# Patient Record
Sex: FEMALE | Race: WHITE | NOT HISPANIC OR LATINO | Employment: OTHER | URBAN - METROPOLITAN AREA
[De-identification: names, ages, dates, MRNs, and addresses within clinical notes are randomized per-mention and may not be internally consistent; named-entity substitution may affect disease eponyms.]

---

## 2017-01-01 ENCOUNTER — TELEPHONE (OUTPATIENT)
Dept: ONCOLOGY | Facility: CLINIC | Age: 77
End: 2017-01-01

## 2017-01-01 ENCOUNTER — APPOINTMENT (OUTPATIENT)
Dept: CT IMAGING | Facility: HOSPITAL | Age: 77
End: 2017-01-01

## 2017-01-01 ENCOUNTER — APPOINTMENT (OUTPATIENT)
Dept: GENERAL RADIOLOGY | Facility: HOSPITAL | Age: 77
End: 2017-01-01

## 2017-01-01 ENCOUNTER — ANESTHESIA EVENT (OUTPATIENT)
Dept: PERIOP | Facility: HOSPITAL | Age: 77
End: 2017-01-01

## 2017-01-01 ENCOUNTER — HOSPITAL ENCOUNTER (EMERGENCY)
Facility: HOSPITAL | Age: 77
Discharge: HOME | End: 2017-03-12
Attending: EMERGENCY MEDICINE | Admitting: EMERGENCY MEDICINE

## 2017-01-01 ENCOUNTER — OFFICE VISIT (OUTPATIENT)
Dept: ONCOLOGY | Facility: CLINIC | Age: 77
End: 2017-01-01

## 2017-01-01 ENCOUNTER — HOSPITAL ENCOUNTER (INPATIENT)
Facility: HOSPITAL | Age: 77
LOS: 5 days | Discharge: NURSING, REHAB OR LONG-TERM CARE | End: 2018-01-04
Attending: EMERGENCY MEDICINE | Admitting: INTERNAL MEDICINE

## 2017-01-01 ENCOUNTER — ANESTHESIA (OUTPATIENT)
Dept: PERIOP | Facility: HOSPITAL | Age: 77
End: 2017-01-01

## 2017-01-01 VITALS
DIASTOLIC BLOOD PRESSURE: 74 MMHG | SYSTOLIC BLOOD PRESSURE: 135 MMHG | HEIGHT: 61 IN | HEART RATE: 102 BPM | RESPIRATION RATE: 16 BRPM | BODY MASS INDEX: 16.8 KG/M2 | WEIGHT: 89 LBS | TEMPERATURE: 97.4 F

## 2017-01-01 VITALS
WEIGHT: 90 LBS | SYSTOLIC BLOOD PRESSURE: 117 MMHG | RESPIRATION RATE: 16 BRPM | TEMPERATURE: 98.3 F | HEART RATE: 111 BPM | OXYGEN SATURATION: 98 % | DIASTOLIC BLOOD PRESSURE: 91 MMHG | BODY MASS INDEX: 16.99 KG/M2 | HEIGHT: 61 IN

## 2017-01-01 DIAGNOSIS — S92.331A CLOSED DISPLACED FRACTURE OF THIRD METATARSAL BONE OF RIGHT FOOT, INITIAL ENCOUNTER: ICD-10-CM
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
## 2017-01-01 DIAGNOSIS — C50.111 MALIGNANT NEOPLASM OF CENTRAL PORTION OF RIGHT FEMALE BREAST (HCC): ICD-10-CM

## 2017-01-01 DIAGNOSIS — C50.111 MALIGNANT NEOPLASM OF CENTRAL PORTION OF RIGHT FEMALE BREAST (HCC): Primary | ICD-10-CM
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
## 2017-01-01 DIAGNOSIS — S72.001A CLOSED FRACTURE OF RIGHT HIP, INITIAL ENCOUNTER (HCC): ICD-10-CM

## 2017-01-01 DIAGNOSIS — Z78.9 IMPAIRED MOBILITY AND ADLS: ICD-10-CM

## 2017-01-01 DIAGNOSIS — S92.324A CLOSED NONDISPLACED FRACTURE OF SECOND METATARSAL BONE OF RIGHT FOOT, INITIAL ENCOUNTER: Primary | ICD-10-CM

## 2017-01-01 DIAGNOSIS — Z74.09 IMPAIRED MOBILITY AND ADLS: ICD-10-CM

## 2017-01-01 DIAGNOSIS — Z74.09 IMPAIRED FUNCTIONAL MOBILITY, BALANCE, GAIT, AND ENDURANCE: ICD-10-CM

## 2017-01-01 LAB
ALBUMIN SERPL-MCNC: 4.1 G/DL (ref 3.2–4.8)
ALBUMIN/GLOB SERPL: 1.4 G/DL (ref 1.5–2.5)
ALP SERPL-CCNC: 60 U/L (ref 25–100)
ALT SERPL W P-5'-P-CCNC: 31 U/L (ref 7–40)
ANION GAP SERPL CALCULATED.3IONS-SCNC: 4 MMOL/L (ref 3–11)
ANION GAP SERPL CALCULATED.3IONS-SCNC: 6 MMOL/L (ref 3–11)
AST SERPL-CCNC: 34 U/L (ref 0–33)
BASOPHILS # BLD AUTO: 0.03 10*3/MM3 (ref 0–0.2)
BASOPHILS NFR BLD AUTO: 0.3 % (ref 0–1)
BILIRUB SERPL-MCNC: 1.2 MG/DL (ref 0.3–1.2)
BILIRUB UR QL STRIP: NEGATIVE
BUN BLD-MCNC: 13 MG/DL (ref 9–23)
BUN BLD-MCNC: 15 MG/DL (ref 9–23)
BUN/CREAT SERPL: 26 (ref 7–25)
BUN/CREAT SERPL: 30 (ref 7–25)
CALCIUM SPEC-SCNC: 8.6 MG/DL (ref 8.7–10.4)
CALCIUM SPEC-SCNC: 9.2 MG/DL (ref 8.7–10.4)
CHLORIDE SERPL-SCNC: 98 MMOL/L (ref 99–109)
CHLORIDE SERPL-SCNC: 99 MMOL/L (ref 99–109)
CLARITY UR: CLEAR
CO2 SERPL-SCNC: 26 MMOL/L (ref 20–31)
CO2 SERPL-SCNC: 28 MMOL/L (ref 20–31)
COLOR UR: YELLOW
CREAT BLD-MCNC: 0.5 MG/DL (ref 0.6–1.3)
CREAT BLD-MCNC: 0.5 MG/DL (ref 0.6–1.3)
DEPRECATED RDW RBC AUTO: 48.2 FL (ref 37–54)
DEPRECATED RDW RBC AUTO: 48.6 FL (ref 37–54)
DEPRECATED RDW RBC AUTO: 49.1 FL (ref 37–54)
EOSINOPHIL # BLD AUTO: 0.04 10*3/MM3 (ref 0–0.3)
EOSINOPHIL # BLD AUTO: 0.05 10*3/MM3 (ref 0–0.3)
EOSINOPHIL # BLD AUTO: 0.14 10*3/MM3 (ref 0–0.3)
EOSINOPHIL NFR BLD AUTO: 0.4 % (ref 0–3)
EOSINOPHIL NFR BLD AUTO: 0.5 % (ref 0–3)
EOSINOPHIL NFR BLD AUTO: 1.4 % (ref 0–3)
ERYTHROCYTE [DISTWIDTH] IN BLOOD BY AUTOMATED COUNT: 14.2 % (ref 11.3–14.5)
ERYTHROCYTE [DISTWIDTH] IN BLOOD BY AUTOMATED COUNT: 14.3 % (ref 11.3–14.5)
ERYTHROCYTE [DISTWIDTH] IN BLOOD BY AUTOMATED COUNT: 14.3 % (ref 11.3–14.5)
GFR SERPL CREATININE-BSD FRML MDRD: 119 ML/MIN/1.73
GFR SERPL CREATININE-BSD FRML MDRD: 119 ML/MIN/1.73
GLOBULIN UR ELPH-MCNC: 3 GM/DL
GLUCOSE BLD-MCNC: 109 MG/DL (ref 70–100)
GLUCOSE BLD-MCNC: 113 MG/DL (ref 70–100)
GLUCOSE UR STRIP-MCNC: NEGATIVE MG/DL
HCT VFR BLD AUTO: 32.9 % (ref 34.5–44)
HCT VFR BLD AUTO: 36.6 % (ref 34.5–44)
HCT VFR BLD AUTO: 38.7 % (ref 34.5–44)
HGB BLD-MCNC: 10.7 G/DL (ref 11.5–15.5)
HGB BLD-MCNC: 11.7 G/DL (ref 11.5–15.5)
HGB BLD-MCNC: 13 G/DL (ref 11.5–15.5)
HGB UR QL STRIP.AUTO: NEGATIVE
IMM GRANULOCYTES # BLD: 0.01 10*3/MM3 (ref 0–0.03)
IMM GRANULOCYTES # BLD: 0.02 10*3/MM3 (ref 0–0.03)
IMM GRANULOCYTES # BLD: 0.02 10*3/MM3 (ref 0–0.03)
IMM GRANULOCYTES NFR BLD: 0.1 % (ref 0–0.6)
IMM GRANULOCYTES NFR BLD: 0.2 % (ref 0–0.6)
IMM GRANULOCYTES NFR BLD: 0.2 % (ref 0–0.6)
KETONES UR QL STRIP: ABNORMAL
LEUKOCYTE ESTERASE UR QL STRIP.AUTO: NEGATIVE
LYMPHOCYTES # BLD AUTO: 0.75 10*3/MM3 (ref 0.6–4.8)
LYMPHOCYTES # BLD AUTO: 0.94 10*3/MM3 (ref 0.6–4.8)
LYMPHOCYTES # BLD AUTO: 1.28 10*3/MM3 (ref 0.6–4.8)
LYMPHOCYTES NFR BLD AUTO: 12.8 % (ref 24–44)
LYMPHOCYTES NFR BLD AUTO: 7.2 % (ref 24–44)
LYMPHOCYTES NFR BLD AUTO: 8.4 % (ref 24–44)
MCH RBC QN AUTO: 30.1 PG (ref 27–31)
MCH RBC QN AUTO: 30.5 PG (ref 27–31)
MCH RBC QN AUTO: 31.2 PG (ref 27–31)
MCHC RBC AUTO-ENTMCNC: 32 G/DL (ref 32–36)
MCHC RBC AUTO-ENTMCNC: 32.5 G/DL (ref 32–36)
MCHC RBC AUTO-ENTMCNC: 33.6 G/DL (ref 32–36)
MCV RBC AUTO: 92.8 FL (ref 80–99)
MCV RBC AUTO: 93.7 FL (ref 80–99)
MCV RBC AUTO: 94.1 FL (ref 80–99)
MONOCYTES # BLD AUTO: 0.98 10*3/MM3 (ref 0–1)
MONOCYTES # BLD AUTO: 1.1 10*3/MM3 (ref 0–1)
MONOCYTES # BLD AUTO: 1.15 10*3/MM3 (ref 0–1)
MONOCYTES NFR BLD AUTO: 11.5 % (ref 0–12)
MONOCYTES NFR BLD AUTO: 9.4 % (ref 0–12)
MONOCYTES NFR BLD AUTO: 9.8 % (ref 0–12)
NEUTROPHILS # BLD AUTO: 7.37 10*3/MM3 (ref 1.5–8.3)
NEUTROPHILS # BLD AUTO: 8.63 10*3/MM3 (ref 1.5–8.3)
NEUTROPHILS # BLD AUTO: 9.07 10*3/MM3 (ref 1.5–8.3)
NEUTROPHILS NFR BLD AUTO: 73.9 % (ref 41–71)
NEUTROPHILS NFR BLD AUTO: 80.9 % (ref 41–71)
NEUTROPHILS NFR BLD AUTO: 82.4 % (ref 41–71)
NITRITE UR QL STRIP: NEGATIVE
PH UR STRIP.AUTO: 7.5 [PH] (ref 5–8)
PLATELET # BLD AUTO: 160 10*3/MM3 (ref 150–450)
PLATELET # BLD AUTO: 165 10*3/MM3 (ref 150–450)
PLATELET # BLD AUTO: 172 10*3/MM3 (ref 150–450)
PMV BLD AUTO: 11.4 FL (ref 6–12)
PMV BLD AUTO: 11.5 FL (ref 6–12)
PMV BLD AUTO: 11.7 FL (ref 6–12)
POTASSIUM BLD-SCNC: 3.4 MMOL/L (ref 3.5–5.5)
POTASSIUM BLD-SCNC: 3.7 MMOL/L (ref 3.5–5.5)
PROT SERPL-MCNC: 7.1 G/DL (ref 5.7–8.2)
PROT UR QL STRIP: NEGATIVE
RBC # BLD AUTO: 3.51 10*6/MM3 (ref 3.89–5.14)
RBC # BLD AUTO: 3.89 10*6/MM3 (ref 3.89–5.14)
RBC # BLD AUTO: 4.17 10*6/MM3 (ref 3.89–5.14)
SODIUM BLD-SCNC: 130 MMOL/L (ref 132–146)
SODIUM BLD-SCNC: 131 MMOL/L (ref 132–146)
SP GR UR STRIP: 1.02 (ref 1–1.03)
TROPONIN I SERPL-MCNC: 0 NG/ML (ref 0–0.07)
TROPONIN I SERPL-MCNC: 0 NG/ML (ref 0–0.07)
UROBILINOGEN UR QL STRIP: ABNORMAL
WBC NRBC COR # BLD: 10.46 10*3/MM3 (ref 3.5–10.8)
WBC NRBC COR # BLD: 11.2 10*3/MM3 (ref 3.5–10.8)
WBC NRBC COR # BLD: 9.98 10*3/MM3 (ref 3.5–10.8)

## 2017-01-01 PROCEDURE — 97162 PT EVAL MOD COMPLEX 30 MIN: CPT

## 2017-01-01 PROCEDURE — 99283 EMERGENCY DEPT VISIT LOW MDM: CPT

## 2017-01-01 PROCEDURE — A9270 NON-COVERED ITEM OR SERVICE: HCPCS | Performed by: FAMILY MEDICINE

## 2017-01-01 PROCEDURE — 25010000002 NEOSTIGMINE PER 0.5 MG: Performed by: ANESTHESIOLOGY

## 2017-01-01 PROCEDURE — 70470 CT HEAD/BRAIN W/O & W/DYE: CPT

## 2017-01-01 PROCEDURE — A9270 NON-COVERED ITEM OR SERVICE: HCPCS | Performed by: ORTHOPAEDIC SURGERY

## 2017-01-01 PROCEDURE — A9270 NON-COVERED ITEM OR SERVICE: HCPCS | Performed by: PHYSICIAN ASSISTANT

## 2017-01-01 PROCEDURE — 80048 BASIC METABOLIC PNL TOTAL CA: CPT | Performed by: FAMILY MEDICINE

## 2017-01-01 PROCEDURE — 71010 HC CHEST PA OR AP: CPT

## 2017-01-01 PROCEDURE — C1713 ANCHOR/SCREW BN/BN,TIS/BN: HCPCS | Performed by: ORTHOPAEDIC SURGERY

## 2017-01-01 PROCEDURE — 25010000002 ENOXAPARIN PER 10 MG: Performed by: ORTHOPAEDIC SURGERY

## 2017-01-01 PROCEDURE — 25010000002 DEXAMETHASONE PER 1 MG: Performed by: ANESTHESIOLOGY

## 2017-01-01 PROCEDURE — 63710000001 CALCIUM CARB-CHOLECALCIFEROL 600-800 MG-UNIT TABLET: Performed by: FAMILY MEDICINE

## 2017-01-01 PROCEDURE — 76000 FLUOROSCOPY <1 HR PHYS/QHP: CPT

## 2017-01-01 PROCEDURE — 63710000001 DOCUSATE SODIUM 100 MG CAPSULE: Performed by: FAMILY MEDICINE

## 2017-01-01 PROCEDURE — 99221 1ST HOSP IP/OBS SF/LOW 40: CPT | Performed by: INTERNAL MEDICINE

## 2017-01-01 PROCEDURE — 25010000003 CEFAZOLIN PER 500 MG: Performed by: ANESTHESIOLOGY

## 2017-01-01 PROCEDURE — 63710000001 MULTIVITAMIN WITH MINERALS TABLET: Performed by: FAMILY MEDICINE

## 2017-01-01 PROCEDURE — 63710000001 OCUVITE-LUTEIN TABLET: Performed by: FAMILY MEDICINE

## 2017-01-01 PROCEDURE — 63710000001 POLYETHYLENE GLYCOL PACK: Performed by: PHYSICIAN ASSISTANT

## 2017-01-01 PROCEDURE — 99213 OFFICE O/P EST LOW 20 MIN: CPT | Performed by: NURSE PRACTITIONER

## 2017-01-01 PROCEDURE — G8978 MOBILITY CURRENT STATUS: HCPCS

## 2017-01-01 PROCEDURE — 63710000001 HYDROCODONE-ACETAMINOPHEN 5-325 MG TABLET: Performed by: ORTHOPAEDIC SURGERY

## 2017-01-01 PROCEDURE — 63710000001 DIPHENHYDRAMINE PER 50 MG: Performed by: ORTHOPAEDIC SURGERY

## 2017-01-01 PROCEDURE — 25010000002 ONDANSETRON PER 1 MG: Performed by: EMERGENCY MEDICINE

## 2017-01-01 PROCEDURE — 63710000001 SENNOSIDES-DOCUSATE SODIUM 8.6-50 MG TABLET: Performed by: PHYSICIAN ASSISTANT

## 2017-01-01 PROCEDURE — 85025 COMPLETE CBC W/AUTO DIFF WBC: CPT | Performed by: EMERGENCY MEDICINE

## 2017-01-01 PROCEDURE — 73502 X-RAY EXAM HIP UNI 2-3 VIEWS: CPT

## 2017-01-01 PROCEDURE — 25010000002 FENTANYL CITRATE (PF) 100 MCG/2ML SOLUTION: Performed by: ANESTHESIOLOGY

## 2017-01-01 PROCEDURE — 85025 COMPLETE CBC W/AUTO DIFF WBC: CPT | Performed by: ORTHOPAEDIC SURGERY

## 2017-01-01 PROCEDURE — 72192 CT PELVIS W/O DYE: CPT

## 2017-01-01 PROCEDURE — 25010000002 ONDANSETRON PER 1 MG: Performed by: ANESTHESIOLOGY

## 2017-01-01 PROCEDURE — 99222 1ST HOSP IP/OBS MODERATE 55: CPT | Performed by: FAMILY MEDICINE

## 2017-01-01 PROCEDURE — 25010000003 CEFAZOLIN 1 GM/50ML SOLUTION: Performed by: ORTHOPAEDIC SURGERY

## 2017-01-01 PROCEDURE — 99285 EMERGENCY DEPT VISIT HI MDM: CPT

## 2017-01-01 PROCEDURE — 0 IOPAMIDOL PER 1 ML: Performed by: INTERNAL MEDICINE

## 2017-01-01 PROCEDURE — 25010000002 FENTANYL CITRATE (PF) 100 MCG/2ML SOLUTION: Performed by: EMERGENCY MEDICINE

## 2017-01-01 PROCEDURE — 93005 ELECTROCARDIOGRAM TRACING: CPT | Performed by: EMERGENCY MEDICINE

## 2017-01-01 PROCEDURE — 99232 SBSQ HOSP IP/OBS MODERATE 35: CPT | Performed by: PHYSICIAN ASSISTANT

## 2017-01-01 PROCEDURE — 25010000002 PHENYLEPHRINE PER 1 ML: Performed by: ANESTHESIOLOGY

## 2017-01-01 PROCEDURE — 81003 URINALYSIS AUTO W/O SCOPE: CPT | Performed by: EMERGENCY MEDICINE

## 2017-01-01 PROCEDURE — G8979 MOBILITY GOAL STATUS: HCPCS

## 2017-01-01 PROCEDURE — 73630 X-RAY EXAM OF FOOT: CPT

## 2017-01-01 PROCEDURE — 80053 COMPREHEN METABOLIC PANEL: CPT | Performed by: EMERGENCY MEDICINE

## 2017-01-01 PROCEDURE — 84484 ASSAY OF TROPONIN QUANT: CPT

## 2017-01-01 PROCEDURE — 0QH634Z INSERTION OF INTERNAL FIXATION DEVICE INTO RIGHT UPPER FEMUR, PERCUTANEOUS APPROACH: ICD-10-PCS | Performed by: ORTHOPAEDIC SURGERY

## 2017-01-01 DEVICE — SCRW CANN 16THRD 6.5X85MM: Type: IMPLANTABLE DEVICE | Site: HIP | Status: FUNCTIONAL

## 2017-01-01 DEVICE — SCRW CANN 16THRD 6.5X75MM: Type: IMPLANTABLE DEVICE | Site: HIP | Status: FUNCTIONAL

## 2017-01-01 DEVICE — SCRW CANN 16THRD 6.5X80MM: Type: IMPLANTABLE DEVICE | Site: HIP | Status: FUNCTIONAL

## 2017-01-01 RX ORDER — HYDROMORPHONE HYDROCHLORIDE 1 MG/ML
0.5 INJECTION, SOLUTION INTRAMUSCULAR; INTRAVENOUS; SUBCUTANEOUS
Status: DISCONTINUED | OUTPATIENT
Start: 2017-01-01 | End: 2018-01-01 | Stop reason: HOSPADM

## 2017-01-01 RX ORDER — ANASTROZOLE 1 MG/1
1 TABLET ORAL DAILY
Qty: 30 TABLET | Refills: 0 | Status: SHIPPED | OUTPATIENT
Start: 2017-01-01 | End: 2017-01-01 | Stop reason: SDUPTHER

## 2017-01-01 RX ORDER — SODIUM CHLORIDE, SODIUM LACTATE, POTASSIUM CHLORIDE, CALCIUM CHLORIDE 600; 310; 30; 20 MG/100ML; MG/100ML; MG/100ML; MG/100ML
9 INJECTION, SOLUTION INTRAVENOUS CONTINUOUS
Status: DISCONTINUED | OUTPATIENT
Start: 2017-01-01 | End: 2018-01-01

## 2017-01-01 RX ORDER — HYDROCODONE BITARTRATE AND ACETAMINOPHEN 5; 325 MG/1; MG/1
2 TABLET ORAL EVERY 4 HOURS PRN
Status: DISCONTINUED | OUTPATIENT
Start: 2017-01-01 | End: 2018-01-01 | Stop reason: HOSPADM

## 2017-01-01 RX ORDER — DIPHENHYDRAMINE HYDROCHLORIDE 50 MG/ML
25 INJECTION INTRAMUSCULAR; INTRAVENOUS EVERY 6 HOURS PRN
Status: DISCONTINUED | OUTPATIENT
Start: 2017-01-01 | End: 2018-01-01 | Stop reason: HOSPADM

## 2017-01-01 RX ORDER — FENTANYL CITRATE 50 UG/ML
25 INJECTION, SOLUTION INTRAMUSCULAR; INTRAVENOUS
Status: DISCONTINUED | OUTPATIENT
Start: 2017-01-01 | End: 2017-01-01 | Stop reason: HOSPADM

## 2017-01-01 RX ORDER — ATRACURIUM BESYLATE 10 MG/ML
INJECTION, SOLUTION INTRAVENOUS AS NEEDED
Status: DISCONTINUED | OUTPATIENT
Start: 2017-01-01 | End: 2017-01-01 | Stop reason: SURG

## 2017-01-01 RX ORDER — GLYCOPYRROLATE 0.2 MG/ML
INJECTION INTRAMUSCULAR; INTRAVENOUS AS NEEDED
Status: DISCONTINUED | OUTPATIENT
Start: 2017-01-01 | End: 2017-01-01 | Stop reason: SURG

## 2017-01-01 RX ORDER — ONDANSETRON 2 MG/ML
4 INJECTION INTRAMUSCULAR; INTRAVENOUS ONCE
Status: COMPLETED | OUTPATIENT
Start: 2017-01-01 | End: 2017-01-01

## 2017-01-01 RX ORDER — SODIUM CHLORIDE 9 MG/ML
75 INJECTION, SOLUTION INTRAVENOUS CONTINUOUS
Status: DISCONTINUED | OUTPATIENT
Start: 2017-01-01 | End: 2017-01-01

## 2017-01-01 RX ORDER — ONDANSETRON 2 MG/ML
INJECTION INTRAMUSCULAR; INTRAVENOUS AS NEEDED
Status: DISCONTINUED | OUTPATIENT
Start: 2017-01-01 | End: 2017-01-01 | Stop reason: SURG

## 2017-01-01 RX ORDER — POTASSIUM CHLORIDE 1.5 G/1.77G
40 POWDER, FOR SOLUTION ORAL AS NEEDED
Status: DISCONTINUED | OUTPATIENT
Start: 2017-01-01 | End: 2018-01-01 | Stop reason: HOSPADM

## 2017-01-01 RX ORDER — SODIUM CHLORIDE, SODIUM LACTATE, POTASSIUM CHLORIDE, CALCIUM CHLORIDE 600; 310; 30; 20 MG/100ML; MG/100ML; MG/100ML; MG/100ML
INJECTION, SOLUTION INTRAVENOUS CONTINUOUS PRN
Status: DISCONTINUED | OUTPATIENT
Start: 2017-01-01 | End: 2017-01-01 | Stop reason: SURG

## 2017-01-01 RX ORDER — DOCUSATE SODIUM 100 MG/1
100 CAPSULE, LIQUID FILLED ORAL 2 TIMES DAILY PRN
Status: DISCONTINUED | OUTPATIENT
Start: 2017-01-01 | End: 2018-01-01 | Stop reason: HOSPADM

## 2017-01-01 RX ORDER — ANASTROZOLE 1 MG/1
TABLET ORAL
Qty: 90 TABLET | Refills: 2 | Status: CANCELLED | OUTPATIENT
Start: 2017-01-01

## 2017-01-01 RX ORDER — ACETAMINOPHEN 325 MG/1
650 TABLET ORAL EVERY 6 HOURS PRN
Status: DISCONTINUED | OUTPATIENT
Start: 2017-01-01 | End: 2018-01-01

## 2017-01-01 RX ORDER — ETOMIDATE 2 MG/ML
INJECTION INTRAVENOUS AS NEEDED
Status: DISCONTINUED | OUTPATIENT
Start: 2017-01-01 | End: 2017-01-01 | Stop reason: SURG

## 2017-01-01 RX ORDER — VITS A,C,E/LUTEIN/MINERALS 300MCG-200
1 TABLET ORAL DAILY
Status: DISCONTINUED | OUTPATIENT
Start: 2017-01-01 | End: 2018-01-01 | Stop reason: HOSPADM

## 2017-01-01 RX ORDER — SODIUM CHLORIDE 0.9 % (FLUSH) 0.9 %
1-10 SYRINGE (ML) INJECTION AS NEEDED
Status: DISCONTINUED | OUTPATIENT
Start: 2017-01-01 | End: 2018-01-01 | Stop reason: HOSPADM

## 2017-01-01 RX ORDER — ANASTROZOLE 1 MG/1
1 TABLET ORAL DAILY
Qty: 90 TABLET | Refills: 1 | Status: SHIPPED | OUTPATIENT
Start: 2017-01-01 | End: 2018-01-01 | Stop reason: HOSPADM

## 2017-01-01 RX ORDER — SENNA AND DOCUSATE SODIUM 50; 8.6 MG/1; MG/1
2 TABLET, FILM COATED ORAL NIGHTLY
Status: DISCONTINUED | OUTPATIENT
Start: 2017-01-01 | End: 2018-01-01 | Stop reason: HOSPADM

## 2017-01-01 RX ORDER — HYDROCODONE BITARTRATE AND ACETAMINOPHEN 5; 325 MG/1; MG/1
1 TABLET ORAL EVERY 4 HOURS PRN
Status: DISCONTINUED | OUTPATIENT
Start: 2017-01-01 | End: 2018-01-01 | Stop reason: HOSPADM

## 2017-01-01 RX ORDER — HYDROCODONE BITARTRATE AND ACETAMINOPHEN 5; 325 MG/1; MG/1
1 TABLET ORAL EVERY 6 HOURS PRN
Qty: 15 TABLET | Refills: 0 | Status: ON HOLD | OUTPATIENT
Start: 2017-01-01 | End: 2018-01-01

## 2017-01-01 RX ORDER — ONDANSETRON 2 MG/ML
4 INJECTION INTRAMUSCULAR; INTRAVENOUS EVERY 6 HOURS PRN
Status: DISCONTINUED | OUTPATIENT
Start: 2017-01-01 | End: 2018-01-01 | Stop reason: HOSPADM

## 2017-01-01 RX ORDER — ONDANSETRON 4 MG/1
4 TABLET, FILM COATED ORAL EVERY 6 HOURS PRN
Status: DISCONTINUED | OUTPATIENT
Start: 2017-01-01 | End: 2018-01-01 | Stop reason: HOSPADM

## 2017-01-01 RX ORDER — CEFAZOLIN SODIUM 1 G/3ML
INJECTION, POWDER, FOR SOLUTION INTRAMUSCULAR; INTRAVENOUS AS NEEDED
Status: DISCONTINUED | OUTPATIENT
Start: 2017-01-01 | End: 2017-01-01 | Stop reason: SURG

## 2017-01-01 RX ORDER — FENTANYL CITRATE 50 UG/ML
25 INJECTION, SOLUTION INTRAMUSCULAR; INTRAVENOUS ONCE
Status: COMPLETED | OUTPATIENT
Start: 2017-01-01 | End: 2017-01-01

## 2017-01-01 RX ORDER — FENTANYL CITRATE 50 UG/ML
25 INJECTION, SOLUTION INTRAMUSCULAR; INTRAVENOUS ONCE
Status: DISCONTINUED | OUTPATIENT
Start: 2017-01-01 | End: 2018-01-01

## 2017-01-01 RX ORDER — MAGNESIUM HYDROXIDE 1200 MG/15ML
LIQUID ORAL AS NEEDED
Status: DISCONTINUED | OUTPATIENT
Start: 2017-01-01 | End: 2017-01-01 | Stop reason: HOSPADM

## 2017-01-01 RX ORDER — ACETAMINOPHEN 325 MG/1
650 TABLET ORAL EVERY 4 HOURS PRN
Status: DISCONTINUED | OUTPATIENT
Start: 2017-01-01 | End: 2018-01-01 | Stop reason: HOSPADM

## 2017-01-01 RX ORDER — FAMOTIDINE 20 MG/1
20 TABLET, FILM COATED ORAL ONCE
Status: DISCONTINUED | OUTPATIENT
Start: 2017-01-01 | End: 2017-01-01 | Stop reason: HOSPADM

## 2017-01-01 RX ORDER — ACETAMINOPHEN 160 MG/5ML
650 SOLUTION ORAL EVERY 4 HOURS PRN
Status: DISCONTINUED | OUTPATIENT
Start: 2017-01-01 | End: 2018-01-01 | Stop reason: HOSPADM

## 2017-01-01 RX ORDER — SODIUM CHLORIDE 9 MG/ML
75 INJECTION, SOLUTION INTRAVENOUS CONTINUOUS
Status: ACTIVE | OUTPATIENT
Start: 2017-01-01 | End: 2018-01-01

## 2017-01-01 RX ORDER — BISACODYL 10 MG
10 SUPPOSITORY, RECTAL RECTAL DAILY PRN
Status: DISCONTINUED | OUTPATIENT
Start: 2017-01-01 | End: 2018-01-01 | Stop reason: HOSPADM

## 2017-01-01 RX ORDER — MULTIPLE VITAMINS W/ MINERALS TAB 9MG-400MCG
1 TAB ORAL DAILY
Status: DISCONTINUED | OUTPATIENT
Start: 2017-01-01 | End: 2018-01-01 | Stop reason: HOSPADM

## 2017-01-01 RX ORDER — LIDOCAINE HYDROCHLORIDE 10 MG/ML
INJECTION, SOLUTION INFILTRATION; PERINEURAL AS NEEDED
Status: DISCONTINUED | OUTPATIENT
Start: 2017-01-01 | End: 2017-01-01 | Stop reason: SURG

## 2017-01-01 RX ORDER — DEXAMETHASONE SODIUM PHOSPHATE 4 MG/ML
INJECTION, SOLUTION INTRA-ARTICULAR; INTRALESIONAL; INTRAMUSCULAR; INTRAVENOUS; SOFT TISSUE AS NEEDED
Status: DISCONTINUED | OUTPATIENT
Start: 2017-01-01 | End: 2017-01-01 | Stop reason: SURG

## 2017-01-01 RX ORDER — FENTANYL CITRATE 50 UG/ML
INJECTION, SOLUTION INTRAMUSCULAR; INTRAVENOUS AS NEEDED
Status: DISCONTINUED | OUTPATIENT
Start: 2017-01-01 | End: 2017-01-01 | Stop reason: SURG

## 2017-01-01 RX ORDER — CALCIUM CARBONATE 200(500)MG
2 TABLET,CHEWABLE ORAL 2 TIMES DAILY PRN
Status: DISCONTINUED | OUTPATIENT
Start: 2017-01-01 | End: 2018-01-01 | Stop reason: HOSPADM

## 2017-01-01 RX ORDER — POTASSIUM CHLORIDE 7.45 MG/ML
10 INJECTION INTRAVENOUS
Status: DISCONTINUED | OUTPATIENT
Start: 2017-01-01 | End: 2018-01-01 | Stop reason: HOSPADM

## 2017-01-01 RX ORDER — BISACODYL 5 MG/1
5 TABLET, DELAYED RELEASE ORAL DAILY PRN
Status: DISCONTINUED | OUTPATIENT
Start: 2017-01-01 | End: 2018-01-01 | Stop reason: HOSPADM

## 2017-01-01 RX ORDER — ONDANSETRON 2 MG/ML
4 INJECTION INTRAMUSCULAR; INTRAVENOUS EVERY 6 HOURS PRN
Status: DISCONTINUED | OUTPATIENT
Start: 2017-01-01 | End: 2018-01-01

## 2017-01-01 RX ORDER — SODIUM CHLORIDE 0.9 % (FLUSH) 0.9 %
1-10 SYRINGE (ML) INJECTION AS NEEDED
Status: DISCONTINUED | OUTPATIENT
Start: 2017-01-01 | End: 2017-01-01 | Stop reason: HOSPADM

## 2017-01-01 RX ORDER — POTASSIUM CHLORIDE 750 MG/1
40 CAPSULE, EXTENDED RELEASE ORAL AS NEEDED
Status: DISCONTINUED | OUTPATIENT
Start: 2017-01-01 | End: 2018-01-01 | Stop reason: HOSPADM

## 2017-01-01 RX ORDER — LIDOCAINE HYDROCHLORIDE 10 MG/ML
0.5 INJECTION, SOLUTION EPIDURAL; INFILTRATION; INTRACAUDAL; PERINEURAL ONCE AS NEEDED
Status: DISCONTINUED | OUTPATIENT
Start: 2017-01-01 | End: 2017-01-01 | Stop reason: HOSPADM

## 2017-01-01 RX ORDER — NALOXONE HCL 0.4 MG/ML
0.1 VIAL (ML) INJECTION
Status: DISCONTINUED | OUTPATIENT
Start: 2017-01-01 | End: 2018-01-01 | Stop reason: HOSPADM

## 2017-01-01 RX ORDER — HYDROMORPHONE HYDROCHLORIDE 1 MG/ML
0.2 INJECTION, SOLUTION INTRAMUSCULAR; INTRAVENOUS; SUBCUTANEOUS
Status: DISCONTINUED | OUTPATIENT
Start: 2017-01-01 | End: 2017-01-01 | Stop reason: HOSPADM

## 2017-01-01 RX ORDER — FAMOTIDINE 10 MG/ML
20 INJECTION, SOLUTION INTRAVENOUS ONCE
Status: COMPLETED | OUTPATIENT
Start: 2017-01-01 | End: 2017-01-01

## 2017-01-01 RX ORDER — DIPHENHYDRAMINE HCL 25 MG
25 CAPSULE ORAL EVERY 6 HOURS PRN
Status: DISCONTINUED | OUTPATIENT
Start: 2017-01-01 | End: 2018-01-01 | Stop reason: HOSPADM

## 2017-01-01 RX ORDER — CEFAZOLIN SODIUM 2 G/100ML
2 INJECTION, SOLUTION INTRAVENOUS ONCE
Status: DISCONTINUED | OUTPATIENT
Start: 2017-01-01 | End: 2017-01-01 | Stop reason: HOSPADM

## 2017-01-01 RX ORDER — ACETAMINOPHEN 650 MG/1
650 SUPPOSITORY RECTAL EVERY 4 HOURS PRN
Status: DISCONTINUED | OUTPATIENT
Start: 2017-01-01 | End: 2018-01-01 | Stop reason: HOSPADM

## 2017-01-01 RX ORDER — DOCUSATE SODIUM 100 MG/1
100 CAPSULE, LIQUID FILLED ORAL 2 TIMES DAILY
Status: DISCONTINUED | OUTPATIENT
Start: 2017-01-01 | End: 2017-01-01

## 2017-01-01 RX ADMIN — MULTIPLE VITAMINS W/ MINERALS TAB 1 TABLET: TAB ORAL at 09:38

## 2017-01-01 RX ADMIN — Medication 2 TABLET: at 19:59

## 2017-01-01 RX ADMIN — ONDANSETRON 4 MG: 2 INJECTION INTRAMUSCULAR; INTRAVENOUS at 19:35

## 2017-01-01 RX ADMIN — GLYCOPYRROLATE 0.4 MG: 0.2 INJECTION, SOLUTION INTRAMUSCULAR; INTRAVENOUS at 19:35

## 2017-01-01 RX ADMIN — SODIUM CHLORIDE 75 ML/HR: 9 INJECTION, SOLUTION INTRAVENOUS at 14:03

## 2017-01-01 RX ADMIN — LIDOCAINE HYDROCHLORIDE 25 MG: 10 INJECTION, SOLUTION INFILTRATION; PERINEURAL at 18:22

## 2017-01-01 RX ADMIN — FAMOTIDINE 20 MG: 10 INJECTION, SOLUTION INTRAVENOUS at 18:08

## 2017-01-01 RX ADMIN — DIPHENHYDRAMINE HYDROCHLORIDE 25 MG: 25 CAPSULE ORAL at 19:59

## 2017-01-01 RX ADMIN — Medication 3 MG: at 19:35

## 2017-01-01 RX ADMIN — FENTANYL CITRATE 50 MCG: 50 INJECTION, SOLUTION INTRAMUSCULAR; INTRAVENOUS at 18:20

## 2017-01-01 RX ADMIN — CEFAZOLIN 1 G: 330 INJECTION, POWDER, FOR SOLUTION INTRAMUSCULAR; INTRAVENOUS at 18:36

## 2017-01-01 RX ADMIN — Medication 1 TABLET: at 09:38

## 2017-01-01 RX ADMIN — SODIUM CHLORIDE, POTASSIUM CHLORIDE, SODIUM LACTATE AND CALCIUM CHLORIDE 9 ML/HR: 600; 310; 30; 20 INJECTION, SOLUTION INTRAVENOUS at 18:08

## 2017-01-01 RX ADMIN — SODIUM CHLORIDE, POTASSIUM CHLORIDE, SODIUM LACTATE AND CALCIUM CHLORIDE: 600; 310; 30; 20 INJECTION, SOLUTION INTRAVENOUS at 18:15

## 2017-01-01 RX ADMIN — Medication 1 TABLET: at 09:37

## 2017-01-01 RX ADMIN — SODIUM CHLORIDE 75 ML/HR: 9 INJECTION, SOLUTION INTRAVENOUS at 09:39

## 2017-01-01 RX ADMIN — CEFAZOLIN SODIUM 1 G: 1 INJECTION, SOLUTION INTRAVENOUS at 09:39

## 2017-01-01 RX ADMIN — CEFAZOLIN SODIUM 1 G: 1 INJECTION, SOLUTION INTRAVENOUS at 02:08

## 2017-01-01 RX ADMIN — HYDROCODONE BITARTRATE AND ACETAMINOPHEN 2 TABLET: 5; 325 TABLET ORAL at 19:59

## 2017-01-01 RX ADMIN — IOPAMIDOL 50 ML: 755 INJECTION, SOLUTION INTRAVENOUS at 18:22

## 2017-01-01 RX ADMIN — ONDANSETRON 4 MG: 2 INJECTION INTRAMUSCULAR; INTRAVENOUS at 13:36

## 2017-01-01 RX ADMIN — FENTANYL CITRATE 50 MCG: 50 INJECTION, SOLUTION INTRAMUSCULAR; INTRAVENOUS at 18:25

## 2017-01-01 RX ADMIN — POLYETHYLENE GLYCOL 3350 17 G: 17 POWDER, FOR SOLUTION ORAL at 09:37

## 2017-01-01 RX ADMIN — PHENYLEPHRINE HYDROCHLORIDE 100 MCG: 10 INJECTION INTRAVENOUS at 19:25

## 2017-01-01 RX ADMIN — FENTANYL CITRATE 25 MCG: 50 INJECTION INTRAMUSCULAR; INTRAVENOUS at 13:36

## 2017-01-01 RX ADMIN — PHENYLEPHRINE HYDROCHLORIDE 100 MCG: 10 INJECTION INTRAVENOUS at 19:23

## 2017-01-01 RX ADMIN — ENOXAPARIN SODIUM 40 MG: 40 INJECTION SUBCUTANEOUS at 09:38

## 2017-01-01 RX ADMIN — ETOMIDATE 10 MG: 2 INJECTION INTRAVENOUS at 18:22

## 2017-01-01 RX ADMIN — SODIUM CHLORIDE 75 ML/HR: 9 INJECTION, SOLUTION INTRAVENOUS at 21:30

## 2017-01-01 RX ADMIN — DEXAMETHASONE SODIUM PHOSPHATE 8 MG: 4 INJECTION, SOLUTION INTRAMUSCULAR; INTRAVENOUS at 18:28

## 2017-01-01 RX ADMIN — DOCUSATE SODIUM 100 MG: 100 CAPSULE, LIQUID FILLED ORAL at 22:14

## 2017-01-01 RX ADMIN — ATRACURIUM BESYLATE 30 MG: 10 INJECTION, SOLUTION INTRAVENOUS at 18:22

## 2017-03-12 NOTE — DISCHARGE INSTRUCTIONS
Information regarding Risks and Benefits When using Opioids and Other Controlled Substances to include Storage and Disposal of Medications    When considering the use of opioids and other controlled substances for the control of pain, anxiety, or for other medical purposes, you need to know of not only the benefits of these drugs but also of potential risks in using these drugs. These drugs, as well as more drugs, have beneficial uses; which is why their use is being considered in your   care, but they have risks involved in their use, too.    Opioids:  Opioids such as hydrocodone, oxycodone, hydromorphone, and codeine are pain relieving drugs, some more potent than others. They are most useful for moderate to more severe painful conditions. Risks include sedation, loss of coordination, decreased concentration, and decreased breathing with possibility of loss of consciousness or even death, especially if used in doses higher than prescribed. Improper usage can lead to addiction, tolerance, or overdose. In addition, many of these drugs are combined with acetaminophen (Tylenol) which can damage or destroy our liver when used excessively.  Alternatives to opioids are useful for mild to moderate pain and include ibuprofen (Motrin), naproxen (Aleve), aspirin, and acetaminophen (Tylenol). As with other drugs, these medications should be used according to directions on the label or from your doctor, as overuse can cause harm.    Benzodiazepines:  This group of drugs include: alprazolam (Xanax), diazepam (Valium), lorazepam (Ativan), and clonazepam (Klonopin). These drugs are used to control anxiety symptoms including anxiety and panic attacks. Risks using these drugs include: sedation, loss of coordination, decreased ability to concentrate, effects on memory, and decreased breathing with possibility of loss of consciousness or even death. Improper and prolonged usage can lead to addiction. An alternative without addiction  potential is hydroxyzine (Vistrail).    Other Controlled Substance:  This group includes Soma, Tramadol, stimulant drugs such as Ritalin, and others. Stimulant drugs are not medications that are prescribed by ER doctors. Soma and Tramadol have sedative and addictive affects similar to opioids with the same dangers mentioned with them.    Overdose:  If you or someone else are concerned that overdose has occurred, call 911 for transportation to the nearest hospital.    Storage and Disposal:  All medications need to be kept out of the reach of children or adults who cannot manage their own medicines. In addition, controlled substances can be targeted by criminals so extra precautions need to be taken to keep them in a safe, secure place. Any unused medications should be disposed of by flushing them down the toilet in the home setting or contact your local pharmacy.

## 2017-03-12 NOTE — ED PROVIDER NOTES
Subjective   Patient is a 76 y.o. female presenting with lower extremity pain.   History provided by:  Patient   used: No    Lower Extremity Issue   Location:  Foot  Injury: yes    Foot location:  R foot  Pain details:     Quality:  Sharp    Radiates to:  Does not radiate    Severity:  Moderate    Onset quality:  Sudden    Timing:  Constant  Chronicity:  New  Dislocation: no    Foreign body present:  No foreign bodies  Tetanus status:  Unknown  Associated symptoms: swelling    Associated symptoms: no back pain, no decreased ROM, no fever, no numbness and no stiffness        Review of Systems   Constitutional: Negative for chills and fever.   Respiratory: Negative for chest tightness, shortness of breath and wheezing.    Cardiovascular: Negative for chest pain, palpitations and leg swelling.   Musculoskeletal: Negative for back pain and stiffness.   Psychiatric/Behavioral: Negative.    All other systems reviewed and are negative.      Past Medical History   Diagnosis Date   • Allergic rhinitis    • Anxiety    • Cataract    • Malignant neoplasm of breast, right        No Known Allergies    Past Surgical History   Procedure Laterality Date   • Eye surgery       cataract   • Breast surgery       Left   • Portacath placement     • Reconstruction breast immediate / delayed w/ tissue expander Bilateral 05/2016     [facility]       History reviewed. No pertinent family history.    Social History     Social History   • Marital status:      Spouse name: N/A   • Number of children: N/A   • Years of education: N/A     Social History Main Topics   • Smoking status: Former Smoker   • Smokeless tobacco: None      Comment: Quit 30 years ago   • Alcohol use 0.6 oz/week     1 Glasses of wine per week   • Drug use: Yes   • Sexual activity: Not Asked     Other Topics Concern   • None     Social History Narrative           Objective   Physical Exam   Constitutional: She is oriented to person,  place, and time. She appears well-developed and well-nourished.   HENT:   Head: Normocephalic and atraumatic.   Right Ear: External ear normal.   Left Ear: External ear normal.   Nose: Nose normal.   Mouth/Throat: Oropharynx is clear and moist.   Eyes: Conjunctivae and EOM are normal. Pupils are equal, round, and reactive to light. No scleral icterus.   Neck: Normal range of motion. No thyromegaly present.   Pulmonary/Chest: No respiratory distress. She has no wheezes. She has no rales. She exhibits no tenderness.   Abdominal: She exhibits no distension. There is no tenderness.   Musculoskeletal: Normal range of motion.   patient has tenderness across the second and third metatarsal heads there is no ecchymosis no open wounds.   Lymphadenopathy:     She has no cervical adenopathy.   Neurological: She is alert and oriented to person, place, and time. She has normal reflexes. She displays normal reflexes. No cranial nerve deficit. Coordination normal.   Skin: Skin is warm and dry.   Psychiatric: She has a normal mood and affect. Her behavior is normal. Judgment and thought content normal.   Nursing note and vitals reviewed.      Procedures         ED Course  ED Course                  MDM  Number of Diagnoses or Management Options  Closed displaced fracture of third metatarsal bone of right foot, initial encounter: new and requires workup  Closed nondisplaced fracture of second metatarsal bone of right foot, initial encounter: new and requires workup     Amount and/or Complexity of Data Reviewed  Tests in the radiology section of CPT®: reviewed and ordered  Discuss the patient with other providers: yes    Patient Progress  Patient progress: stable      Final diagnoses:   Closed nondisplaced fracture of second metatarsal bone of right foot, initial encounter   Closed displaced fracture of third metatarsal bone of right foot, initial encounter            TY Temple  03/19/17 1214

## 2017-08-29 NOTE — TELEPHONE ENCOUNTER
----- Message from Keenan Alvarez sent at 8/29/2017  1:59 PM EDT -----  Regarding: CARMICHAEL/REFILL REQUEST  VENICE FROM [facility] CALLED ASKING FOR SOMEONE TO CALL HIM BACK ABOUT PATIENTS ARIMIDEX REFILL HE CAN BE REACHED AT [phone]

## 2017-08-29 NOTE — TELEPHONE ENCOUNTER
Patient canceled office visit in June and has not rescheduled.  Ok per Dr. Lund to give 30 day supply, but will need office visit for any additional fills.  Spoke with Jay and let him know.  He will notify patient.  Rx sent for 30 days.

## 2017-08-30 NOTE — TELEPHONE ENCOUNTER
----- Message from Luna Umaña sent at 8/30/2017  3:26 PM EDT -----  Regarding: vargas unhappy with script  Contact: [phone]  Anastrozole. 30 day supply     called with patient in the background  Complaining about the cost of the 30 day script that was called in.  He wants a 90 day supply.    He was told that we need to see her prior to getting more than 30.    They made an appt for 09 05 17 and ask if you can call and change the script to a 90 day supply due to cost.    He ask for a rtn call to let him know by the end of the day.

## 2017-08-30 NOTE — TELEPHONE ENCOUNTER
I attempted to call both the home and mobile phone lines. No answer or VM at either.     Wanted to let them know that for this month, only a 30 day Rx will be sent. Dr. Lund does not feel comfortable giving any more until he has examined the patient. Once she is seen in clinic next week, we can send 90 day supplies in, as long as she continues keeping her scheduled follow up appts.

## 2017-09-05 PROBLEM — T45.1X5A CHEMOTHERAPY-INDUCED NEUROPATHY (HCC): Status: ACTIVE | Noted: 2017-01-01

## 2017-09-05 PROBLEM — G62.0 CHEMOTHERAPY-INDUCED NEUROPATHY (HCC): Status: ACTIVE | Noted: 2017-01-01

## 2017-09-05 NOTE — PROGRESS NOTES
CHIEF COMPLAINT:   1. Breast cancer follow up                                         2. Peripheral neuropathy                                           Problem List:  Oncology/Hematology History    1. 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 A 2.2 cm  primary, Dhillon-Alexander 4 out of 9, ER and WI positive, HER-2/javid 3+, status  post right mastectomy 09/29/2015:  a) Due to multiple somatic complaints, staging CT of the head chest, abdomen,  and pelvis was done on 10/28/2015. These were negative save for a stable 7 mm  left lower lobe nodule unchanged from 2004. Total body bone scan showed  degenerative joint disease and postsurgical rib changes but no areas worrisome  for metastasis.   b) Baseline echocardiogram 10/28/2015: Estimated ejection fraction 60% to 65%.  Repeat echocardiogram prior to beginning Herceptin on 01/08/2016: Estimated  ejection fraction 55% to 60%.  c) Began dose dense Adriamycin and Cytoxan cycle #1 of a planned 4 on  11/04/2015. Cycle #2 was delayed 1 week due to afebrile neutropenia, received  cycle #2 on 11/24/2015.  d) Started Herceptin and Taxol 01/13/2016.  e) Started Arimidex 04/04/2016.   f) Started Herceptin alone on 04/06/2016.   2. History of previous left breast cancer stage I, status post mastectomy and  radiation (data deficient).        Malignant neoplasm of central portion of right female breast    9/29/2015 Initial Diagnosis     Malignant neoplasm of central portion of right female breast         7/6/2016 Adverse Reaction     Decreased EF 45%, (baseline 60-65% 10/2015).  Herceptin held          8/3/2016 -  Other Event     Repeat ECHO EF 45-50% with mild global hypokinesis.  Will d/c Herceptin.            HISTORY OF PRESENT ILLNESS:  The patient is a 77 y.o. female, here for follow up on management of adjuvant therapy breast cancer.  She is feeling well on her Arimidex.   Takes her calcium supplements.  Has not had her bone density testing yet, states that she actually has an appointment with Dr. Baum tomorrow and will discuss.  Presently her only residual complaint is of persistent though not worsening dysesthesias of both her fingertips and toes, states that it may actually be slightly improved in her fingers, but stable in her toes.  Was asking about the medication that a family member stated she had heard of for this symptom.  Did have a foot fracture in the spring after an accident, healed without complication.      Past Medical History:   Diagnosis Date   • Allergic rhinitis    • Anxiety    • Cataract    • Malignant neoplasm of breast, right      Past Surgical History:   Procedure Laterality Date   • BREAST SURGERY      Left   • EYE SURGERY      cataract   • PORTACATH PLACEMENT     • RECONSTRUCTION BREAST IMMEDIATE / DELAYED W/ TISSUE EXPANDER Bilateral 05/2016    [facility]       No Known Allergies    Family History and Social History reviewed and changed as necessary      REVIEW OF SYSTEM:   Review of Systems   Constitutional: Negative for appetite change, chills, diaphoresis, fatigue, fever and unexpected weight change.   HENT:   Negative for mouth sores, sore throat and trouble swallowing.    Eyes: Negative for icterus.   Respiratory: Negative for cough, hemoptysis and shortness of breath.    Cardiovascular: Negative for chest pain, leg swelling and palpitations.   Gastrointestinal: Negative for abdominal distention, abdominal pain, blood in stool, constipation, diarrhea, nausea and vomiting.   Endocrine: Negative for hot flashes.   Genitourinary: Negative for bladder incontinence, difficulty urinating, dysuria, frequency and hematuria.    Musculoskeletal: Negative for gait problem, neck pain and neck stiffness.   Skin: Negative for rash.   Neurological: Negative for dizziness, gait problem, headaches, light-headedness and numbness.   Hematological:  "Negative for adenopathy. Does not bruise/bleed easily.   Psychiatric/Behavioral: Negative for depression. The patient is not nervous/anxious.    All other systems reviewed and are negative except as stated above in the history of present illness.       PHYSICAL EXAM    Vitals:    09/05/17 1135   BP: 135/74   Pulse: 102   Resp: 16   Temp: 97.4 °F (36.3 °C)   Weight: 89 lb (40.4 kg)   Height: 61\" (154.9 cm)     Constitutional: Appears well-developed and well-nourished. No distress.   ECOG: (0) Fully active, able to carry on all predisease performance without restriction  HENT:   Head: Normocephalic.   Mouth/Throat: Oropharynx is clear and moist.   Eyes: Conjunctivae are normal. Pupils are equal, round, and reactive to light. No scleral icterus.   Neck: Neck supple. No JVD present. No thyromegaly present.   Cardiovascular: Normal rate, regular rhythm and normal heart sounds.    Pulmonary/Chest: Breath sounds normal. No respiratory distress.   Abdominal: Soft. Exhibits no distension and no mass. There is no hepatosplenomegaly. There is no tenderness. There is no rebound and no guarding.   Musculoskeletal:Exhibits no edema, tenderness or deformity.   Neurological: Alert and oriented to person, place, and time. Exhibits normal muscle tone.   Skin: No ecchymosis, no petechiae and no rash noted. Not diaphoretic. No cyanosis. Nails show no clubbing.   Psychiatric: Normal mood and affect.   Vitals reviewed.  Chest wall: Bilateral chest wall beneath bilateral reconstructions benign  Nodes: No cervical, supraclavicular or axillary nodes palpable on exam.      Assessment/Plan     1. Breast cancer:   2.   History of drug-induced decreased ejection fraction  3.   Neuropathy    Discussion:  The patient has no evidence of disease on clinical exam and no new worrisome symptoms.  We will continue Arimidex until April 2026, she is overall tolerating therapy without any significant side effects.  We did discuss breast cancer index " testing and may consider doing that but she wants to wait until she has done a few more years of therapy before we send that off.  She will get bone densities with Dr. Baum.  Her ejection fraction has recovered her echocardiogram November 2016 and no further plans on repeating echo in the absence of symptoms.  Her neuropathy is stable.  We did discuss referral to neurology that she wants to hold off on that for now.  We also discussed medications for neuropathy but I explained that these medications were not curative they would just treat the symptoms and currently she is not having any pain or significant dysesthesias they just feel numb.  No indication for any medications at this time.  We did refill her Arimidex 1 mg daily with quantity of 90 and will refill this for 1 year.  We will see her back in 1 year for follow-up and certainly sooner if she has any concerns.      Leighann Mojica, KRISS    09/05/2017

## 2017-10-04 NOTE — TELEPHONE ENCOUNTER
I reviewed the results of Mrs. Teran's DEXA scan, she does have osteoporosis.  I called and spoke with her and she is waiting to hear from Dr. Baum.  I discussed with her that she has osteoporosis and would benefit from bisphosphonate therapy.  She will let me know after she speaks with Dr. Baum.  She does take calcium and Vit D.
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
## 2017-12-30 PROBLEM — E87.1 HYPONATREMIA: Status: ACTIVE | Noted: 2017-01-01
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
## 2017-12-30 PROBLEM — Z85.3 HISTORY OF BREAST CANCER: Chronic | Status: ACTIVE | Noted: 2017-01-01

## 2017-12-30 PROBLEM — S72.001A CLOSED RIGHT HIP FRACTURE (HCC): Status: ACTIVE | Noted: 2017-01-01

## 2017-12-30 NOTE — ANESTHESIA PROCEDURE NOTES
Airway  Urgency: elective    End Time:12/30/2017 6:15 PM  Airway not difficult    General Information and Staff    Patient location during procedure: OR  Anesthesiologist: GEOFFREY SPENCER    Indications and Patient Condition  Indications for airway management: airway protection    Preoxygenated: yes  MILS not maintained throughout  Mask difficulty assessment: 1 - vent by mask    Final Airway Details  Final airway type: endotracheal airway      Successful airway: ETT  Cuffed: yes   Successful intubation technique: direct laryngoscopy  Facilitating devices/methods: Bougie  Endotracheal tube insertion site: oral  Blade: Caio  Blade size: #3  ETT size: 7.0 mm  Cormack-Lehane Classification: grade IIb - view of arytenoids or posterior of glottis only  Placement verified by: chest auscultation and capnometry   Measured from: lips  ETT to lips (cm): 20  Number of attempts at approach: 1    Additional Comments  Negative epigastric sounds, Breath sound equal bilaterally with symmetric chest rise and fall  Intubated easily with eshmann stylet

## 2017-12-30 NOTE — ANESTHESIA PROCEDURE NOTES
Peripheral Block    Patient location during procedure: pre-op  Reason for block: procedure for pain and at surgeon's request  Performed by  Anesthesiologist: GEOFFREY SPENCER  Preanesthetic Checklist  Completed: patient identified, site marked, surgical consent, pre-op evaluation, timeout performed, IV checked, risks and benefits discussed and monitors and equipment checked  Prep:  Pt Position: supine  Sterile barriers:cap, gloves and mask  Prep: ChloraPrep  Patient monitoring: blood pressure monitoring, continuous pulse oximetry and EKG  Procedure  Performed under: general  Guidance:ultrasound guided  Images:still images obtained    Block Type:fascia iliaca catheter  Injection Technique:catheter  Needle Type:echogenic  Needle Gauge:18 G    Catheter Size:20 G (20g)  Medications  Preservative Free Saline:10ml  Local Injected:ropivacaine 0.2% Local Amount Injected:30mL  Post Assessment  Injection Assessment: negative aspiration for heme, no paresthesia on injection and incremental injection  Patient Tolerance:comfortable throughout block  Complications:no  Additional Notes  Procedure:                 Pt placed in supine position.   The insertion site was prepped in sterile fashion with Chlorapreop and clear plastic drapes.  Analgesia was provided by skin infiltration at insertion site with Lidocaine 1% 3mls.  A B-Sharp 18 g , 4 inch echogenic Touhy needle was advance In-plane under ultrasound guidance. The   Anterior superior Iliac crest was initially visualized and the probe was directed slightly medially and slightly towards the umbilicus.  The course of the needle was tracked over the sartorius muscle through the fascia Iliacus and into the anterior portion of the Iliacus muscle.  Major vessels where identified and avoided as where structures of the peritoneal cavity.  LA injection was made incrementally in 1-5ml amounts spread was visualized superiorly below fascia iliacus.  Injection was completed with negative  aspiration of blood and negative intravascular injection.  Injection pressures where normal or minimal resistance.  A 20 g B-Sharp wire styleted catheter was then advance thru the needle and very easily placed in a superior or cephalad direction.  The catheter was secured at insertion site with skin afix , mastisol, steristreps.  A CHG tegaderm dressing was placed over the insertion site and the nerve catheter labeled and capped.  Thank You.  Because  Of continued oozing from site catheter removed before surgery and pressure held

## 2017-12-30 NOTE — ANESTHESIA PREPROCEDURE EVALUATION
Anesthesia Evaluation       NPO Liquid Status: Waived due to emergency     Airway   Mallampati: II  TM distance: >3 FB  Neck ROM: full  possible difficult intubation  Dental          Pulmonary    Cardiovascular   Exercise tolerance: good (4-7 METS)    Rhythm: regular  Rate: normal    (+) valvular problems/murmurs AS, murmur,       Neuro/Psych  GI/Hepatic/Renal/Endo      Musculoskeletal     Abdominal    Substance History      OB/GYN          Other            Phys Exam Other: Loud systolic heart murmur                                        Anesthesia Plan    ASA 2     general     intravenous induction   Anesthetic plan and risks discussed with patient.    FIAc for post op pain

## 2017-12-31 PROBLEM — E87.6 HYPOKALEMIA: Status: ACTIVE | Noted: 2017-01-01

## 2017-12-31 PROBLEM — D64.9 POSTOPERATIVE ANEMIA: Status: ACTIVE | Noted: 2017-01-01

## 2017-12-31 NOTE — ANESTHESIA POSTPROCEDURE EVALUATION
Patient: Cassidy JACINTO Teran    Procedure Summary     Date Anesthesia Start Anesthesia Stop Room / Location    12/30/17 1817 1949 [facility] / [facility]       Procedure Diagnosis Surgeon Provider    HIP CANNULATED SCREW PLACEMENT (Right Hip) No diagnosis on file. MD Ze Lynch MD          Anesthesia Type: general  Last vitals  BP   113/77 (12/30/17 1745)   Temp   98.3 °F (36.8 °C) (12/30/17 1255)   Pulse   96 (12/30/17 1747)   Resp   16 (12/30/17 1255)     SpO2   96 % (12/30/17 1747)     Post Anesthesia Care and Evaluation    Patient location during evaluation: PACU  Patient participation: complete - patient participated  Level of consciousness: sleepy but conscious  Pain score: 0  Pain management: adequate  Airway patency: patent  Anesthetic complications: No anesthetic complications  PONV Status: none  Cardiovascular status: hemodynamically stable and acceptable  Respiratory status: nonlabored ventilation, acceptable and nasal cannula  Hydration status: acceptable

## 2018-01-01 ENCOUNTER — APPOINTMENT (OUTPATIENT)
Dept: CARDIOLOGY | Facility: HOSPITAL | Age: 78
End: 2018-01-01
Attending: INTERNAL MEDICINE

## 2018-01-01 ENCOUNTER — APPOINTMENT (OUTPATIENT)
Dept: NEUROLOGY | Facility: HOSPITAL | Age: 78
End: 2018-01-01
Attending: PSYCHIATRY & NEUROLOGY

## 2018-01-01 ENCOUNTER — HOSPITAL ENCOUNTER (EMERGENCY)
Facility: HOSPITAL | Age: 78
Discharge: HOME | End: 2018-01-08
Attending: EMERGENCY MEDICINE | Admitting: EMERGENCY MEDICINE

## 2018-01-01 ENCOUNTER — APPOINTMENT (OUTPATIENT)
Dept: GENERAL RADIOLOGY | Facility: HOSPITAL | Age: 78
End: 2018-01-01

## 2018-01-01 ENCOUNTER — APPOINTMENT (OUTPATIENT)
Dept: CT IMAGING | Facility: HOSPITAL | Age: 78
End: 2018-01-01

## 2018-01-01 ENCOUNTER — HOSPITAL ENCOUNTER (EMERGENCY)
Facility: HOSPITAL | Age: 78
Discharge: NURSING, REHAB OR LONG-TERM CARE | End: 2018-01-13
Attending: EMERGENCY MEDICINE | Admitting: EMERGENCY MEDICINE

## 2018-01-01 ENCOUNTER — APPOINTMENT (OUTPATIENT)
Dept: MRI IMAGING | Facility: HOSPITAL | Age: 78
End: 2018-01-01

## 2018-01-01 ENCOUNTER — LAB REQUISITION (OUTPATIENT)
Dept: LAB | Facility: HOSPITAL | Age: 78
End: 2018-01-01

## 2018-01-01 ENCOUNTER — APPOINTMENT (OUTPATIENT)
Dept: GENERAL RADIOLOGY | Facility: HOSPITAL | Age: 78
End: 2018-01-01
Attending: PSYCHIATRY & NEUROLOGY

## 2018-01-01 ENCOUNTER — APPOINTMENT (OUTPATIENT)
Dept: MRI IMAGING | Facility: HOSPITAL | Age: 78
End: 2018-01-01
Attending: PSYCHIATRY & NEUROLOGY

## 2018-01-01 ENCOUNTER — HOSPITAL ENCOUNTER (INPATIENT)
Facility: HOSPITAL | Age: 78
LOS: 29 days | End: 2018-02-14
Attending: EMERGENCY MEDICINE | Admitting: INTERNAL MEDICINE

## 2018-01-01 VITALS
HEART RATE: 88 BPM | OXYGEN SATURATION: 98 % | RESPIRATION RATE: 21 BRPM | HEIGHT: 61 IN | TEMPERATURE: 99 F | WEIGHT: 98.7 LBS | BODY MASS INDEX: 18.63 KG/M2 | DIASTOLIC BLOOD PRESSURE: 94 MMHG | SYSTOLIC BLOOD PRESSURE: 165 MMHG

## 2018-01-01 VITALS
WEIGHT: 85 LBS | OXYGEN SATURATION: 99 % | RESPIRATION RATE: 14 BRPM | SYSTOLIC BLOOD PRESSURE: 122 MMHG | HEART RATE: 109 BPM | DIASTOLIC BLOOD PRESSURE: 74 MMHG | TEMPERATURE: 98.2 F | HEIGHT: 61 IN | BODY MASS INDEX: 16.05 KG/M2

## 2018-01-01 VITALS
DIASTOLIC BLOOD PRESSURE: 79 MMHG | HEART RATE: 98 BPM | HEIGHT: 64 IN | TEMPERATURE: 98.6 F | RESPIRATION RATE: 14 BRPM | SYSTOLIC BLOOD PRESSURE: 137 MMHG | OXYGEN SATURATION: 97 % | BODY MASS INDEX: 14.53 KG/M2 | WEIGHT: 85.1 LBS

## 2018-01-01 VITALS
WEIGHT: 90 LBS | DIASTOLIC BLOOD PRESSURE: 77 MMHG | SYSTOLIC BLOOD PRESSURE: 122 MMHG | HEART RATE: 99 BPM | BODY MASS INDEX: 16.99 KG/M2 | HEIGHT: 61 IN | OXYGEN SATURATION: 98 % | RESPIRATION RATE: 16 BRPM | TEMPERATURE: 98.1 F

## 2018-01-01 DIAGNOSIS — I48.19 PERSISTENT ATRIAL FIBRILLATION (HCC): ICD-10-CM

## 2018-01-01 DIAGNOSIS — R41.0 CONFUSION: ICD-10-CM

## 2018-01-01 DIAGNOSIS — A41.9 SEPSIS, DUE TO UNSPECIFIED ORGANISM: Primary | ICD-10-CM

## 2018-01-01 DIAGNOSIS — I48.91 ATRIAL FIBRILLATION WITH RVR (HCC): ICD-10-CM

## 2018-01-01 DIAGNOSIS — S00.93XA TRAUMATIC HEMATOMA OF HEAD, INITIAL ENCOUNTER: ICD-10-CM

## 2018-01-01 DIAGNOSIS — S09.90XA INJURY OF HEAD, INITIAL ENCOUNTER: Primary | ICD-10-CM

## 2018-01-01 DIAGNOSIS — S80.11XA LEG HEMATOMA, RIGHT, INITIAL ENCOUNTER: ICD-10-CM

## 2018-01-01 DIAGNOSIS — T14.8XXA HEMATOMA: ICD-10-CM

## 2018-01-01 DIAGNOSIS — Z74.09 IMPAIRED MOBILITY AND ADLS: ICD-10-CM

## 2018-01-01 DIAGNOSIS — R13.10 DYSPHAGIA, UNSPECIFIED TYPE: ICD-10-CM

## 2018-01-01 DIAGNOSIS — J10.1 INFLUENZA A: ICD-10-CM

## 2018-01-01 DIAGNOSIS — E86.0 DEHYDRATION, MODERATE: ICD-10-CM

## 2018-01-01 DIAGNOSIS — Z78.9 IMPAIRED MOBILITY AND ADLS: ICD-10-CM

## 2018-01-01 DIAGNOSIS — S70.01XA HEMATOMA OF RIGHT HIP, INITIAL ENCOUNTER: Primary | ICD-10-CM

## 2018-01-01 DIAGNOSIS — Z74.09 IMPAIRED FUNCTIONAL MOBILITY, BALANCE, GAIT, AND ENDURANCE: ICD-10-CM

## 2018-01-01 DIAGNOSIS — Z00.00 ROUTINE GENERAL MEDICAL EXAMINATION AT A HEALTH CARE FACILITY: ICD-10-CM

## 2018-01-01 LAB
ACTH PLAS-MCNC: 4.3 PG/ML (ref 7.2–63.3)
ALB CSF/SERPL: 13 {RATIO} (ref 0–8)
ALBUMIN CSF-MCNC: 42 MG/DL (ref 11–48)
ALBUMIN SERPL-MCNC: 2.2 G/DL (ref 3.2–4.8)
ALBUMIN SERPL-MCNC: 2.3 G/DL (ref 3.2–4.8)
ALBUMIN SERPL-MCNC: 2.3 G/DL (ref 3.2–4.8)
ALBUMIN SERPL-MCNC: 2.7 G/DL (ref 3.2–4.8)
ALBUMIN SERPL-MCNC: 2.7 G/DL (ref 3.2–4.8)
ALBUMIN SERPL-MCNC: 2.8 G/DL (ref 3.2–4.8)
ALBUMIN SERPL-MCNC: 3.1 G/DL (ref 3.2–4.8)
ALBUMIN SERPL-MCNC: 3.2 G/DL (ref 3.5–4.8)
ALBUMIN SERPL-MCNC: 3.3 G/DL (ref 3.2–4.8)
ALBUMIN SERPL-MCNC: 3.4 G/DL (ref 3.2–4.8)
ALBUMIN SERPL-MCNC: 3.6 G/DL (ref 3.2–4.8)
ALBUMIN SERPL-MCNC: 3.7 G/DL (ref 3.2–4.8)
ALBUMIN SERPL-MCNC: 3.8 G/DL (ref 3.2–4.8)
ALBUMIN SERPL-MCNC: 3.8 G/DL (ref 3.2–4.8)
ALBUMIN SERPL-MCNC: 3.9 G/DL (ref 3.2–4.8)
ALBUMIN/GLOB SERPL: 0.5 G/DL (ref 1.5–2.5)
ALBUMIN/GLOB SERPL: 0.5 G/DL (ref 1.5–2.5)
ALBUMIN/GLOB SERPL: 0.6 G/DL (ref 1.5–2.5)
ALBUMIN/GLOB SERPL: 0.7 G/DL (ref 1.5–2.5)
ALBUMIN/GLOB SERPL: 0.7 G/DL (ref 1.5–2.5)
ALBUMIN/GLOB SERPL: 0.8 G/DL (ref 1.5–2.5)
ALBUMIN/GLOB SERPL: 0.9 G/DL (ref 1.5–2.5)
ALBUMIN/GLOB SERPL: 1.2 G/DL (ref 1.5–2.5)
ALBUMIN/GLOB SERPL: 1.3 G/DL (ref 1.5–2.5)
ALBUMIN/GLOB SERPL: 1.3 G/DL (ref 1.5–2.5)
ALBUMIN/GLOB SERPL: 1.5 G/DL (ref 1.5–2.5)
ALP SERPL-CCNC: 55 U/L (ref 25–100)
ALP SERPL-CCNC: 62 U/L (ref 25–100)
ALP SERPL-CCNC: 65 U/L (ref 25–100)
ALP SERPL-CCNC: 67 U/L (ref 25–100)
ALP SERPL-CCNC: 68 U/L (ref 25–100)
ALP SERPL-CCNC: 69 U/L (ref 25–100)
ALP SERPL-CCNC: 69 U/L (ref 25–100)
ALP SERPL-CCNC: 70 U/L (ref 25–100)
ALP SERPL-CCNC: 70 U/L (ref 25–100)
ALP SERPL-CCNC: 72 U/L (ref 25–100)
ALP SERPL-CCNC: 74 U/L (ref 25–100)
ALP SERPL-CCNC: 80 U/L (ref 25–100)
ALP SERPL-CCNC: 80 U/L (ref 25–100)
ALP SERPL-CCNC: 94 U/L (ref 25–100)
ALT SERPL W P-5'-P-CCNC: 105 U/L (ref 7–40)
ALT SERPL W P-5'-P-CCNC: 110 U/L (ref 7–40)
ALT SERPL W P-5'-P-CCNC: 121 U/L (ref 7–40)
ALT SERPL W P-5'-P-CCNC: 21 U/L (ref 7–40)
ALT SERPL W P-5'-P-CCNC: 24 U/L (ref 7–40)
ALT SERPL W P-5'-P-CCNC: 30 U/L (ref 7–40)
ALT SERPL W P-5'-P-CCNC: 31 U/L (ref 7–40)
ALT SERPL W P-5'-P-CCNC: 32 U/L (ref 7–40)
ALT SERPL W P-5'-P-CCNC: 38 U/L (ref 7–40)
ALT SERPL W P-5'-P-CCNC: 38 U/L (ref 7–40)
ALT SERPL W P-5'-P-CCNC: 86 U/L (ref 7–40)
ALT SERPL W P-5'-P-CCNC: 92 U/L (ref 7–40)
ALT SERPL W P-5'-P-CCNC: 93 U/L (ref 7–40)
ALT SERPL W P-5'-P-CCNC: 99 U/L (ref 7–40)
AMMONIA BLD-SCNC: 35 UMOL/L (ref 19–60)
AMPHET+METHAMPHET UR QL: NEGATIVE
AMPHETAMINES UR QL: NEGATIVE
ANION GAP SERPL CALCULATED.3IONS-SCNC: 10 MMOL/L (ref 3–11)
ANION GAP SERPL CALCULATED.3IONS-SCNC: 11 MMOL/L (ref 3–11)
ANION GAP SERPL CALCULATED.3IONS-SCNC: 12 MMOL/L (ref 3–11)
ANION GAP SERPL CALCULATED.3IONS-SCNC: 13 MMOL/L (ref 3–11)
ANION GAP SERPL CALCULATED.3IONS-SCNC: 14 MMOL/L (ref 3–11)
ANION GAP SERPL CALCULATED.3IONS-SCNC: 17 MMOL/L (ref 3–11)
ANION GAP SERPL CALCULATED.3IONS-SCNC: 2 MMOL/L (ref 3–11)
ANION GAP SERPL CALCULATED.3IONS-SCNC: 3 MMOL/L (ref 3–11)
ANION GAP SERPL CALCULATED.3IONS-SCNC: 4 MMOL/L (ref 3–11)
ANION GAP SERPL CALCULATED.3IONS-SCNC: 4 MMOL/L (ref 3–11)
ANION GAP SERPL CALCULATED.3IONS-SCNC: 5 MMOL/L (ref 3–11)
ANION GAP SERPL CALCULATED.3IONS-SCNC: 5 MMOL/L (ref 3–11)
ANION GAP SERPL CALCULATED.3IONS-SCNC: 7 MMOL/L (ref 3–11)
ANION GAP SERPL CALCULATED.3IONS-SCNC: 8 MMOL/L (ref 3–11)
ANION GAP SERPL CALCULATED.3IONS-SCNC: 9 MMOL/L (ref 3–11)
ANTI-RI ANTIBODIES*: NORMAL TITER
APPEARANCE CSF: CLEAR
APPEARANCE CSF: CLEAR
APTT PPP: 30.8 SECONDS (ref 24–31)
ARTERIAL PATENCY WRIST A: ABNORMAL
ARTERIAL PATENCY WRIST A: ABNORMAL
ARTERIAL PATENCY WRIST A: POSITIVE
AST SERPL-CCNC: 22 U/L (ref 0–33)
AST SERPL-CCNC: 23 U/L (ref 0–33)
AST SERPL-CCNC: 23 U/L (ref 0–33)
AST SERPL-CCNC: 24 U/L (ref 0–33)
AST SERPL-CCNC: 25 U/L (ref 0–33)
AST SERPL-CCNC: 36 U/L (ref 0–33)
AST SERPL-CCNC: 41 U/L (ref 0–33)
AST SERPL-CCNC: 46 U/L (ref 0–33)
AST SERPL-CCNC: 49 U/L (ref 0–33)
AST SERPL-CCNC: 51 U/L (ref 0–33)
AST SERPL-CCNC: 51 U/L (ref 0–33)
AST SERPL-CCNC: 61 U/L (ref 0–33)
ATMOSPHERIC PRESS: ABNORMAL MMHG
BACTERIA SPEC AEROBE CULT: NORMAL
BACTERIA UR QL AUTO: ABNORMAL /HPF
BARBITURATES UR QL SCN: NEGATIVE
BASE EXCESS BLDA CALC-SCNC: -9.1 MMOL/L (ref 0–2)
BASE EXCESS BLDA CALC-SCNC: 1.9 MMOL/L (ref 0–2)
BASE EXCESS BLDA CALC-SCNC: 9.7 MMOL/L (ref 0–2)
BASOPHILS # BLD AUTO: 0 10*3/MM3 (ref 0–0.2)
BASOPHILS # BLD AUTO: 0 10*3/MM3 (ref 0–0.2)
BASOPHILS # BLD AUTO: 0.01 10*3/MM3 (ref 0–0.2)
BASOPHILS # BLD AUTO: 0.02 10*3/MM3 (ref 0–0.2)
BASOPHILS # BLD AUTO: 0.03 10*3/MM3 (ref 0–0.2)
BASOPHILS # BLD AUTO: 0.03 10*3/MM3 (ref 0–0.2)
BASOPHILS NFR BLD AUTO: 0 % (ref 0–1)
BASOPHILS NFR BLD AUTO: 0 % (ref 0–1)
BASOPHILS NFR BLD AUTO: 0.1 % (ref 0–1)
BASOPHILS NFR BLD AUTO: 0.2 % (ref 0–1)
BASOPHILS NFR BLD AUTO: 0.2 % (ref 0–1)
BASOPHILS NFR BLD AUTO: 0.3 % (ref 0–1)
BASOPHILS NFR BLD AUTO: 0.6 % (ref 0–1)
BDY SITE: ABNORMAL
BENZODIAZ UR QL SCN: NEGATIVE
BH CV LOWER VASCULAR LEFT COMMON FEMORAL AUGMENT: NORMAL
BH CV LOWER VASCULAR LEFT COMMON FEMORAL COMPRESS: NORMAL
BH CV LOWER VASCULAR LEFT COMMON FEMORAL PHASIC: NORMAL
BH CV LOWER VASCULAR LEFT COMMON FEMORAL SPONT: NORMAL
BH CV LOWER VASCULAR LEFT DISTAL FEMORAL COMPRESS: NORMAL
BH CV LOWER VASCULAR LEFT GASTRONEMIUS COMPRESS: NORMAL
BH CV LOWER VASCULAR LEFT GREATER SAPH AK COMPRESS: NORMAL
BH CV LOWER VASCULAR LEFT GREATER SAPH BK COMPRESS: NORMAL
BH CV LOWER VASCULAR LEFT LESSER SAPH COMPRESS: NORMAL
BH CV LOWER VASCULAR LEFT MID FEMORAL AUGMENT: NORMAL
BH CV LOWER VASCULAR LEFT MID FEMORAL COMPRESS: NORMAL
BH CV LOWER VASCULAR LEFT MID FEMORAL PHASIC: NORMAL
BH CV LOWER VASCULAR LEFT MID FEMORAL SPONT: NORMAL
BH CV LOWER VASCULAR LEFT PERONEAL COMPRESS: NORMAL
BH CV LOWER VASCULAR LEFT POPLITEAL AUGMENT: NORMAL
BH CV LOWER VASCULAR LEFT POPLITEAL COMPRESS: NORMAL
BH CV LOWER VASCULAR LEFT POPLITEAL PHASIC: NORMAL
BH CV LOWER VASCULAR LEFT POPLITEAL SPONT: NORMAL
BH CV LOWER VASCULAR LEFT POSTERIOR TIBIAL COMPRESS: NORMAL
BH CV LOWER VASCULAR LEFT PROFUNDA FEMORAL AUGMENT: NORMAL
BH CV LOWER VASCULAR LEFT PROFUNDA FEMORAL COMPRESS: NORMAL
BH CV LOWER VASCULAR LEFT PROFUNDA FEMORAL PHASIC: NORMAL
BH CV LOWER VASCULAR LEFT PROFUNDA FEMORAL SPONT: NORMAL
BH CV LOWER VASCULAR LEFT PROXIMAL FEMORAL COMPRESS: NORMAL
BH CV LOWER VASCULAR LEFT SAPHENOFEMORAL JUNCTION AUGMENT: NORMAL
BH CV LOWER VASCULAR LEFT SAPHENOFEMORAL JUNCTION COMPRESS: NORMAL
BH CV LOWER VASCULAR LEFT SAPHENOFEMORAL JUNCTION PHASIC: NORMAL
BH CV LOWER VASCULAR LEFT SAPHENOFEMORAL JUNCTION SPONT: NORMAL
BH CV LOWER VASCULAR RIGHT COMMON FEMORAL AUGMENT: NORMAL
BH CV LOWER VASCULAR RIGHT COMMON FEMORAL COMPRESS: NORMAL
BH CV LOWER VASCULAR RIGHT COMMON FEMORAL PHASIC: NORMAL
BH CV LOWER VASCULAR RIGHT COMMON FEMORAL SPONT: NORMAL
BH CV LOWER VASCULAR RIGHT DISTAL FEMORAL COMPRESS: NORMAL
BH CV LOWER VASCULAR RIGHT GASTRONEMIUS COMPRESS: NORMAL
BH CV LOWER VASCULAR RIGHT GREATER SAPH AK COMPRESS: NORMAL
BH CV LOWER VASCULAR RIGHT GREATER SAPH BK COMPRESS: NORMAL
BH CV LOWER VASCULAR RIGHT LESSER SAPH COMPRESS: NORMAL
BH CV LOWER VASCULAR RIGHT MID FEMORAL AUGMENT: NORMAL
BH CV LOWER VASCULAR RIGHT MID FEMORAL COMPRESS: NORMAL
BH CV LOWER VASCULAR RIGHT MID FEMORAL PHASIC: NORMAL
BH CV LOWER VASCULAR RIGHT MID FEMORAL SPONT: NORMAL
BH CV LOWER VASCULAR RIGHT PERONEAL COMPRESS: NORMAL
BH CV LOWER VASCULAR RIGHT POPLITEAL AUGMENT: NORMAL
BH CV LOWER VASCULAR RIGHT POPLITEAL COMPRESS: NORMAL
BH CV LOWER VASCULAR RIGHT POPLITEAL PHASIC: NORMAL
BH CV LOWER VASCULAR RIGHT POPLITEAL SPONT: NORMAL
BH CV LOWER VASCULAR RIGHT POSTERIOR TIBIAL COMPRESS: NORMAL
BH CV LOWER VASCULAR RIGHT PROFUNDA FEMORAL COMPRESS: NORMAL
BH CV LOWER VASCULAR RIGHT PROXIMAL FEMORAL COMPRESS: NORMAL
BH CV LOWER VASCULAR RIGHT SAPHENOFEMORAL JUNCTION AUGMENT: NORMAL
BH CV LOWER VASCULAR RIGHT SAPHENOFEMORAL JUNCTION COMPRESS: NORMAL
BH CV LOWER VASCULAR RIGHT SAPHENOFEMORAL JUNCTION PHASIC: NORMAL
BH CV LOWER VASCULAR RIGHT SAPHENOFEMORAL JUNCTION SPONT: NORMAL
BILIRUB SERPL-MCNC: 0.6 MG/DL (ref 0.3–1.2)
BILIRUB SERPL-MCNC: 0.7 MG/DL (ref 0.3–1.2)
BILIRUB SERPL-MCNC: 0.8 MG/DL (ref 0.3–1.2)
BILIRUB SERPL-MCNC: 0.8 MG/DL (ref 0.3–1.2)
BILIRUB SERPL-MCNC: 0.9 MG/DL (ref 0.3–1.2)
BILIRUB SERPL-MCNC: 1 MG/DL (ref 0.3–1.2)
BILIRUB SERPL-MCNC: 1 MG/DL (ref 0.3–1.2)
BILIRUB SERPL-MCNC: 1.1 MG/DL (ref 0.3–1.2)
BILIRUB SERPL-MCNC: 1.2 MG/DL (ref 0.3–1.2)
BILIRUB SERPL-MCNC: 1.2 MG/DL (ref 0.3–1.2)
BILIRUB UR QL STRIP: NEGATIVE
BNP SERPL-MCNC: 48 PG/ML (ref 0–100)
BUN BLD-MCNC: 12 MG/DL (ref 9–23)
BUN BLD-MCNC: 13 MG/DL (ref 9–23)
BUN BLD-MCNC: 14 MG/DL (ref 9–23)
BUN BLD-MCNC: 15 MG/DL (ref 9–23)
BUN BLD-MCNC: 16 MG/DL (ref 9–23)
BUN BLD-MCNC: 17 MG/DL (ref 9–23)
BUN BLD-MCNC: 18 MG/DL (ref 9–23)
BUN BLD-MCNC: 21 MG/DL (ref 9–23)
BUN BLD-MCNC: 21 MG/DL (ref 9–23)
BUN BLD-MCNC: 22 MG/DL (ref 9–23)
BUN BLD-MCNC: 6 MG/DL (ref 9–23)
BUN BLD-MCNC: 9 MG/DL (ref 9–23)
BUN/CREAT SERPL: 20 (ref 7–25)
BUN/CREAT SERPL: 22.5 (ref 7–25)
BUN/CREAT SERPL: 28.3 (ref 7–25)
BUN/CREAT SERPL: 30 (ref 7–25)
BUN/CREAT SERPL: 34 (ref 7–25)
BUN/CREAT SERPL: 34 (ref 7–25)
BUN/CREAT SERPL: 36 (ref 7–25)
BUN/CREAT SERPL: 40 (ref 7–25)
BUN/CREAT SERPL: 42.5 (ref 7–25)
BUN/CREAT SERPL: 42.5 (ref 7–25)
BUN/CREAT SERPL: 43.3 (ref 7–25)
BUN/CREAT SERPL: 43.3 (ref 7–25)
BUN/CREAT SERPL: 46.7 (ref 7–25)
BUN/CREAT SERPL: 46.7 (ref 7–25)
BUN/CREAT SERPL: 50 (ref 7–25)
BUN/CREAT SERPL: 50 (ref 7–25)
BUN/CREAT SERPL: 52.5 (ref 7–25)
BUN/CREAT SERPL: 55 (ref 7–25)
BUN/CREAT SERPL: 60 (ref 7–25)
BUN/CREAT SERPL: 65 (ref 7–25)
BUN/CREAT SERPL: 70 (ref 7–25)
BUN/CREAT SERPL: 70 (ref 7–25)
BUN/CREAT SERPL: 75 (ref 7–25)
BUPRENORPHINE SERPL-MCNC: NEGATIVE NG/ML
BURR CELLS BLD QL SMEAR: NORMAL
CA-I SERPL ISE-MCNC: 1.09 MMOL/L (ref 1.12–1.32)
CA-I SERPL ISE-MCNC: 1.14 MMOL/L (ref 1.12–1.32)
CA-I SERPL ISE-MCNC: 1.19 MMOL/L (ref 1.12–1.32)
CA-I SERPL ISE-MCNC: 1.34 MMOL/L (ref 1.12–1.32)
CALCIUM SPEC-SCNC: 6.8 MG/DL (ref 8.7–10.4)
CALCIUM SPEC-SCNC: 6.9 MG/DL (ref 8.7–10.4)
CALCIUM SPEC-SCNC: 7.4 MG/DL (ref 8.7–10.4)
CALCIUM SPEC-SCNC: 7.5 MG/DL (ref 8.7–10.4)
CALCIUM SPEC-SCNC: 7.5 MG/DL (ref 8.7–10.4)
CALCIUM SPEC-SCNC: 7.6 MG/DL (ref 8.7–10.4)
CALCIUM SPEC-SCNC: 7.8 MG/DL (ref 8.7–10.4)
CALCIUM SPEC-SCNC: 7.9 MG/DL (ref 8.7–10.4)
CALCIUM SPEC-SCNC: 8 MG/DL (ref 8.7–10.4)
CALCIUM SPEC-SCNC: 8.1 MG/DL (ref 8.7–10.4)
CALCIUM SPEC-SCNC: 8.3 MG/DL (ref 8.7–10.4)
CALCIUM SPEC-SCNC: 8.3 MG/DL (ref 8.7–10.4)
CALCIUM SPEC-SCNC: 8.4 MG/DL (ref 8.7–10.4)
CALCIUM SPEC-SCNC: 8.5 MG/DL (ref 8.7–10.4)
CALCIUM SPEC-SCNC: 8.5 MG/DL (ref 8.7–10.4)
CALCIUM SPEC-SCNC: 8.6 MG/DL (ref 8.7–10.4)
CALCIUM SPEC-SCNC: 8.7 MG/DL (ref 8.7–10.4)
CALCIUM SPEC-SCNC: 8.7 MG/DL (ref 8.7–10.4)
CALCIUM SPEC-SCNC: 8.8 MG/DL (ref 8.7–10.4)
CALCIUM SPEC-SCNC: 8.8 MG/DL (ref 8.7–10.4)
CALCIUM SPEC-SCNC: 8.9 MG/DL (ref 8.7–10.4)
CALCIUM SPEC-SCNC: 9 MG/DL (ref 8.7–10.4)
CALCIUM SPEC-SCNC: 9.2 MG/DL (ref 8.7–10.4)
CALCIUM SPEC-SCNC: 9.2 MG/DL (ref 8.7–10.4)
CANNABINOIDS SERPL QL: NEGATIVE
CHLORIDE SERPL-SCNC: 101 MMOL/L (ref 99–109)
CHLORIDE SERPL-SCNC: 102 MMOL/L (ref 99–109)
CHLORIDE SERPL-SCNC: 103 MMOL/L (ref 99–109)
CHLORIDE SERPL-SCNC: 104 MMOL/L (ref 99–109)
CHLORIDE SERPL-SCNC: 104 MMOL/L (ref 99–109)
CHLORIDE SERPL-SCNC: 106 MMOL/L (ref 99–109)
CHLORIDE SERPL-SCNC: 107 MMOL/L (ref 99–109)
CHLORIDE SERPL-SCNC: 109 MMOL/L (ref 99–109)
CHLORIDE SERPL-SCNC: 110 MMOL/L (ref 99–109)
CHLORIDE SERPL-SCNC: 111 MMOL/L (ref 99–109)
CHLORIDE SERPL-SCNC: 111 MMOL/L (ref 99–109)
CHLORIDE SERPL-SCNC: 118 MMOL/L (ref 99–109)
CHLORIDE SERPL-SCNC: 89 MMOL/L (ref 99–109)
CHLORIDE SERPL-SCNC: 94 MMOL/L (ref 99–109)
CHLORIDE SERPL-SCNC: 96 MMOL/L (ref 99–109)
CHLORIDE SERPL-SCNC: 97 MMOL/L (ref 99–109)
CHLORIDE SERPL-SCNC: 98 MMOL/L (ref 99–109)
CHLORIDE SERPL-SCNC: 99 MMOL/L (ref 99–109)
CHOLEST SERPL-MCNC: 189 MG/DL (ref 0–200)
CK SERPL-CCNC: 60 U/L (ref 26–174)
CLARITY UR: CLEAR
CO2 BLDA-SCNC: 12 MMOL/L (ref 22–33)
CO2 BLDA-SCNC: 23.6 MMOL/L (ref 22–33)
CO2 BLDA-SCNC: 35.8 MMOL/L (ref 22–33)
CO2 SERPL-SCNC: 11 MMOL/L (ref 20–31)
CO2 SERPL-SCNC: 12 MMOL/L (ref 20–31)
CO2 SERPL-SCNC: 14 MMOL/L (ref 20–31)
CO2 SERPL-SCNC: 15 MMOL/L (ref 20–31)
CO2 SERPL-SCNC: 15 MMOL/L (ref 20–31)
CO2 SERPL-SCNC: 18 MMOL/L (ref 20–31)
CO2 SERPL-SCNC: 20 MMOL/L (ref 20–31)
CO2 SERPL-SCNC: 21 MMOL/L (ref 20–31)
CO2 SERPL-SCNC: 21 MMOL/L (ref 20–31)
CO2 SERPL-SCNC: 22 MMOL/L (ref 20–31)
CO2 SERPL-SCNC: 22 MMOL/L (ref 20–31)
CO2 SERPL-SCNC: 23 MMOL/L (ref 20–31)
CO2 SERPL-SCNC: 24 MMOL/L (ref 20–31)
CO2 SERPL-SCNC: 25 MMOL/L (ref 20–31)
CO2 SERPL-SCNC: 26 MMOL/L (ref 20–31)
CO2 SERPL-SCNC: 27 MMOL/L (ref 20–31)
CO2 SERPL-SCNC: 28 MMOL/L (ref 20–31)
CO2 SERPL-SCNC: 28 MMOL/L (ref 20–31)
CO2 SERPL-SCNC: 29 MMOL/L (ref 20–31)
CO2 SERPL-SCNC: 30 MMOL/L (ref 20–31)
COCAINE UR QL: NEGATIVE
COHGB MFR BLD: 1.4 % (ref 0–2)
COHGB MFR BLD: 1.5 % (ref 0–2)
COHGB MFR BLD: 1.6 % (ref 0–2)
COLOR CSF: COLORLESS
COLOR CSF: COLORLESS
COLOR SPUN CSF: COLORLESS
COLOR SPUN CSF: COLORLESS
COLOR UR: ABNORMAL
COLOR UR: YELLOW
CORTIS SERPL-MCNC: 25.1 MCG/DL
CORTIS SERPL-MCNC: 32.2 MCG/DL
CORTIS SERPL-MCNC: 41 MCG/DL
CORTIS SERPL-MCNC: 50.8 MCG/DL
CORTIS SERPL-MCNC: 59.4 MCG/DL
CREAT BLD-MCNC: 0.2 MG/DL (ref 0.6–1.3)
CREAT BLD-MCNC: 0.3 MG/DL (ref 0.6–1.3)
CREAT BLD-MCNC: 0.4 MG/DL (ref 0.6–1.3)
CREAT BLD-MCNC: 0.5 MG/DL (ref 0.6–1.3)
CREAT BLD-MCNC: 0.6 MG/DL (ref 0.6–1.3)
CRP SERPL-MCNC: 2.55 MG/DL (ref 0–1)
CRYPTOC AG TITR CSF: NEGATIVE {TITER}
D DIMER PPP FEU-MCNC: 3.59 MG/L (FEU) (ref 0–0.5)
D-LACTATE SERPL-SCNC: 0.9 MMOL/L (ref 0.5–2)
D-LACTATE SERPL-SCNC: 1.1 MMOL/L (ref 0.5–2)
D-LACTATE SERPL-SCNC: 1.3 MMOL/L (ref 0.5–2)
D-LACTATE SERPL-SCNC: 1.4 MMOL/L (ref 0.5–2)
DEPRECATED RDW RBC AUTO: 45.8 FL (ref 37–54)
DEPRECATED RDW RBC AUTO: 48.1 FL (ref 37–54)
DEPRECATED RDW RBC AUTO: 48.9 FL (ref 37–54)
DEPRECATED RDW RBC AUTO: 49.1 FL (ref 37–54)
DEPRECATED RDW RBC AUTO: 49.1 FL (ref 37–54)
DEPRECATED RDW RBC AUTO: 49.2 FL (ref 37–54)
DEPRECATED RDW RBC AUTO: 49.5 FL (ref 37–54)
DEPRECATED RDW RBC AUTO: 49.8 FL (ref 37–54)
DEPRECATED RDW RBC AUTO: 49.8 FL (ref 37–54)
DEPRECATED RDW RBC AUTO: 50.5 FL (ref 37–54)
DEPRECATED RDW RBC AUTO: 50.8 FL (ref 37–54)
DEPRECATED RDW RBC AUTO: 51.5 FL (ref 37–54)
DEPRECATED RDW RBC AUTO: 51.6 FL (ref 37–54)
DEPRECATED RDW RBC AUTO: 52.5 FL (ref 37–54)
DEPRECATED RDW RBC AUTO: 54 FL (ref 37–54)
DEPRECATED RDW RBC AUTO: 57.4 FL (ref 37–54)
DEPRECATED RDW RBC AUTO: 57.6 FL (ref 37–54)
DEPRECATED RDW RBC AUTO: 57.9 FL (ref 37–54)
EOSINOPHIL # BLD AUTO: 0 10*3/MM3 (ref 0–0.3)
EOSINOPHIL # BLD AUTO: 0.01 10*3/MM3 (ref 0–0.3)
EOSINOPHIL # BLD AUTO: 0.02 10*3/MM3 (ref 0–0.3)
EOSINOPHIL # BLD AUTO: 0.05 10*3/MM3 (ref 0–0.3)
EOSINOPHIL # BLD AUTO: 0.17 10*3/MM3 (ref 0–0.3)
EOSINOPHIL # BLD AUTO: 0.21 10*3/MM3 (ref 0–0.3)
EOSINOPHIL # BLD AUTO: 0.31 10*3/MM3 (ref 0–0.3)
EOSINOPHIL NFR BLD AUTO: 0 % (ref 0–3)
EOSINOPHIL NFR BLD AUTO: 0.1 % (ref 0–3)
EOSINOPHIL NFR BLD AUTO: 0.1 % (ref 0–3)
EOSINOPHIL NFR BLD AUTO: 0.2 % (ref 0–3)
EOSINOPHIL NFR BLD AUTO: 0.2 % (ref 0–3)
EOSINOPHIL NFR BLD AUTO: 0.4 % (ref 0–3)
EOSINOPHIL NFR BLD AUTO: 2.4 % (ref 0–3)
EOSINOPHIL NFR BLD AUTO: 2.8 % (ref 0–3)
EOSINOPHIL NFR BLD AUTO: 4.7 % (ref 0–3)
ERYTHROCYTE [DISTWIDTH] IN BLOOD BY AUTOMATED COUNT: 13.9 % (ref 11.3–14.5)
ERYTHROCYTE [DISTWIDTH] IN BLOOD BY AUTOMATED COUNT: 14.2 % (ref 11.3–14.5)
ERYTHROCYTE [DISTWIDTH] IN BLOOD BY AUTOMATED COUNT: 14.3 % (ref 11.3–14.5)
ERYTHROCYTE [DISTWIDTH] IN BLOOD BY AUTOMATED COUNT: 14.4 % (ref 11.3–14.5)
ERYTHROCYTE [DISTWIDTH] IN BLOOD BY AUTOMATED COUNT: 14.5 % (ref 11.3–14.5)
ERYTHROCYTE [DISTWIDTH] IN BLOOD BY AUTOMATED COUNT: 14.5 % (ref 11.3–14.5)
ERYTHROCYTE [DISTWIDTH] IN BLOOD BY AUTOMATED COUNT: 14.6 % (ref 11.3–14.5)
ERYTHROCYTE [DISTWIDTH] IN BLOOD BY AUTOMATED COUNT: 14.7 % (ref 11.3–14.5)
ERYTHROCYTE [DISTWIDTH] IN BLOOD BY AUTOMATED COUNT: 14.7 % (ref 11.3–14.5)
ERYTHROCYTE [DISTWIDTH] IN BLOOD BY AUTOMATED COUNT: 14.8 % (ref 11.3–14.5)
ERYTHROCYTE [DISTWIDTH] IN BLOOD BY AUTOMATED COUNT: 15 % (ref 11.3–14.5)
ERYTHROCYTE [DISTWIDTH] IN BLOOD BY AUTOMATED COUNT: 15.4 % (ref 11.3–14.5)
ERYTHROCYTE [DISTWIDTH] IN BLOOD BY AUTOMATED COUNT: 15.5 % (ref 11.3–14.5)
ERYTHROCYTE [DISTWIDTH] IN BLOOD BY AUTOMATED COUNT: 15.7 % (ref 11.3–14.5)
ERYTHROCYTE [DISTWIDTH] IN BLOOD BY AUTOMATED COUNT: 16.4 % (ref 11.3–14.5)
ERYTHROCYTE [DISTWIDTH] IN BLOOD BY AUTOMATED COUNT: 17 % (ref 11.3–14.5)
ERYTHROCYTE [DISTWIDTH] IN BLOOD BY AUTOMATED COUNT: 17.6 % (ref 11.3–14.5)
ERYTHROCYTE [DISTWIDTH] IN BLOOD BY AUTOMATED COUNT: 17.6 % (ref 11.3–14.5)
FLUAV AG NPH QL: POSITIVE
FLUBV AG NPH QL IA: NEGATIVE
GFR SERPL CREATININE-BSD FRML MDRD: 119 ML/MIN/1.73
GFR SERPL CREATININE-BSD FRML MDRD: 96 ML/MIN/1.73
GFR SERPL CREATININE-BSD FRML MDRD: >150 ML/MIN/1.73
GLOBULIN UR ELPH-MCNC: 2.4 GM/DL
GLOBULIN UR ELPH-MCNC: 2.6 GM/DL
GLOBULIN UR ELPH-MCNC: 2.8 GM/DL
GLOBULIN UR ELPH-MCNC: 2.9 GM/DL
GLOBULIN UR ELPH-MCNC: 3 GM/DL
GLOBULIN UR ELPH-MCNC: 3.2 GM/DL
GLOBULIN UR ELPH-MCNC: 3.6 GM/DL
GLOBULIN UR ELPH-MCNC: 3.6 GM/DL
GLOBULIN UR ELPH-MCNC: 3.7 GM/DL
GLOBULIN UR ELPH-MCNC: 3.8 GM/DL
GLOBULIN UR ELPH-MCNC: 4 GM/DL
GLOBULIN UR ELPH-MCNC: 4.2 GM/DL
GLOBULIN UR ELPH-MCNC: 4.7 GM/DL
GLUCOSE BLD-MCNC: 101 MG/DL (ref 70–100)
GLUCOSE BLD-MCNC: 101 MG/DL (ref 70–100)
GLUCOSE BLD-MCNC: 103 MG/DL (ref 70–100)
GLUCOSE BLD-MCNC: 105 MG/DL (ref 70–100)
GLUCOSE BLD-MCNC: 116 MG/DL (ref 70–100)
GLUCOSE BLD-MCNC: 117 MG/DL (ref 70–100)
GLUCOSE BLD-MCNC: 119 MG/DL (ref 70–100)
GLUCOSE BLD-MCNC: 120 MG/DL (ref 70–100)
GLUCOSE BLD-MCNC: 120 MG/DL (ref 70–100)
GLUCOSE BLD-MCNC: 121 MG/DL (ref 70–100)
GLUCOSE BLD-MCNC: 125 MG/DL (ref 70–100)
GLUCOSE BLD-MCNC: 126 MG/DL (ref 70–100)
GLUCOSE BLD-MCNC: 126 MG/DL (ref 70–100)
GLUCOSE BLD-MCNC: 129 MG/DL (ref 70–100)
GLUCOSE BLD-MCNC: 131 MG/DL (ref 70–100)
GLUCOSE BLD-MCNC: 143 MG/DL (ref 70–100)
GLUCOSE BLD-MCNC: 146 MG/DL (ref 70–100)
GLUCOSE BLD-MCNC: 170 MG/DL (ref 70–100)
GLUCOSE BLD-MCNC: 172 MG/DL (ref 70–100)
GLUCOSE BLD-MCNC: 193 MG/DL (ref 70–100)
GLUCOSE BLD-MCNC: 201 MG/DL (ref 70–100)
GLUCOSE BLD-MCNC: 91 MG/DL (ref 70–100)
GLUCOSE BLD-MCNC: 92 MG/DL (ref 70–100)
GLUCOSE BLD-MCNC: 95 MG/DL (ref 70–100)
GLUCOSE BLD-MCNC: 97 MG/DL (ref 70–100)
GLUCOSE BLD-MCNC: 97 MG/DL (ref 70–100)
GLUCOSE BLDC GLUCOMTR-MCNC: 105 MG/DL (ref 70–130)
GLUCOSE BLDC GLUCOMTR-MCNC: 115 MG/DL (ref 70–130)
GLUCOSE BLDC GLUCOMTR-MCNC: 116 MG/DL (ref 70–130)
GLUCOSE BLDC GLUCOMTR-MCNC: 118 MG/DL (ref 70–130)
GLUCOSE BLDC GLUCOMTR-MCNC: 118 MG/DL (ref 70–130)
GLUCOSE BLDC GLUCOMTR-MCNC: 119 MG/DL (ref 70–130)
GLUCOSE BLDC GLUCOMTR-MCNC: 119 MG/DL (ref 70–130)
GLUCOSE BLDC GLUCOMTR-MCNC: 121 MG/DL (ref 70–130)
GLUCOSE BLDC GLUCOMTR-MCNC: 124 MG/DL (ref 70–130)
GLUCOSE BLDC GLUCOMTR-MCNC: 125 MG/DL (ref 70–130)
GLUCOSE BLDC GLUCOMTR-MCNC: 126 MG/DL (ref 70–130)
GLUCOSE BLDC GLUCOMTR-MCNC: 127 MG/DL (ref 70–130)
GLUCOSE BLDC GLUCOMTR-MCNC: 131 MG/DL (ref 70–130)
GLUCOSE BLDC GLUCOMTR-MCNC: 134 MG/DL (ref 70–130)
GLUCOSE BLDC GLUCOMTR-MCNC: 136 MG/DL (ref 70–130)
GLUCOSE BLDC GLUCOMTR-MCNC: 136 MG/DL (ref 70–130)
GLUCOSE BLDC GLUCOMTR-MCNC: 137 MG/DL (ref 70–130)
GLUCOSE BLDC GLUCOMTR-MCNC: 139 MG/DL (ref 70–130)
GLUCOSE BLDC GLUCOMTR-MCNC: 140 MG/DL (ref 70–130)
GLUCOSE BLDC GLUCOMTR-MCNC: 140 MG/DL (ref 70–130)
GLUCOSE BLDC GLUCOMTR-MCNC: 144 MG/DL (ref 70–130)
GLUCOSE BLDC GLUCOMTR-MCNC: 147 MG/DL (ref 70–130)
GLUCOSE BLDC GLUCOMTR-MCNC: 148 MG/DL (ref 70–130)
GLUCOSE BLDC GLUCOMTR-MCNC: 150 MG/DL (ref 70–130)
GLUCOSE BLDC GLUCOMTR-MCNC: 150 MG/DL (ref 70–130)
GLUCOSE BLDC GLUCOMTR-MCNC: 151 MG/DL (ref 70–130)
GLUCOSE BLDC GLUCOMTR-MCNC: 162 MG/DL (ref 70–130)
GLUCOSE BLDC GLUCOMTR-MCNC: 163 MG/DL (ref 70–130)
GLUCOSE BLDC GLUCOMTR-MCNC: 164 MG/DL (ref 70–130)
GLUCOSE BLDC GLUCOMTR-MCNC: 166 MG/DL (ref 70–130)
GLUCOSE BLDC GLUCOMTR-MCNC: 167 MG/DL (ref 70–130)
GLUCOSE BLDC GLUCOMTR-MCNC: 171 MG/DL (ref 70–130)
GLUCOSE BLDC GLUCOMTR-MCNC: 174 MG/DL (ref 70–130)
GLUCOSE BLDC GLUCOMTR-MCNC: 175 MG/DL (ref 70–130)
GLUCOSE BLDC GLUCOMTR-MCNC: 176 MG/DL (ref 70–130)
GLUCOSE BLDC GLUCOMTR-MCNC: 178 MG/DL (ref 70–130)
GLUCOSE BLDC GLUCOMTR-MCNC: 181 MG/DL (ref 70–130)
GLUCOSE BLDC GLUCOMTR-MCNC: 186 MG/DL (ref 70–130)
GLUCOSE BLDC GLUCOMTR-MCNC: 188 MG/DL (ref 70–130)
GLUCOSE BLDC GLUCOMTR-MCNC: 191 MG/DL (ref 70–130)
GLUCOSE BLDC GLUCOMTR-MCNC: 192 MG/DL (ref 70–130)
GLUCOSE BLDC GLUCOMTR-MCNC: 192 MG/DL (ref 70–130)
GLUCOSE BLDC GLUCOMTR-MCNC: 193 MG/DL (ref 70–130)
GLUCOSE BLDC GLUCOMTR-MCNC: 195 MG/DL (ref 70–130)
GLUCOSE BLDC GLUCOMTR-MCNC: 195 MG/DL (ref 70–130)
GLUCOSE BLDC GLUCOMTR-MCNC: 197 MG/DL (ref 70–130)
GLUCOSE BLDC GLUCOMTR-MCNC: 201 MG/DL (ref 70–130)
GLUCOSE BLDC GLUCOMTR-MCNC: 202 MG/DL (ref 70–130)
GLUCOSE BLDC GLUCOMTR-MCNC: 207 MG/DL (ref 70–130)
GLUCOSE BLDC GLUCOMTR-MCNC: 208 MG/DL (ref 70–130)
GLUCOSE BLDC GLUCOMTR-MCNC: 209 MG/DL (ref 70–130)
GLUCOSE BLDC GLUCOMTR-MCNC: 212 MG/DL (ref 70–130)
GLUCOSE BLDC GLUCOMTR-MCNC: 220 MG/DL (ref 70–130)
GLUCOSE BLDC GLUCOMTR-MCNC: 96 MG/DL (ref 70–130)
GLUCOSE BLDC GLUCOMTR-MCNC: 99 MG/DL (ref 70–130)
GLUCOSE CSF-MCNC: 112 MG/DL (ref 40–70)
GLUCOSE UR STRIP-MCNC: ABNORMAL MG/DL
GLUCOSE UR STRIP-MCNC: NEGATIVE MG/DL
HBA1C MFR BLD: 5.5 % (ref 4.8–5.6)
HCO3 BLDA-SCNC: 11.6 MMOL/L (ref 20–26)
HCO3 BLDA-SCNC: 22.9 MMOL/L (ref 20–26)
HCO3 BLDA-SCNC: 34.4 MMOL/L (ref 20–26)
HCT VFR BLD AUTO: 26.7 % (ref 34.5–44)
HCT VFR BLD AUTO: 27.3 % (ref 34.5–44)
HCT VFR BLD AUTO: 27.3 % (ref 34.5–44)
HCT VFR BLD AUTO: 29.1 % (ref 34.5–44)
HCT VFR BLD AUTO: 29.5 % (ref 34.5–44)
HCT VFR BLD AUTO: 29.7 % (ref 34.5–44)
HCT VFR BLD AUTO: 30.2 % (ref 34.5–44)
HCT VFR BLD AUTO: 31 % (ref 34.5–44)
HCT VFR BLD AUTO: 31.4 % (ref 34.5–44)
HCT VFR BLD AUTO: 32.6 % (ref 34.5–44)
HCT VFR BLD AUTO: 32.6 % (ref 34.5–44)
HCT VFR BLD AUTO: 32.9 % (ref 34.5–44)
HCT VFR BLD AUTO: 33.3 % (ref 34.5–44)
HCT VFR BLD AUTO: 33.4 % (ref 34.5–44)
HCT VFR BLD AUTO: 34.4 % (ref 34.5–44)
HCT VFR BLD AUTO: 34.6 % (ref 34.5–44)
HCT VFR BLD AUTO: 34.8 % (ref 34.5–44)
HCT VFR BLD AUTO: 35.5 % (ref 34.5–44)
HCT VFR BLD AUTO: 38.2 % (ref 34.5–44)
HCT VFR BLD CALC: 26 %
HCT VFR BLD CALC: 34.3 %
HCT VFR BLD CALC: 34.5 %
HGB BLD-MCNC: 10.3 G/DL (ref 11.5–15.5)
HGB BLD-MCNC: 10.4 G/DL (ref 11.5–15.5)
HGB BLD-MCNC: 10.6 G/DL (ref 11.5–15.5)
HGB BLD-MCNC: 10.8 G/DL (ref 11.5–15.5)
HGB BLD-MCNC: 11 G/DL (ref 11.5–15.5)
HGB BLD-MCNC: 11.1 G/DL (ref 11.5–15.5)
HGB BLD-MCNC: 11.2 G/DL (ref 11.5–15.5)
HGB BLD-MCNC: 11.7 G/DL (ref 11.5–15.5)
HGB BLD-MCNC: 11.8 G/DL (ref 11.5–15.5)
HGB BLD-MCNC: 8.8 G/DL (ref 11.5–15.5)
HGB BLD-MCNC: 8.8 G/DL (ref 11.5–15.5)
HGB BLD-MCNC: 9 G/DL (ref 11.5–15.5)
HGB BLD-MCNC: 9.4 G/DL (ref 11.5–15.5)
HGB BLD-MCNC: 9.6 G/DL (ref 11.5–15.5)
HGB BLD-MCNC: 9.6 G/DL (ref 11.5–15.5)
HGB BLD-MCNC: 9.8 G/DL (ref 11.5–15.5)
HGB BLD-MCNC: 9.9 G/DL (ref 11.5–15.5)
HGB BLDA-MCNC: 11.2 G/DL (ref 14–18)
HGB BLDA-MCNC: 11.3 G/DL (ref 14–18)
HGB BLDA-MCNC: 8.5 G/DL (ref 14–18)
HGB UR QL STRIP.AUTO: ABNORMAL
HGB UR QL STRIP.AUTO: NEGATIVE
HOLD SPECIMEN: NORMAL
HOLD SPECIMEN: NORMAL
HOROWITZ INDEX BLD+IHG-RTO: 21 %
HOROWITZ INDEX BLD+IHG-RTO: 28 %
HOROWITZ INDEX BLD+IHG-RTO: 55 %
HU1 AB TITR SER: NORMAL TITER
HYALINE CASTS UR QL AUTO: ABNORMAL /LPF
IGG CSF-MCNC: 5.4 MG/DL (ref 0–8.6)
IGG SERPL-MCNC: 845 MG/DL (ref 700–1600)
IGG SYNTH RATE SER+CSF CALC-MRATE: -0.4 MG/DAY
IGG/ALB CLEAR SER+CSF-RTO: 0.5 (ref 0–0.7)
IGG/ALB CSF: 0.13 {RATIO} (ref 0–0.25)
IMM GRANULOCYTES # BLD: 0 10*3/MM3 (ref 0–0.03)
IMM GRANULOCYTES # BLD: 0.01 10*3/MM3 (ref 0–0.03)
IMM GRANULOCYTES # BLD: 0.02 10*3/MM3 (ref 0–0.03)
IMM GRANULOCYTES # BLD: 0.04 10*3/MM3 (ref 0–0.03)
IMM GRANULOCYTES # BLD: 0.05 10*3/MM3 (ref 0–0.03)
IMM GRANULOCYTES # BLD: 0.06 10*3/MM3 (ref 0–0.03)
IMM GRANULOCYTES # BLD: 0.07 10*3/MM3 (ref 0–0.03)
IMM GRANULOCYTES NFR BLD: 0 % (ref 0–0.6)
IMM GRANULOCYTES NFR BLD: 0.1 % (ref 0–0.6)
IMM GRANULOCYTES NFR BLD: 0.3 % (ref 0–0.6)
IMM GRANULOCYTES NFR BLD: 0.4 % (ref 0–0.6)
IMM GRANULOCYTES NFR BLD: 0.9 % (ref 0–0.6)
IMM GRANULOCYTES NFR BLD: 1.1 % (ref 0–0.6)
INR PPP: 1.05
INR PPP: 1.18
KETONES UR QL STRIP: ABNORMAL
KETONES UR QL STRIP: ABNORMAL
KETONES UR QL STRIP: NEGATIVE
KETONES UR QL STRIP: NEGATIVE
LAB AP CASE REPORT: NORMAL
LEUKOCYTE ESTERASE UR QL STRIP.AUTO: NEGATIVE
LIPASE SERPL-CCNC: 73 U/L (ref 6–51)
LYMPHOCYTES # BLD AUTO: 0.45 10*3/MM3 (ref 0.6–4.8)
LYMPHOCYTES # BLD AUTO: 0.47 10*3/MM3 (ref 0.6–4.8)
LYMPHOCYTES # BLD AUTO: 0.52 10*3/MM3 (ref 0.6–4.8)
LYMPHOCYTES # BLD AUTO: 0.7 10*3/MM3 (ref 0.6–4.8)
LYMPHOCYTES # BLD AUTO: 0.82 10*3/MM3 (ref 0.6–4.8)
LYMPHOCYTES # BLD AUTO: 0.93 10*3/MM3 (ref 0.6–4.8)
LYMPHOCYTES # BLD AUTO: 0.95 10*3/MM3 (ref 0.6–4.8)
LYMPHOCYTES # BLD AUTO: 1.02 10*3/MM3 (ref 0.6–4.8)
LYMPHOCYTES # BLD AUTO: 1.1 10*3/MM3 (ref 0.6–4.8)
LYMPHOCYTES # BLD AUTO: 1.22 10*3/MM3 (ref 0.6–4.8)
LYMPHOCYTES # BLD AUTO: 1.24 10*3/MM3 (ref 0.6–4.8)
LYMPHOCYTES # BLD AUTO: 1.44 10*3/MM3 (ref 0.6–4.8)
LYMPHOCYTES NFR BLD AUTO: 10.2 % (ref 24–44)
LYMPHOCYTES NFR BLD AUTO: 10.5 % (ref 24–44)
LYMPHOCYTES NFR BLD AUTO: 14.4 % (ref 24–44)
LYMPHOCYTES NFR BLD AUTO: 14.8 % (ref 24–44)
LYMPHOCYTES NFR BLD AUTO: 15.4 % (ref 24–44)
LYMPHOCYTES NFR BLD AUTO: 17.9 % (ref 24–44)
LYMPHOCYTES NFR BLD AUTO: 21.2 % (ref 24–44)
LYMPHOCYTES NFR BLD AUTO: 6.2 % (ref 24–44)
LYMPHOCYTES NFR BLD AUTO: 7.4 % (ref 24–44)
LYMPHOCYTES NFR BLD AUTO: 8.1 % (ref 24–44)
LYMPHOCYTES NFR BLD AUTO: 9.6 % (ref 24–44)
LYMPHOCYTES NFR BLD AUTO: 9.7 % (ref 24–44)
Lab: NORMAL
MAGNESIUM SERPL-MCNC: 1.5 MG/DL (ref 1.3–2.7)
MAGNESIUM SERPL-MCNC: 1.6 MG/DL (ref 1.3–2.7)
MAGNESIUM SERPL-MCNC: 1.8 MG/DL (ref 1.3–2.7)
MAGNESIUM SERPL-MCNC: 1.9 MG/DL (ref 1.3–2.7)
MAGNESIUM SERPL-MCNC: 2 MG/DL (ref 1.3–2.7)
MAGNESIUM SERPL-MCNC: 2.1 MG/DL (ref 1.3–2.7)
MAGNESIUM SERPL-MCNC: 2.2 MG/DL (ref 1.3–2.7)
MAGNESIUM SERPL-MCNC: 2.3 MG/DL (ref 1.3–2.7)
MAGNESIUM SERPL-MCNC: 2.3 MG/DL (ref 1.3–2.7)
MBP CSF-MCNC: 39.1 NG/ML (ref 0–1.2)
MCH RBC QN AUTO: 29.4 PG (ref 27–31)
MCH RBC QN AUTO: 29.6 PG (ref 27–31)
MCH RBC QN AUTO: 29.7 PG (ref 27–31)
MCH RBC QN AUTO: 29.7 PG (ref 27–31)
MCH RBC QN AUTO: 29.8 PG (ref 27–31)
MCH RBC QN AUTO: 29.9 PG (ref 27–31)
MCH RBC QN AUTO: 30 PG (ref 27–31)
MCH RBC QN AUTO: 30.1 PG (ref 27–31)
MCH RBC QN AUTO: 30.2 PG (ref 27–31)
MCH RBC QN AUTO: 30.2 PG (ref 27–31)
MCH RBC QN AUTO: 30.4 PG (ref 27–31)
MCH RBC QN AUTO: 30.5 PG (ref 27–31)
MCH RBC QN AUTO: 30.6 PG (ref 27–31)
MCH RBC QN AUTO: 30.7 PG (ref 27–31)
MCH RBC QN AUTO: 30.7 PG (ref 27–31)
MCH RBC QN AUTO: 30.8 PG (ref 27–31)
MCHC RBC AUTO-ENTMCNC: 30.9 G/DL (ref 32–36)
MCHC RBC AUTO-ENTMCNC: 31.6 G/DL (ref 32–36)
MCHC RBC AUTO-ENTMCNC: 31.9 G/DL (ref 32–36)
MCHC RBC AUTO-ENTMCNC: 32.1 G/DL (ref 32–36)
MCHC RBC AUTO-ENTMCNC: 32.2 G/DL (ref 32–36)
MCHC RBC AUTO-ENTMCNC: 32.3 G/DL (ref 32–36)
MCHC RBC AUTO-ENTMCNC: 32.8 G/DL (ref 32–36)
MCHC RBC AUTO-ENTMCNC: 32.8 G/DL (ref 32–36)
MCHC RBC AUTO-ENTMCNC: 33 G/DL (ref 32–36)
MCHC RBC AUTO-ENTMCNC: 33.3 G/DL (ref 32–36)
MCHC RBC AUTO-ENTMCNC: 33.7 G/DL (ref 32–36)
MCV RBC AUTO: 87.6 FL (ref 80–99)
MCV RBC AUTO: 89.7 FL (ref 80–99)
MCV RBC AUTO: 92 FL (ref 80–99)
MCV RBC AUTO: 92.1 FL (ref 80–99)
MCV RBC AUTO: 92.7 FL (ref 80–99)
MCV RBC AUTO: 92.8 FL (ref 80–99)
MCV RBC AUTO: 92.9 FL (ref 80–99)
MCV RBC AUTO: 93 FL (ref 80–99)
MCV RBC AUTO: 93.4 FL (ref 80–99)
MCV RBC AUTO: 93.8 FL (ref 80–99)
MCV RBC AUTO: 94.1 FL (ref 80–99)
MCV RBC AUTO: 94.8 FL (ref 80–99)
MCV RBC AUTO: 95.3 FL (ref 80–99)
MCV RBC AUTO: 96.2 FL (ref 80–99)
METHADONE UR QL SCN: NEGATIVE
METHGB BLD QL: 0.8 % (ref 0–1.5)
METHGB BLD QL: 0.9 % (ref 0–1.5)
METHGB BLD QL: 0.9 % (ref 0–1.5)
MODALITY: ABNORMAL
MONOCYTES # BLD AUTO: 0.29 10*3/MM3 (ref 0–1)
MONOCYTES # BLD AUTO: 0.39 10*3/MM3 (ref 0–1)
MONOCYTES # BLD AUTO: 0.39 10*3/MM3 (ref 0–1)
MONOCYTES # BLD AUTO: 0.41 10*3/MM3 (ref 0–1)
MONOCYTES # BLD AUTO: 0.73 10*3/MM3 (ref 0–1)
MONOCYTES # BLD AUTO: 0.75 10*3/MM3 (ref 0–1)
MONOCYTES # BLD AUTO: 0.79 10*3/MM3 (ref 0–1)
MONOCYTES # BLD AUTO: 1.02 10*3/MM3 (ref 0–1)
MONOCYTES # BLD AUTO: 1.09 10*3/MM3 (ref 0–1)
MONOCYTES # BLD AUTO: 1.19 10*3/MM3 (ref 0–1)
MONOCYTES # BLD AUTO: 1.23 10*3/MM3 (ref 0–1)
MONOCYTES # BLD AUTO: 1.37 10*3/MM3 (ref 0–1)
MONOCYTES NFR BLD AUTO: 10 % (ref 0–12)
MONOCYTES NFR BLD AUTO: 10.8 % (ref 0–12)
MONOCYTES NFR BLD AUTO: 11 % (ref 0–12)
MONOCYTES NFR BLD AUTO: 14.6 % (ref 0–12)
MONOCYTES NFR BLD AUTO: 14.7 % (ref 0–12)
MONOCYTES NFR BLD AUTO: 15.7 % (ref 0–12)
MONOCYTES NFR BLD AUTO: 22.9 % (ref 0–12)
MONOCYTES NFR BLD AUTO: 3.7 % (ref 0–12)
MONOCYTES NFR BLD AUTO: 5.9 % (ref 0–12)
MONOCYTES NFR BLD AUTO: 6.3 % (ref 0–12)
MONOCYTES NFR BLD AUTO: 6.6 % (ref 0–12)
MONOCYTES NFR BLD AUTO: 7 % (ref 0–12)
MUCOUS THREADS URNS QL MICRO: ABNORMAL /HPF
NEUTROPHILS # BLD AUTO: 10.75 10*3/MM3 (ref 1.5–8.3)
NEUTROPHILS # BLD AUTO: 3.12 10*3/MM3 (ref 1.5–8.3)
NEUTROPHILS # BLD AUTO: 3.57 10*3/MM3 (ref 1.5–8.3)
NEUTROPHILS # BLD AUTO: 4.48 10*3/MM3 (ref 1.5–8.3)
NEUTROPHILS # BLD AUTO: 4.55 10*3/MM3 (ref 1.5–8.3)
NEUTROPHILS # BLD AUTO: 4.67 10*3/MM3 (ref 1.5–8.3)
NEUTROPHILS # BLD AUTO: 5.01 10*3/MM3 (ref 1.5–8.3)
NEUTROPHILS # BLD AUTO: 5.21 10*3/MM3 (ref 1.5–8.3)
NEUTROPHILS # BLD AUTO: 5.77 10*3/MM3 (ref 1.5–8.3)
NEUTROPHILS # BLD AUTO: 5.9 10*3/MM3 (ref 1.5–8.3)
NEUTROPHILS # BLD AUTO: 9.8 10*3/MM3 (ref 1.5–8.3)
NEUTROPHILS # BLD AUTO: 9.85 10*3/MM3 (ref 1.5–8.3)
NEUTROPHILS NFR BLD AUTO: 64.6 % (ref 41–71)
NEUTROPHILS NFR BLD AUTO: 66.6 % (ref 41–71)
NEUTROPHILS NFR BLD AUTO: 67.2 % (ref 41–71)
NEUTROPHILS NFR BLD AUTO: 68.6 % (ref 41–71)
NEUTROPHILS NFR BLD AUTO: 71.1 % (ref 41–71)
NEUTROPHILS NFR BLD AUTO: 77.9 % (ref 41–71)
NEUTROPHILS NFR BLD AUTO: 78.4 % (ref 41–71)
NEUTROPHILS NFR BLD AUTO: 78.9 % (ref 41–71)
NEUTROPHILS NFR BLD AUTO: 79 % (ref 41–71)
NEUTROPHILS NFR BLD AUTO: 82.4 % (ref 41–71)
NEUTROPHILS NFR BLD AUTO: 84.3 % (ref 41–71)
NEUTROPHILS NFR BLD AUTO: 88.6 % (ref 41–71)
NITRITE UR QL STRIP: NEGATIVE
NT-PROBNP SERPL-MCNC: 2973 PG/ML (ref 0–738)
OLIGOCLONAL BANDS.IT SER+CSF QL: ABNORMAL
OPIATES UR QL: NEGATIVE
OXYCODONE UR QL SCN: NEGATIVE
OXYHGB MFR BLDV: 92.6 % (ref 94–99)
OXYHGB MFR BLDV: 97.1 % (ref 94–99)
OXYHGB MFR BLDV: 98.1 % (ref 94–99)
PATH REPORT.FINAL DX SPEC: NORMAL
PCO2 BLDA: 14.7 MM HG (ref 35–45)
PCO2 BLDA: 24.6 MM HG (ref 35–45)
PCO2 BLDA: 47.2 MM HG (ref 35–45)
PCP UR QL SCN: NEGATIVE
PH BLDA: 7.47 PH UNITS (ref 7.35–7.45)
PH BLDA: 7.5 PH UNITS (ref 7.35–7.45)
PH BLDA: 7.58 PH UNITS (ref 7.35–7.45)
PH UR STRIP.AUTO: 5.5 [PH] (ref 5–8)
PH UR STRIP.AUTO: 6 [PH] (ref 5–8)
PHOSPHATE SERPL-MCNC: 1.3 MG/DL (ref 2.4–5.1)
PHOSPHATE SERPL-MCNC: 1.5 MG/DL (ref 2.4–5.1)
PHOSPHATE SERPL-MCNC: 1.6 MG/DL (ref 2.4–5.1)
PHOSPHATE SERPL-MCNC: 1.7 MG/DL (ref 2.4–5.1)
PHOSPHATE SERPL-MCNC: 1.7 MG/DL (ref 2.4–5.1)
PHOSPHATE SERPL-MCNC: 1.8 MG/DL (ref 2.4–5.1)
PHOSPHATE SERPL-MCNC: 1.8 MG/DL (ref 2.4–5.1)
PHOSPHATE SERPL-MCNC: 1.9 MG/DL (ref 2.4–5.1)
PHOSPHATE SERPL-MCNC: 1.9 MG/DL (ref 2.4–5.1)
PHOSPHATE SERPL-MCNC: 2 MG/DL (ref 2.4–5.1)
PHOSPHATE SERPL-MCNC: 2.2 MG/DL (ref 2.4–5.1)
PHOSPHATE SERPL-MCNC: 2.4 MG/DL (ref 2.4–5.1)
PHOSPHATE SERPL-MCNC: 2.5 MG/DL (ref 2.4–5.1)
PHOSPHATE SERPL-MCNC: 2.5 MG/DL (ref 2.4–5.1)
PHOSPHATE SERPL-MCNC: 2.6 MG/DL (ref 2.4–5.1)
PHOSPHATE SERPL-MCNC: 2.7 MG/DL (ref 2.4–5.1)
PHOSPHATE SERPL-MCNC: 2.8 MG/DL (ref 2.4–5.1)
PHOSPHATE SERPL-MCNC: 2.9 MG/DL (ref 2.4–5.1)
PHOSPHATE SERPL-MCNC: 3 MG/DL (ref 2.4–5.1)
PHOSPHATE SERPL-MCNC: 3.1 MG/DL (ref 2.4–5.1)
PHOSPHATE SERPL-MCNC: 3.2 MG/DL (ref 2.4–5.1)
PHOSPHATE SERPL-MCNC: 3.3 MG/DL (ref 2.4–5.1)
PHOSPHATE SERPL-MCNC: 3.3 MG/DL (ref 2.4–5.1)
PHOSPHATE SERPL-MCNC: 3.4 MG/DL (ref 2.4–5.1)
PHOSPHATE SERPL-MCNC: 4.3 MG/DL (ref 2.4–5.1)
PLAT MORPH BLD: NORMAL
PLATELET # BLD AUTO: 109 10*3/MM3 (ref 150–450)
PLATELET # BLD AUTO: 137 10*3/MM3 (ref 150–450)
PLATELET # BLD AUTO: 140 10*3/MM3 (ref 150–450)
PLATELET # BLD AUTO: 146 10*3/MM3 (ref 150–450)
PLATELET # BLD AUTO: 195 10*3/MM3 (ref 150–450)
PLATELET # BLD AUTO: 214 10*3/MM3 (ref 150–450)
PLATELET # BLD AUTO: 242 10*3/MM3 (ref 150–450)
PLATELET # BLD AUTO: 251 10*3/MM3 (ref 150–450)
PLATELET # BLD AUTO: 286 10*3/MM3 (ref 150–450)
PLATELET # BLD AUTO: 289 10*3/MM3 (ref 150–450)
PLATELET # BLD AUTO: 350 10*3/MM3 (ref 150–450)
PLATELET # BLD AUTO: 353 10*3/MM3 (ref 150–450)
PLATELET # BLD AUTO: 355 10*3/MM3 (ref 150–450)
PLATELET # BLD AUTO: 356 10*3/MM3 (ref 150–450)
PLATELET # BLD AUTO: 431 10*3/MM3 (ref 150–450)
PLATELET # BLD AUTO: 451 10*3/MM3 (ref 150–450)
PLATELET # BLD AUTO: 81 10*3/MM3 (ref 150–450)
PLATELET # BLD AUTO: 92 10*3/MM3 (ref 150–450)
PMV BLD AUTO: 10.2 FL (ref 6–12)
PMV BLD AUTO: 10.4 FL (ref 6–12)
PMV BLD AUTO: 10.6 FL (ref 6–12)
PMV BLD AUTO: 10.6 FL (ref 6–12)
PMV BLD AUTO: 10.7 FL (ref 6–12)
PMV BLD AUTO: 10.7 FL (ref 6–12)
PMV BLD AUTO: 10.9 FL (ref 6–12)
PMV BLD AUTO: 11.2 FL (ref 6–12)
PMV BLD AUTO: 11.3 FL (ref 6–12)
PMV BLD AUTO: 11.6 FL (ref 6–12)
PMV BLD AUTO: 11.7 FL (ref 6–12)
PMV BLD AUTO: 12.4 FL (ref 6–12)
PMV BLD AUTO: 12.8 FL (ref 6–12)
PMV BLD AUTO: 9.5 FL (ref 6–12)
PMV BLD AUTO: 9.7 FL (ref 6–12)
PMV BLD AUTO: 9.9 FL (ref 6–12)
PO2 BLDA: 106 MM HG (ref 83–108)
PO2 BLDA: 203 MM HG (ref 83–108)
PO2 BLDA: 61 MM HG (ref 83–108)
POTASSIUM BLD-SCNC: 2.5 MMOL/L (ref 3.5–5.5)
POTASSIUM BLD-SCNC: 2.8 MMOL/L (ref 3.5–5.5)
POTASSIUM BLD-SCNC: 2.8 MMOL/L (ref 3.5–5.5)
POTASSIUM BLD-SCNC: 2.9 MMOL/L (ref 3.5–5.5)
POTASSIUM BLD-SCNC: 2.9 MMOL/L (ref 3.5–5.5)
POTASSIUM BLD-SCNC: 3 MMOL/L (ref 3.5–5.5)
POTASSIUM BLD-SCNC: 3.1 MMOL/L (ref 3.5–5.5)
POTASSIUM BLD-SCNC: 3.1 MMOL/L (ref 3.5–5.5)
POTASSIUM BLD-SCNC: 3.2 MMOL/L (ref 3.5–5.5)
POTASSIUM BLD-SCNC: 3.3 MMOL/L (ref 3.5–5.5)
POTASSIUM BLD-SCNC: 3.5 MMOL/L (ref 3.5–5.5)
POTASSIUM BLD-SCNC: 3.6 MMOL/L (ref 3.5–5.5)
POTASSIUM BLD-SCNC: 3.6 MMOL/L (ref 3.5–5.5)
POTASSIUM BLD-SCNC: 3.7 MMOL/L (ref 3.5–5.5)
POTASSIUM BLD-SCNC: 3.8 MMOL/L (ref 3.5–5.5)
POTASSIUM BLD-SCNC: 3.9 MMOL/L (ref 3.5–5.5)
POTASSIUM BLD-SCNC: 4.2 MMOL/L (ref 3.5–5.5)
POTASSIUM BLD-SCNC: 4.3 MMOL/L (ref 3.5–5.5)
POTASSIUM BLD-SCNC: 4.5 MMOL/L (ref 3.5–5.5)
POTASSIUM BLD-SCNC: 4.5 MMOL/L (ref 3.5–5.5)
POTASSIUM BLD-SCNC: 4.7 MMOL/L (ref 3.5–5.5)
POTASSIUM BLD-SCNC: 4.7 MMOL/L (ref 3.5–5.5)
PREALB SERPL-MCNC: 10.2 MG/DL (ref 10–40)
PREALB SERPL-MCNC: 6.8 MG/DL (ref 10–40)
PROCALCITONIN SERPL-MCNC: 0.07 NG/ML
PROPOXYPH UR QL: NEGATIVE
PROT CSF-MCNC: 70 MG/DL (ref 15–45)
PROT SERPL-MCNC: 6 G/DL (ref 5.7–8.2)
PROT SERPL-MCNC: 6.1 G/DL (ref 5.7–8.2)
PROT SERPL-MCNC: 6.3 G/DL (ref 5.7–8.2)
PROT SERPL-MCNC: 6.4 G/DL (ref 5.7–8.2)
PROT SERPL-MCNC: 6.6 G/DL (ref 5.7–8.2)
PROT SERPL-MCNC: 6.7 G/DL (ref 5.7–8.2)
PROT SERPL-MCNC: 6.8 G/DL (ref 5.7–8.2)
PROT SERPL-MCNC: 7 G/DL (ref 5.7–8.2)
PROT UR QL STRIP: ABNORMAL
PROT UR QL STRIP: NEGATIVE
PROTHROMBIN TIME: 11.5 SECONDS (ref 9.6–11.5)
PROTHROMBIN TIME: 12.9 SECONDS (ref 9.6–11.5)
RBC # BLD AUTO: 2.87 10*6/MM3 (ref 3.89–5.14)
RBC # BLD AUTO: 2.91 10*6/MM3 (ref 3.89–5.14)
RBC # BLD AUTO: 2.94 10*6/MM3 (ref 3.89–5.14)
RBC # BLD AUTO: 3.16 10*6/MM3 (ref 3.89–5.14)
RBC # BLD AUTO: 3.18 10*6/MM3 (ref 3.89–5.14)
RBC # BLD AUTO: 3.21 10*6/MM3 (ref 3.89–5.14)
RBC # BLD AUTO: 3.27 10*6/MM3 (ref 3.89–5.14)
RBC # BLD AUTO: 3.42 10*6/MM3 (ref 3.89–5.14)
RBC # BLD AUTO: 3.5 10*6/MM3 (ref 3.89–5.14)
RBC # BLD AUTO: 3.55 10*6/MM3 (ref 3.89–5.14)
RBC # BLD AUTO: 3.59 10*6/MM3 (ref 3.89–5.14)
RBC # BLD AUTO: 3.62 10*6/MM3 (ref 3.89–5.14)
RBC # BLD AUTO: 3.71 10*6/MM3 (ref 3.89–5.14)
RBC # BLD AUTO: 3.72 10*6/MM3 (ref 3.89–5.14)
RBC # BLD AUTO: 3.72 10*6/MM3 (ref 3.89–5.14)
RBC # BLD AUTO: 3.75 10*6/MM3 (ref 3.89–5.14)
RBC # BLD AUTO: 3.83 10*6/MM3 (ref 3.89–5.14)
RBC # BLD AUTO: 3.97 10*6/MM3 (ref 3.89–5.14)
RBC # CSF MANUAL: 0 /MM3 (ref 0–0)
RBC # CSF MANUAL: 0 /MM3 (ref 0–0)
RBC # UR: ABNORMAL /HPF
REF LAB TEST METHOD: ABNORMAL
REF LAB TEST METHOD: NORMAL
SODIUM BLD-SCNC: 123 MMOL/L (ref 132–146)
SODIUM BLD-SCNC: 127 MMOL/L (ref 132–146)
SODIUM BLD-SCNC: 128 MMOL/L (ref 132–146)
SODIUM BLD-SCNC: 128 MMOL/L (ref 132–146)
SODIUM BLD-SCNC: 129 MMOL/L (ref 132–146)
SODIUM BLD-SCNC: 130 MMOL/L (ref 132–146)
SODIUM BLD-SCNC: 131 MMOL/L (ref 132–146)
SODIUM BLD-SCNC: 132 MMOL/L (ref 132–146)
SODIUM BLD-SCNC: 132 MMOL/L (ref 132–146)
SODIUM BLD-SCNC: 133 MMOL/L (ref 132–146)
SODIUM BLD-SCNC: 134 MMOL/L (ref 132–146)
SODIUM BLD-SCNC: 135 MMOL/L (ref 132–146)
SODIUM BLD-SCNC: 136 MMOL/L (ref 132–146)
SODIUM BLD-SCNC: 138 MMOL/L (ref 132–146)
SODIUM BLD-SCNC: 139 MMOL/L (ref 132–146)
SODIUM BLD-SCNC: 141 MMOL/L (ref 132–146)
SODIUM BLD-SCNC: 141 MMOL/L (ref 132–146)
SP GR UR STRIP: 1.02 (ref 1–1.03)
SP GR UR STRIP: 1.02 (ref 1–1.03)
SP GR UR STRIP: 1.03 (ref 1–1.03)
SP GR UR STRIP: 1.03 (ref 1–1.03)
SPECIMEN VOL CSF: 5.5 ML
SPECIMEN VOL CSF: 5.5 ML
SQUAMOUS #/AREA URNS HPF: ABNORMAL /HPF
T4 FREE SERPL-MCNC: 1.52 NG/DL (ref 0.89–1.76)
TRICYCLICS UR QL SCN: NEGATIVE
TRIGL SERPL-MCNC: 79 MG/DL (ref 0–150)
TROPONIN I SERPL-MCNC: 0 NG/ML (ref 0–0.07)
TROPONIN I SERPL-MCNC: 0 NG/ML (ref 0–0.07)
TSH SERPL DL<=0.05 MIU/L-ACNC: 1.01 MIU/ML (ref 0.35–5.35)
TSH SERPL DL<=0.05 MIU/L-ACNC: 1.9 MIU/ML (ref 0.35–5.35)
TUBE # CSF: 1
TUBE # CSF: 4
UROBILINOGEN UR QL STRIP: ABNORMAL
UROBILINOGEN UR QL STRIP: NORMAL
VANCOMYCIN TROUGH SERPL-MCNC: 4.3 MCG/ML (ref 10–20)
VIT B1 BLD-SCNC: 79.3 NMOL/L (ref 66.5–200)
VIT B12 BLD-MCNC: 1547 PG/ML (ref 211–911)
VIT B12 BLD-MCNC: 1803 PG/ML (ref 211–911)
WBC # CSF MANUAL: 3 /MM3 (ref 0–5)
WBC # CSF MANUAL: 4 /MM3 (ref 0–5)
WBC MORPH BLD: NORMAL
WBC NRBC COR # BLD: 11.06 10*3/MM3 (ref 3.5–10.8)
WBC NRBC COR # BLD: 11.95 10*3/MM3 (ref 3.5–10.8)
WBC NRBC COR # BLD: 13.66 10*3/MM3 (ref 3.5–10.8)
WBC NRBC COR # BLD: 4.39 10*3/MM3 (ref 3.5–10.8)
WBC NRBC COR # BLD: 5.36 10*3/MM3 (ref 3.5–10.8)
WBC NRBC COR # BLD: 5.54 10*3/MM3 (ref 3.5–10.8)
WBC NRBC COR # BLD: 5.64 10*3/MM3 (ref 3.5–10.8)
WBC NRBC COR # BLD: 5.98 10*3/MM3 (ref 3.5–10.8)
WBC NRBC COR # BLD: 6.08 10*3/MM3 (ref 3.5–10.8)
WBC NRBC COR # BLD: 6.62 10*3/MM3 (ref 3.5–10.8)
WBC NRBC COR # BLD: 6.64 10*3/MM3 (ref 3.5–10.8)
WBC NRBC COR # BLD: 6.94 10*3/MM3 (ref 3.5–10.8)
WBC NRBC COR # BLD: 6.99 10*3/MM3 (ref 3.5–10.8)
WBC NRBC COR # BLD: 7.3 10*3/MM3 (ref 3.5–10.8)
WBC NRBC COR # BLD: 7.43 10*3/MM3 (ref 3.5–10.8)
WBC NRBC COR # BLD: 7.45 10*3/MM3 (ref 3.5–10.8)
WBC NRBC COR # BLD: 7.58 10*3/MM3 (ref 3.5–10.8)
WBC NRBC COR # BLD: 8.95 10*3/MM3 (ref 3.5–10.8)
WBC UR QL AUTO: ABNORMAL /HPF
WHOLE BLOOD HOLD SPECIMEN: NORMAL
WHOLE BLOOD HOLD SPECIMEN: NORMAL

## 2018-01-01 PROCEDURE — 25010000002 HEPARIN (PORCINE) PER 1000 UNITS: Performed by: HOSPITALIST

## 2018-01-01 PROCEDURE — 94640 AIRWAY INHALATION TREATMENT: CPT

## 2018-01-01 PROCEDURE — 83735 ASSAY OF MAGNESIUM: CPT | Performed by: INTERNAL MEDICINE

## 2018-01-01 PROCEDURE — 70553 MRI BRAIN STEM W/O & W/DYE: CPT

## 2018-01-01 PROCEDURE — 63710000001 OSELTAMIVIR 30 MG CAPSULE: Performed by: NURSE PRACTITIONER

## 2018-01-01 PROCEDURE — 80053 COMPREHEN METABOLIC PANEL: CPT | Performed by: NURSE PRACTITIONER

## 2018-01-01 PROCEDURE — A9270 NON-COVERED ITEM OR SERVICE: HCPCS | Performed by: FAMILY MEDICINE

## 2018-01-01 PROCEDURE — 99205 OFFICE O/P NEW HI 60 MIN: CPT | Performed by: INTERNAL MEDICINE

## 2018-01-01 PROCEDURE — 97116 GAIT TRAINING THERAPY: CPT

## 2018-01-01 PROCEDURE — 74018 RADEX ABDOMEN 1 VIEW: CPT

## 2018-01-01 PROCEDURE — 5A09357 ASSISTANCE WITH RESPIRATORY VENTILATION, LESS THAN 24 CONSECUTIVE HOURS, CONTINUOUS POSITIVE AIRWAY PRESSURE: ICD-10-PCS | Performed by: INTERNAL MEDICINE

## 2018-01-01 PROCEDURE — 25010000002 PIPERACILLIN SOD-TAZOBACTAM PER 1 G: Performed by: INTERNAL MEDICINE

## 2018-01-01 PROCEDURE — G0378 HOSPITAL OBSERVATION PER HR: HCPCS

## 2018-01-01 PROCEDURE — 83735 ASSAY OF MAGNESIUM: CPT | Performed by: NURSE PRACTITIONER

## 2018-01-01 PROCEDURE — 92610 EVALUATE SWALLOWING FUNCTION: CPT

## 2018-01-01 PROCEDURE — 80053 COMPREHEN METABOLIC PANEL: CPT | Performed by: INTERNAL MEDICINE

## 2018-01-01 PROCEDURE — 25010000002 HEPARIN (PORCINE) PER 1000 UNITS: Performed by: INTERNAL MEDICINE

## 2018-01-01 PROCEDURE — 80048 BASIC METABOLIC PNL TOTAL CA: CPT | Performed by: INTERNAL MEDICINE

## 2018-01-01 PROCEDURE — 25010000002 LEVETRIRACETAM PER 10 MG: Performed by: PSYCHIATRY & NEUROLOGY

## 2018-01-01 PROCEDURE — 99232 SBSQ HOSP IP/OBS MODERATE 35: CPT | Performed by: PSYCHIATRY & NEUROLOGY

## 2018-01-01 PROCEDURE — G8988 SELF CARE GOAL STATUS: HCPCS

## 2018-01-01 PROCEDURE — 80048 BASIC METABOLIC PNL TOTAL CA: CPT | Performed by: HOSPITALIST

## 2018-01-01 PROCEDURE — 25010000002 AMIODARONE IN DEXTROSE 5% 360-4.14 MG/200ML-% SOLUTION: Performed by: INTERNAL MEDICINE

## 2018-01-01 PROCEDURE — 84132 ASSAY OF SERUM POTASSIUM: CPT | Performed by: INTERNAL MEDICINE

## 2018-01-01 PROCEDURE — 25010000002 IMMUNE GLOBULIN (HUMAN) 20 GM/200ML SOLUTION: Performed by: PSYCHIATRY & NEUROLOGY

## 2018-01-01 PROCEDURE — A9270 NON-COVERED ITEM OR SERVICE: HCPCS | Performed by: NURSE PRACTITIONER

## 2018-01-01 PROCEDURE — 25810000003 DEXTROSE-NACL PER 500 ML: Performed by: INTERNAL MEDICINE

## 2018-01-01 PROCEDURE — 99233 SBSQ HOSP IP/OBS HIGH 50: CPT | Performed by: INTERNAL MEDICINE

## 2018-01-01 PROCEDURE — 95819 EEG AWAKE AND ASLEEP: CPT

## 2018-01-01 PROCEDURE — 63710000001 HYDROCODONE-ACETAMINOPHEN 5-325 MG TABLET: Performed by: ORTHOPAEDIC SURGERY

## 2018-01-01 PROCEDURE — 70470 CT HEAD/BRAIN W/O & W/DYE: CPT

## 2018-01-01 PROCEDURE — 82040 ASSAY OF SERUM ALBUMIN: CPT | Performed by: PSYCHIATRY & NEUROLOGY

## 2018-01-01 PROCEDURE — 25010000003 POTASSIUM CHLORIDE 20 MEQ/250ML SOLUTION

## 2018-01-01 PROCEDURE — 71045 X-RAY EXAM CHEST 1 VIEW: CPT

## 2018-01-01 PROCEDURE — G8997 SWALLOW GOAL STATUS: HCPCS

## 2018-01-01 PROCEDURE — 25010000002 IMMUNE GLOBULIN (HUMAN) 5 GM/50ML SOLUTION 50 ML VIAL: Performed by: PSYCHIATRY & NEUROLOGY

## 2018-01-01 PROCEDURE — 94799 UNLISTED PULMONARY SVC/PX: CPT

## 2018-01-01 PROCEDURE — 25010000002 METHYLPREDNISOLONE: Performed by: PSYCHIATRY & NEUROLOGY

## 2018-01-01 PROCEDURE — C1894 INTRO/SHEATH, NON-LASER: HCPCS

## 2018-01-01 PROCEDURE — A9270 NON-COVERED ITEM OR SERVICE: HCPCS | Performed by: PHYSICIAN ASSISTANT

## 2018-01-01 PROCEDURE — 25010000003 POTASSIUM CHLORIDE PER 2 MEQ: Performed by: INTERNAL MEDICINE

## 2018-01-01 PROCEDURE — 25010000002 METHYLPREDNISOLONE PER 125 MG: Performed by: PSYCHIATRY & NEUROLOGY

## 2018-01-01 PROCEDURE — 97110 THERAPEUTIC EXERCISES: CPT

## 2018-01-01 PROCEDURE — 009U3ZX DRAINAGE OF SPINAL CANAL, PERCUTANEOUS APPROACH, DIAGNOSTIC: ICD-10-PCS | Performed by: RADIOLOGY

## 2018-01-01 PROCEDURE — 63710000001 OCUVITE-LUTEIN TABLET: Performed by: FAMILY MEDICINE

## 2018-01-01 PROCEDURE — 97530 THERAPEUTIC ACTIVITIES: CPT

## 2018-01-01 PROCEDURE — 63710000001 INSULIN LISPRO (HUMAN) PER 5 UNITS: Performed by: NURSE PRACTITIONER

## 2018-01-01 PROCEDURE — 86255 FLUORESCENT ANTIBODY SCREEN: CPT | Performed by: PSYCHIATRY & NEUROLOGY

## 2018-01-01 PROCEDURE — 80048 BASIC METABOLIC PNL TOTAL CA: CPT

## 2018-01-01 PROCEDURE — 25010000002 VANCOMYCIN PER 500 MG

## 2018-01-01 PROCEDURE — 63710000001 MULTIVITAMIN WITH MINERALS TABLET: Performed by: FAMILY MEDICINE

## 2018-01-01 PROCEDURE — 99226 PR SBSQ OBSERVATION CARE/DAY 35 MINUTES: CPT | Performed by: INTERNAL MEDICINE

## 2018-01-01 PROCEDURE — 25010000002 LEVETIRACETAM IN NACL 0.82% 500 MG/100ML SOLUTION

## 2018-01-01 PROCEDURE — 84100 ASSAY OF PHOSPHORUS: CPT | Performed by: INTERNAL MEDICINE

## 2018-01-01 PROCEDURE — 84134 ASSAY OF PREALBUMIN: CPT | Performed by: NURSE PRACTITIONER

## 2018-01-01 PROCEDURE — 70450 CT HEAD/BRAIN W/O DYE: CPT

## 2018-01-01 PROCEDURE — 97110 THERAPEUTIC EXERCISES: CPT | Performed by: PHYSICAL THERAPIST

## 2018-01-01 PROCEDURE — 97162 PT EVAL MOD COMPLEX 30 MIN: CPT | Performed by: PHYSICAL THERAPIST

## 2018-01-01 PROCEDURE — 82962 GLUCOSE BLOOD TEST: CPT

## 2018-01-01 PROCEDURE — 84443 ASSAY THYROID STIM HORMONE: CPT | Performed by: NURSE PRACTITIONER

## 2018-01-01 PROCEDURE — 99226 PR SBSQ OBSERVATION CARE/DAY 35 MINUTES: CPT | Performed by: HOSPITALIST

## 2018-01-01 PROCEDURE — 99220 PR INITIAL OBSERVATION CARE/DAY 70 MINUTES: CPT | Performed by: HOSPITALIST

## 2018-01-01 PROCEDURE — 83605 ASSAY OF LACTIC ACID: CPT | Performed by: INTERNAL MEDICINE

## 2018-01-01 PROCEDURE — 99232 SBSQ HOSP IP/OBS MODERATE 35: CPT | Performed by: INTERNAL MEDICINE

## 2018-01-01 PROCEDURE — 93005 ELECTROCARDIOGRAM TRACING: CPT

## 2018-01-01 PROCEDURE — 84134 ASSAY OF PREALBUMIN: CPT | Performed by: INTERNAL MEDICINE

## 2018-01-01 PROCEDURE — 25010000002 MIDAZOLAM PER 1 MG

## 2018-01-01 PROCEDURE — 93005 ELECTROCARDIOGRAM TRACING: CPT | Performed by: NURSE PRACTITIONER

## 2018-01-01 PROCEDURE — 82330 ASSAY OF CALCIUM: CPT | Performed by: INTERNAL MEDICINE

## 2018-01-01 PROCEDURE — 25010000002 AMIODARONE IN DEXTROSE 5% 150-4.21 MG/100ML-% SOLUTION

## 2018-01-01 PROCEDURE — 92960 CARDIOVERSION ELECTRIC EXT: CPT | Performed by: INTERNAL MEDICINE

## 2018-01-01 PROCEDURE — 25010000002 PIPERACILLIN-TAZOBACTAM: Performed by: INTERNAL MEDICINE

## 2018-01-01 PROCEDURE — 25010000002 ACETAZOLAMIDE PER 500 MG: Performed by: INTERNAL MEDICINE

## 2018-01-01 PROCEDURE — 82945 GLUCOSE OTHER FLUID: CPT | Performed by: PSYCHIATRY & NEUROLOGY

## 2018-01-01 PROCEDURE — 25010000002 VANCOMYCIN PER 500 MG: Performed by: EMERGENCY MEDICINE

## 2018-01-01 PROCEDURE — 70551 MRI BRAIN STEM W/O DYE: CPT

## 2018-01-01 PROCEDURE — 99291 CRITICAL CARE FIRST HOUR: CPT | Performed by: INTERNAL MEDICINE

## 2018-01-01 PROCEDURE — 85025 COMPLETE CBC W/AUTO DIFF WBC: CPT | Performed by: NURSE PRACTITIONER

## 2018-01-01 PROCEDURE — 97166 OT EVAL MOD COMPLEX 45 MIN: CPT

## 2018-01-01 PROCEDURE — 85730 THROMBOPLASTIN TIME PARTIAL: CPT | Performed by: NURSE PRACTITIONER

## 2018-01-01 PROCEDURE — 99225 PR SBSQ OBSERVATION CARE/DAY 25 MINUTES: CPT | Performed by: INTERNAL MEDICINE

## 2018-01-01 PROCEDURE — 84100 ASSAY OF PHOSPHORUS: CPT | Performed by: NURSE PRACTITIONER

## 2018-01-01 PROCEDURE — 99204 OFFICE O/P NEW MOD 45 MIN: CPT | Performed by: PSYCHIATRY & NEUROLOGY

## 2018-01-01 PROCEDURE — 97530 THERAPEUTIC ACTIVITIES: CPT | Performed by: OCCUPATIONAL THERAPIST

## 2018-01-01 PROCEDURE — 81001 URINALYSIS AUTO W/SCOPE: CPT | Performed by: EMERGENCY MEDICINE

## 2018-01-01 PROCEDURE — 85025 COMPLETE CBC W/AUTO DIFF WBC: CPT | Performed by: INTERNAL MEDICINE

## 2018-01-01 PROCEDURE — A9270 NON-COVERED ITEM OR SERVICE: HCPCS | Performed by: ORTHOPAEDIC SURGERY

## 2018-01-01 PROCEDURE — 93010 ELECTROCARDIOGRAM REPORT: CPT | Performed by: INTERNAL MEDICINE

## 2018-01-01 PROCEDURE — 85025 COMPLETE CBC W/AUTO DIFF WBC: CPT | Performed by: EMERGENCY MEDICINE

## 2018-01-01 PROCEDURE — 82024 ASSAY OF ACTH: CPT | Performed by: INTERNAL MEDICINE

## 2018-01-01 PROCEDURE — 63710000001 CALCIUM CARB-CHOLECALCIFEROL 600-800 MG-UNIT TABLET: Performed by: FAMILY MEDICINE

## 2018-01-01 PROCEDURE — 77003 FLUOROGUIDE FOR SPINE INJECT: CPT

## 2018-01-01 PROCEDURE — P9612 CATHETERIZE FOR URINE SPEC: HCPCS

## 2018-01-01 PROCEDURE — 87040 BLOOD CULTURE FOR BACTERIA: CPT | Performed by: EMERGENCY MEDICINE

## 2018-01-01 PROCEDURE — 99239 HOSP IP/OBS DSCHRG MGMT >30: CPT | Performed by: INTERNAL MEDICINE

## 2018-01-01 PROCEDURE — 72192 CT PELVIS W/O DYE: CPT

## 2018-01-01 PROCEDURE — 84484 ASSAY OF TROPONIN QUANT: CPT

## 2018-01-01 PROCEDURE — 25010000002 PIPERACILLIN-TAZOBACTAM: Performed by: EMERGENCY MEDICINE

## 2018-01-01 PROCEDURE — 82805 BLOOD GASES W/O2 SATURATION: CPT | Performed by: INTERNAL MEDICINE

## 2018-01-01 PROCEDURE — 36600 WITHDRAWAL OF ARTERIAL BLOOD: CPT | Performed by: INTERNAL MEDICINE

## 2018-01-01 PROCEDURE — 25010000003 METHYLPREDNISOLONE PER 125 MG: Performed by: PSYCHIATRY & NEUROLOGY

## 2018-01-01 PROCEDURE — 83036 HEMOGLOBIN GLYCOSYLATED A1C: CPT | Performed by: NURSE PRACTITIONER

## 2018-01-01 PROCEDURE — 84157 ASSAY OF PROTEIN OTHER: CPT | Performed by: PSYCHIATRY & NEUROLOGY

## 2018-01-01 PROCEDURE — 83873 ASSAY OF CSF PROTEIN: CPT | Performed by: PSYCHIATRY & NEUROLOGY

## 2018-01-01 PROCEDURE — 25010000002 COSYNTROPIN PER 0.25 MG: Performed by: INTERNAL MEDICINE

## 2018-01-01 PROCEDURE — 82607 VITAMIN B-12: CPT | Performed by: INTERNAL MEDICINE

## 2018-01-01 PROCEDURE — 87529 HSV DNA AMP PROBE: CPT | Performed by: INTERNAL MEDICINE

## 2018-01-01 PROCEDURE — 99284 EMERGENCY DEPT VISIT MOD MDM: CPT

## 2018-01-01 PROCEDURE — 25010000002 AMIODARONE IN DEXTROSE 5% 150-4.21 MG/100ML-% SOLUTION: Performed by: INTERNAL MEDICINE

## 2018-01-01 PROCEDURE — 99285 EMERGENCY DEPT VISIT HI MDM: CPT

## 2018-01-01 PROCEDURE — 86140 C-REACTIVE PROTEIN: CPT | Performed by: INTERNAL MEDICINE

## 2018-01-01 PROCEDURE — 99214 OFFICE O/P EST MOD 30 MIN: CPT | Performed by: INTERNAL MEDICINE

## 2018-01-01 PROCEDURE — 84443 ASSAY THYROID STIM HORMONE: CPT | Performed by: INTERNAL MEDICINE

## 2018-01-01 PROCEDURE — 25010000002 ENOXAPARIN PER 10 MG

## 2018-01-01 PROCEDURE — 82607 VITAMIN B-12: CPT | Performed by: PSYCHIATRY & NEUROLOGY

## 2018-01-01 PROCEDURE — 80306 DRUG TEST PRSMV INSTRMNT: CPT | Performed by: INTERNAL MEDICINE

## 2018-01-01 PROCEDURE — G8996 SWALLOW CURRENT STATUS: HCPCS

## 2018-01-01 PROCEDURE — 80202 ASSAY OF VANCOMYCIN: CPT | Performed by: INTERNAL MEDICINE

## 2018-01-01 PROCEDURE — 0 IOPAMIDOL PER 1 ML: Performed by: INTERNAL MEDICINE

## 2018-01-01 PROCEDURE — 82533 TOTAL CORTISOL: CPT | Performed by: INTERNAL MEDICINE

## 2018-01-01 PROCEDURE — 84145 PROCALCITONIN (PCT): CPT | Performed by: EMERGENCY MEDICINE

## 2018-01-01 PROCEDURE — 83880 ASSAY OF NATRIURETIC PEPTIDE: CPT | Performed by: NURSE PRACTITIONER

## 2018-01-01 PROCEDURE — 83690 ASSAY OF LIPASE: CPT | Performed by: NURSE PRACTITIONER

## 2018-01-01 PROCEDURE — 94760 N-INVAS EAR/PLS OXIMETRY 1: CPT

## 2018-01-01 PROCEDURE — 81003 URINALYSIS AUTO W/O SCOPE: CPT | Performed by: INTERNAL MEDICINE

## 2018-01-01 PROCEDURE — 85018 HEMOGLOBIN: CPT | Performed by: INTERNAL MEDICINE

## 2018-01-01 PROCEDURE — 80048 BASIC METABOLIC PNL TOTAL CA: CPT | Performed by: NURSE PRACTITIONER

## 2018-01-01 PROCEDURE — 63710000001 POTASSIUM CHLORIDE 10 MEQ CAPSULE CONTROLLED-RELEASE: Performed by: NURSE PRACTITIONER

## 2018-01-01 PROCEDURE — 99225 PR SBSQ OBSERVATION CARE/DAY 25 MINUTES: CPT | Performed by: HOSPITALIST

## 2018-01-01 PROCEDURE — 82042 OTHER SOURCE ALBUMIN QUAN EA: CPT | Performed by: PSYCHIATRY & NEUROLOGY

## 2018-01-01 PROCEDURE — 85610 PROTHROMBIN TIME: CPT | Performed by: NURSE PRACTITIONER

## 2018-01-01 PROCEDURE — 93005 ELECTROCARDIOGRAM TRACING: CPT | Performed by: INTERNAL MEDICINE

## 2018-01-01 PROCEDURE — 63710000001 SENNOSIDES-DOCUSATE SODIUM 8.6-50 MG TABLET: Performed by: PHYSICIAN ASSISTANT

## 2018-01-01 PROCEDURE — 25010000002 IMMUNE GLOBULIN (HUMAN) 10 GM/100ML SOLUTION 100 ML VIAL: Performed by: PSYCHIATRY & NEUROLOGY

## 2018-01-01 PROCEDURE — 25010000002 VANCOMYCIN

## 2018-01-01 PROCEDURE — 80053 COMPREHEN METABOLIC PANEL: CPT | Performed by: EMERGENCY MEDICINE

## 2018-01-01 PROCEDURE — 84478 ASSAY OF TRIGLYCERIDES: CPT | Performed by: INTERNAL MEDICINE

## 2018-01-01 PROCEDURE — 82465 ASSAY BLD/SERUM CHOLESTEROL: CPT | Performed by: INTERNAL MEDICINE

## 2018-01-01 PROCEDURE — 85027 COMPLETE CBC AUTOMATED: CPT | Performed by: INTERNAL MEDICINE

## 2018-01-01 PROCEDURE — 85027 COMPLETE CBC AUTOMATED: CPT | Performed by: HOSPITALIST

## 2018-01-01 PROCEDURE — 63710000001 POLYETHYLENE GLYCOL PACK: Performed by: NURSE PRACTITIONER

## 2018-01-01 PROCEDURE — 99239 HOSP IP/OBS DSCHRG MGMT >30: CPT | Performed by: PHYSICIAN ASSISTANT

## 2018-01-01 PROCEDURE — 87327 CRYPTOCOCCUS NEOFORM AG IA: CPT | Performed by: PSYCHIATRY & NEUROLOGY

## 2018-01-01 PROCEDURE — 83605 ASSAY OF LACTIC ACID: CPT | Performed by: EMERGENCY MEDICINE

## 2018-01-01 PROCEDURE — 85379 FIBRIN DEGRADATION QUANT: CPT | Performed by: INTERNAL MEDICINE

## 2018-01-01 PROCEDURE — 83916 OLIGOCLONAL BANDS: CPT | Performed by: PSYCHIATRY & NEUROLOGY

## 2018-01-01 PROCEDURE — 63710000001 LACTOBACILLUS ACIDOPHILUS CAPSULE: Performed by: NURSE PRACTITIONER

## 2018-01-01 PROCEDURE — 82550 ASSAY OF CK (CPK): CPT | Performed by: NURSE PRACTITIONER

## 2018-01-01 PROCEDURE — 85025 COMPLETE CBC W/AUTO DIFF WBC: CPT | Performed by: ORTHOPAEDIC SURGERY

## 2018-01-01 PROCEDURE — 94660 CPAP INITIATION&MGMT: CPT

## 2018-01-01 PROCEDURE — 81001 URINALYSIS AUTO W/SCOPE: CPT | Performed by: NURSE PRACTITIONER

## 2018-01-01 PROCEDURE — 87086 URINE CULTURE/COLONY COUNT: CPT | Performed by: INTERNAL MEDICINE

## 2018-01-01 PROCEDURE — 25010000002 METHYLPREDNISOLONE

## 2018-01-01 PROCEDURE — 93970 EXTREMITY STUDY: CPT | Performed by: INTERNAL MEDICINE

## 2018-01-01 PROCEDURE — 25010000002 ENOXAPARIN PER 10 MG: Performed by: ORTHOPAEDIC SURGERY

## 2018-01-01 PROCEDURE — A9577 INJ MULTIHANCE: HCPCS | Performed by: INTERNAL MEDICINE

## 2018-01-01 PROCEDURE — 82784 ASSAY IGA/IGD/IGG/IGM EACH: CPT | Performed by: PSYCHIATRY & NEUROLOGY

## 2018-01-01 PROCEDURE — 81001 URINALYSIS AUTO W/SCOPE: CPT

## 2018-01-01 PROCEDURE — 63710000001 DIPHENHYDRAMINE PER 50 MG: Performed by: ORTHOPAEDIC SURGERY

## 2018-01-01 PROCEDURE — 87804 INFLUENZA ASSAY W/OPTIC: CPT | Performed by: EMERGENCY MEDICINE

## 2018-01-01 PROCEDURE — 63710000001 GUAIFENESIN 600 MG TABLET SUSTAINED-RELEASE 12 HOUR: Performed by: NURSE PRACTITIONER

## 2018-01-01 PROCEDURE — 25010000003 POTASSIUM CHLORIDE 20 MEQ/250ML SOLUTION: Performed by: INTERNAL MEDICINE

## 2018-01-01 PROCEDURE — 89050 BODY FLUID CELL COUNT: CPT | Performed by: PSYCHIATRY & NEUROLOGY

## 2018-01-01 PROCEDURE — 25010000002 PIPERACILLIN-TAZOBACTAM: Performed by: NURSE PRACTITIONER

## 2018-01-01 PROCEDURE — 5A2204Z RESTORATION OF CARDIAC RHYTHM, SINGLE: ICD-10-PCS | Performed by: INTERNAL MEDICINE

## 2018-01-01 PROCEDURE — 63710000001 MULTIVITAMIN WITH MINERALS TABLET: Performed by: NURSE PRACTITIONER

## 2018-01-01 PROCEDURE — G8987 SELF CARE CURRENT STATUS: HCPCS

## 2018-01-01 PROCEDURE — 85027 COMPLETE CBC AUTOMATED: CPT | Performed by: NURSE PRACTITIONER

## 2018-01-01 PROCEDURE — 84425 ASSAY OF VITAMIN B-1: CPT | Performed by: PSYCHIATRY & NEUROLOGY

## 2018-01-01 PROCEDURE — 87040 BLOOD CULTURE FOR BACTERIA: CPT | Performed by: INTERNAL MEDICINE

## 2018-01-01 PROCEDURE — 83605 ASSAY OF LACTIC ACID: CPT | Performed by: NURSE PRACTITIONER

## 2018-01-01 PROCEDURE — 71275 CT ANGIOGRAPHY CHEST: CPT

## 2018-01-01 PROCEDURE — 0 GADOBENATE DIMEGLUMINE 529 MG/ML SOLUTION: Performed by: INTERNAL MEDICINE

## 2018-01-01 PROCEDURE — 85014 HEMATOCRIT: CPT | Performed by: INTERNAL MEDICINE

## 2018-01-01 PROCEDURE — 92526 ORAL FUNCTION THERAPY: CPT

## 2018-01-01 PROCEDURE — 99222 1ST HOSP IP/OBS MODERATE 55: CPT | Performed by: INTERNAL MEDICINE

## 2018-01-01 PROCEDURE — 95816 EEG AWAKE AND DROWSY: CPT

## 2018-01-01 PROCEDURE — 99231 SBSQ HOSP IP/OBS SF/LOW 25: CPT | Performed by: INTERNAL MEDICINE

## 2018-01-01 PROCEDURE — 99231 SBSQ HOSP IP/OBS SF/LOW 25: CPT | Performed by: PSYCHIATRY & NEUROLOGY

## 2018-01-01 PROCEDURE — 82140 ASSAY OF AMMONIA: CPT | Performed by: INTERNAL MEDICINE

## 2018-01-01 PROCEDURE — 63710000001 CALCIUM CARB-CHOLECALCIFEROL 600-800 MG-UNIT TABLET: Performed by: NURSE PRACTITIONER

## 2018-01-01 PROCEDURE — 3E0G76Z INTRODUCTION OF NUTRITIONAL SUBSTANCE INTO UPPER GI, VIA NATURAL OR ARTIFICIAL OPENING: ICD-10-PCS | Performed by: INTERNAL MEDICINE

## 2018-01-01 PROCEDURE — 83735 ASSAY OF MAGNESIUM: CPT | Performed by: EMERGENCY MEDICINE

## 2018-01-01 PROCEDURE — B01BZZZ FLUOROSCOPY OF SPINAL CORD: ICD-10-PCS | Performed by: RADIOLOGY

## 2018-01-01 PROCEDURE — 25010000002 MORPHINE SULFATE (PF) 2 MG/ML SOLUTION: Performed by: INTERNAL MEDICINE

## 2018-01-01 PROCEDURE — 99213 OFFICE O/P EST LOW 20 MIN: CPT | Performed by: PSYCHIATRY & NEUROLOGY

## 2018-01-01 PROCEDURE — 25010000003 POTASSIUM CHLORIDE 10 MEQ/100ML SOLUTION: Performed by: INTERNAL MEDICINE

## 2018-01-01 PROCEDURE — 84439 ASSAY OF FREE THYROXINE: CPT | Performed by: INTERNAL MEDICINE

## 2018-01-01 PROCEDURE — C1751 CATH, INF, PER/CENT/MIDLINE: HCPCS

## 2018-01-01 PROCEDURE — 97166 OT EVAL MOD COMPLEX 45 MIN: CPT | Performed by: OCCUPATIONAL THERAPIST

## 2018-01-01 PROCEDURE — 63710000001 POLYETHYLENE GLYCOL PACK: Performed by: PHYSICIAN ASSISTANT

## 2018-01-01 PROCEDURE — 82330 ASSAY OF CALCIUM: CPT | Performed by: NURSE PRACTITIONER

## 2018-01-01 PROCEDURE — 85007 BL SMEAR W/DIFF WBC COUNT: CPT | Performed by: INTERNAL MEDICINE

## 2018-01-01 PROCEDURE — 88112 CYTOPATH CELL ENHANCE TECH: CPT | Performed by: PSYCHIATRY & NEUROLOGY

## 2018-01-01 PROCEDURE — 93970 EXTREMITY STUDY: CPT

## 2018-01-01 PROCEDURE — 85025 COMPLETE CBC W/AUTO DIFF WBC: CPT

## 2018-01-01 PROCEDURE — 25010000002 DIGOXIN PER 500 MCG: Performed by: NURSE PRACTITIONER

## 2018-01-01 RX ORDER — HYDROCODONE BITARTRATE AND ACETAMINOPHEN 5; 325 MG/1; MG/1
1 TABLET ORAL EVERY 6 HOURS PRN
Qty: 8 TABLET | Refills: 0 | Status: SHIPPED | OUTPATIENT
Start: 2018-01-01

## 2018-01-01 RX ORDER — DEXTROMETHORPHAN POLISTIREX 30 MG/5ML
30 SUSPENSION ORAL EVERY 12 HOURS PRN
Status: DISCONTINUED | OUTPATIENT
Start: 2018-01-01 | End: 2018-01-01

## 2018-01-01 RX ORDER — CARVEDILOL 12.5 MG/1
12.5 TABLET ORAL EVERY 12 HOURS SCHEDULED
Status: DISCONTINUED | OUTPATIENT
Start: 2018-01-01 | End: 2018-01-01

## 2018-01-01 RX ORDER — FAMOTIDINE 10 MG/ML
20 INJECTION, SOLUTION INTRAVENOUS EVERY 12 HOURS SCHEDULED
Status: DISCONTINUED | OUTPATIENT
Start: 2018-01-01 | End: 2018-01-01

## 2018-01-01 RX ORDER — DILTIAZEM HCL IN NACL,ISO-OSM 125 MG/125
5-15 PLASTIC BAG, INJECTION (ML) INTRAVENOUS
Status: DISCONTINUED | OUTPATIENT
Start: 2018-01-01 | End: 2018-01-01

## 2018-01-01 RX ORDER — SODIUM CHLORIDE 9 MG/ML
100 INJECTION, SOLUTION INTRAVENOUS CONTINUOUS
Status: DISCONTINUED | OUTPATIENT
Start: 2018-01-01 | End: 2018-01-01

## 2018-01-01 RX ORDER — MAGNESIUM SULFATE HEPTAHYDRATE 40 MG/ML
2 INJECTION, SOLUTION INTRAVENOUS AS NEEDED
Status: DISCONTINUED | OUTPATIENT
Start: 2018-01-01 | End: 2018-01-01

## 2018-01-01 RX ORDER — HEPARIN SODIUM 5000 [USP'U]/ML
5000 INJECTION, SOLUTION INTRAVENOUS; SUBCUTANEOUS EVERY 12 HOURS SCHEDULED
Status: DISCONTINUED | OUTPATIENT
Start: 2018-01-01 | End: 2018-01-01 | Stop reason: HOSPADM

## 2018-01-01 RX ORDER — SODIUM CHLORIDE 0.9 % (FLUSH) 0.9 %
1-10 SYRINGE (ML) INJECTION AS NEEDED
Status: DISCONTINUED | OUTPATIENT
Start: 2018-01-01 | End: 2018-01-01

## 2018-01-01 RX ORDER — MAGNESIUM SULFATE HEPTAHYDRATE 40 MG/ML
4 INJECTION, SOLUTION INTRAVENOUS AS NEEDED
Status: DISCONTINUED | OUTPATIENT
Start: 2018-01-01 | End: 2018-01-01

## 2018-01-01 RX ORDER — SODIUM CHLORIDE 9 MG/ML
40 INJECTION, SOLUTION INTRAVENOUS CONTINUOUS
Status: DISCONTINUED | OUTPATIENT
Start: 2018-01-01 | End: 2018-01-01

## 2018-01-01 RX ORDER — SOTALOL HYDROCHLORIDE 80 MG/1
40 TABLET ORAL EVERY 12 HOURS SCHEDULED
Status: DISCONTINUED | OUTPATIENT
Start: 2018-01-01 | End: 2018-01-01

## 2018-01-01 RX ORDER — FAMOTIDINE 20 MG/1
20 TABLET, FILM COATED ORAL 2 TIMES DAILY
Status: DISCONTINUED | OUTPATIENT
Start: 2018-01-01 | End: 2018-01-01

## 2018-01-01 RX ORDER — ONDANSETRON 2 MG/ML
4 INJECTION INTRAMUSCULAR; INTRAVENOUS EVERY 6 HOURS PRN
Status: DISCONTINUED | OUTPATIENT
Start: 2018-01-01 | End: 2018-01-01 | Stop reason: HOSPADM

## 2018-01-01 RX ORDER — METOPROLOL TARTRATE 5 MG/5ML
5 INJECTION INTRAVENOUS EVERY 6 HOURS SCHEDULED
Status: DISCONTINUED | OUTPATIENT
Start: 2018-01-01 | End: 2018-01-01 | Stop reason: HOSPADM

## 2018-01-01 RX ORDER — IPRATROPIUM BROMIDE AND ALBUTEROL SULFATE 2.5; .5 MG/3ML; MG/3ML
3 SOLUTION RESPIRATORY (INHALATION) 4 TIMES DAILY PRN
Status: DISCONTINUED | OUTPATIENT
Start: 2018-01-01 | End: 2018-01-01 | Stop reason: HOSPADM

## 2018-01-01 RX ORDER — POTASSIUM CHLORIDE 7.45 MG/ML
10 INJECTION INTRAVENOUS
Status: DISCONTINUED | OUTPATIENT
Start: 2018-01-01 | End: 2018-01-01

## 2018-01-01 RX ORDER — HYDROCODONE BITARTRATE AND ACETAMINOPHEN 5; 325 MG/1; MG/1
1 TABLET ORAL EVERY 6 HOURS PRN
Status: DISCONTINUED | OUTPATIENT
Start: 2018-01-01 | End: 2018-01-01

## 2018-01-01 RX ORDER — ACETAMINOPHEN 325 MG/1
650 TABLET ORAL EVERY 4 HOURS PRN
Status: DISCONTINUED | OUTPATIENT
Start: 2018-01-01 | End: 2018-01-01 | Stop reason: HOSPADM

## 2018-01-01 RX ORDER — ACETAZOLAMIDE SODIUM 500 MG/5ML
250 INJECTION, POWDER, LYOPHILIZED, FOR SOLUTION INTRAVENOUS EVERY 8 HOURS
Status: DISCONTINUED | OUTPATIENT
Start: 2018-01-01 | End: 2018-01-01

## 2018-01-01 RX ORDER — METHYLPHENIDATE HYDROCHLORIDE 10 MG/1
10 TABLET ORAL
Status: DISCONTINUED | OUTPATIENT
Start: 2018-01-01 | End: 2018-01-01

## 2018-01-01 RX ORDER — FAMOTIDINE 20 MG/1
20 TABLET, FILM COATED ORAL 2 TIMES DAILY PRN
Status: DISCONTINUED | OUTPATIENT
Start: 2018-01-01 | End: 2018-01-01

## 2018-01-01 RX ORDER — ACETAZOLAMIDE SODIUM 500 MG/5ML
500 INJECTION, POWDER, LYOPHILIZED, FOR SOLUTION INTRAVENOUS EVERY 4 HOURS
Status: COMPLETED | OUTPATIENT
Start: 2018-01-01 | End: 2018-01-01

## 2018-01-01 RX ORDER — DEXTROSE AND SODIUM CHLORIDE 5; .45 G/100ML; G/100ML
75 INJECTION, SOLUTION INTRAVENOUS CONTINUOUS
Status: DISCONTINUED | OUTPATIENT
Start: 2018-01-01 | End: 2018-01-01

## 2018-01-01 RX ORDER — POTASSIUM CHLORIDE 7.45 MG/ML
10 INJECTION INTRAVENOUS
Status: COMPLETED | OUTPATIENT
Start: 2018-01-01 | End: 2018-01-01

## 2018-01-01 RX ORDER — ALBUTEROL SULFATE 2.5 MG/3ML
2.5 SOLUTION RESPIRATORY (INHALATION)
Status: DISCONTINUED | OUTPATIENT
Start: 2018-01-01 | End: 2018-01-01

## 2018-01-01 RX ORDER — MULTIPLE VITAMINS W/ MINERALS TAB 9MG-400MCG
1 TAB ORAL DAILY
Status: DISCONTINUED | OUTPATIENT
Start: 2018-01-01 | End: 2018-01-01 | Stop reason: ALTCHOICE

## 2018-01-01 RX ORDER — MAGNESIUM SULFATE 1 G/100ML
1 INJECTION INTRAVENOUS AS NEEDED
Status: DISCONTINUED | OUTPATIENT
Start: 2018-01-01 | End: 2018-01-01

## 2018-01-01 RX ORDER — METOPROLOL TARTRATE 5 MG/5ML
5 INJECTION INTRAVENOUS EVERY 6 HOURS PRN
Status: DISCONTINUED | OUTPATIENT
Start: 2018-01-01 | End: 2018-01-01 | Stop reason: HOSPADM

## 2018-01-01 RX ORDER — NICOTINE POLACRILEX 4 MG
15 LOZENGE BUCCAL
Status: DISCONTINUED | OUTPATIENT
Start: 2018-01-01 | End: 2018-01-01 | Stop reason: HOSPADM

## 2018-01-01 RX ORDER — SENNA AND DOCUSATE SODIUM 50; 8.6 MG/1; MG/1
2 TABLET, FILM COATED ORAL 2 TIMES DAILY PRN
Status: DISCONTINUED | OUTPATIENT
Start: 2018-01-01 | End: 2018-01-01

## 2018-01-01 RX ORDER — ALBUTEROL SULFATE 2.5 MG/3ML
2.5 SOLUTION RESPIRATORY (INHALATION) 4 TIMES DAILY PRN
Status: DISCONTINUED | OUTPATIENT
Start: 2018-01-01 | End: 2018-01-01

## 2018-01-01 RX ORDER — ACETAMINOPHEN 325 MG/1
650 TABLET ORAL EVERY 4 HOURS PRN
Start: 2018-01-01

## 2018-01-01 RX ORDER — METOPROLOL TARTRATE 5 MG/5ML
2.5 INJECTION INTRAVENOUS
Status: DISCONTINUED | OUTPATIENT
Start: 2018-01-01 | End: 2018-01-01 | Stop reason: HOSPADM

## 2018-01-01 RX ORDER — PROMETHAZINE HYDROCHLORIDE 12.5 MG/1
6.25 SUPPOSITORY RECTAL EVERY 8 HOURS PRN
Status: DISCONTINUED | OUTPATIENT
Start: 2018-01-01 | End: 2018-01-01

## 2018-01-01 RX ORDER — FLUCONAZOLE 150 MG/1
150 TABLET ORAL ONCE
Status: COMPLETED | OUTPATIENT
Start: 2018-01-01 | End: 2018-01-01

## 2018-01-01 RX ORDER — DIGOXIN 0.25 MG/ML
500 INJECTION INTRAMUSCULAR; INTRAVENOUS
Status: DISCONTINUED | OUTPATIENT
Start: 2018-01-01 | End: 2018-01-01

## 2018-01-01 RX ORDER — COSYNTROPIN 0.25 MG/ML
0.25 INJECTION, POWDER, FOR SOLUTION INTRAMUSCULAR; INTRAVENOUS ONCE
Status: COMPLETED | OUTPATIENT
Start: 2018-01-01 | End: 2018-01-01

## 2018-01-01 RX ORDER — ACETAMINOPHEN 650 MG/1
650 SUPPOSITORY RECTAL EVERY 4 HOURS PRN
Status: DISCONTINUED | OUTPATIENT
Start: 2018-01-01 | End: 2018-01-01

## 2018-01-01 RX ORDER — BISACODYL 5 MG/1
5 TABLET, DELAYED RELEASE ORAL DAILY PRN
Status: DISCONTINUED | OUTPATIENT
Start: 2018-01-01 | End: 2018-01-01

## 2018-01-01 RX ORDER — DIGOXIN 0.25 MG/ML
500 INJECTION INTRAMUSCULAR; INTRAVENOUS ONCE
Status: DISCONTINUED | OUTPATIENT
Start: 2018-01-01 | End: 2018-01-01

## 2018-01-01 RX ORDER — LIDOCAINE HYDROCHLORIDE 10 MG/ML
5 INJECTION, SOLUTION EPIDURAL; INFILTRATION; INTRACAUDAL; PERINEURAL ONCE
Status: COMPLETED | OUTPATIENT
Start: 2018-01-01 | End: 2018-01-01

## 2018-01-01 RX ORDER — MORPHINE SULFATE 2 MG/ML
1 INJECTION, SOLUTION INTRAMUSCULAR; INTRAVENOUS
Status: DISCONTINUED | OUTPATIENT
Start: 2018-01-01 | End: 2018-01-01 | Stop reason: HOSPADM

## 2018-01-01 RX ORDER — BISACODYL 10 MG
10 SUPPOSITORY, RECTAL RECTAL ONCE
Status: COMPLETED | OUTPATIENT
Start: 2018-01-01 | End: 2018-01-01

## 2018-01-01 RX ORDER — SENNA AND DOCUSATE SODIUM 50; 8.6 MG/1; MG/1
2 TABLET, FILM COATED ORAL 2 TIMES DAILY PRN
Start: 2018-01-01

## 2018-01-01 RX ORDER — PROMETHAZINE HYDROCHLORIDE 25 MG/ML
6.25 INJECTION, SOLUTION INTRAMUSCULAR; INTRAVENOUS EVERY 8 HOURS PRN
Status: DISCONTINUED | OUTPATIENT
Start: 2018-01-01 | End: 2018-01-01

## 2018-01-01 RX ORDER — DEXTROSE, SODIUM CHLORIDE, AND POTASSIUM CHLORIDE 5; .9; .15 G/100ML; G/100ML; G/100ML
75 INJECTION INTRAVENOUS CONTINUOUS
Status: DISCONTINUED | OUTPATIENT
Start: 2018-01-01 | End: 2018-01-01 | Stop reason: HOSPADM

## 2018-01-01 RX ORDER — SODIUM CHLORIDE 0.9 % (FLUSH) 0.9 %
10 SYRINGE (ML) INJECTION AS NEEDED
Status: DISCONTINUED | OUTPATIENT
Start: 2018-01-01 | End: 2018-01-01 | Stop reason: HOSPADM

## 2018-01-01 RX ORDER — METHYLPHENIDATE HYDROCHLORIDE 5 MG/1
5 TABLET ORAL
Status: DISCONTINUED | OUTPATIENT
Start: 2018-01-01 | End: 2018-01-01

## 2018-01-01 RX ORDER — POTASSIUM CHLORIDE 29.8 MG/ML
20 INJECTION INTRAVENOUS
Status: DISCONTINUED | OUTPATIENT
Start: 2018-01-01 | End: 2018-01-01 | Stop reason: HOSPADM

## 2018-01-01 RX ORDER — MAGNESIUM SULFATE HEPTAHYDRATE 40 MG/ML
4 INJECTION, SOLUTION INTRAVENOUS ONCE
Status: COMPLETED | OUTPATIENT
Start: 2018-01-01 | End: 2018-01-01

## 2018-01-01 RX ORDER — LEVETIRACETAM 5 MG/ML
500 INJECTION INTRAVASCULAR EVERY 12 HOURS SCHEDULED
Status: DISCONTINUED | OUTPATIENT
Start: 2018-01-01 | End: 2018-01-01

## 2018-01-01 RX ORDER — MAGNESIUM SULFATE HEPTAHYDRATE 40 MG/ML
6 INJECTION, SOLUTION INTRAVENOUS AS NEEDED
Status: ACTIVE | OUTPATIENT
Start: 2018-01-01 | End: 2018-01-01

## 2018-01-01 RX ORDER — DEXTROSE AND SODIUM CHLORIDE 5; .9 G/100ML; G/100ML
50 INJECTION, SOLUTION INTRAVENOUS CONTINUOUS
Status: DISCONTINUED | OUTPATIENT
Start: 2018-01-01 | End: 2018-01-01

## 2018-01-01 RX ORDER — METOPROLOL TARTRATE 5 MG/5ML
5 INJECTION INTRAVENOUS
Status: DISCONTINUED | OUTPATIENT
Start: 2018-01-01 | End: 2018-01-01

## 2018-01-01 RX ORDER — SODIUM CHLORIDE 9 MG/ML
75 INJECTION, SOLUTION INTRAVENOUS CONTINUOUS
Status: DISCONTINUED | OUTPATIENT
Start: 2018-01-01 | End: 2018-01-01

## 2018-01-01 RX ORDER — HEPARIN SODIUM 5000 [USP'U]/ML
5000 INJECTION, SOLUTION INTRAVENOUS; SUBCUTANEOUS EVERY 12 HOURS SCHEDULED
Status: DISCONTINUED | OUTPATIENT
Start: 2018-01-01 | End: 2018-01-01

## 2018-01-01 RX ORDER — OSELTAMIVIR PHOSPHATE 30 MG/1
30 CAPSULE ORAL ONCE
Status: COMPLETED | OUTPATIENT
Start: 2018-01-01 | End: 2018-01-01

## 2018-01-01 RX ORDER — ACETAZOLAMIDE SODIUM 500 MG/5ML
500 INJECTION, POWDER, LYOPHILIZED, FOR SOLUTION INTRAVENOUS EVERY 4 HOURS
Status: DISCONTINUED | OUTPATIENT
Start: 2018-01-01 | End: 2018-01-01

## 2018-01-01 RX ORDER — QUETIAPINE FUMARATE 25 MG/1
25 TABLET, FILM COATED ORAL NIGHTLY
Status: DISCONTINUED | OUTPATIENT
Start: 2018-01-01 | End: 2018-01-01 | Stop reason: HOSPADM

## 2018-01-01 RX ORDER — SOTALOL HYDROCHLORIDE 80 MG/1
80 TABLET ORAL EVERY 12 HOURS SCHEDULED
Status: DISCONTINUED | OUTPATIENT
Start: 2018-01-01 | End: 2018-01-01

## 2018-01-01 RX ORDER — IPRATROPIUM BROMIDE AND ALBUTEROL SULFATE 2.5; .5 MG/3ML; MG/3ML
3 SOLUTION RESPIRATORY (INHALATION)
Status: DISCONTINUED | OUTPATIENT
Start: 2018-01-01 | End: 2018-01-01

## 2018-01-01 RX ORDER — MIDAZOLAM HYDROCHLORIDE 1 MG/ML
INJECTION INTRAMUSCULAR; INTRAVENOUS
Status: COMPLETED
Start: 2018-01-01 | End: 2018-01-01

## 2018-01-01 RX ORDER — ALBUTEROL SULFATE 2.5 MG/3ML
1.25 SOLUTION RESPIRATORY (INHALATION) EVERY 4 HOURS PRN
Status: DISCONTINUED | OUTPATIENT
Start: 2018-01-01 | End: 2018-01-01 | Stop reason: HOSPADM

## 2018-01-01 RX ORDER — PROMETHAZINE HYDROCHLORIDE 12.5 MG/1
6.25 TABLET ORAL EVERY 8 HOURS PRN
Status: DISCONTINUED | OUTPATIENT
Start: 2018-01-01 | End: 2018-01-01

## 2018-01-01 RX ORDER — ONDANSETRON 4 MG/1
4 TABLET, FILM COATED ORAL EVERY 6 HOURS PRN
Status: DISCONTINUED | OUTPATIENT
Start: 2018-01-01 | End: 2018-01-01

## 2018-01-01 RX ORDER — OSELTAMIVIR PHOSPHATE 30 MG/1
30 CAPSULE ORAL EVERY 12 HOURS SCHEDULED
Status: COMPLETED | OUTPATIENT
Start: 2018-01-01 | End: 2018-01-01

## 2018-01-01 RX ORDER — THIAMINE MONONITRATE (VIT B1) 100 MG
100 TABLET ORAL DAILY
Status: DISCONTINUED | OUTPATIENT
Start: 2018-01-01 | End: 2018-01-01

## 2018-01-01 RX ORDER — CETIRIZINE HYDROCHLORIDE 10 MG/1
10 TABLET ORAL NIGHTLY
Status: DISCONTINUED | OUTPATIENT
Start: 2018-01-01 | End: 2018-01-01

## 2018-01-01 RX ORDER — GUAIFENESIN 600 MG/1
600 TABLET, EXTENDED RELEASE ORAL EVERY 12 HOURS SCHEDULED
Status: DISCONTINUED | OUTPATIENT
Start: 2018-01-01 | End: 2018-01-01 | Stop reason: ALTCHOICE

## 2018-01-01 RX ORDER — DEXTROSE MONOHYDRATE 25 G/50ML
25 INJECTION, SOLUTION INTRAVENOUS
Status: DISCONTINUED | OUTPATIENT
Start: 2018-01-01 | End: 2018-01-01 | Stop reason: HOSPADM

## 2018-01-01 RX ORDER — MULTIVIT AND MINERALS-FERROUS GLUCONATE 9 MG IRON/15 ML ORAL LIQUID 9 MG/15 ML
15 LIQUID (ML) ORAL DAILY
Status: DISCONTINUED | OUTPATIENT
Start: 2018-01-01 | End: 2018-01-01

## 2018-01-01 RX ORDER — ACETAMINOPHEN 500 MG
1000 TABLET ORAL ONCE
Status: COMPLETED | OUTPATIENT
Start: 2018-01-01 | End: 2018-01-01

## 2018-01-01 RX ORDER — KETOROLAC TROMETHAMINE 15 MG/ML
15 INJECTION, SOLUTION INTRAMUSCULAR; INTRAVENOUS ONCE AS NEEDED
Status: ACTIVE | OUTPATIENT
Start: 2018-01-01 | End: 2018-01-01

## 2018-01-01 RX ORDER — SODIUM CHLORIDE 9 MG/ML
50 INJECTION, SOLUTION INTRAVENOUS CONTINUOUS
Status: ACTIVE | OUTPATIENT
Start: 2018-01-01 | End: 2018-01-01

## 2018-01-01 RX ORDER — QUETIAPINE FUMARATE 25 MG/1
25 TABLET, FILM COATED ORAL NIGHTLY
Start: 2018-01-01 | End: 2018-01-01

## 2018-01-01 RX ORDER — ACETYLCYSTEINE 200 MG/ML
3 SOLUTION ORAL; RESPIRATORY (INHALATION) EVERY 4 HOURS PRN
Status: DISCONTINUED | OUTPATIENT
Start: 2018-01-01 | End: 2018-01-01 | Stop reason: HOSPADM

## 2018-01-01 RX ORDER — CARVEDILOL 6.25 MG/1
6.25 TABLET ORAL EVERY 12 HOURS SCHEDULED
Status: DISCONTINUED | OUTPATIENT
Start: 2018-01-01 | End: 2018-01-01

## 2018-01-01 RX ORDER — L.ACID,PARA/B.BIFIDUM/S.THERM 8B CELL
1 CAPSULE ORAL DAILY
Status: DISCONTINUED | OUTPATIENT
Start: 2018-01-01 | End: 2018-01-01

## 2018-01-01 RX ORDER — POTASSIUM CHLORIDE 1.5 G/1.77G
40 POWDER, FOR SOLUTION ORAL AS NEEDED
Status: DISCONTINUED | OUTPATIENT
Start: 2018-01-01 | End: 2018-01-01 | Stop reason: HOSPADM

## 2018-01-01 RX ORDER — MAGNESIUM SULFATE HEPTAHYDRATE 40 MG/ML
4 INJECTION, SOLUTION INTRAVENOUS AS NEEDED
Status: DISPENSED | OUTPATIENT
Start: 2018-01-01 | End: 2018-01-01

## 2018-01-01 RX ORDER — POTASSIUM CHLORIDE 750 MG/1
40 CAPSULE, EXTENDED RELEASE ORAL AS NEEDED
Status: DISCONTINUED | OUTPATIENT
Start: 2018-01-01 | End: 2018-01-01

## 2018-01-01 RX ADMIN — Medication 15 ML: at 18:17

## 2018-01-01 RX ADMIN — SOTALOL HYDROCHLORIDE 40 MG: 80 TABLET ORAL at 08:07

## 2018-01-01 RX ADMIN — POLYETHYLENE GLYCOL 3350 17 G: 17 POWDER, FOR SOLUTION ORAL at 09:21

## 2018-01-01 RX ADMIN — VANCOMYCIN HYDROCHLORIDE 500 MG: 500 INJECTION, POWDER, LYOPHILIZED, FOR SOLUTION INTRAVENOUS at 06:33

## 2018-01-01 RX ADMIN — COSYNTROPIN 0.25 MG: 0.25 INJECTION, POWDER, LYOPHILIZED, FOR SOLUTION INTRAMUSCULAR; INTRAVENOUS at 15:17

## 2018-01-01 RX ADMIN — CARVEDILOL 12.5 MG: 12.5 TABLET, FILM COATED ORAL at 22:00

## 2018-01-01 RX ADMIN — OSELTAMIVIR PHOSPHATE 30 MG: 30 CAPSULE ORAL at 22:39

## 2018-01-01 RX ADMIN — SOTALOL HYDROCHLORIDE 40 MG: 80 TABLET ORAL at 21:51

## 2018-01-01 RX ADMIN — HEPARIN SODIUM 5000 UNITS: 5000 INJECTION, SOLUTION INTRAVENOUS; SUBCUTANEOUS at 21:25

## 2018-01-01 RX ADMIN — CETIRIZINE HYDROCHLORIDE 10 MG: 10 TABLET, FILM COATED ORAL at 20:23

## 2018-01-01 RX ADMIN — HEPARIN SODIUM 5000 UNITS: 5000 INJECTION, SOLUTION INTRAVENOUS; SUBCUTANEOUS at 10:13

## 2018-01-01 RX ADMIN — FAMOTIDINE 20 MG: 20 TABLET, FILM COATED ORAL at 10:24

## 2018-01-01 RX ADMIN — IMMUNE GLOBULIN INFUSION (HUMAN) 20 G: 100 INJECTION, SOLUTION INTRAVENOUS; SUBCUTANEOUS at 18:03

## 2018-01-01 RX ADMIN — SOTALOL HYDROCHLORIDE 40 MG: 80 TABLET ORAL at 20:25

## 2018-01-01 RX ADMIN — METHYLPREDNISOLONE SODIUM SUCCINATE 500 MG: 500 INJECTION, POWDER, FOR SOLUTION INTRAMUSCULAR; INTRAVENOUS at 18:58

## 2018-01-01 RX ADMIN — HEPARIN SODIUM 5000 UNITS: 5000 INJECTION, SOLUTION INTRAVENOUS; SUBCUTANEOUS at 08:30

## 2018-01-01 RX ADMIN — INSULIN LISPRO 2 UNITS: 100 INJECTION, SOLUTION INTRAVENOUS; SUBCUTANEOUS at 18:40

## 2018-01-01 RX ADMIN — SODIUM CHLORIDE 50 ML/HR: 900 INJECTION INTRAVENOUS at 05:07

## 2018-01-01 RX ADMIN — LEVETIRACETAM 250 MG: 100 INJECTION, SOLUTION INTRAVENOUS at 17:08

## 2018-01-01 RX ADMIN — GUAIFENESIN 600 MG: 600 TABLET, EXTENDED RELEASE ORAL at 08:30

## 2018-01-01 RX ADMIN — Medication 1 CAPSULE: at 08:17

## 2018-01-01 RX ADMIN — INSULIN LISPRO 2 UNITS: 100 INJECTION, SOLUTION INTRAVENOUS; SUBCUTANEOUS at 18:13

## 2018-01-01 RX ADMIN — HEPARIN SODIUM 5000 UNITS: 5000 INJECTION, SOLUTION INTRAVENOUS; SUBCUTANEOUS at 20:26

## 2018-01-01 RX ADMIN — Medication 15 ML: at 10:25

## 2018-01-01 RX ADMIN — CARVEDILOL 6.25 MG: 6.25 TABLET, FILM COATED ORAL at 20:09

## 2018-01-01 RX ADMIN — POTASSIUM CHLORIDE 20 MEQ: 149 INJECTION, SOLUTION, CONCENTRATE INTRAVENOUS at 18:58

## 2018-01-01 RX ADMIN — QUETIAPINE FUMARATE 25 MG: 25 TABLET ORAL at 22:18

## 2018-01-01 RX ADMIN — MULTIPLE VITAMINS W/ MINERALS TAB 1 TABLET: TAB ORAL at 10:11

## 2018-01-01 RX ADMIN — GUAIFENESIN 600 MG: 600 TABLET, EXTENDED RELEASE ORAL at 08:42

## 2018-01-01 RX ADMIN — SODIUM CHLORIDE 75 ML/HR: 9 INJECTION, SOLUTION INTRAVENOUS at 14:31

## 2018-01-01 RX ADMIN — SOTALOL HYDROCHLORIDE 40 MG: 80 TABLET ORAL at 21:03

## 2018-01-01 RX ADMIN — Medication 100 MG: at 13:30

## 2018-01-01 RX ADMIN — METHYLPREDNISOLONE SODIUM SUCCINATE 500 MG: 500 INJECTION, POWDER, FOR SOLUTION INTRAMUSCULAR; INTRAVENOUS at 21:38

## 2018-01-01 RX ADMIN — INSULIN LISPRO 3 UNITS: 100 INJECTION, SOLUTION INTRAVENOUS; SUBCUTANEOUS at 05:40

## 2018-01-01 RX ADMIN — HEPARIN SODIUM 5000 UNITS: 5000 INJECTION, SOLUTION INTRAVENOUS; SUBCUTANEOUS at 09:54

## 2018-01-01 RX ADMIN — GUAIFENESIN 600 MG: 600 TABLET, EXTENDED RELEASE ORAL at 21:04

## 2018-01-01 RX ADMIN — Medication 1 TABLET: at 08:42

## 2018-01-01 RX ADMIN — Medication 100 MG: at 08:08

## 2018-01-01 RX ADMIN — POLYETHYLENE GLYCOL 3350 17 G: 17 POWDER, FOR SOLUTION ORAL at 09:38

## 2018-01-01 RX ADMIN — Medication 15 ML: at 09:37

## 2018-01-01 RX ADMIN — INSULIN LISPRO 2 UNITS: 100 INJECTION, SOLUTION INTRAVENOUS; SUBCUTANEOUS at 00:22

## 2018-01-01 RX ADMIN — INSULIN LISPRO 2 UNITS: 100 INJECTION, SOLUTION INTRAVENOUS; SUBCUTANEOUS at 18:04

## 2018-01-01 RX ADMIN — OSELTAMIVIR PHOSPHATE 30 MG: 30 CAPSULE ORAL at 20:13

## 2018-01-01 RX ADMIN — Medication 1 TABLET: at 10:11

## 2018-01-01 RX ADMIN — Medication 1 CAPSULE: at 09:10

## 2018-01-01 RX ADMIN — SODIUM CHLORIDE 50 ML/HR: 900 INJECTION INTRAVENOUS at 11:22

## 2018-01-01 RX ADMIN — FAMOTIDINE 20 MG: 20 TABLET, FILM COATED ORAL at 21:52

## 2018-01-01 RX ADMIN — FAMOTIDINE 20 MG: 10 INJECTION, SOLUTION INTRAVENOUS at 21:22

## 2018-01-01 RX ADMIN — SODIUM CHLORIDE 750 MG: 9 INJECTION, SOLUTION INTRAVENOUS at 05:14

## 2018-01-01 RX ADMIN — DEXTROSE AND SODIUM CHLORIDE 75 ML/HR: 5; 450 INJECTION, SOLUTION INTRAVENOUS at 09:44

## 2018-01-01 RX ADMIN — HYDROCODONE BITARTRATE AND ACETAMINOPHEN 1 TABLET: 5; 325 TABLET ORAL at 08:25

## 2018-01-01 RX ADMIN — HEPARIN SODIUM 5000 UNITS: 5000 INJECTION, SOLUTION INTRAVENOUS; SUBCUTANEOUS at 22:12

## 2018-01-01 RX ADMIN — OSELTAMIVIR PHOSPHATE 30 MG: 30 CAPSULE ORAL at 20:32

## 2018-01-01 RX ADMIN — LEVETIRACETAM 250 MG: 100 INJECTION, SOLUTION INTRAVENOUS at 05:12

## 2018-01-01 RX ADMIN — AMIODARONE HYDROCHLORIDE 0.5 MG/MIN: 1.8 INJECTION, SOLUTION INTRAVENOUS at 20:09

## 2018-01-01 RX ADMIN — POTASSIUM CHLORIDE 20 MEQ: 149 INJECTION, SOLUTION, CONCENTRATE INTRAVENOUS at 08:04

## 2018-01-01 RX ADMIN — HEPARIN SODIUM 5000 UNITS: 5000 INJECTION, SOLUTION INTRAVENOUS; SUBCUTANEOUS at 21:13

## 2018-01-01 RX ADMIN — HEPARIN SODIUM 5000 UNITS: 5000 INJECTION, SOLUTION INTRAVENOUS; SUBCUTANEOUS at 08:55

## 2018-01-01 RX ADMIN — HEPARIN SODIUM 5000 UNITS: 5000 INJECTION, SOLUTION INTRAVENOUS; SUBCUTANEOUS at 09:10

## 2018-01-01 RX ADMIN — TAZOBACTAM SODIUM AND PIPERACILLIN SODIUM 3.38 G: 375; 3 INJECTION, SOLUTION INTRAVENOUS at 09:19

## 2018-01-01 RX ADMIN — FAMOTIDINE 20 MG: 20 TABLET, FILM COATED ORAL at 08:11

## 2018-01-01 RX ADMIN — HEPARIN SODIUM 5000 UNITS: 5000 INJECTION, SOLUTION INTRAVENOUS; SUBCUTANEOUS at 10:24

## 2018-01-01 RX ADMIN — SODIUM CHLORIDE 750 MG: 9 INJECTION, SOLUTION INTRAVENOUS at 18:13

## 2018-01-01 RX ADMIN — AMIODARONE HYDROCHLORIDE 150 MG: 1.5 INJECTION, SOLUTION INTRAVENOUS at 18:00

## 2018-01-01 RX ADMIN — Medication 100 MG: at 09:38

## 2018-01-01 RX ADMIN — POLYETHYLENE GLYCOL 3350 17 G: 17 POWDER, FOR SOLUTION ORAL at 08:07

## 2018-01-01 RX ADMIN — IPRATROPIUM BROMIDE AND ALBUTEROL SULFATE 3 ML: .5; 3 SOLUTION RESPIRATORY (INHALATION) at 07:28

## 2018-01-01 RX ADMIN — INSULIN LISPRO 2 UNITS: 100 INJECTION, SOLUTION INTRAVENOUS; SUBCUTANEOUS at 13:00

## 2018-01-01 RX ADMIN — HEPARIN SODIUM 5000 UNITS: 5000 INJECTION, SOLUTION INTRAVENOUS; SUBCUTANEOUS at 09:49

## 2018-01-01 RX ADMIN — LEVETIRACETAM 250 MG: 100 INJECTION, SOLUTION INTRAVENOUS at 18:31

## 2018-01-01 RX ADMIN — GUAIFENESIN 600 MG: 600 TABLET, EXTENDED RELEASE ORAL at 23:45

## 2018-01-01 RX ADMIN — IPRATROPIUM BROMIDE AND ALBUTEROL SULFATE 3 ML: .5; 3 SOLUTION RESPIRATORY (INHALATION) at 16:37

## 2018-01-01 RX ADMIN — POLYETHYLENE GLYCOL 3350 17 G: 17 POWDER, FOR SOLUTION ORAL at 08:54

## 2018-01-01 RX ADMIN — POTASSIUM CHLORIDE 20 MEQ: 2 INJECTION, SOLUTION, CONCENTRATE INTRAVENOUS at 11:22

## 2018-01-01 RX ADMIN — HEPARIN SODIUM 5000 UNITS: 5000 INJECTION, SOLUTION INTRAVENOUS; SUBCUTANEOUS at 09:38

## 2018-01-01 RX ADMIN — HEPARIN SODIUM 5000 UNITS: 5000 INJECTION, SOLUTION INTRAVENOUS; SUBCUTANEOUS at 08:01

## 2018-01-01 RX ADMIN — CARVEDILOL 6.25 MG: 6.25 TABLET, FILM COATED ORAL at 10:28

## 2018-01-01 RX ADMIN — INSULIN LISPRO 3 UNITS: 100 INJECTION, SOLUTION INTRAVENOUS; SUBCUTANEOUS at 00:00

## 2018-01-01 RX ADMIN — DEXTROSE AND SODIUM CHLORIDE 50 ML/HR: 5; 900 INJECTION, SOLUTION INTRAVENOUS at 17:26

## 2018-01-01 RX ADMIN — FAMOTIDINE 20 MG: 20 TABLET, FILM COATED ORAL at 17:44

## 2018-01-01 RX ADMIN — HEPARIN SODIUM 5000 UNITS: 5000 INJECTION, SOLUTION INTRAVENOUS; SUBCUTANEOUS at 22:09

## 2018-01-01 RX ADMIN — POLYETHYLENE GLYCOL 3350 17 G: 17 POWDER, FOR SOLUTION ORAL at 09:31

## 2018-01-01 RX ADMIN — HEPARIN SODIUM 5000 UNITS: 5000 INJECTION, SOLUTION INTRAVENOUS; SUBCUTANEOUS at 20:29

## 2018-01-01 RX ADMIN — LEVETIRACETAM 500 MG: 5 INJECTION INTRAVENOUS at 17:47

## 2018-01-01 RX ADMIN — SOTALOL HYDROCHLORIDE 40 MG: 80 TABLET ORAL at 21:36

## 2018-01-01 RX ADMIN — AMIODARONE HYDROCHLORIDE 150 MG: 1.5 INJECTION, SOLUTION INTRAVENOUS at 11:15

## 2018-01-01 RX ADMIN — HYDROGEN PEROXIDE: 3 LIQUID TOPICAL at 03:23

## 2018-01-01 RX ADMIN — TAZOBACTAM SODIUM AND PIPERACILLIN SODIUM 4.5 G: .5; 4 INJECTION, POWDER, LYOPHILIZED, FOR SOLUTION INTRAVENOUS at 21:22

## 2018-01-01 RX ADMIN — ALBUTEROL SULFATE 2.5 MG: 2.5 SOLUTION RESPIRATORY (INHALATION) at 21:45

## 2018-01-01 RX ADMIN — FAMOTIDINE 20 MG: 20 TABLET, FILM COATED ORAL at 21:13

## 2018-01-01 RX ADMIN — FAMOTIDINE 20 MG: 20 TABLET, FILM COATED ORAL at 21:37

## 2018-01-01 RX ADMIN — ENOXAPARIN SODIUM 40 MG: 40 INJECTION SUBCUTANEOUS at 08:22

## 2018-01-01 RX ADMIN — HEPARIN SODIUM 5000 UNITS: 5000 INJECTION, SOLUTION INTRAVENOUS; SUBCUTANEOUS at 21:45

## 2018-01-01 RX ADMIN — MORPHINE SULFATE 1 MG: 10 INJECTION INTRAVENOUS at 05:19

## 2018-01-01 RX ADMIN — HEPARIN SODIUM 5000 UNITS: 5000 INJECTION, SOLUTION INTRAVENOUS; SUBCUTANEOUS at 22:39

## 2018-01-01 RX ADMIN — Medication 2 TABLET: at 20:12

## 2018-01-01 RX ADMIN — SODIUM CHLORIDE 75 ML/HR: 9 INJECTION, SOLUTION INTRAVENOUS at 10:26

## 2018-01-01 RX ADMIN — METHYLPREDNISOLONE SODIUM SUCCINATE 500 MG: 500 INJECTION, POWDER, FOR SOLUTION INTRAMUSCULAR; INTRAVENOUS at 00:02

## 2018-01-01 RX ADMIN — LEVETIRACETAM 250 MG: 100 INJECTION, SOLUTION INTRAVENOUS at 16:53

## 2018-01-01 RX ADMIN — OSELTAMIVIR PHOSPHATE 30 MG: 30 CAPSULE ORAL at 08:31

## 2018-01-01 RX ADMIN — Medication 1 TABLET: at 09:30

## 2018-01-01 RX ADMIN — SOTALOL HYDROCHLORIDE 40 MG: 80 TABLET ORAL at 09:04

## 2018-01-01 RX ADMIN — METHYLPREDNISOLONE SODIUM SUCCINATE 500 MG: 500 INJECTION, POWDER, FOR SOLUTION INTRAMUSCULAR; INTRAVENOUS at 05:40

## 2018-01-01 RX ADMIN — ALBUTEROL SULFATE 2.5 MG: 2.5 SOLUTION RESPIRATORY (INHALATION) at 07:06

## 2018-01-01 RX ADMIN — POTASSIUM CHLORIDE 40 MEQ: 1.5 POWDER, FOR SOLUTION ORAL at 21:30

## 2018-01-01 RX ADMIN — IMMUNE GLOBULIN INFUSION (HUMAN) 20 G: 100 INJECTION, SOLUTION INTRAVENOUS; SUBCUTANEOUS at 17:26

## 2018-01-01 RX ADMIN — CETIRIZINE HYDROCHLORIDE 10 MG: 10 TABLET, FILM COATED ORAL at 21:37

## 2018-01-01 RX ADMIN — FAMOTIDINE 20 MG: 20 TABLET, FILM COATED ORAL at 20:45

## 2018-01-01 RX ADMIN — Medication 15 ML: at 09:43

## 2018-01-01 RX ADMIN — LEVETIRACETAM 250 MG: 100 INJECTION, SOLUTION INTRAVENOUS at 05:21

## 2018-01-01 RX ADMIN — Medication 2 TABLET: at 22:18

## 2018-01-01 RX ADMIN — Medication 100 MG: at 08:32

## 2018-01-01 RX ADMIN — INSULIN LISPRO 2 UNITS: 100 INJECTION, SOLUTION INTRAVENOUS; SUBCUTANEOUS at 00:17

## 2018-01-01 RX ADMIN — LEVETIRACETAM 250 MG: 100 INJECTION, SOLUTION INTRAVENOUS at 18:00

## 2018-01-01 RX ADMIN — METHYLPREDNISOLONE SODIUM SUCCINATE 500 MG: 500 INJECTION, POWDER, FOR SOLUTION INTRAMUSCULAR; INTRAVENOUS at 15:23

## 2018-01-01 RX ADMIN — HYDROCODONE BITARTRATE AND ACETAMINOPHEN 1 TABLET: 5; 325 TABLET ORAL at 20:09

## 2018-01-01 RX ADMIN — FAMOTIDINE 20 MG: 20 TABLET, FILM COATED ORAL at 21:30

## 2018-01-01 RX ADMIN — INSULIN LISPRO 2 UNITS: 100 INJECTION, SOLUTION INTRAVENOUS; SUBCUTANEOUS at 06:38

## 2018-01-01 RX ADMIN — METOROPROLOL TARTRATE 5 MG: 5 INJECTION, SOLUTION INTRAVENOUS at 20:16

## 2018-01-01 RX ADMIN — HYDROCODONE BITARTRATE AND ACETAMINOPHEN 2 TABLET: 5; 325 TABLET ORAL at 08:23

## 2018-01-01 RX ADMIN — VANCOMYCIN HYDROCHLORIDE 500 MG: 500 INJECTION, POWDER, LYOPHILIZED, FOR SOLUTION INTRAVENOUS at 05:09

## 2018-01-01 RX ADMIN — METOROPROLOL TARTRATE 5 MG: 5 INJECTION, SOLUTION INTRAVENOUS at 23:17

## 2018-01-01 RX ADMIN — HEPARIN SODIUM 5000 UNITS: 5000 INJECTION, SOLUTION INTRAVENOUS; SUBCUTANEOUS at 20:59

## 2018-01-01 RX ADMIN — TAZOBACTAM SODIUM AND PIPERACILLIN SODIUM 4.5 G: .5; 4 INJECTION, POWDER, LYOPHILIZED, FOR SOLUTION INTRAVENOUS at 23:15

## 2018-01-01 RX ADMIN — TAZOBACTAM SODIUM AND PIPERACILLIN SODIUM 3.38 G: 375; 3 INJECTION, SOLUTION INTRAVENOUS at 00:02

## 2018-01-01 RX ADMIN — Medication 1 TABLET: at 10:27

## 2018-01-01 RX ADMIN — HEPARIN SODIUM 5000 UNITS: 5000 INJECTION, SOLUTION INTRAVENOUS; SUBCUTANEOUS at 09:22

## 2018-01-01 RX ADMIN — TAZOBACTAM SODIUM AND PIPERACILLIN SODIUM 3.38 G: 375; 3 INJECTION, SOLUTION INTRAVENOUS at 01:14

## 2018-01-01 RX ADMIN — TAZOBACTAM SODIUM AND PIPERACILLIN SODIUM 4.5 G: .5; 4 INJECTION, POWDER, LYOPHILIZED, FOR SOLUTION INTRAVENOUS at 06:17

## 2018-01-01 RX ADMIN — HEPARIN SODIUM 5000 UNITS: 5000 INJECTION, SOLUTION INTRAVENOUS; SUBCUTANEOUS at 08:10

## 2018-01-01 RX ADMIN — SODIUM CHLORIDE 75 ML/HR: 9 INJECTION, SOLUTION INTRAVENOUS at 15:38

## 2018-01-01 RX ADMIN — VANCOMYCIN HYDROCHLORIDE 500 MG: 500 INJECTION, POWDER, LYOPHILIZED, FOR SOLUTION INTRAVENOUS at 09:09

## 2018-01-01 RX ADMIN — METHYLPREDNISOLONE SODIUM SUCCINATE 500 MG: 500 INJECTION, POWDER, FOR SOLUTION INTRAMUSCULAR; INTRAVENOUS at 00:31

## 2018-01-01 RX ADMIN — IPRATROPIUM BROMIDE AND ALBUTEROL SULFATE 3 ML: .5; 3 SOLUTION RESPIRATORY (INHALATION) at 08:37

## 2018-01-01 RX ADMIN — ALBUTEROL SULFATE 2.5 MG: 2.5 SOLUTION RESPIRATORY (INHALATION) at 11:44

## 2018-01-01 RX ADMIN — INSULIN LISPRO 3 UNITS: 100 INJECTION, SOLUTION INTRAVENOUS; SUBCUTANEOUS at 12:56

## 2018-01-01 RX ADMIN — INSULIN LISPRO 2 UNITS: 100 INJECTION, SOLUTION INTRAVENOUS; SUBCUTANEOUS at 18:56

## 2018-01-01 RX ADMIN — ALBUTEROL SULFATE 2.5 MG: 2.5 SOLUTION RESPIRATORY (INHALATION) at 03:55

## 2018-01-01 RX ADMIN — METHYLPREDNISOLONE SODIUM SUCCINATE 500 MG: 500 INJECTION, POWDER, FOR SOLUTION INTRAMUSCULAR; INTRAVENOUS at 12:53

## 2018-01-01 RX ADMIN — MULTIPLE VITAMINS W/ MINERALS TAB 1 TABLET: TAB ORAL at 09:49

## 2018-01-01 RX ADMIN — IMMUNE GLOBULIN INFUSION (HUMAN) 16 G: 100 INJECTION, SOLUTION INTRAVENOUS; SUBCUTANEOUS at 16:48

## 2018-01-01 RX ADMIN — CARVEDILOL 6.25 MG: 6.25 TABLET, FILM COATED ORAL at 20:13

## 2018-01-01 RX ADMIN — FAMOTIDINE 20 MG: 20 TABLET, FILM COATED ORAL at 08:24

## 2018-01-01 RX ADMIN — CETIRIZINE HYDROCHLORIDE 10 MG: 10 TABLET, FILM COATED ORAL at 20:09

## 2018-01-01 RX ADMIN — POTASSIUM PHOSPHATE, MONOBASIC AND POTASSIUM PHOSPHATE, DIBASIC 30 MMOL: 224; 236 INJECTION, SOLUTION INTRAVENOUS at 00:02

## 2018-01-01 RX ADMIN — SODIUM CHLORIDE 75 ML/HR: 9 INJECTION, SOLUTION INTRAVENOUS at 18:31

## 2018-01-01 RX ADMIN — SOTALOL HYDROCHLORIDE 80 MG: 80 TABLET ORAL at 21:00

## 2018-01-01 RX ADMIN — OSELTAMIVIR PHOSPHATE 30 MG: 30 CAPSULE ORAL at 00:24

## 2018-01-01 RX ADMIN — LEVETIRACETAM 250 MG: 100 INJECTION, SOLUTION INTRAVENOUS at 17:26

## 2018-01-01 RX ADMIN — IPRATROPIUM BROMIDE AND ALBUTEROL SULFATE 3 ML: .5; 3 SOLUTION RESPIRATORY (INHALATION) at 19:54

## 2018-01-01 RX ADMIN — INSULIN LISPRO 2 UNITS: 100 INJECTION, SOLUTION INTRAVENOUS; SUBCUTANEOUS at 00:19

## 2018-01-01 RX ADMIN — Medication 1 CAPSULE: at 08:41

## 2018-01-01 RX ADMIN — MULTIPLE VITAMINS W/ MINERALS TAB 1 TABLET: TAB ORAL at 08:23

## 2018-01-01 RX ADMIN — POTASSIUM CHLORIDE 20 MEQ: 2 INJECTION, SOLUTION, CONCENTRATE INTRAVENOUS at 13:08

## 2018-01-01 RX ADMIN — ENOXAPARIN SODIUM 30 MG: 30 INJECTION SUBCUTANEOUS at 09:30

## 2018-01-01 RX ADMIN — CETIRIZINE HYDROCHLORIDE 10 MG: 10 TABLET, FILM COATED ORAL at 22:12

## 2018-01-01 RX ADMIN — HEPARIN SODIUM 5000 UNITS: 5000 INJECTION, SOLUTION INTRAVENOUS; SUBCUTANEOUS at 08:14

## 2018-01-01 RX ADMIN — IMMUNE GLOBULIN INFUSION (HUMAN) 16 G: 100 INJECTION, SOLUTION INTRAVENOUS; SUBCUTANEOUS at 14:49

## 2018-01-01 RX ADMIN — MULTIPLE VITAMINS W/ MINERALS TAB 1 TABLET: TAB ORAL at 08:42

## 2018-01-01 RX ADMIN — HEPARIN SODIUM 5000 UNITS: 5000 INJECTION, SOLUTION INTRAVENOUS; SUBCUTANEOUS at 20:52

## 2018-01-01 RX ADMIN — ACETAMINOPHEN 650 MG: 325 TABLET, FILM COATED ORAL at 19:14

## 2018-01-01 RX ADMIN — SODIUM CHLORIDE 75 ML/HR: 9 INJECTION, SOLUTION INTRAVENOUS at 21:29

## 2018-01-01 RX ADMIN — FAMOTIDINE 20 MG: 20 TABLET, FILM COATED ORAL at 09:28

## 2018-01-01 RX ADMIN — HEPARIN SODIUM 5000 UNITS: 5000 INJECTION, SOLUTION INTRAVENOUS; SUBCUTANEOUS at 20:13

## 2018-01-01 RX ADMIN — HEPARIN SODIUM 5000 UNITS: 5000 INJECTION, SOLUTION INTRAVENOUS; SUBCUTANEOUS at 20:09

## 2018-01-01 RX ADMIN — Medication 1 TABLET: at 08:22

## 2018-01-01 RX ADMIN — INSULIN LISPRO 3 UNITS: 100 INJECTION, SOLUTION INTRAVENOUS; SUBCUTANEOUS at 05:09

## 2018-01-01 RX ADMIN — SOTALOL HYDROCHLORIDE 40 MG: 80 TABLET ORAL at 20:43

## 2018-01-01 RX ADMIN — Medication 15 ML: at 09:55

## 2018-01-01 RX ADMIN — MULTIPLE VITAMINS W/ MINERALS TAB 1 TABLET: TAB ORAL at 08:30

## 2018-01-01 RX ADMIN — AMIODARONE HYDROCHLORIDE 0.5 MG/MIN: 1.8 INJECTION, SOLUTION INTRAVENOUS at 07:15

## 2018-01-01 RX ADMIN — LIDOCAINE HYDROCHLORIDE 5 ML: 10 INJECTION, SOLUTION EPIDURAL; INFILTRATION; INTRACAUDAL; PERINEURAL at 15:18

## 2018-01-01 RX ADMIN — POTASSIUM CHLORIDE 40 MEQ: 1.5 POWDER, FOR SOLUTION ORAL at 09:04

## 2018-01-01 RX ADMIN — Medication 1 TABLET: at 08:31

## 2018-01-01 RX ADMIN — LIDOCAINE HYDROCHLORIDE 5 ML: 10 INJECTION, SOLUTION EPIDURAL; INFILTRATION; INTRACAUDAL; PERINEURAL at 16:00

## 2018-01-01 RX ADMIN — ALBUTEROL SULFATE 2.5 MG: 2.5 SOLUTION RESPIRATORY (INHALATION) at 10:47

## 2018-01-01 RX ADMIN — TAZOBACTAM SODIUM AND PIPERACILLIN SODIUM 4.5 G: .5; 4 INJECTION, POWDER, LYOPHILIZED, FOR SOLUTION INTRAVENOUS at 05:43

## 2018-01-01 RX ADMIN — SOTALOL HYDROCHLORIDE 40 MG: 80 TABLET ORAL at 08:32

## 2018-01-01 RX ADMIN — POTASSIUM CHLORIDE 40 MEQ: 1.5 POWDER, FOR SOLUTION ORAL at 10:17

## 2018-01-01 RX ADMIN — POTASSIUM CHLORIDE 20 MEQ: 2 INJECTION, SOLUTION, CONCENTRATE INTRAVENOUS at 03:46

## 2018-01-01 RX ADMIN — ENOXAPARIN SODIUM 40 MG: 40 INJECTION SUBCUTANEOUS at 08:26

## 2018-01-01 RX ADMIN — ALBUTEROL SULFATE 2.5 MG: 2.5 SOLUTION RESPIRATORY (INHALATION) at 07:09

## 2018-01-01 RX ADMIN — HEPARIN SODIUM 5000 UNITS: 5000 INJECTION, SOLUTION INTRAVENOUS; SUBCUTANEOUS at 21:51

## 2018-01-01 RX ADMIN — POTASSIUM PHOSPHATE, MONOBASIC AND POTASSIUM PHOSPHATE, DIBASIC 15 MMOL: 224; 236 INJECTION, SOLUTION INTRAVENOUS at 15:10

## 2018-01-01 RX ADMIN — WATER 500 MG: 1 INJECTION INTRAMUSCULAR; INTRAVENOUS; SUBCUTANEOUS at 16:14

## 2018-01-01 RX ADMIN — METOROPROLOL TARTRATE 5 MG: 5 INJECTION, SOLUTION INTRAVENOUS at 18:07

## 2018-01-01 RX ADMIN — OSELTAMIVIR PHOSPHATE 30 MG: 30 CAPSULE ORAL at 08:41

## 2018-01-01 RX ADMIN — Medication 100 MG: at 08:14

## 2018-01-01 RX ADMIN — POTASSIUM CHLORIDE 40 MEQ: 1.5 POWDER, FOR SOLUTION ORAL at 08:19

## 2018-01-01 RX ADMIN — INSULIN LISPRO 2 UNITS: 100 INJECTION, SOLUTION INTRAVENOUS; SUBCUTANEOUS at 11:35

## 2018-01-01 RX ADMIN — OSELTAMIVIR PHOSPHATE 30 MG: 30 CAPSULE ORAL at 21:04

## 2018-01-01 RX ADMIN — IPRATROPIUM BROMIDE AND ALBUTEROL SULFATE 3 ML: .5; 3 SOLUTION RESPIRATORY (INHALATION) at 00:40

## 2018-01-01 RX ADMIN — POLYETHYLENE GLYCOL 3350 17 G: 17 POWDER, FOR SOLUTION ORAL at 17:46

## 2018-01-01 RX ADMIN — WATER 250 MG: 1 INJECTION INTRAMUSCULAR; INTRAVENOUS; SUBCUTANEOUS at 12:45

## 2018-01-01 RX ADMIN — Medication: at 06:42

## 2018-01-01 RX ADMIN — POTASSIUM CHLORIDE 20 MEQ: 149 INJECTION, SOLUTION, CONCENTRATE INTRAVENOUS at 05:43

## 2018-01-01 RX ADMIN — Medication 100 MG: at 09:55

## 2018-01-01 RX ADMIN — IMMUNE GLOBULIN INFUSION (HUMAN) 16 G: 100 INJECTION, SOLUTION INTRAVENOUS; SUBCUTANEOUS at 15:34

## 2018-01-01 RX ADMIN — HEPARIN SODIUM 5000 UNITS: 5000 INJECTION, SOLUTION INTRAVENOUS; SUBCUTANEOUS at 21:29

## 2018-01-01 RX ADMIN — Medication 100 MG: at 09:23

## 2018-01-01 RX ADMIN — POTASSIUM CHLORIDE 20 MEQ: 149 INJECTION, SOLUTION, CONCENTRATE INTRAVENOUS at 03:45

## 2018-01-01 RX ADMIN — HEPARIN SODIUM 5000 UNITS: 5000 INJECTION, SOLUTION INTRAVENOUS; SUBCUTANEOUS at 21:37

## 2018-01-01 RX ADMIN — POTASSIUM CHLORIDE 40 MEQ: 750 CAPSULE, EXTENDED RELEASE ORAL at 14:12

## 2018-01-01 RX ADMIN — INSULIN LISPRO 2 UNITS: 100 INJECTION, SOLUTION INTRAVENOUS; SUBCUTANEOUS at 06:23

## 2018-01-01 RX ADMIN — IPRATROPIUM BROMIDE AND ALBUTEROL SULFATE 3 ML: .5; 3 SOLUTION RESPIRATORY (INHALATION) at 17:05

## 2018-01-01 RX ADMIN — POLYETHYLENE GLYCOL 3350 17 G: 17 POWDER, FOR SOLUTION ORAL at 09:28

## 2018-01-01 RX ADMIN — Medication 15 ML: at 08:33

## 2018-01-01 RX ADMIN — SOTALOL HYDROCHLORIDE 80 MG: 80 TABLET ORAL at 09:22

## 2018-01-01 RX ADMIN — HEPARIN SODIUM 5000 UNITS: 5000 INJECTION, SOLUTION INTRAVENOUS; SUBCUTANEOUS at 08:41

## 2018-01-01 RX ADMIN — WATER 250 MG: 1 INJECTION INTRAMUSCULAR; INTRAVENOUS; SUBCUTANEOUS at 05:08

## 2018-01-01 RX ADMIN — POTASSIUM PHOSPHATE, MONOBASIC AND POTASSIUM PHOSPHATE, DIBASIC 30 MMOL: 224; 236 INJECTION, SOLUTION INTRAVENOUS at 15:06

## 2018-01-01 RX ADMIN — ALBUTEROL SULFATE 2.5 MG: 2.5 SOLUTION RESPIRATORY (INHALATION) at 00:11

## 2018-01-01 RX ADMIN — SODIUM CHLORIDE 500 ML: 9 INJECTION, SOLUTION INTRAVENOUS at 18:30

## 2018-01-01 RX ADMIN — HEPARIN SODIUM 5000 UNITS: 5000 INJECTION, SOLUTION INTRAVENOUS; SUBCUTANEOUS at 22:28

## 2018-01-01 RX ADMIN — SOTALOL HYDROCHLORIDE 40 MG: 80 TABLET ORAL at 08:11

## 2018-01-01 RX ADMIN — IMMUNE GLOBULIN INFUSION (HUMAN) 20 G: 100 INJECTION, SOLUTION INTRAVENOUS; SUBCUTANEOUS at 17:31

## 2018-01-01 RX ADMIN — HEPARIN SODIUM 5000 UNITS: 5000 INJECTION, SOLUTION INTRAVENOUS; SUBCUTANEOUS at 10:12

## 2018-01-01 RX ADMIN — Medication 1 TABLET: at 09:10

## 2018-01-01 RX ADMIN — DEXTROSE MONOHYDRATE, SODIUM CHLORIDE, AND POTASSIUM CHLORIDE 100 ML/HR: 50; 9; 1.49 INJECTION, SOLUTION INTRAVENOUS at 18:03

## 2018-01-01 RX ADMIN — OSELTAMIVIR PHOSPHATE 30 MG: 30 CAPSULE ORAL at 10:12

## 2018-01-01 RX ADMIN — POTASSIUM CHLORIDE 40 MEQ: 750 CAPSULE, EXTENDED RELEASE ORAL at 08:53

## 2018-01-01 RX ADMIN — ACETAMINOPHEN 650 MG: 325 TABLET, FILM COATED ORAL at 20:23

## 2018-01-01 RX ADMIN — POTASSIUM CHLORIDE 20 MEQ: 149 INJECTION, SOLUTION, CONCENTRATE INTRAVENOUS at 18:09

## 2018-01-01 RX ADMIN — FAMOTIDINE 20 MG: 20 TABLET, FILM COATED ORAL at 20:43

## 2018-01-01 RX ADMIN — GUAIFENESIN 600 MG: 600 TABLET, EXTENDED RELEASE ORAL at 21:37

## 2018-01-01 RX ADMIN — SODIUM CHLORIDE 500 ML: 9 INJECTION, SOLUTION INTRAVENOUS at 13:25

## 2018-01-01 RX ADMIN — FLUCONAZOLE 150 MG: 150 TABLET ORAL at 23:45

## 2018-01-01 RX ADMIN — QUETIAPINE FUMARATE 25 MG: 25 TABLET ORAL at 20:10

## 2018-01-01 RX ADMIN — INSULIN LISPRO 2 UNITS: 100 INJECTION, SOLUTION INTRAVENOUS; SUBCUTANEOUS at 19:03

## 2018-01-01 RX ADMIN — SOTALOL HYDROCHLORIDE 80 MG: 80 TABLET ORAL at 08:01

## 2018-01-01 RX ADMIN — SOTALOL HYDROCHLORIDE 40 MG: 80 TABLET ORAL at 09:21

## 2018-01-01 RX ADMIN — Medication 750 MG: at 11:35

## 2018-01-01 RX ADMIN — HEPARIN SODIUM 5000 UNITS: 5000 INJECTION, SOLUTION INTRAVENOUS; SUBCUTANEOUS at 08:32

## 2018-01-01 RX ADMIN — POTASSIUM CHLORIDE 10 MEQ: 7.46 INJECTION, SOLUTION INTRAVENOUS at 18:04

## 2018-01-01 RX ADMIN — SOTALOL HYDROCHLORIDE 40 MG: 80 TABLET ORAL at 21:13

## 2018-01-01 RX ADMIN — BISACODYL 10 MG: 10 SUPPOSITORY RECTAL at 12:32

## 2018-01-01 RX ADMIN — WATER 500 MG: 1 INJECTION INTRAMUSCULAR; INTRAVENOUS; SUBCUTANEOUS at 20:16

## 2018-01-01 RX ADMIN — Medication 1 CAPSULE: at 08:30

## 2018-01-01 RX ADMIN — POTASSIUM PHOSPHATE, MONOBASIC AND POTASSIUM PHOSPHATE, DIBASIC 15 MMOL: 224; 236 INJECTION, SOLUTION INTRAVENOUS at 02:17

## 2018-01-01 RX ADMIN — HEPARIN SODIUM 5000 UNITS: 5000 INJECTION, SOLUTION INTRAVENOUS; SUBCUTANEOUS at 20:24

## 2018-01-01 RX ADMIN — DIPHENHYDRAMINE HYDROCHLORIDE 25 MG: 25 CAPSULE ORAL at 20:10

## 2018-01-01 RX ADMIN — SOTALOL HYDROCHLORIDE 40 MG: 80 TABLET ORAL at 21:30

## 2018-01-01 RX ADMIN — GUAIFENESIN 600 MG: 600 TABLET, EXTENDED RELEASE ORAL at 09:49

## 2018-01-01 RX ADMIN — INSULIN LISPRO 2 UNITS: 100 INJECTION, SOLUTION INTRAVENOUS; SUBCUTANEOUS at 17:00

## 2018-01-01 RX ADMIN — VANCOMYCIN HYDROCHLORIDE 750 MG: 750 INJECTION, SOLUTION INTRAVENOUS at 20:33

## 2018-01-01 RX ADMIN — SODIUM CHLORIDE 75 ML/HR: 9 INJECTION, SOLUTION INTRAVENOUS at 01:31

## 2018-01-01 RX ADMIN — HEPARIN SODIUM 5000 UNITS: 5000 INJECTION, SOLUTION INTRAVENOUS; SUBCUTANEOUS at 09:05

## 2018-01-01 RX ADMIN — METHYLPREDNISOLONE SODIUM SUCCINATE 500 MG: 500 INJECTION, POWDER, FOR SOLUTION INTRAMUSCULAR; INTRAVENOUS at 01:09

## 2018-01-01 RX ADMIN — LEVETIRACETAM 250 MG: 100 INJECTION, SOLUTION INTRAVENOUS at 05:18

## 2018-01-01 RX ADMIN — ACETYLCYSTEINE 4 ML: 200 INHALANT RESPIRATORY (INHALATION) at 22:30

## 2018-01-01 RX ADMIN — OSELTAMIVIR PHOSPHATE 30 MG: 30 CAPSULE ORAL at 21:37

## 2018-01-01 RX ADMIN — Medication 1 TABLET: at 08:26

## 2018-01-01 RX ADMIN — FAMOTIDINE 20 MG: 20 TABLET, FILM COATED ORAL at 21:36

## 2018-01-01 RX ADMIN — LEVETIRACETAM 500 MG: 5 INJECTION INTRAVENOUS at 05:32

## 2018-01-01 RX ADMIN — GUAIFENESIN 600 MG: 600 TABLET, EXTENDED RELEASE ORAL at 10:03

## 2018-01-01 RX ADMIN — LEVETIRACETAM 500 MG: 5 INJECTION INTRAVENOUS at 06:06

## 2018-01-01 RX ADMIN — POLYETHYLENE GLYCOL 3350 17 G: 17 POWDER, FOR SOLUTION ORAL at 08:17

## 2018-01-01 RX ADMIN — ALBUTEROL SULFATE 2.5 MG: 2.5 SOLUTION RESPIRATORY (INHALATION) at 22:29

## 2018-01-01 RX ADMIN — FAMOTIDINE 20 MG: 20 TABLET, FILM COATED ORAL at 09:04

## 2018-01-01 RX ADMIN — TAZOBACTAM SODIUM AND PIPERACILLIN SODIUM 4.5 G: .5; 4 INJECTION, POWDER, LYOPHILIZED, FOR SOLUTION INTRAVENOUS at 12:56

## 2018-01-01 RX ADMIN — Medication 100 MG: at 10:06

## 2018-01-01 RX ADMIN — Medication 15 ML: at 08:11

## 2018-01-01 RX ADMIN — SOTALOL HYDROCHLORIDE 40 MG: 80 TABLET ORAL at 20:45

## 2018-01-01 RX ADMIN — FAMOTIDINE 20 MG: 20 TABLET, FILM COATED ORAL at 08:32

## 2018-01-01 RX ADMIN — MULTIPLE VITAMINS W/ MINERALS TAB 1 TABLET: TAB ORAL at 10:27

## 2018-01-01 RX ADMIN — IPRATROPIUM BROMIDE AND ALBUTEROL SULFATE 3 ML: .5; 3 SOLUTION RESPIRATORY (INHALATION) at 12:47

## 2018-01-01 RX ADMIN — VANCOMYCIN HYDROCHLORIDE 750 MG: 750 INJECTION, SOLUTION INTRAVENOUS at 11:58

## 2018-01-01 RX ADMIN — MAGNESIUM SULFATE IN WATER 4 G: 40 INJECTION, SOLUTION INTRAVENOUS at 05:17

## 2018-01-01 RX ADMIN — LEVETIRACETAM 250 MG: 100 INJECTION, SOLUTION INTRAVENOUS at 05:23

## 2018-01-01 RX ADMIN — CARVEDILOL 6.25 MG: 6.25 TABLET, FILM COATED ORAL at 08:17

## 2018-01-01 RX ADMIN — METOROPROLOL TARTRATE 5 MG: 5 INJECTION, SOLUTION INTRAVENOUS at 05:19

## 2018-01-01 RX ADMIN — MULTIPLE VITAMINS W/ MINERALS TAB 1 TABLET: TAB ORAL at 08:19

## 2018-01-01 RX ADMIN — POLYETHYLENE GLYCOL 3350 17 G: 17 POWDER, FOR SOLUTION ORAL at 09:49

## 2018-01-01 RX ADMIN — MORPHINE SULFATE 1 MG: 10 INJECTION INTRAVENOUS at 00:45

## 2018-01-01 RX ADMIN — DIGOXIN 500 MCG: 0.25 INJECTION INTRAMUSCULAR; INTRAVENOUS at 17:03

## 2018-01-01 RX ADMIN — Medication 1 CAPSULE: at 09:49

## 2018-01-01 RX ADMIN — POTASSIUM CHLORIDE 10 MEQ: 7.46 INJECTION, SOLUTION INTRAVENOUS at 20:12

## 2018-01-01 RX ADMIN — METHYLPREDNISOLONE SODIUM SUCCINATE 500 MG: 500 INJECTION, POWDER, FOR SOLUTION INTRAMUSCULAR; INTRAVENOUS at 17:11

## 2018-01-01 RX ADMIN — POTASSIUM PHOSPHATE, MONOBASIC AND POTASSIUM PHOSPHATE, DIBASIC 30 MMOL: 224; 236 INJECTION, SOLUTION INTRAVENOUS at 13:28

## 2018-01-01 RX ADMIN — GADOBENATE DIMEGLUMINE 8 ML: 529 INJECTION, SOLUTION INTRAVENOUS at 17:45

## 2018-01-01 RX ADMIN — IMMUNE GLOBULIN INFUSION (HUMAN) 20 G: 100 INJECTION, SOLUTION INTRAVENOUS; SUBCUTANEOUS at 18:00

## 2018-01-01 RX ADMIN — Medication 1 TABLET: at 08:20

## 2018-01-01 RX ADMIN — HEPARIN SODIUM 5000 UNITS: 5000 INJECTION, SOLUTION INTRAVENOUS; SUBCUTANEOUS at 21:03

## 2018-01-01 RX ADMIN — SODIUM CHLORIDE 75 ML/HR: 9 INJECTION, SOLUTION INTRAVENOUS at 13:26

## 2018-01-01 RX ADMIN — IMMUNE GLOBULIN INFUSION (HUMAN) 20 G: 100 INJECTION, SOLUTION INTRAVENOUS; SUBCUTANEOUS at 18:15

## 2018-01-01 RX ADMIN — FAMOTIDINE 20 MG: 20 TABLET, FILM COATED ORAL at 10:41

## 2018-01-01 RX ADMIN — IPRATROPIUM BROMIDE AND ALBUTEROL SULFATE 3 ML: .5; 3 SOLUTION RESPIRATORY (INHALATION) at 12:37

## 2018-01-01 RX ADMIN — WATER 250 MG: 1 INJECTION INTRAMUSCULAR; INTRAVENOUS; SUBCUTANEOUS at 04:10

## 2018-01-01 RX ADMIN — POLYETHYLENE GLYCOL 3350 17 G: 17 POWDER, FOR SOLUTION ORAL at 22:18

## 2018-01-01 RX ADMIN — LEVETIRACETAM 250 MG: 100 INJECTION, SOLUTION INTRAVENOUS at 18:03

## 2018-01-01 RX ADMIN — SODIUM CHLORIDE 75 ML/HR: 9 INJECTION, SOLUTION INTRAVENOUS at 07:52

## 2018-01-01 RX ADMIN — Medication 1 CAPSULE: at 10:11

## 2018-01-01 RX ADMIN — IOPAMIDOL 79 ML: 755 INJECTION, SOLUTION INTRAVENOUS at 16:56

## 2018-01-01 RX ADMIN — CARVEDILOL 12.5 MG: 12.5 TABLET, FILM COATED ORAL at 08:53

## 2018-01-01 RX ADMIN — POLYETHYLENE GLYCOL 3350 17 G: 17 POWDER, FOR SOLUTION ORAL at 08:18

## 2018-01-01 RX ADMIN — TAZOBACTAM SODIUM AND PIPERACILLIN SODIUM 4.5 G: .5; 4 INJECTION, POWDER, LYOPHILIZED, FOR SOLUTION INTRAVENOUS at 06:14

## 2018-01-01 RX ADMIN — Medication 15 ML: at 09:29

## 2018-01-01 RX ADMIN — FAMOTIDINE 20 MG: 20 TABLET, FILM COATED ORAL at 21:04

## 2018-01-01 RX ADMIN — INSULIN LISPRO 2 UNITS: 100 INJECTION, SOLUTION INTRAVENOUS; SUBCUTANEOUS at 13:08

## 2018-01-01 RX ADMIN — POTASSIUM CHLORIDE 10 MEQ: 7.46 INJECTION, SOLUTION INTRAVENOUS at 21:21

## 2018-01-01 RX ADMIN — DEXTROSE AND SODIUM CHLORIDE 50 ML/HR: 5; 900 INJECTION, SOLUTION INTRAVENOUS at 13:45

## 2018-01-01 RX ADMIN — SOTALOL HYDROCHLORIDE 40 MG: 80 TABLET ORAL at 17:46

## 2018-01-01 RX ADMIN — ALBUTEROL SULFATE 2.5 MG: 2.5 SOLUTION RESPIRATORY (INHALATION) at 23:15

## 2018-01-01 RX ADMIN — INSULIN LISPRO 2 UNITS: 100 INJECTION, SOLUTION INTRAVENOUS; SUBCUTANEOUS at 18:46

## 2018-01-01 RX ADMIN — FAMOTIDINE 20 MG: 10 INJECTION, SOLUTION INTRAVENOUS at 08:01

## 2018-01-01 RX ADMIN — LEVETIRACETAM 250 MG: 100 INJECTION, SOLUTION INTRAVENOUS at 05:55

## 2018-01-01 RX ADMIN — SODIUM CHLORIDE 75 ML/HR: 9 INJECTION, SOLUTION INTRAVENOUS at 22:10

## 2018-01-01 RX ADMIN — FAMOTIDINE 20 MG: 20 TABLET, FILM COATED ORAL at 21:03

## 2018-01-01 RX ADMIN — IPRATROPIUM BROMIDE AND ALBUTEROL SULFATE 3 ML: .5; 3 SOLUTION RESPIRATORY (INHALATION) at 08:55

## 2018-01-01 RX ADMIN — AMIODARONE HYDROCHLORIDE 1 MG/MIN: 1.8 INJECTION, SOLUTION INTRAVENOUS at 11:39

## 2018-01-01 RX ADMIN — FAMOTIDINE 20 MG: 20 TABLET, FILM COATED ORAL at 09:22

## 2018-01-01 RX ADMIN — POTASSIUM CHLORIDE 40 MEQ: 1.5 POWDER, FOR SOLUTION ORAL at 15:20

## 2018-01-01 RX ADMIN — MULTIPLE VITAMINS W/ MINERALS TAB 1 TABLET: TAB ORAL at 09:31

## 2018-01-01 RX ADMIN — ALBUTEROL SULFATE 2.5 MG: 2.5 SOLUTION RESPIRATORY (INHALATION) at 00:58

## 2018-01-01 RX ADMIN — INSULIN LISPRO 2 UNITS: 100 INJECTION, SOLUTION INTRAVENOUS; SUBCUTANEOUS at 00:02

## 2018-01-01 RX ADMIN — VANCOMYCIN HYDROCHLORIDE 500 MG: 500 INJECTION, POWDER, LYOPHILIZED, FOR SOLUTION INTRAVENOUS at 22:10

## 2018-01-01 RX ADMIN — IPRATROPIUM BROMIDE AND ALBUTEROL SULFATE 3 ML: .5; 3 SOLUTION RESPIRATORY (INHALATION) at 13:29

## 2018-01-01 RX ADMIN — INSULIN LISPRO 2 UNITS: 100 INJECTION, SOLUTION INTRAVENOUS; SUBCUTANEOUS at 00:31

## 2018-01-01 RX ADMIN — SOTALOL HYDROCHLORIDE 80 MG: 80 TABLET ORAL at 21:23

## 2018-01-01 RX ADMIN — ACETAMINOPHEN 1000 MG: 500 TABLET ORAL at 20:33

## 2018-01-01 RX ADMIN — Medication 1 TABLET: at 08:19

## 2018-01-01 RX ADMIN — POLYETHYLENE GLYCOL 3350 17 G: 17 POWDER, FOR SOLUTION ORAL at 08:26

## 2018-01-01 RX ADMIN — HYDROCODONE BITARTRATE AND ACETAMINOPHEN 2 TABLET: 5; 325 TABLET ORAL at 17:26

## 2018-01-01 RX ADMIN — POTASSIUM PHOSPHATE, MONOBASIC AND POTASSIUM PHOSPHATE, DIBASIC 15 MMOL: 224; 236 INJECTION, SOLUTION INTRAVENOUS at 00:32

## 2018-01-01 RX ADMIN — POLYETHYLENE GLYCOL 3350 17 G: 17 POWDER, FOR SOLUTION ORAL at 10:02

## 2018-01-01 RX ADMIN — CETIRIZINE HYDROCHLORIDE 10 MG: 10 TABLET, FILM COATED ORAL at 20:16

## 2018-01-01 RX ADMIN — POTASSIUM PHOSPHATE, MONOBASIC AND POTASSIUM PHOSPHATE, DIBASIC 15 MMOL: 224; 236 INJECTION, SOLUTION INTRAVENOUS at 12:10

## 2018-01-01 RX ADMIN — METOROPROLOL TARTRATE 5 MG: 5 INJECTION, SOLUTION INTRAVENOUS at 13:15

## 2018-01-01 RX ADMIN — ALBUTEROL SULFATE 2.5 MG: 2.5 SOLUTION RESPIRATORY (INHALATION) at 19:52

## 2018-01-01 RX ADMIN — SOTALOL HYDROCHLORIDE 40 MG: 80 TABLET ORAL at 09:54

## 2018-01-01 RX ADMIN — POLYETHYLENE GLYCOL 3350 17 G: 17 POWDER, FOR SOLUTION ORAL at 08:14

## 2018-01-01 RX ADMIN — ACETYLCYSTEINE 3 ML: 200 INHALANT RESPIRATORY (INHALATION) at 07:06

## 2018-01-01 RX ADMIN — CETIRIZINE HYDROCHLORIDE 10 MG: 10 TABLET, FILM COATED ORAL at 22:39

## 2018-01-01 RX ADMIN — TAZOBACTAM SODIUM AND PIPERACILLIN SODIUM 4.5 G: .5; 4 INJECTION, POWDER, LYOPHILIZED, FOR SOLUTION INTRAVENOUS at 12:36

## 2018-01-01 RX ADMIN — OSELTAMIVIR PHOSPHATE 30 MG: 30 CAPSULE ORAL at 09:49

## 2018-01-01 RX ADMIN — MAGNESIUM SULFATE HEPTAHYDRATE 4 G: 40 INJECTION, SOLUTION INTRAVENOUS at 20:14

## 2018-01-01 RX ADMIN — ALBUTEROL SULFATE 2.5 MG: 2.5 SOLUTION RESPIRATORY (INHALATION) at 21:50

## 2018-01-01 RX ADMIN — METOROPROLOL TARTRATE 5 MG: 5 INJECTION, SOLUTION INTRAVENOUS at 08:27

## 2018-01-01 RX ADMIN — ALBUTEROL SULFATE 2.5 MG: 2.5 SOLUTION RESPIRATORY (INHALATION) at 15:29

## 2018-01-01 RX ADMIN — MULTIPLE VITAMINS W/ MINERALS TAB 1 TABLET: TAB ORAL at 08:27

## 2018-01-01 RX ADMIN — Medication 1 TABLET: at 10:02

## 2018-01-01 RX ADMIN — TAZOBACTAM SODIUM AND PIPERACILLIN SODIUM 4.5 G: .5; 4 INJECTION, POWDER, LYOPHILIZED, FOR SOLUTION INTRAVENOUS at 03:52

## 2018-01-01 RX ADMIN — HEPARIN SODIUM 5000 UNITS: 5000 INJECTION, SOLUTION INTRAVENOUS; SUBCUTANEOUS at 09:02

## 2018-01-01 RX ADMIN — SOTALOL HYDROCHLORIDE 40 MG: 80 TABLET ORAL at 10:24

## 2018-01-01 RX ADMIN — IPRATROPIUM BROMIDE AND ALBUTEROL SULFATE 3 ML: .5; 3 SOLUTION RESPIRATORY (INHALATION) at 14:54

## 2018-01-01 RX ADMIN — MIDAZOLAM HYDROCHLORIDE: 1 INJECTION, SOLUTION INTRAMUSCULAR; INTRAVENOUS at 17:25

## 2018-01-01 RX ADMIN — IPRATROPIUM BROMIDE AND ALBUTEROL SULFATE 3 ML: .5; 3 SOLUTION RESPIRATORY (INHALATION) at 08:06

## 2018-01-01 RX ADMIN — INSULIN LISPRO 3 UNITS: 100 INJECTION, SOLUTION INTRAVENOUS; SUBCUTANEOUS at 05:46

## 2018-01-01 RX ADMIN — Medication 1 TABLET: at 08:53

## 2018-01-01 RX ADMIN — POTASSIUM CHLORIDE 40 MEQ: 1.5 POWDER, FOR SOLUTION ORAL at 04:11

## 2018-01-01 RX ADMIN — METHYLPHENIDATE HYDROCHLORIDE 10 MG: 10 TABLET ORAL at 08:01

## 2018-01-01 RX ADMIN — METHYLPHENIDATE HYDROCHLORIDE 5 MG: 5 TABLET ORAL at 19:04

## 2018-01-01 RX ADMIN — HEPARIN SODIUM 5000 UNITS: 5000 INJECTION, SOLUTION INTRAVENOUS; SUBCUTANEOUS at 20:28

## 2018-01-01 RX ADMIN — Medication 100 MG: at 09:10

## 2018-01-01 RX ADMIN — LEVETIRACETAM 250 MG: 100 INJECTION, SOLUTION INTRAVENOUS at 05:47

## 2018-01-01 RX ADMIN — TAZOBACTAM SODIUM AND PIPERACILLIN SODIUM 3.38 G: 375; 3 INJECTION, SOLUTION INTRAVENOUS at 18:44

## 2018-01-01 RX ADMIN — METHYLPREDNISOLONE SODIUM SUCCINATE 500 MG: 500 INJECTION, POWDER, FOR SOLUTION INTRAMUSCULAR; INTRAVENOUS at 00:07

## 2018-01-01 RX ADMIN — INSULIN LISPRO 2 UNITS: 100 INJECTION, SOLUTION INTRAVENOUS; SUBCUTANEOUS at 06:02

## 2018-01-01 RX ADMIN — CETIRIZINE HYDROCHLORIDE 10 MG: 10 TABLET, FILM COATED ORAL at 23:44

## 2018-01-01 RX ADMIN — INSULIN LISPRO 3 UNITS: 100 INJECTION, SOLUTION INTRAVENOUS; SUBCUTANEOUS at 00:10

## 2018-01-01 RX ADMIN — HEPARIN SODIUM 5000 UNITS: 5000 INJECTION, SOLUTION INTRAVENOUS; SUBCUTANEOUS at 08:24

## 2018-01-01 RX ADMIN — POTASSIUM CHLORIDE 10 MEQ: 7.46 INJECTION, SOLUTION INTRAVENOUS at 19:03

## 2018-01-01 RX ADMIN — POTASSIUM CHLORIDE 20 MEQ: 149 INJECTION, SOLUTION, CONCENTRATE INTRAVENOUS at 13:17

## 2018-01-01 RX ADMIN — SODIUM CHLORIDE 250 MG: 9 INJECTION, SOLUTION INTRAVENOUS at 15:13

## 2018-01-01 RX ADMIN — HEPARIN SODIUM 5000 UNITS: 5000 INJECTION, SOLUTION INTRAVENOUS; SUBCUTANEOUS at 22:00

## 2018-01-01 RX ADMIN — INSULIN LISPRO 3 UNITS: 100 INJECTION, SOLUTION INTRAVENOUS; SUBCUTANEOUS at 11:27

## 2018-01-01 RX ADMIN — LEVETIRACETAM 500 MG: 5 INJECTION INTRAVENOUS at 17:31

## 2018-01-01 RX ADMIN — SOTALOL HYDROCHLORIDE 80 MG: 80 TABLET ORAL at 10:40

## 2018-01-01 RX ADMIN — CETIRIZINE HYDROCHLORIDE 10 MG: 10 TABLET, FILM COATED ORAL at 21:04

## 2018-01-01 RX ADMIN — SODIUM CHLORIDE 1000 ML: 9 INJECTION, SOLUTION INTRAVENOUS at 20:32

## 2018-01-01 RX ADMIN — LEVETIRACETAM 250 MG: 100 INJECTION, SOLUTION INTRAVENOUS at 18:01

## 2018-01-01 RX ADMIN — METHYLPREDNISOLONE SODIUM SUCCINATE 500 MG: 500 INJECTION, POWDER, FOR SOLUTION INTRAMUSCULAR; INTRAVENOUS at 05:52

## 2018-01-01 RX ADMIN — TAZOBACTAM SODIUM AND PIPERACILLIN SODIUM 4.5 G: .5; 4 INJECTION, POWDER, LYOPHILIZED, FOR SOLUTION INTRAVENOUS at 21:58

## 2018-01-01 RX ADMIN — HEPARIN SODIUM 5000 UNITS: 5000 INJECTION, SOLUTION INTRAVENOUS; SUBCUTANEOUS at 10:27

## 2018-01-01 RX ADMIN — METHYLPREDNISOLONE SODIUM SUCCINATE 500 MG: 500 INJECTION, POWDER, FOR SOLUTION INTRAMUSCULAR; INTRAVENOUS at 09:19

## 2018-01-01 RX ADMIN — IPRATROPIUM BROMIDE AND ALBUTEROL SULFATE 3 ML: .5; 3 SOLUTION RESPIRATORY (INHALATION) at 22:58

## 2018-01-01 RX ADMIN — Medication 15 ML: at 08:08

## 2018-01-01 RX ADMIN — Medication 1 TABLET: at 09:49

## 2018-01-01 RX ADMIN — MULTIPLE VITAMINS W/ MINERALS TAB 1 TABLET: TAB ORAL at 09:10

## 2018-01-01 RX ADMIN — HEPARIN SODIUM 5000 UNITS: 5000 INJECTION, SOLUTION INTRAVENOUS; SUBCUTANEOUS at 09:29

## 2018-01-01 RX ADMIN — CARVEDILOL 12.5 MG: 12.5 TABLET, FILM COATED ORAL at 20:16

## 2018-01-01 RX ADMIN — IPRATROPIUM BROMIDE AND ALBUTEROL SULFATE 3 ML: .5; 3 SOLUTION RESPIRATORY (INHALATION) at 16:28

## 2018-01-01 RX ADMIN — POTASSIUM CHLORIDE 40 MEQ: 750 CAPSULE, EXTENDED RELEASE ORAL at 13:16

## 2018-01-01 RX ADMIN — LEVETIRACETAM 250 MG: 100 INJECTION, SOLUTION INTRAVENOUS at 17:02

## 2018-01-01 RX ADMIN — METHYLPREDNISOLONE SODIUM SUCCINATE 500 MG: 500 INJECTION, POWDER, FOR SOLUTION INTRAMUSCULAR; INTRAVENOUS at 15:03

## 2018-01-01 RX ADMIN — INSULIN LISPRO 3 UNITS: 100 INJECTION, SOLUTION INTRAVENOUS; SUBCUTANEOUS at 00:11

## 2018-01-01 RX ADMIN — FAMOTIDINE 20 MG: 20 TABLET, FILM COATED ORAL at 08:16

## 2018-01-01 RX ADMIN — IMMUNE GLOBULIN INFUSION (HUMAN) 16 G: 100 INJECTION, SOLUTION INTRAVENOUS; SUBCUTANEOUS at 15:10

## 2018-01-01 RX ADMIN — Medication 100 MG: at 18:18

## 2018-01-01 RX ADMIN — INSULIN LISPRO 2 UNITS: 100 INJECTION, SOLUTION INTRAVENOUS; SUBCUTANEOUS at 12:11

## 2018-01-01 RX ADMIN — Medication 15 ML: at 08:14

## 2018-01-01 RX ADMIN — FAMOTIDINE 20 MG: 20 TABLET, FILM COATED ORAL at 09:55

## 2018-01-01 RX ADMIN — FAMOTIDINE 20 MG: 20 TABLET, FILM COATED ORAL at 20:26

## 2018-01-01 RX ADMIN — POLYETHYLENE GLYCOL 3350 17 G: 17 POWDER, FOR SOLUTION ORAL at 08:30

## 2018-01-01 RX ADMIN — LEVETIRACETAM 250 MG: 100 INJECTION, SOLUTION INTRAVENOUS at 05:03

## 2018-01-01 RX ADMIN — MULTIPLE VITAMINS W/ MINERALS TAB 1 TABLET: TAB ORAL at 08:53

## 2018-01-01 RX ADMIN — FAMOTIDINE 20 MG: 20 TABLET, FILM COATED ORAL at 21:51

## 2018-01-01 RX ADMIN — Medication 100 MG: at 08:11

## 2018-01-01 RX ADMIN — POLYETHYLENE GLYCOL 3350 17 G: 17 POWDER, FOR SOLUTION ORAL at 09:22

## 2018-01-01 RX ADMIN — ACETYLCYSTEINE 3 ML: 200 INHALANT RESPIRATORY (INHALATION) at 00:11

## 2018-01-01 RX ADMIN — CARVEDILOL 12.5 MG: 12.5 TABLET, FILM COATED ORAL at 21:28

## 2018-01-01 RX ADMIN — HEPARIN SODIUM 5000 UNITS: 5000 INJECTION, SOLUTION INTRAVENOUS; SUBCUTANEOUS at 21:04

## 2018-01-01 RX ADMIN — OSELTAMIVIR PHOSPHATE 30 MG: 30 CAPSULE ORAL at 10:02

## 2018-01-01 RX ADMIN — LEVETIRACETAM 250 MG: 100 INJECTION, SOLUTION INTRAVENOUS at 06:37

## 2018-01-01 RX ADMIN — HEPARIN SODIUM 5000 UNITS: 5000 INJECTION, SOLUTION INTRAVENOUS; SUBCUTANEOUS at 21:01

## 2018-01-01 RX ADMIN — Medication 15 ML: at 09:10

## 2018-01-01 RX ADMIN — FAMOTIDINE 20 MG: 20 TABLET, FILM COATED ORAL at 20:24

## 2018-01-01 RX ADMIN — SODIUM CHLORIDE 75 ML/HR: 9 INJECTION, SOLUTION INTRAVENOUS at 09:44

## 2018-01-01 RX ADMIN — GUAIFENESIN 600 MG: 600 TABLET, EXTENDED RELEASE ORAL at 22:39

## 2018-01-01 RX ADMIN — POLYETHYLENE GLYCOL 3350 17 G: 17 POWDER, FOR SOLUTION ORAL at 08:24

## 2018-01-01 RX ADMIN — POTASSIUM CHLORIDE 40 MEQ: 750 CAPSULE, EXTENDED RELEASE ORAL at 10:03

## 2018-01-01 RX ADMIN — CETIRIZINE HYDROCHLORIDE 10 MG: 10 TABLET, FILM COATED ORAL at 20:13

## 2018-01-01 RX ADMIN — FAMOTIDINE 20 MG: 20 TABLET, FILM COATED ORAL at 21:02

## 2018-01-01 RX ADMIN — POLYETHYLENE GLYCOL 3350 17 G: 17 POWDER, FOR SOLUTION ORAL at 10:20

## 2018-01-01 RX ADMIN — ENOXAPARIN SODIUM 30 MG: 30 INJECTION SUBCUTANEOUS at 08:18

## 2018-01-01 RX ADMIN — FAMOTIDINE 20 MG: 20 TABLET, FILM COATED ORAL at 20:29

## 2018-01-01 RX ADMIN — HEPARIN SODIUM 5000 UNITS: 5000 INJECTION, SOLUTION INTRAVENOUS; SUBCUTANEOUS at 20:23

## 2018-01-01 RX ADMIN — SODIUM CHLORIDE 75 ML/HR: 9 INJECTION, SOLUTION INTRAVENOUS at 05:44

## 2018-01-01 RX ADMIN — IMMUNE GLOBULIN INFUSION (HUMAN) 16 G: 100 INJECTION, SOLUTION INTRAVENOUS; SUBCUTANEOUS at 14:37

## 2018-01-01 RX ADMIN — POTASSIUM PHOSPHATE, MONOBASIC AND POTASSIUM PHOSPHATE, DIBASIC 30 MMOL: 224; 236 INJECTION, SOLUTION INTRAVENOUS at 00:30

## 2018-01-01 RX ADMIN — METHYLPREDNISOLONE SODIUM SUCCINATE 500 MG: 500 INJECTION, POWDER, FOR SOLUTION INTRAMUSCULAR; INTRAVENOUS at 12:45

## 2018-01-01 RX ADMIN — POTASSIUM CHLORIDE 20 MEQ: 2 INJECTION, SOLUTION, CONCENTRATE INTRAVENOUS at 08:21

## 2018-01-01 RX ADMIN — LEVETIRACETAM 250 MG: 100 INJECTION, SOLUTION INTRAVENOUS at 05:44

## 2018-01-01 RX ADMIN — Medication 100 MG: at 09:28

## 2018-01-01 RX ADMIN — INSULIN LISPRO 2 UNITS: 100 INJECTION, SOLUTION INTRAVENOUS; SUBCUTANEOUS at 12:56

## 2018-01-01 RX ADMIN — SODIUM CHLORIDE 100 ML/HR: 9 INJECTION, SOLUTION INTRAVENOUS at 22:42

## 2018-01-01 RX ADMIN — TAZOBACTAM SODIUM AND PIPERACILLIN SODIUM 4.5 G: .5; 4 INJECTION, POWDER, LYOPHILIZED, FOR SOLUTION INTRAVENOUS at 21:25

## 2018-01-01 RX ADMIN — POTASSIUM PHOSPHATE, MONOBASIC AND POTASSIUM PHOSPHATE, DIBASIC 30 MMOL: 224; 236 INJECTION, SOLUTION INTRAVENOUS at 23:54

## 2018-01-01 RX ADMIN — POTASSIUM CHLORIDE 20 MEQ: 2 INJECTION, SOLUTION, CONCENTRATE INTRAVENOUS at 02:01

## 2018-01-01 RX ADMIN — POTASSIUM PHOSPHATE, MONOBASIC AND POTASSIUM PHOSPHATE, DIBASIC 15 MMOL: 224; 236 INJECTION, SOLUTION INTRAVENOUS at 01:31

## 2018-01-01 RX ADMIN — POLYETHYLENE GLYCOL 3350 17 G: 17 POWDER, FOR SOLUTION ORAL at 08:41

## 2018-01-01 RX ADMIN — POTASSIUM CHLORIDE 40 MEQ: 1.5 POWDER, FOR SOLUTION ORAL at 10:28

## 2018-01-01 RX ADMIN — HEPARIN SODIUM 5000 UNITS: 5000 INJECTION, SOLUTION INTRAVENOUS; SUBCUTANEOUS at 08:54

## 2018-01-01 RX ADMIN — HEPARIN SODIUM 5000 UNITS: 5000 INJECTION, SOLUTION INTRAVENOUS; SUBCUTANEOUS at 20:16

## 2018-01-01 RX ADMIN — SOTALOL HYDROCHLORIDE 40 MG: 80 TABLET ORAL at 08:24

## 2018-01-01 RX ADMIN — FAMOTIDINE 20 MG: 20 TABLET, FILM COATED ORAL at 08:07

## 2018-01-01 RX ADMIN — Medication 15 ML: at 09:22

## 2018-01-01 RX ADMIN — SODIUM CHLORIDE 75 ML/HR: 9 INJECTION, SOLUTION INTRAVENOUS at 15:06

## 2018-01-01 RX ADMIN — LEVETIRACETAM 250 MG: 100 INJECTION, SOLUTION INTRAVENOUS at 17:28

## 2018-01-01 RX ADMIN — Medication 2 TABLET: at 20:10

## 2018-01-01 RX ADMIN — MULTIPLE VITAMINS W/ MINERALS TAB 1 TABLET: TAB ORAL at 10:03

## 2018-01-01 RX ADMIN — POTASSIUM CHLORIDE 40 MEQ: 1.5 POWDER, FOR SOLUTION ORAL at 08:01

## 2018-01-01 RX ADMIN — HEPARIN SODIUM 5000 UNITS: 5000 INJECTION, SOLUTION INTRAVENOUS; SUBCUTANEOUS at 21:23

## 2018-01-01 RX ADMIN — ALBUTEROL SULFATE 2.5 MG: 2.5 SOLUTION RESPIRATORY (INHALATION) at 04:16

## 2018-01-01 RX ADMIN — METHYLPREDNISOLONE SODIUM SUCCINATE 500 MG: 500 INJECTION, POWDER, FOR SOLUTION INTRAMUSCULAR; INTRAVENOUS at 11:56

## 2018-01-01 RX ADMIN — Medication 1 CAPSULE: at 10:27

## 2018-01-01 RX ADMIN — SODIUM CHLORIDE 75 ML/HR: 9 INJECTION, SOLUTION INTRAVENOUS at 18:01

## 2018-01-01 RX ADMIN — SODIUM CHLORIDE 1000 ML: 9 INJECTION, SOLUTION INTRAVENOUS at 22:41

## 2018-01-01 RX ADMIN — IPRATROPIUM BROMIDE AND ALBUTEROL SULFATE 3 ML: .5; 3 SOLUTION RESPIRATORY (INHALATION) at 20:50

## 2018-01-01 RX ADMIN — IPRATROPIUM BROMIDE AND ALBUTEROL SULFATE 3 ML: .5; 3 SOLUTION RESPIRATORY (INHALATION) at 20:05

## 2018-01-01 RX ADMIN — Medication 1 CAPSULE: at 08:54

## 2018-01-01 RX ADMIN — POLYETHYLENE GLYCOL 3350 17 G: 17 POWDER, FOR SOLUTION ORAL at 09:54

## 2018-01-01 RX ADMIN — INSULIN LISPRO 2 UNITS: 100 INJECTION, SOLUTION INTRAVENOUS; SUBCUTANEOUS at 00:06

## 2018-01-01 RX ADMIN — SODIUM CHLORIDE 75 ML/HR: 9 INJECTION, SOLUTION INTRAVENOUS at 06:50

## 2018-01-01 RX ADMIN — SOTALOL HYDROCHLORIDE 40 MG: 80 TABLET ORAL at 20:29

## 2018-01-01 RX ADMIN — TAZOBACTAM SODIUM AND PIPERACILLIN SODIUM 3.38 G: 375; 3 INJECTION, SOLUTION INTRAVENOUS at 17:12

## 2018-01-01 RX ADMIN — SOTALOL HYDROCHLORIDE 40 MG: 80 TABLET ORAL at 21:02

## 2018-01-01 RX ADMIN — FAMOTIDINE 20 MG: 20 TABLET, FILM COATED ORAL at 09:38

## 2018-01-01 RX ADMIN — ACETAMINOPHEN 650 MG: 325 TABLET, FILM COATED ORAL at 12:32

## 2018-01-01 RX ADMIN — SOTALOL HYDROCHLORIDE 40 MG: 80 TABLET ORAL at 08:15

## 2018-01-01 RX ADMIN — INSULIN LISPRO 2 UNITS: 100 INJECTION, SOLUTION INTRAVENOUS; SUBCUTANEOUS at 06:37

## 2018-01-01 RX ADMIN — CETIRIZINE HYDROCHLORIDE 10 MG: 10 TABLET, FILM COATED ORAL at 22:00

## 2018-01-01 RX ADMIN — WATER 250 MG: 1 INJECTION INTRAMUSCULAR; INTRAVENOUS; SUBCUTANEOUS at 12:33

## 2018-01-01 RX ADMIN — HYDROCODONE BITARTRATE AND ACETAMINOPHEN 1 TABLET: 5; 325 TABLET ORAL at 20:15

## 2018-01-01 RX ADMIN — LEVETIRACETAM 250 MG: 100 INJECTION, SOLUTION INTRAVENOUS at 17:47

## 2018-01-01 RX ADMIN — Medication 1 CAPSULE: at 10:03

## 2018-01-01 RX ADMIN — LEVETIRACETAM 250 MG: 100 INJECTION, SOLUTION INTRAVENOUS at 08:11

## 2018-01-01 RX ADMIN — SOTALOL HYDROCHLORIDE 40 MG: 80 TABLET ORAL at 09:28

## 2018-01-01 RX ADMIN — POTASSIUM CHLORIDE 40 MEQ: 750 CAPSULE, EXTENDED RELEASE ORAL at 17:38

## 2018-01-01 RX ADMIN — HEPARIN SODIUM 5000 UNITS: 5000 INJECTION, SOLUTION INTRAVENOUS; SUBCUTANEOUS at 08:17

## 2018-01-01 RX ADMIN — WATER 250 MG: 1 INJECTION INTRAMUSCULAR; INTRAVENOUS; SUBCUTANEOUS at 21:58

## 2018-01-01 RX ADMIN — COSYNTROPIN 0.25 MG: 0.25 INJECTION, POWDER, LYOPHILIZED, FOR SOLUTION INTRAMUSCULAR; INTRAVENOUS at 13:14

## 2018-01-01 RX ADMIN — GUAIFENESIN 600 MG: 600 TABLET, EXTENDED RELEASE ORAL at 20:23

## 2018-01-01 RX ADMIN — ACETYLCYSTEINE 4 ML: 200 INHALANT RESPIRATORY (INHALATION) at 03:55

## 2018-01-01 RX ADMIN — POLYETHYLENE GLYCOL 3350 17 G: 17 POWDER, FOR SOLUTION ORAL at 09:03

## 2018-01-01 RX ADMIN — SODIUM CHLORIDE 100 ML/HR: 9 INJECTION, SOLUTION INTRAVENOUS at 21:11

## 2018-01-01 RX ADMIN — POTASSIUM CHLORIDE 40 MEQ: 750 CAPSULE, EXTENDED RELEASE ORAL at 12:32

## 2018-01-01 RX ADMIN — HEPARIN SODIUM 5000 UNITS: 5000 INJECTION, SOLUTION INTRAVENOUS; SUBCUTANEOUS at 10:02

## 2018-01-01 RX ADMIN — POTASSIUM CHLORIDE 10 MEQ: 149 INJECTION, SOLUTION, CONCENTRATE INTRAVENOUS at 11:49

## 2018-01-01 RX ADMIN — POTASSIUM PHOSPHATE, MONOBASIC AND POTASSIUM PHOSPHATE, DIBASIC 15 MMOL: 224; 236 INJECTION, SOLUTION INTRAVENOUS at 14:14

## 2018-01-01 RX ADMIN — SOTALOL HYDROCHLORIDE 80 MG: 80 TABLET ORAL at 20:24

## 2018-01-01 RX ADMIN — HEPARIN SODIUM 5000 UNITS: 5000 INJECTION, SOLUTION INTRAVENOUS; SUBCUTANEOUS at 08:07

## 2018-01-01 RX ADMIN — MULTIPLE VITAMINS W/ MINERALS TAB 1 TABLET: TAB ORAL at 08:16

## 2018-01-01 RX ADMIN — SODIUM CHLORIDE 75 ML/HR: 9 INJECTION, SOLUTION INTRAVENOUS at 17:31

## 2018-01-01 RX ADMIN — SOTALOL HYDROCHLORIDE 40 MG: 80 TABLET ORAL at 09:38

## 2018-01-01 RX ADMIN — Medication 15 ML: at 08:32

## 2018-01-01 RX ADMIN — IPRATROPIUM BROMIDE AND ALBUTEROL SULFATE 3 ML: .5; 3 SOLUTION RESPIRATORY (INHALATION) at 11:15

## 2018-01-01 RX ADMIN — METHYLPREDNISOLONE SODIUM SUCCINATE 500 MG: 500 INJECTION, POWDER, FOR SOLUTION INTRAMUSCULAR; INTRAVENOUS at 11:30

## 2018-01-01 NOTE — PROGRESS NOTES
"/73  Pulse 100  Temp 98.2 °F (36.8 °C)  Resp 16  Ht 154.9 cm (61\")  Wt 40.8 kg (90 lb)  SpO2 97%  BMI 17.01 kg/m2    Lab Results (last 24 hours)     Procedure Component Value Units Date/Time    CBC & Differential [[id number]] Collected:  01/01/18 0546    Specimen:  Blood Updated:  01/01/18 0650    Narrative:       The following orders were created for panel order CBC & Differential.  Procedure                               Abnormality         Status                     ---------                               -----------         ------                     CBC Auto Differential[[id number]]        Abnormal            Final result                 Please view results for these tests on the individual orders.    CBC Auto Differential [[id number]]  (Abnormal) Collected:  01/01/18 0546    Specimen:  Blood Updated:  01/01/18 0650     WBC 7.45 10*3/mm3      RBC 3.21 (L) 10*6/mm3      Hemoglobin 9.9 (L) g/dL      Hematocrit 30.2 (L) %      MCV 94.1 fL      MCH 30.8 pg      MCHC 32.8 g/dL      RDW 14.2 %      RDW-SD 49.1 fl      MPV 11.6 fL      Platelets 140 (L) 10*3/mm3      Neutrophil % 67.2 %      Lymphocyte % 14.8 (L) %      Monocyte % 14.6 (H) %      Eosinophil % 2.8 %      Basophil % 0.3 %      Immature Grans % 0.3 %      Neutrophils, Absolute 5.01 10*3/mm3      Lymphocytes, Absolute 1.10 10*3/mm3      Monocytes, Absolute 1.09 (H) 10*3/mm3      Eosinophils, Absolute 0.21 10*3/mm3      Basophils, Absolute 0.02 10*3/mm3      Immature Grans, Absolute 0.02 10*3/mm3           Imaging Results (last 24 hours)     Procedure Component Value Units Date/Time    CT Pelvis Without Contrast [[id number]] Collected:  12/31/17 1228     Updated:  12/31/17 1234    Narrative:       EXAMINATION: CT PELVIS WO CONTRAST - 12/30/2017     INDICATION: Evaluate for possible right hip fixation.      TECHNIQUE: CT pelvis without intravenous contrast.     The radiation dose reduction device was turned on for each scan per the  ALARA (As Low " as Reasonably Achievable) protocol.     COMPARISON: CT dated 10/28/2015.     FINDINGS: Intrapelvic soft tissues unremarkable without free fluid or  bulky pelvic adenopathy. Moderately distended urinary bladder.     Osseous structures demonstrate atherosclerotic involvement of the  lumbosacral junction. SI joints symmetric and intact. Pubic symphysis  without significant widening. Abnormal cortical irregularity in the  right subcapital femoral region extending along the neck consistent with  subcapital impacted fracture in asymmetric appearance compared to the  left side. Minimal degenerative changes of the bilateral hips noted.       Impression:       Acute impacted right subcapital femoral fracture which is  nondisplaced producing mild foreshortening of the right leg due to  impacted asymmetric configuration.     DICTATED:     12/30/2017  EDITED:          12/31/2017     This report was finalized on 12/31/2017 12:31 PM by Dr. Bob Alfaro.       XR Hip With or Without Pelvis 2 - 3 View Right [[id number]] Collected:  12/31/17 1159     Updated:  12/31/17 1240    Narrative:       EXAMINATION: XR HIP, 2-3 VIEWS RIGHT - 12/30/2017     INDICATION: Fall, right hip pain.      COMPARISON: None.     FINDINGS: SI joints and pubic symphysis without significant widening.  Right femoral neck subcapital region has asymmetric somewhat impacted  appearance suspicious for fracture in the acute setting however no  discrete fracture line is clearly evident. Background degenerative  changes. Calcified phleboliths.           Impression:       Asymmetric appearance of the right subcapital region  concerning for impacted fracture however no discrete fracture line.  Recommend CT bony pelvis for further evaluation.     DICTATED:     12/30/2017  EDITED:          12/31/2017     This report was finalized on 12/31/2017 12:38 PM by Dr. Bob Alfaro.       XR Chest 1 View [[id number]] Collected:  12/31/17 1200     Updated:  12/31/17 1241     Narrative:       EXAMINATION: XR CHEST 1 VW - 12/30/2017     INDICATION: Right rib pain after fall.     COMPARISON: Chest x-ray 10/27/2015.     FINDINGS: Chronic lung changes including hyperinflated appearance and  prior granulomatous involvement without focal consolidation or  pneumothorax. No pleural effusion. Cardiac silhouette within normal  limits.           Impression:       Chronic lung changes including hyperinflated appearance  without acute cardiopulmonary disease.     DICTATED:     12/30/2017  EDITED:          12/31/2017     This report was finalized on 12/31/2017 12:39 PM by Dr. Bob Alfaro.       FL C Arm During Surgery [[id number]] Collected:  01/01/18 0807     Updated:  01/01/18 0911    Narrative:       EXAMINATION: FLUOROSCOPY C-ARM DURING SURGERY-     INDICATION: Hip pinning; W19.XXXA-Unspecified fall, initial encounter;  Y92.099-Unspecified place in other non-institutional residence as the  place of occurrence of the external cause; S72.001A-Fracture of  unspecified part of neck of right femur, initial encounter for closed  fracture.      TECHNIQUE: Intraoperative fluoroscopy for improved localization and  treatment planning.     COMPARISON: CT bony pelvis 12/30/2017.     FINDINGS: Intraoperative fluoroscopy with total fluoroscopic time usage  1 minute 29 seconds and four representative images saved of ORIF right  hip involving three partially threaded screws spanning the subcapital  fracture right femur.       Impression:       Intraoperative fluoroscopy ORIF right hip.      D:  01/01/2018  E:  01/01/2018       CT Head With & Without Contrast [[id number]] Collected:  01/01/18 0845     Updated:  01/01/18 0934    Narrative:       EXAMINATION: CT HEAD W WO CONTRAST-      INDICATION: Dizziness and fall in a patient with history of breast  cancer; W19.XXXA-Unspecified fall, initial encounter;  Y92.099-Unspecified place in other non-institutional residence as the  place of occurrence of the external  cause; S72.001A-Fracture of  unspecified part of neck of right femur, initial encounter for closed  fracture; Z74.09-Other reduced mobility.      TECHNIQUE: CT head with and without intravenous contrast administration.     The radiation dose reduction device was turned on for each scan per the  ALARA (As Low as Reasonably Achievable) protocol.     COMPARISON: CT head 10/28/2015.     FINDINGS: Midline structures are symmetric without evidence of mass,  mass effect or midline shift. No intraaxial hemorrhage or extraaxial  fluid collection. Ventricles and sulci are mildly prominent. There is an  area of encephalomalacia left frontal lobe with ex vacuo dilatation of  the left frontal horn new within the interval between comparison  10/28/2015. No abnormal enhancing focus on postcontrast administration  sequences identified. Superior sagittal sinus is widely patent. Globes  and orbits unremarkable. Visualized paranasal sinuses and mastoid air  cells are grossly clear and well pneumatized. Calvarium intact.       Impression:       1. No acute intracranial abnormality.  2. Area of encephalomalacia left frontal lobe including ex vacuo  dilatation of the left frontal horn occurring within the interval  between prior comparison 10/28/2015 likely prior insult.  3. No abnormal enhancing focus to suggest intracranial metastasis.     D:  01/01/2018  E:  01/01/2018             Patient Care Team:  Luke Baum MD as PCP - General  Neftali Denise MD as Consulting Physician (General Surgery)    SUBJECTIVE: Cassidy reports she is doing okay.  She has not gotten up to the chair yet today.  She reports right hip pain overnight but feels better this morning.  Walked to the chair with therapy yesterday.    PHYSICAL EXAM  Right hip incision is clean, dry and intact  Thigh and calf soft and nontender  Neurovascular intact distally     Principal Problem:    Closed right hip fracture  Active Problems:    Malignant neoplasm of  central portion of right female breast    Fall at home    Hyponatremia    History of breast cancer    Hypokalemia    Postoperative anemia      PLAN / DISPOSITION: 77-year-old female postop day #2 status post right hip percutaneous pinning  -Partial weightbearing right lower extremity with PT/OT and walker assist  -Lovenox for DVT prophylaxis  - for rehabilitation planning  -Right hip dressing changed to Aquacel, incision healing well  -Follow-up in 2-3 weeks    Corbin Swanson MD  01/01/18  10:01 AM

## 2018-01-01 NOTE — PLAN OF CARE
Problem: Patient Care Overview (Adult)  Goal: Plan of Care Review  Outcome: Ongoing (interventions implemented as appropriate)   01/01/18 1634   Coping/Psychosocial Response Interventions   Plan Of Care Reviewed With patient   Patient Care Overview   Progress improving   Outcome Evaluation   Outcome Summary/Follow up Plan Participating well with therapy. Still required much assist wtih all mobility       Problem: Inpatient Physical Therapy  Goal: Bed Mobility Goal LTG- PT   12/31/17 1418   Bed Mobility PT LTG   Bed Mobility PT LTG, Date Established 12/31/17   Bed Mobility PT LTG, Time to Achieve 5 - 7 days   Bed Mobility PT LTG, Activity Type supine to sit/sit to supine   Bed Mobility PT LTG, [place] Level moderate assist (50% patient effort)   Bed Mobility PT Goal LTG, Assist Device bed rails   Bed Mobility PT LTG, Outcome goal ongoing     Goal: Transfer Training Goal 1 LTG- PT   12/31/17 1418   Transfer Training PT LTG   Transfer Training PT LTG, Date Established 12/31/17   Transfer Training PT LTG, Time to Achieve 5 - 7 days   Transfer Training PT LTG, Activity Type sit to stand/stand to sit   Transfer Training PT LTG, [place] Level minimum assist (75% patient effort)   Transfer Training PT LTG, Outcome goal ongoing     Goal: Gait Training Goal LTG- PT  Outcome: Ongoing (interventions implemented as appropriate)   12/31/17 1418   Gait Training PT LTG   Gait Training Goal PT LTG, Date Established 12/31/17   Gait Training Goal PT LTG, Time to Achieve 5 - 7 days   Gait Training Goal PT LTG, [place] Level minimum assist (75% patient effort);2 person assist required   Gait Training Goal PT LTG, Assist Device walker, rolling   Gait Training Goal PT LTG, Distance to Achieve 15   Gait Training Goal PT LTG, Outcome goal ongoing

## 2018-01-01 NOTE — THERAPY TREATMENT NOTE
Acute Care - Physical Therapy Treatment Note  [facility]     Patient Name: Cassidy Teran  : 1940  MRN: [id number]  Today's Date: 2018  Onset of Illness/Injury or Date of Surgery Date: 17  Date of Referral to PT: 17  Referring Physician: Dr Swanson    Admit Date: 2017    Visit Dx:    ICD-10-CM ICD-9-CM   1. Fall in home, initial encounter W19.XXXA E888.9    Y92.099 E849.0   2. Closed fracture of right hip, initial encounter S72.001A 820.8   3. Impaired functional mobility, balance, gait, and endurance Z74.09 V49.89     Patient Active Problem List   Diagnosis   • Malignant neoplasm of central portion of right female breast   • Decreased cardiac ejection fraction   • CHF (congestive heart failure)   • Fall at home   • Hyponatremia   • Closed right hip fracture   • History of breast cancer   • Hypokalemia   • Postoperative anemia               Adult Rehabilitation Note       18 1512          Rehab Assessment/Intervention    Discipline physical therapist  -SC      Document Type therapy note (daily note)  -SC      Subjective Information complains of;pain  -SC      Patient Effort, Rehab Treatment good  -SC      Precautions/Limitations fall precautions;other (see comments)   PWB  -SC      Recorded by [SC] Prince Obrien, PT      Pain Assessment    Pain Assessment Rivera-Casas FACES  -SC      Rivera-Casas FACES Pain Rating 2  -SC      Post Pain Score 2  -SC      Pain Type Acute pain  -SC      Pain Location Hip  -SC      Pain Orientation Right  -SC      Pain Intervention(s) Repositioned  -SC      Recorded by [SC] Prince Obrien, PT      Cognitive Assessment/Intervention    Current Cognitive/Communication Assessment impaired  -SC      Orientation Status oriented to;person;place;required verbal cueing (specifiy in comments);situation;time  -SC      Follows Commands/Answers Questions 75% of the time  -SC      Personal Safety moderate impairment;decreased awareness, need for assist;decreased  awareness, need for safety;decreased insight to deficits  -SC      Personal Safety Interventions fall prevention program maintained;gait belt  -SC      Recorded by [SC] Prince Obrien, PT      Bed Mobility, Assessment/Treatment    Bed Mobility, Assistive Device bed rails;head of bed elevated;draw sheet  -SC      Bed Mobility, Scoot/Bridge, [place] maximum assist (25% patient effort)  -SC      Bed Mob, Supine to Sit, [place] maximum assist (25% patient effort)  -SC      Bed Mobility, Safety Issues cognitive deficits limit understanding;decreased use of arms for pushing/pulling;decreased use of legs for bridging/pushing  -SC      Bed Mobility, Impairments strength decreased;motor control impaired  -SC      Bed Mobility, Comment repeted cues or sequencing up to edg of bed  -SC      Recorded by [SC] Prince Obrien, PT      Transfer Assessment/Treatment    Transfers, Sit-Stand [place] minimum assist (75% patient effort);2 person assist required  -SC      Transfers, Stand-Sit [place] minimum assist (75% patient effort);2 person assist required  -SC      Transfers, Sit-Stand-Sit, Assist Device other (see comments);mechanical gait assist  -SC      Transfer, Impairments motor control impaired;pain;strength decreased  -SC      Transfer, Comment cues for hand placement and safety  -SC      Recorded by [SC] Prince Obrien, PT      Gait Assessment/Treatment    Gait, [place] Level minimum assist (75% patient effort);2 person assist required  -SC      Gait, Assistive Device other (see comments)   arjo  -SC      Gait, Distance (Feet) 20  -SC      Gait, Gait Pattern Analysis swing-to gait  -SC      Gait, Gait Deviations right:;other (see comments)   PWB  -SC      Gait, Safety Issues balance decreased during turns  -SC      Gait, Impairments motor control impaired;strength decreased;impaired balance  -SC      Gait, Comment repeted cues to off loan wt on R leg. patient able to ambulate to door and back to  chaIR  -SC      Recorded by [SC] Prince Obrien, PT      Therapy Exercises    Exercise Protocols hip ORIF  -SC      Hip ORIF Exercises right:;with assist;5 reps;10 reps;quad set;hamstring set;hip abduction;LAQ  -SC      Recorded by [SC] rPince Obrien, PT      Positioning and Restraints    Pre-Treatment Position in bed  -SC      Post Treatment Position chair  -SC      In Chair sitting;reclined;notified nsg;call light within reach;encouraged to call for assist;exit alarm on;with family/caregiver  -SC      Recorded by [SC] Prince Obrien, PT        User Key  (r) = Recorded By, (t) = Taken By, (c) = Cosigned By    Initials Name Effective Dates    SC Prince Obrien, PT 06/19/15 -                 IP PT Goals       12/31/17 1418          Bed Mobility PT LTG    Bed Mobility PT LTG, Date Established 12/31/17  -SC      Bed Mobility PT LTG, Time to Achieve 5 - 7 days  -SC      Bed Mobility PT LTG, Activity Type supine to sit/sit to supine  -SC      Bed Mobility PT LTG, [place] Level moderate assist (50% patient effort)  -SC      Bed Mobility PT Goal  LTG, Assist Device bed rails  -SC      Bed Mobility PT LTG, Outcome goal ongoing  -SC      Transfer Training PT LTG    Transfer Training PT LTG, Date Established 12/31/17  -SC      Transfer Training PT LTG, Time to Achieve 5 - 7 days  -SC      Transfer Training PT LTG, Activity Type sit to stand/stand to sit  -SC      Transfer Training PT LTG, [place] Level minimum assist (75% patient effort)  -SC      Transfer Training PT LTG, Outcome goal ongoing  -SC      Gait Training PT LTG    Gait Training Goal PT LTG, Date Established 12/31/17  -SC      Gait Training Goal PT LTG, Time to Achieve 5 - 7 days  -SC      Gait Training Goal PT LTG, [place] Level minimum assist (75% patient effort);2 person assist required  -SC      Gait Training Goal PT LTG, Assist Device walker, rolling  -SC      Gait Training Goal PT LTG, Distance to Achieve 15  -SC      Gait Training Goal PT  LTG, Outcome goal ongoing  -SC        User Key  (r) = Recorded By, (t) = Taken By, (c) = Cosigned By    Initials Name Provider Type    SC Prince Obrien, PT Physical Therapist          Physical Therapy Education     Title: PT OT SLP Therapies (Active)     Topic: Physical Therapy (Active)     Point: Mobility training (Active)    Learning Progress Summary    Learner Readiness Method Response Comment Documented by Status   Patient Eager E NR REVIEWED Pwb PRECAUTIONS SC 01/01/18 1633 Active    Eager E ANDREA CASILLAS,NR reviewed benefits of acivity SC 12/31/17 1418 Done               Point: Home exercise program (Active)    Learning Progress Summary    Learner Readiness Method Response Comment Documented by Status   Patient Eager E NR REVIEWED Pwb PRECAUTIONS SC 01/01/18 1633 Active    Eager E ANDREA CASILLAS,NR reviewed benefits of acivity SC 12/31/17 1418 Done               Point: Body mechanics (Active)    Learning Progress Summary    Learner Readiness Method Response Comment Documented by Status   Patient Eager E NR REVIEWED Pwb PRECAUTIONS SC 01/01/18 1633 Active    Eager E ANDREA CASILLAS,JOSEF reviewed benefits of acivity SC 12/31/17 1418 Done               Point: Precautions (Active)    Learning Progress Summary    Learner Readiness Method Response Comment Documented by Status   Patient Eager E NR REVIEWED Pwb PRECAUTIONS SC 01/01/18 1633 Active    Eager E ANDREA CASILLAS,JOSEF reviewed benefits of acivity SC 12/31/17 1418 Done                      User Key     Initials Effective Dates Name Provider Type Discipline    SC 06/19/15 -  Prince Obrien, PT Physical Therapist PT                    PT Recommendation and Plan  Anticipated Discharge Disposition: skilled nursing facility  Planned Therapy Interventions: balance training, bed mobility training, gait training, home exercise program, patient/family education, strengthening, transfer training  PT Frequency: daily  Plan of Care Review  Plan Of Care Reviewed With: patient  Progress: improving  Outcome  Summary/Follow up Plan: Participating well with therapy. Still required much assist wtih all mobility          Outcome Measures       01/01/18 1512 12/31/17 0820       How much help from another person do you currently need...    Turning from your back to your side while in flat bed without using bedrails? 2  -SC 2  -SC     Moving from lying on back to sitting on the side of a flat bed without bedrails? 1  -SC 1  -SC     Moving to and from a bed to a chair (including a wheelchair)? 2  -SC 2  -SC     Standing up from a chair using your arms (e.g., wheelchair, bedside chair)? 2  -SC 2  -SC     Climbing 3-5 steps with a railing? 1  -SC 1  -SC     To walk in hospital room? 2  -SC 2  -SC     AM-PAC 6 Clicks Score 10  -SC 10  -SC     Functional Assessment    Outcome Measure Options AM-PAC 6 Clicks Basic Mobility (PT)  -SC AM-PAC 6 Clicks Basic Mobility (PT)  -SC       User Key  (r) = Recorded By, (t) = Taken By, (c) = Cosigned By    Initials Name Provider Type    SC Prince Obrien, PT Physical Therapist           Time Calculation:         PT Charges       01/01/18 1629          Time Calculation    Start Time 1512  -SC      PT Received On 01/01/18  -SC      PT Goal Re-Cert Due Date 01/10/18  -SC      Time Calculation- PT    Total Timed Code Minutes- PT 26 minute(s)  -SC        User Key  (r) = Recorded By, (t) = Taken By, (c) = Cosigned By    Initials Name Provider Type    SC Prince Obrien, PT Physical Therapist          Therapy Charges for Today     Code Description Service Date Service Provider Modifiers Qty    89108556863 HC PT MOBILITY CURRENT 12/31/2017 Prince Obrien, PT GP, CL 1    32611705001 HC PT MOBILITY PROJECTED 12/31/2017 Prince Obrien, PT GP, CL 1    18089317984 HC PT EVAL MOD COMPLEXITY 4 12/31/2017 Prince Obrien, PT GP 1    35787234000 HC GAIT TRAINING EA 15 MIN 1/1/2018 Prince Obrien, PT GP 1    43776840472 HC PT THER PROC EA 15 MIN 1/1/2018 Prince Obrien, PT GP 1    47231704979 HC PT THER SUPP EA 15  MIN 1/1/2018 Shearmitchell SANTANA Micah, PT GP 2    16857614736 HC GAIT TRAINING EA 15 MIN 1/1/2018 Shearon MAX Micah, PT GP 1    53816571368 HC PT THER PROC EA 15 MIN 1/1/2018 Shearon MAX Micah, PT GP 1    24158708722 HC PT THER SUPP EA 15 MIN 1/1/2018 Shearmitchell SANTANA Micah, PT GP 2          PT G-Codes  Outcome Measure Options: AM-PAC 6 Clicks Basic Mobility (PT)  Score: 10  Functional Limitation: Mobility: Walking and moving around  Mobility: Walking and Moving Around Current Status (): At least 60 percent but less than 80 percent impaired, limited or restricted  Mobility: Walking and Moving Around Goal Status (): At least 60 percent but less than 80 percent impaired, limited or restricted    Joanamitchell MALDONADO Micah, PT  1/1/2018

## 2018-01-01 NOTE — PLAN OF CARE
Problem: Patient Care Overview (Adult)  Goal: Plan of Care Review  Outcome: Ongoing (interventions implemented as appropriate)      Problem: Pain, Acute (Adult)  Goal: Identify Related Risk Factors and Signs and Symptoms  Outcome: Ongoing (interventions implemented as appropriate)   01/01/18 0300   Pain, Acute   Related Risk Factors (Acute Pain) patient perception;knowledge deficit

## 2018-01-01 NOTE — PROGRESS NOTES
[facility] Medicine Services  PROGRESS NOTE    Patient Name: Cassidy Teran  : 1940  MRN: [id number]    Date of Admission: 2017  Length of Stay: 1  Primary Care Physician: Luke Baum MD    Subjective     CC: f/u R hip fracture    HPI:  Seems less confused this morning, expresses frustration with nursing response time.  Pain is doing ok.   at bedside.  Eating well.    Review of Systems  Gen- No fevers, chills  CV- No chest pain, palpitations  Resp- No cough, dyspnea  GI- No N/V/D, abd pain    Otherwise ROS is negative except as mentioned in the HPI.    Objective     Vital Signs:   Temp:  [97.3 °F (36.3 °C)-98.2 °F (36.8 °C)] 98.2 °F (36.8 °C)  Heart Rate:  [] 100  Resp:  [14-16] 16  BP: (111-122)/(64-73) 121/73        Physical Exam:  Constitutional: Thin, elderly female. NAD. Awake, alert and conversant, feeding herself lunch  HENT: NCAT, mucous membranes moist  Respiratory: Clear to auscultation bilaterally, respiratory effort normal   Cardiovascular: RRR, no murmurs, rubs, or gallops   Gastrointestinal: Positive bowel sounds, soft, nontender, nondistended  Musculoskeletal: No bilateral ankle edema. R hip incision is dressed and clean   Psychiatric: Appropriate affect, cooperative  Neurologic: Oriented x 3. Strength symmetric in all extremities, Cranial Nerves grossly intact to confrontation, speech clear, no focal deficits  Skin: No rashes    Results Reviewed:  I have personally reviewed current lab, radiology, and data and agree.      Results from last 7 days  Lab Units 18  0546 17  0511 17  2108   WBC 10*3/mm3 7.45 11.20* 9.98   HEMOGLOBIN g/dL 9.9* 10.7* 11.7   HEMATOCRIT % 30.2* 32.9* 36.6   PLATELETS 10*3/mm3 140* 165 160       Results from last 7 days  Lab Units 17  0511 17  1312   SODIUM mmol/L 130* 131*   POTASSIUM mmol/L 3.4* 3.7   CHLORIDE mmol/L 98* 99   CO2 mmol/L 26.0 28.0   BUN mg/dL 13 15   CREATININE mg/dL  0.50* 0.50*   GLUCOSE mg/dL 109* 113*   CALCIUM mg/dL 8.6* 9.2   ALT (SGPT) U/L  --  31   AST (SGOT) U/L  --  34*     No results found for: BNP  No results found for: PHART    Microbiology Results Abnormal     None        Imaging Results (last 24 hours)     Procedure Component Value Units Date/Time    CT Head With & Without Contrast [[id number]] Collected:  01/01/18 0845     Updated:  01/01/18 1014    Narrative:       EXAMINATION: CT HEAD W WO CONTRAST-      INDICATION: Dizziness and fall in a patient with history of breast  cancer; W19.XXXA-Unspecified fall, initial encounter;  Y92.099-Unspecified place in other non-institutional residence as the  place of occurrence of the external cause; S72.001A-Fracture of  unspecified part of neck of right femur, initial encounter for closed  fracture; Z74.09-Other reduced mobility.      TECHNIQUE: CT head with and without intravenous contrast administration.     The radiation dose reduction device was turned on for each scan per the  ALARA (As Low as Reasonably Achievable) protocol.     COMPARISON: CT head 10/28/2015.     FINDINGS: Midline structures are symmetric without evidence of mass,  mass effect or midline shift. No intraaxial hemorrhage or extraaxial  fluid collection. Ventricles and sulci are mildly prominent. There is an  area of encephalomalacia left frontal lobe with ex vacuo dilatation of  the left frontal horn new within the interval between comparison  10/28/2015. No abnormal enhancing focus on postcontrast administration  sequences identified. Superior sagittal sinus is widely patent. Globes  and orbits unremarkable. Visualized paranasal sinuses and mastoid air  cells are grossly clear and well pneumatized. Calvarium intact.       Impression:       1. No acute intracranial abnormality.  2. Area of encephalomalacia left frontal lobe including ex vacuo  dilatation of the left frontal horn occurring within the interval  between prior comparison 10/28/2015 likely  prior insult.  3. No abnormal enhancing focus to suggest intracranial metastasis.     D:  01/01/2018  E:  01/01/2018     This report was finalized on 1/1/2018 10:11 AM by Dr. Bob Alfaro.       FL C Arm During Surgery [[id number]] Collected:  01/01/18 0807     Updated:  01/01/18 1014    Narrative:       EXAMINATION: FLUOROSCOPY C-ARM DURING SURGERY-     INDICATION: Hip pinning; W19.XXXA-Unspecified fall, initial encounter;  Y92.099-Unspecified place in other non-institutional residence as the  place of occurrence of the external cause; S72.001A-Fracture of  unspecified part of neck of right femur, initial encounter for closed  fracture.      TECHNIQUE: Intraoperative fluoroscopy for improved localization and  treatment planning.     COMPARISON: CT bony pelvis 12/30/2017.     FINDINGS: Intraoperative fluoroscopy with total fluoroscopic time usage  1 minute 29 seconds and four representative images saved of ORIF right  hip involving three partially threaded screws spanning the subcapital  fracture right femur.       Impression:       Intraoperative fluoroscopy ORIF right hip.      D:  01/01/2018  E:  01/01/2018     This report was finalized on 1/1/2018 10:12 AM by Dr. Bob Alfaro.           I have reviewed the medications.    Assessment/Plan   Assessment / Plan     Hospital Problem List     * (Principal)Closed right hip fracture    Malignant neoplasm of central portion of right female breast    Overview Signed 8/17/2016  9:44 AM by Jenni Izaguirre              Fall at home    Hyponatremia    History of breast cancer (Chronic)    Hypokalemia    Postoperative anemia        Brief Hospital Course to date:  Cassidy Teran is a 77 y.o. female with PMH significant for L breast cancer (2003- s/p mastectomy and XRT) followed by R breast cancer (s/p mastectomy, Herceptin, Adriamycin and Cytoxan). She did have some reduction of her systolic function (down to 45%) but EF recovered to 55-60% by January 2017 after discontinuation of  "Herceptin. She was started on Arimidex in April 2017 and the patient's  reports she has been \"weak and dizzy\" ever since. Ms. Teran was admitted to [facility] on 12/30/17 after a fall at home, sustaining a R femoral neck fracture. She underwent repair by Dr. Corbin Swanson.     Right femoral neck fracture  Post-operative anemia  - s/p R hip cannulated screw fixation of the R femoral neck by Dr. Swanson on 12/30/17  - Pain management PRN  - PT/OT following, CM for rehab placement at d/c     R breast cancer   Weakness and dizziness  - Ongoing since April 2017 after starting Arimidex. Patient is followed by Dr. Lund  - Reviewed Dr. Augustine note, seems unlikley that arimedex causing her symptoms but ok to stop for now.  Can f/u with Dr. Lund.  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  Findings d/w patient and .    Hyponatremia  - stable    Hypokalemia, replete potassium PRN     DVT Prophylaxis: Lovenox      CODE STATUS: Full Code    Disposition: I expect the patient to be discharged to rehab once arranged.    Nomi Kraus MD  01/01/18  1:41 PM        "

## 2018-01-02 PROBLEM — R41.0 DELIRIUM: Status: ACTIVE | Noted: 2018-01-01

## 2018-01-02 NOTE — THERAPY EVALUATION
Acute Care - Occupational Therapy Initial Evaluation  [facility]     Patient Name: Cassidy Teran  : 1940  MRN: [id number]  Today's Date: 2018  Onset of Illness/Injury or Date of Surgery Date: 17  Date of Referral to OT: 17  Referring Physician: Dr. Swanson    Admit Date: 2017       ICD-10-CM ICD-9-CM   1. Fall in home, initial encounter W19.XXXA E888.9    Y92.099 E849.0   2. Closed fracture of right hip, initial encounter S72.001A 820.8   3. Impaired functional mobility, balance, gait, and endurance Z74.09 V49.89   4. Impaired mobility and ADLs Z74.09 799.89     Patient Active Problem List   Diagnosis   • Malignant neoplasm of central portion of right female breast   • Decreased cardiac ejection fraction   • CHF (congestive heart failure)   • Fall at home   • Hyponatremia   • Closed right hip fracture   • History of breast cancer   • Hypokalemia   • Postoperative anemia   • Delirium     Past Medical History:   Diagnosis Date   • Allergic rhinitis    • Anxiety    • Cataract    • Malignant neoplasm of breast, right      Past Surgical History:   Procedure Laterality Date   • BREAST SURGERY      Left   • EYE SURGERY      cataract   • PORTACATH PLACEMENT     • RECONSTRUCTION BREAST IMMEDIATE / DELAYED W/ TISSUE EXPANDER Bilateral 2016    [facility]          OT ASSESSMENT FLOWSHEET (last 72 hours)      OT Evaluation       18 0824 18 0815 18 2000 18 1512 18 0822    Rehab Evaluation    Document Type therapy note (daily note)  -MJ evaluation  -AR  therapy note (daily note)  -SC     Subjective Information agree to therapy;no complaints  -MJ no complaints;agree to therapy  -AR  complains of;pain  -SC     Patient Effort, Rehab Treatment good  -MJ good  -AR  good  -SC     Symptoms Noted During/After Treatment dizziness;significant change in vital signs;other (see comments)   nausea  -MJ dizziness;fatigue;significant change in vital signs   susttained  tachycardia, elevated BP, nausea  -AR       Symptoms Noted Comment Heart rate sustained 150-160 on BSC, RN notified  -MJ        General Information    Patient Profile Review  yes  -AR       Onset of Illness/Injury or Date of Surgery Date  12/30/17  -AR       Referring Physician  Dr. Swanson  -AR       General Observations  Pt supine, no family at bedside  -AR       Pertinent History Of Current Problem  Pt is a 77 yof s/p trip and fall at home and unable to ambulate. Imaging in ED revealed minimally displaced femoral neck FX. Pt POD#2 hip R hip percutaneous pinning. Pt is PWB RLE.  -AR       Precautions/Limitations fall precautions;other (see comments)   PWB R LE  -MJ fall precautions;non-weight bearing status   PWB RLE, confused, monitor vitals  -AR  fall precautions;other (see comments)   PWB  -SC     Prior Level of Function  independent:;all household mobility;community mobility;gait;transfer;ADL's;home management;cooking;cleaning;driving  -AR       Equipment Currently Used at Home  walker, rolling  -AR       Plans/Goals Discussed With  patient;agreed upon  -AR       Risks Reviewed  patient:;LOB;nausea/vomiting;dizziness;increased discomfort;change in vital signs;increased drainage;lines disloged  -AR       Benefits Reviewed  patient:;improve function;increase independence;increase strength;increase balance;decrease pain;decrease risk of DVT;increase knowledge  -AR       Barriers to Rehab  cognitive status  -AR       Living Environment    Lives With  other (see comments)   Pt reports that she lives alone and with her spouse  -AR       Living Arrangements  house  -AR       Home Accessibility  bed and bath on same level;tub/shower is not walk in;stairs to enter home  -AR       Number of Stairs to Enter Home  2  -AR       Stair Railings at Home  none  -AR       Transportation Available  car;family or friend will provide  -AR       Living Environment Comment  Pt confused, reporting that she lives alone and lives with her  spouse. no family present to conform PLOF/home set-up.   -AR       Clinical Impression    Date of Referral to OT  12/31/17  -AR       OT Diagnosis  decreased independence with ADLs  -AR       Patient/Family Goals Statement  return home  -AR       Criteria for Skilled Therapeutic Interventions Met  yes;treatment indicated  -AR       Rehab Potential  good, to achieve stated therapy goals  -AR       Therapy Frequency  daily   per priority policy  -AR       Anticipated Equipment Needs At Discharge  --   defer- needs rehab  -AR       Anticipated Discharge Disposition  inpatient rehabilitation facility  -AR       Vital Signs    Pre Systolic BP Rehab 122  -  -AR       Pre Treatment Diastolic BP 73  -MJ 73  -AR       Intra Systolic BP Rehab 156  -   RN notified  -AR       Intra Treatment Diastolic   -  -AR       Post Systolic BP Rehab  126  -AR       Post Treatment Diastolic BP  81  -AR       Pretreatment Heart Rate (beats/min) 120  -  -AR       Intratreatment Heart Rate (beats/min) 150   sustained 150-160  -  -AR       Posttreatment Heart Rate (beats/min) 114  -  -AR       Pre SpO2 (%) 100  -  -AR       O2 Delivery Pre Treatment supplemental O2  -MJ supplemental O2  -AR       Intra SpO2 (%)  97  -AR       O2 Delivery Intra Treatment  supplemental O2  -AR       Post SpO2 (%) 100  -  -AR       O2 Delivery Post Treatment supplemental O2  -MJ supplemental O2  -AR       Pre Patient Position Supine  -MJ Supine  -AR       Intra Patient Position Sitting  -MJ Sitting  -AR       Post Patient Position Supine  -MJ Supine  -AR       Pain Assessment    Pain Assessment Rivera-Casas FACES  -MJ Rivera-Casas FACES  -AR  Rivera-Casas FACES  -SC     Rivera-Casas FACES Pain Rating 2  -MJ 2  -AR  2  -SC     Post Pain Score 2  -MJ 2  -AR  2  -SC     Pain Type Acute pain  -MJ Acute pain  -AR  Acute pain  -SC     Pain Location Hip  -MJ Hip  -AR  Hip  -SC     Pain Orientation Right  -MJ Right  -AR   Right  -SC     Pain Intervention(s) Repositioned;Ambulation/increased activity  -MJ Medication (See MAR);Repositioned;Ambulation/increased activity  -AR  Repositioned  -SC     Response to Interventions  sustained tachycardia and elevateed BP, returned to supine  -AR       Cognitive Assessment/Intervention    Current Cognitive/Communication Assessment impaired  -MJ impaired  -AR  impaired  -SC     Orientation Status oriented to;person;place;required verbal cueing (specifiy in comments)  -MJ oriented to;person;other (see comments)   aware in hospital, unable to name  -AR  oriented to;person;place;required verbal cueing (specifiy in comments);situation;time  -SC     Follows Commands/Answers Questions 75% of the time;able to follow single-step instructions;needs cueing;needs repetition  -MJ 75% of the time;able to follow single-step instructions  -AR  75% of the time  -SC     Personal Safety moderate impairment   cues for WB status  -MJ moderate impairment  -AR  moderate impairment;decreased awareness, need for assist;decreased awareness, need for safety;decreased insight to deficits  -SC     Personal Safety Interventions  fall prevention program maintained  -AR  fall prevention program maintained;gait belt  -SC     ROM (Range of Motion)    General ROM  no range of motion deficits identified   BUE  -AR       MMT (Manual Muscle Testing)    General MMT Assessment  upper extremity strength deficits identified   BUE MMT 4/5  -AR       Muscle Tone Assessment    RLE Muscle Tone Assessment   mildly decreased tone  -VONDA  mildly decreased tone  -TG    Mobility Assessment/Training    Extremity Weight-Bearing Status right lower extremity  -MJ right lower extremity  -AR       Right Lower Extremity Weight-Bearing partial weight-bearing  -MJ partial weight-bearing  -AR       Bed Mobility, Assessment/Treatment    Bed Mobility, Assistive Device bed rails;head of bed elevated;draw sheet  -MJ bed rails;head of bed elevated;draw sheet  -AR   bed rails;head of bed elevated;draw sheet  -SC     Bed Mobility, Scoot/Bridge, [place] minimum assist (75% patient effort);verbal cues required   pt bridged to have soiled linens removed and clean replaced  -MJ minimum assist (75% patient effort);verbal cues required   pt able to bridge  -AR  maximum assist (25% patient effort)  -SC     Bed Mob, Supine to Sit, [place] maximum assist (25% patient effort);2 person assist required;verbal cues required  -MJ maximum assist (25% patient effort);2 person assist required;verbal cues required  -AR  maximum assist (25% patient effort)  -SC     Bed Mob, Sit to Supine, [place] maximum assist (25% patient effort);2 person assist required;verbal cues required  -MJ maximum assist (25% patient effort);2 person assist required;verbal cues required  -AR       Bed Mobility, Safety Issues decreased use of arms for pushing/pulling;decreased use of legs for bridging/pushing  -MJ   cognitive deficits limit understanding;decreased use of arms for pushing/pulling;decreased use of legs for bridging/pushing  -SC     Bed Mobility, Impairments ROM decreased;strength decreased;pain  -MJ strength decreased;impaired balance;postural control impaired;other (see comments)   impaired motor planning  -AR  strength decreased;motor control impaired  -SC     Bed Mobility, Comment Verbal cues to move LEs off EOB and to use UEs to push trunk to sitting. Assist with legs and trunk, increased time and effort to perform  -MJ Pt required ongoing cues to sequence and execute, required increased time and effort  -AR  repeted cues or sequencing up to edg of bed  -SC     Transfer Assessment/Treatment    Transfers, Chair-Bed [place] moderate assist (50% patient effort);2 person assist required;verbal cues required  -MJ        Transfers, Bed-Chair-Bed, Assist Device rolling walker  -MJ        Transfers, Sit-Stand [place] minimum assist (75% patient effort);2 person assist required;verbal  cues required  -MJ minimum assist (75% patient effort);2 person assist required;verbal cues required  -AR  minimum assist (75% patient effort);2 person assist required  -SC     Transfers, Stand-Sit [place] minimum assist (75% patient effort);2 person assist required;verbal cues required  -MJ minimum assist (75% patient effort);2 person assist required;verbal cues required  -AR  minimum assist (75% patient effort);2 person assist required  -SC     Transfers, Sit-Stand-Sit, Assist Device rolling walker  -MJ rolling walker;elevated surface  -AR  other (see comments);mechanical gait assist  -SC     Toilet Transfer, [place] moderate assist (50% patient effort);2 person assist required;verbal cues required  -MJ moderate assist (50% patient effort);2 person assist required;verbal cues required  -AR       Toilet Transfer, Assistive Device rolling walker  -MJ bedside commode without drop arms;rolling walker  -AR       Transfer, Maintain Weight Bearing Status unable to maintain weight bearing status;assist to maintain weight bearing status;cues to maintain weight bearing status  -MJ unable to maintain weight bearing status  -AR       Transfer, Safety Issues step length decreased;weight-shifting ability decreased  -MJ        Transfer, Impairments ROM decreased;strength decreased;pain  -MJ strength decreased;impaired balance;pain  -AR  motor control impaired;pain;strength decreased  -SC     Transfer, Comment Verbal cues for correct hand placement, to step R LE out prior to transfer and for PWB through R LE. Assisted pt to BSC and back to bed. While on BSC pt with increased heart rate, BP and nausea, RN notified. Symptoms improved once returned to bed  - Pt required cues for PRW RLE, to sequence and for chair approach. Pt having difficulty maintaining PWB RLE. Pt with tachycardia, elevatged BP and nausea on BSC. Notified RN and assisted pt to supine.  -AR  cues for hand placement and safety  -SC     Lower Body  Dressing Assessment/Training    LB Dressing Assess/Train, Clothing Type  donning:;slipper socks  -AR       LB Dressing Assess/Train, Position  edge of bed  -AR       LB Dressing Assess/Train, [place]  maximum assist (25% patient effort)  -AR       LB Dressing Assess/Train, Impairments  ROM decreased;pain  -AR       Toileting Assessment/Training    Toileting Assess/Train, Assistive Device  bedside commode  -AR       Toileting Assess/Train, Position  standing  -AR       Toileting Assess/Train, Indepen Level  dependent (less than 25% patient effort)   post-toilet hygiene and clothing management  -AR       Toileting Assess/Train, Impairments  strength decreased;impaired balance;unable to maintain weight bearing status  -AR       Motor Skills/Interventions    Additional Documentation  Balance Skills Training (Group);Fine Motor Coordination Training (Group)  -AR       Balance Skills Training    Sitting-Level of Assistance  Minimum assistance   progressed CGA  -AR       Sitting-Balance Support  Feet supported;Right upper extremity supported;Left upper extremity supported  -AR       Standing-Level of Assistance  Moderate assistance;x2  -AR       Static Standing Balance Support  assistive device  -AR       Therapy Exercises    Exercise Protocols hip ORIF  -   hip ORIF  -SC     Hip ORIF Exercises --   deferred due to increased heart rate  -   right:;with assist;5 reps;10 reps;quad set;hamstring set;hip abduction;LAQ  -SC     Fine Motor Coordination Training    Fine Motor Tool Usage  imapired d/t B numbness- chronic  -AR       Sensory Assessment/Intervention    Sensory Impairment  parasthesia  -AR       Light Touch  LUE;RUE  -AR       LUE Light Touch  moderate impairment   chronic from chemotherapy  -AR       RUE Light Touch  moderate impairment   chronic from chemotherapy  -AR       Positioning and Restraints    Pre-Treatment Position in bed  - in bed  -AR  in bed  -SC     Post Treatment Position bed  - bed  -AR   chair  -SC     In Bed notified nsg;supine;call light within reach;encouraged to call for assist;exit alarm on;SCD pump applied  -MJ notified nsg;supine;call light within reach;encouraged to call for assist;exit alarm on;SCD pump applied  -AR       In Chair    sitting;reclined;notified nsg;call light within reach;encouraged to call for assist;exit alarm on;with family/caregiver  -SC       12/31/17 0820 12/31/17 0800 12/31/17 0605 12/31/17 0357 12/31/17 0210    Rehab Evaluation    Document Type evaluation  -SC        Subjective Information agree to therapy;no complaints  -SC        Patient Effort, Rehab Treatment good  -SC        General Information    Patient Profile Review yes  -SC        Onset of Illness/Injury or Date of Surgery Date 12/30/17  -SC        Referring Physician Dr Swanson  -SC        General Observations in bed  -SC        Pertinent History Of Current Problem fell at home resulting in R hip fx s/p orif r hip  -SC        Precautions/Limitations hip precautions- left;other (see comments)   PWB RLE  -SC        Prior Level of Function all household mobility;independent:   waker  -SC        Equipment Currently Used at Home --   walker  -SC        Plans/Goals Discussed With patient;agreed upon  -SC        Risks Reviewed patient:;nausea/vomiting;dizziness;increased discomfort  -SC        Benefits Reviewed patient:;improve function;increase independence;increase strength  -SC        Barriers to Rehab none identified  -SC        Living Environment    Lives With spouse  -SC        Living Arrangements house  -SC        Home Accessibility no concerns  -SC        Pain Assessment    Pain Assessment Rivera-Casas FACES  -SC        Rivera-Baker FACES Pain Rating 4  -SC        Pain Type Acute pain  -SC        Pain Location Hip  -SC        Pain Orientation Right  -SC        Pain Intervention(s) Repositioned  -SC        Cognitive Assessment/Intervention    Current Cognitive/Communication Assessment impaired  -SC         Orientation Status oriented to;person  -SC        Follows Commands/Answers Questions 50% of the time;able to follow single-step instructions;needs cueing;needs increased time;needs repetition  -SC        Personal Safety moderate impairment;decreased awareness, need for assist;decreased awareness, need for safety;decreased insight to deficits  -SC        Personal Safety Interventions fall prevention program maintained;gait belt  -SC        ROM (Range of Motion)    General ROM lower extremity range of motion deficits identified   R lin limited 25% by pain  -SC        MMT (Manual Muscle Testing)    General MMT Assessment lower extremity strength deficits identified   R dorsiflexion 1/5, R quads -3/5  -SC        Muscle Tone Assessment    RLE Muscle Tone Assessment  mildly decreased tone  -LS mildly decreased tone  -KG mildly decreased tone  -KG mildly decreased tone  -KG    Mobility Assessment/Training    Extremity Weight-Bearing Status right lower extremity  -SC        Right Lower Extremity Weight-Bearing partial weight-bearing  -SC        Bed Mobility, Assessment/Treatment    Bed Mobility, Assistive Device bed rails;head of bed elevated;draw sheet  -SC        Bed Mob, Supine to Sit, [place] dependent (less than 25% patient effort)  -SC        Bed Mobility, Safety Issues cognitive deficits limit understanding;decreased use of arms for pushing/pulling;decreased use of legs for bridging/pushing  -SC        Bed Mobility, Impairments impaired balance;coordination impaired;motor control impaired  -SC        Bed Mobility, Comment Up  to edge of bed with draw sheel  -SC        Transfer Assessment/Treatment    Transfers, Sit-Stand [place] moderate assist (50% patient effort);2 person assist required  -SC        Transfers, Stand-Sit [place] moderate assist (50% patient effort);2 person assist required;verbal cues required  -SC        Transfers, Sit-Stand-Sit, Assist Device rolling walker  -SC        Transfer,  Safety Issues balance decreased during turns  -SC        Transfer, Impairments motor control impaired;coordination impaired;strength decreased;impaired balance  -SC        Transfer, Comment tood few steps with assist over to chair .   -SC        Therapy Exercises    Exercise Protocols hip ORIF  -SC        Hip ORIF Exercises right:;5 reps;ankle pumps/circles;heel slides;hip abduction;SLR;LAQ  -SC        Positioning and Restraints    Pre-Treatment Position in bed  -SC        Post Treatment Position chair  -SC        In Chair notified nsg;reclined;sitting;call light within reach;encouraged to call for assist;exit alarm on  -SC          12/31/17 0016 12/30/17 2245 12/30/17 2042          General Information    Equipment Currently Used at Home   none  -KG      Living Environment    Lives With   spouse  -KG      Living Arrangements   house  -KG      Home Accessibility   no concerns  -KG      Stair Railings at Home   none  -KG      Type of Financial/Environmental Concern   none  -KG      Transportation Available   car;family or friend will provide  -KG      Functional Level Prior    Ambulation   0-->independent  -KG      Transferring   0-->independent  -KG      Toileting   0-->independent  -KG      Bathing   0-->independent  -KG      Dressing   0-->independent  -KG      Eating   0-->independent  -KG      Communication   0-->understands/communicates without difficulty  -KG      Swallowing   0-->swallows foods/liquids without difficulty  -KG      Prior Functional Level Comment   na  -KG      Muscle Tone Assessment    RLE Muscle Tone Assessment mildly decreased tone  -KG mildly decreased tone  -KG mildly decreased tone  -KG        User Key  (r) = Recorded By, (t) = Taken By, (c) = Cosigned By    Initials Name Effective Dates    SC Prince Obrien, PT 06/19/15 -     AR Vianey Arellano, OT 06/22/15 -     RANJIT Martines, PT 03/14/16 -     KG Evangelista Hawkins, RN 06/16/16 -     VONDA Presley, RN 08/22/16 -     TG Sara SANTANA  , RN 02/17/17 -     LS Julieta Dillon RN 06/26/17 -            Occupational Therapy Education     Title: PT OT SLP Therapies (Active)     Topic: Occupational Therapy (Done)     Point: ADL training (Done)    Description: Instruct learner(s) on proper safety adaptation and remediation techniques during self care or transfers.   Instruct in proper use of assistive devices.    Learning Progress Summary    Learner Readiness Method Response Comment Documented by Status   Patient Acceptance E,TB,D VU,NR Role of OT, goals of care, PWB RLE, bed mobility, transfer training, ADL retraining AR 01/02/18 1007 Done               Point: Precautions (Done)    Description: Instruct learner(s) on prescribed precautions during self-care and functional transfers.    Learning Progress Summary    Learner Readiness Method Response Comment Documented by Status   Patient Acceptance E,TB,D VU,NR Role of OT, goals of care, PWB RLE, bed mobility, transfer training, ADL retraining AR 01/02/18 1007 Done               Point: Body mechanics (Done)    Description: Instruct learner(s) on proper positioning and spine alignment during self-care, functional mobility activities and/or exercises.    Learning Progress Summary    Learner Readiness Method Response Comment Documented by Status   Patient Acceptance E,TB,D VU,NR Role of OT, goals of care, PWB RLE, bed mobility, transfer training, ADL retraining AR 01/02/18 1007 Done                      User Key     Initials Effective Dates Name Provider Type Discipline    AR 06/22/15 -  Vianey Arellano OT Occupational Therapist OT                  OT Recommendation and Plan  Anticipated Equipment Needs At Discharge:  (defer- needs rehab)  Anticipated Discharge Disposition: inpatient rehabilitation facility  Therapy Frequency: daily (per priority policy)  Plan of Care Review  Plan Of Care Reviewed With: patient  Outcome Summary/Follow up Plan: Pt limited with tachycardia(161) and elevated BP (156/116)  with activity, as well as nausea. Pt confused, reporting she lives alone as well as with spouse. Pt unable to maintain PWB RLE. Not a candidate for AE this date d/t confusion. Recommend rehab.           OT Goals       01/02/18 1007          Transfer Training OT LTG    Transfer Training OT LTG, Date Established 01/02/18  -AR      Transfer Training OT LTG, Time to Achieve 1 wk  -AR      Transfer Training OT LTG, Activity Type sit to stand/stand to sit;toilet  -AR      Transfer Training OT LTG, [place] Level minimum assist (75% patient effort)  -AR      Transfer Training OT LTG, Assist Device commode, bedside;walker, rolling  -AR      Static Sitting Balance OT LTG    Static Sitting Balance OT LTG, Date Established 01/02/18  -AR      Static Sitting Balance OT LTG, Time to Achieve 1 wk  -AR      Static Sitting Balance OT LTG, [place] Level supervision required  -AR      Static Sitting Balance OT LTG, Assist Device UE Support  -AR      Orientation OT LTG    Orientation OT LTG, Date Established 01/02/18  -AR      Orientation OT LTG, Time to Achieve 1 wk  -AR      Orientation OT LTG, Activity Type person;place;time;situation;50% treatment session;verbal cues  -AR      LB Dressing OT LTG    LB Dressing Goal OT LTG, Date Established 01/02/18  -AR      LB Dressing Goal OT LTG, Time to Achieve 1 wk  -AR      LB Dressing Goal OT LTG, Activity Type Pt will complete LB dressing task  -AR      LB Dressing Goal OT LTG, [place] Level verbal cues required;moderate assist (50% patient effort)  -AR      LB Dressing Goal OT LTG, Adaptive Equipment --   AD PRN  -AR        User Key  (r) = Recorded By, (t) = Taken By, (c) = Cosigned By    Initials Name Provider Type    DHEERAJ Arellano, OT Occupational Therapist                Outcome Measures       01/02/18 0824 01/02/18 0815 01/01/18 1512    How much help from another person do you currently need...    Turning from your back to your side while in flat bed without using  bedrails? 2  -MJ  2  -SC    Moving from lying on back to sitting on the side of a flat bed without bedrails? 2  -MJ  1  -SC    Moving to and from a bed to a chair (including a wheelchair)? 2  -MJ  2  -SC    Standing up from a chair using your arms (e.g., wheelchair, bedside chair)? 2  -MJ  2  -SC    Climbing 3-5 steps with a railing? 1  -MJ  1  -SC    To walk in hospital room? 2  -MJ  2  -SC    AM-PAC 6 Clicks Score 11  -MJ  10  -SC    How much help from another is currently needed...    Putting on and taking off regular lower body clothing?  1  -AR     Bathing (including washing, rinsing, and drying)  2  -AR     Toileting (which includes using toilet bed pan or urinal)  1  -AR     Putting on and taking off regular upper body clothing  3  -AR     Taking care of personal grooming (such as brushing teeth)  3  -AR     Eating meals  3  -AR     Score  13  -AR     Functional Assessment    Outcome Measure Options AM-PAC 6 Clicks Basic Mobility (PT)  - AM-PAC 6 Clicks Daily Activity (OT)  -AR AM-PAC 6 Clicks Basic Mobility (PT)  -SC      12/31/17 0820          How much help from another person do you currently need...    Turning from your back to your side while in flat bed without using bedrails? 2  -SC      Moving from lying on back to sitting on the side of a flat bed without bedrails? 1  -SC      Moving to and from a bed to a chair (including a wheelchair)? 2  -SC      Standing up from a chair using your arms (e.g., wheelchair, bedside chair)? 2  -SC      Climbing 3-5 steps with a railing? 1  -SC      To walk in hospital room? 2  -SC      AM-PAC 6 Clicks Score 10  -SC      Functional Assessment    Outcome Measure Options AM-PAC 6 Clicks Basic Mobility (PT)  -SC        User Key  (r) = Recorded By, (t) = Taken By, (c) = Cosigned By    Initials Name Provider Type    SC Prince Obrien, PT Physical Therapist    DHEERAJ Arellano, OT Occupational Therapist    RANJIT Martines, PT Physical Therapist          Time  Calculation:   OT Start Time: 0815    Therapy Charges for Today     Code Description Service Date Service Provider Modifiers Qty    18125770558 HC OT EVAL MOD COMPLEXITY 4 1/2/2018 Vianey Arellano OT GO 1               Vianey Arellano OT  1/2/2018

## 2018-01-02 NOTE — PROGRESS NOTES
[facility] Medicine Services  PROGRESS NOTE    Patient Name: Cassidy Teran  : 1940  MRN: [id number]    Date of Admission: 2017  Length of Stay: 1  Primary Care Physician: Luke Baum MD    Subjective     CC: f/u R hip fracture    HPI:   is not present.  She is more confused this morning.  Says her pain is better.  No issues overnight besides the confusion.  RN at bedside passing meds.    Review of Systems  Unable to obtain d/t confusion    Objective     Vital Signs:   Temp:  [98 °F (36.7 °C)-98.8 °F (37.1 °C)] 98.2 °F (36.8 °C)  Heart Rate:  [] 108  Resp:  [16] 16  BP: ()/(58-73) 122/73        Physical Exam:  Constitutional: Thin, elderly female. NAD. Awake, alert and conversant, but confused  HENT: NCAT, mucous membranes moist  Respiratory: Clear to auscultation bilaterally, respiratory effort normal   Cardiovascular: RRR, no murmurs, rubs, or gallops   Gastrointestinal: Positive bowel sounds, soft, nontender, nondistended  Musculoskeletal: No bilateral ankle edema. R hip incision is dressed and clean   Psychiatric: Appropriate affect, cooperative  Neurologic: Confused today, knows she is in the hospital but not which one.  Thinks year is  . Strength symmetric in all extremities, Cranial Nerves grossly intact to confrontation, speech clear, no focal deficits  Skin: No rashes    Results Reviewed:  I have personally reviewed current lab, radiology, and data and agree.      Results from last 7 days  Lab Units 18  0601 18  0546 17  0511   WBC 10*3/mm3 6.94 7.45 11.20*   HEMOGLOBIN g/dL 8.8* 9.9* 10.7*   HEMATOCRIT % 27.3* 30.2* 32.9*   PLATELETS 10*3/mm3 137* 140* 165       Results from last 7 days  Lab Units 17  0511 17  1312   SODIUM mmol/L 130* 131*   POTASSIUM mmol/L 3.4* 3.7   CHLORIDE mmol/L 98* 99   CO2 mmol/L 26.0 28.0   BUN mg/dL 13 15   CREATININE mg/dL 0.50* 0.50*   GLUCOSE mg/dL 109* 113*   CALCIUM mg/dL  8.6* 9.2   ALT (SGPT) U/L  --  31   AST (SGOT) U/L  --  34*     No results found for: BNP  No results found for: PHART    Microbiology Results Abnormal     None        Imaging Results (last 24 hours)     Procedure Component Value Units Date/Time    CT Head With & Without Contrast [[id number]] Collected:  01/01/18 0845     Updated:  01/01/18 1014    Narrative:       EXAMINATION: CT HEAD W WO CONTRAST-      INDICATION: Dizziness and fall in a patient with history of breast  cancer; W19.XXXA-Unspecified fall, initial encounter;  Y92.099-Unspecified place in other non-institutional residence as the  place of occurrence of the external cause; S72.001A-Fracture of  unspecified part of neck of right femur, initial encounter for closed  fracture; Z74.09-Other reduced mobility.      TECHNIQUE: CT head with and without intravenous contrast administration.     The radiation dose reduction device was turned on for each scan per the  ALARA (As Low as Reasonably Achievable) protocol.     COMPARISON: CT head 10/28/2015.     FINDINGS: Midline structures are symmetric without evidence of mass,  mass effect or midline shift. No intraaxial hemorrhage or extraaxial  fluid collection. Ventricles and sulci are mildly prominent. There is an  area of encephalomalacia left frontal lobe with ex vacuo dilatation of  the left frontal horn new within the interval between comparison  10/28/2015. No abnormal enhancing focus on postcontrast administration  sequences identified. Superior sagittal sinus is widely patent. Globes  and orbits unremarkable. Visualized paranasal sinuses and mastoid air  cells are grossly clear and well pneumatized. Calvarium intact.       Impression:       1. No acute intracranial abnormality.  2. Area of encephalomalacia left frontal lobe including ex vacuo  dilatation of the left frontal horn occurring within the interval  between prior comparison 10/28/2015 likely prior insult.  3. No abnormal enhancing focus to  suggest intracranial metastasis.     D:  01/01/2018  E:  01/01/2018     This report was finalized on 1/1/2018 10:11 AM by Dr. Bob Alfaro.       FL C Arm During Surgery [[id number]] Collected:  01/01/18 0807     Updated:  01/01/18 1014    Narrative:       EXAMINATION: FLUOROSCOPY C-ARM DURING SURGERY-     INDICATION: Hip pinning; W19.XXXA-Unspecified fall, initial encounter;  Y92.099-Unspecified place in other non-institutional residence as the  place of occurrence of the external cause; S72.001A-Fracture of  unspecified part of neck of right femur, initial encounter for closed  fracture.      TECHNIQUE: Intraoperative fluoroscopy for improved localization and  treatment planning.     COMPARISON: CT bony pelvis 12/30/2017.     FINDINGS: Intraoperative fluoroscopy with total fluoroscopic time usage  1 minute 29 seconds and four representative images saved of ORIF right  hip involving three partially threaded screws spanning the subcapital  fracture right femur.       Impression:       Intraoperative fluoroscopy ORIF right hip.      D:  01/01/2018  E:  01/01/2018     This report was finalized on 1/1/2018 10:12 AM by Dr. Bob Alfaro.           I have reviewed the medications.    Assessment/Plan   Assessment / Plan     Hospital Problem List     * (Principal)Closed right hip fracture    Malignant neoplasm of central portion of right female breast    Overview Signed 8/17/2016  9:44 AM by Jenni Izaguirre              Fall at home    Hyponatremia    History of breast cancer (Chronic)    Hypokalemia    Postoperative anemia    Delirium        Brief Hospital Course to date:  Cassidy Teran is a 77 y.o. female with PMH significant for L breast cancer (2003- s/p mastectomy and XRT) followed by R breast cancer (s/p mastectomy, Herceptin, Adriamycin and Cytoxan). She did have some reduction of her systolic function (down to 45%) but EF recovered to 55-60% by January 2017 after discontinuation of Herceptin. She was started on  "Arimidex in April 2017 and the patient's  reports she has been \"weak and dizzy\" ever since. Ms. Teran was admitted to [facility] on 12/30/17 after a fall at home, sustaining a R femoral neck fracture. She underwent repair by Dr. Corbin Swanson.     Right femoral neck fracture  Post-operative anemia  - s/p R hip cannulated screw fixation of the R femoral neck by Dr. Swanson on 12/30/17  - Pain management PRN  - PT/OT following, CM for rehab placement at d/c     R breast cancer   Weakness and dizziness  - Ongoing since April 2017 after starting Arimidex. Patient is followed by Dr. Lund  - Reviewed Dr. Augustine note, seems unlikley that arimedex causing her symptoms but ok to stop for now.  Can f/u with Dr. Lund.  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  Findings d/w patient and .    Hyponatremia  - previously stable, will check in AM    Post-op Anemia  - continues to drift down, no overt bleeding.    Delirium   - multifactorial including anesthesia, narcotics, hospital acquired, with some probable underlying cognitive impairment  - will schedule some seroquel at night, should improve with time and being out of hospital.    Hypokalemia, replete potassium PRN     DVT Prophylaxis: Lovenox      CODE STATUS: Full Code    Disposition: I expect the patient to be discharged to rehab once arranged.    Nomi Kraus MD  01/02/18  8:24 AM        "

## 2018-01-02 NOTE — THERAPY TREATMENT NOTE
Acute Care - Physical Therapy Treatment Note  [facility]     Patient Name: Cassidy Teran  : 1940  MRN: [id number]  Today's Date: 2018  Onset of Illness/Injury or Date of Surgery Date: 17  Date of Referral to PT: 17  Referring Physician: Dr. Swanson    Admit Date: 2017    Visit Dx:    ICD-10-CM ICD-9-CM   1. Fall in home, initial encounter W19.XXXA E888.9    Y92.099 E849.0   2. Closed fracture of right hip, initial encounter S72.001A 820.8   3. Impaired functional mobility, balance, gait, and endurance Z74.09 V49.89   4. Impaired mobility and ADLs Z74.09 799.89     Patient Active Problem List   Diagnosis   • Malignant neoplasm of central portion of right female breast   • Decreased cardiac ejection fraction   • CHF (congestive heart failure)   • Fall at home   • Hyponatremia   • Closed right hip fracture   • History of breast cancer   • Hypokalemia   • Postoperative anemia   • Delirium               Adult Rehabilitation Note       18 1309 18 0824 18 1512    Rehab Assessment/Intervention    Discipline physical therapist  -MJ physical therapist  -MJ physical therapist  -SC    Document Type therapy note (daily note)  -MJ therapy note (daily note)  -MJ therapy note (daily note)  -SC    Subjective Information agree to therapy;complains of;fatigue  -MJ agree to therapy;no complaints  -MJ complains of;pain  -SC    Patient Effort, Rehab Treatment good  -MJ good  -MJ good  -SC    Symptoms Noted During/After Treatment fatigue  -MJ dizziness;significant change in vital signs;other (see comments)   nausea  -MJ     Symptoms Noted Comment  Heart rate sustained 150-160 on BSC, RN notified  -MJ     Precautions/Limitations fall precautions;other (see comments)   PWB R LE  -MJ fall precautions;other (see comments)   PWB R LE  -MJ fall precautions;other (see comments)   PWB  -SC    Recorded by [MJ] Ezra Martines PT [MJ] Ezra Martines PT [SC] Prince Obrien PT    Vital Signs    Pre Systolic  BP Rehab  122  -MJ     Pre Treatment Diastolic BP  73  -MJ     Intra Systolic BP Rehab  156  -MJ     Intra Treatment Diastolic BP  116  -MJ     Pretreatment Heart Rate (beats/min) 94  -  -MJ     Intratreatment Heart Rate (beats/min)  150   sustained 150-160  -MJ     Posttreatment Heart Rate (beats/min) 97  -  -MJ     Pre SpO2 (%)  100  -MJ     O2 Delivery Pre Treatment  supplemental O2  -MJ     Post SpO2 (%)  100  -MJ     O2 Delivery Post Treatment  supplemental O2  -MJ     Pre Patient Position Supine  -MJ Supine  -MJ     Intra Patient Position  Sitting  -MJ     Post Patient Position Sitting  -MJ Supine  -MJ     Recorded by [MJ] Ezra Martines, PT [MJ] Ezra Martines, PT     Pain Assessment    Pain Assessment 0-10  -MJ Rivera-Casas FACES  -MJ Rivera-Casas FACES  -SC    Rivera-Casas FACES Pain Rating  2  -MJ 2  -SC    Pain Score 0  -MJ      Post Pain Score 0  -MJ 2  -MJ 2  -SC    Pain Type  Acute pain  -MJ Acute pain  -SC    Pain Location  Hip  -MJ Hip  -SC    Pain Orientation  Right  -MJ Right  -SC    Pain Intervention(s)  Repositioned;Ambulation/increased activity  -MJ Repositioned  -SC    Recorded by [MJ] Ezra Martines, PT [MJ] Ezra Martines PT [SC] Prince Obrien, PT    Cognitive Assessment/Intervention    Current Cognitive/Communication Assessment impaired  -MJ impaired  -MJ impaired  -SC    Orientation Status oriented to;person;place;required verbal cueing (specifiy in comments)  -MJ oriented to;person;place;required verbal cueing (specifiy in comments)  -MJ oriented to;person;place;required verbal cueing (specifiy in comments);situation;time  -SC    Follows Commands/Answers Questions 75% of the time;able to follow single-step instructions;needs cueing;needs repetition  -MJ 75% of the time;able to follow single-step instructions;needs cueing;needs repetition  -MJ 75% of the time  -SC    Personal Safety moderate impairment   cues for WB status  -MJ moderate impairment   cues for WB status  -MJ moderate  impairment;decreased awareness, need for assist;decreased awareness, need for safety;decreased insight to deficits  -SC    Personal Safety Interventions   fall prevention program maintained;gait belt  -SC    Recorded by [MJ] Ezra Martines, PT [MJ] Ezra Martines PT [SC] Prince Obrien, PT    Mobility Assessment/Training    Extremity Weight-Bearing Status right lower extremity  -MJ right lower extremity  -MJ     Right Lower Extremity Weight-Bearing partial weight-bearing  - partial weight-bearing  -     Recorded by [MJ] Ezra Martines PT [MJ] Ezra Martines PT     Bed Mobility, Assessment/Treatment    Bed Mobility, Assistive Device bed rails;head of bed elevated;leg   - bed rails;head of bed elevated;draw sheet  - bed rails;head of bed elevated;draw sheet  -SC    Bed Mobility, Scoot/Bridge, [place]  minimum assist (75% patient effort);verbal cues required   pt bridged to have soiled linens removed and clean replaced  - maximum assist (25% patient effort)  -SC    Bed Mob, Supine to Sit, [place] moderate assist (50% patient effort);2 person assist required;verbal cues required  - maximum assist (25% patient effort);2 person assist required;verbal cues required  - maximum assist (25% patient effort)  -SC    Bed Mob, Sit to Supine, [place] not tested   Pt UIC  - maximum assist (25% patient effort);2 person assist required;verbal cues required  -     Bed Mobility, Safety Issues decreased use of arms for pushing/pulling;decreased use of legs for bridging/pushing  - decreased use of arms for pushing/pulling;decreased use of legs for bridging/pushing  - cognitive deficits limit understanding;decreased use of arms for pushing/pulling;decreased use of legs for bridging/pushing  -SC    Bed Mobility, Impairments ROM decreased;strength decreased;pain  -MJ ROM decreased;strength decreased;pain  -MJ strength decreased;motor control impaired  -SC    Bed Mobility, Comment Verbal cues for sequencing,  assist with legs and trunk, increased time and effort to perform  -MJ Verbal cues to move LEs off EOB and to use UEs to push trunk to sitting. Assist with legs and trunk, increased time and effort to perform  -MJ repeted cues or sequencing up to edg of bed  -SC    Recorded by [MJ] Ezra Martines, PT [MJ] Ezra Martines, PT [SC] Prince Washingtonp, PT    Transfer Assessment/Treatment    Transfers, Chair-Bed [place]  moderate assist (50% patient effort);2 person assist required;verbal cues required  -MJ     Transfers, Bed-Chair-Bed, Assist Device  rolling walker  -MJ     Transfers, Sit-Stand [place] minimum assist (75% patient effort);2 person assist required;verbal cues required  -MJ minimum assist (75% patient effort);2 person assist required;verbal cues required  -MJ minimum assist (75% patient effort);2 person assist required  -SC    Transfers, Stand-Sit [place] minimum assist (75% patient effort);2 person assist required;verbal cues required  -MJ minimum assist (75% patient effort);2 person assist required;verbal cues required  -MJ minimum assist (75% patient effort);2 person assist required  -SC    Transfers, Sit-Stand-Sit, Assist Device rolling walker  -MJ rolling walker  -MJ other (see comments);mechanical gait assist  -SC    Toilet Transfer, [place]  moderate assist (50% patient effort);2 person assist required;verbal cues required  -MJ     Toilet Transfer, Assistive Device  rolling walker  -MJ     Transfer, Maintain Weight Bearing Status able to maintain weight bearing status;assist to maintain weight bearing status;cues to maintain weight bearing status  -MJ unable to maintain weight bearing status;assist to maintain weight bearing status;cues to maintain weight bearing status  -MJ     Transfer, Safety Issues step length decreased;weight-shifting ability decreased  -MJ step length decreased;weight-shifting ability decreased  -MJ     Transfer, Impairments ROM decreased;strength decreased;pain  -MJ  ROM decreased;strength decreased;pain  -MJ motor control impaired;pain;strength decreased  -SC    Transfer, Comment Verbal cues for correct hand placement and for PWB status  - Verbal cues for correct hand placement, to step R LE out prior to transfer and for PWB through R LE. Assisted pt to BSC and back to bed. While on BSC pt with increased heart rate, BP and nausea, RN notified. Symptoms improved once returned to bed  - cues for hand placement and safety  -SC    Recorded by [] Ezra Martines PT [MJ] Ezra Martines PT [SC] Prince Obrien PT    Gait Assessment/Treatment    Gait, [place] Level minimum assist (75% patient effort);2 person assist required;verbal cues required  - unable to perform  - minimum assist (75% patient effort);2 person assist required  -SC    Gait, Assistive Device rolling walker  -  other (see comments)   arjo  -SC    Gait, Distance (Feet) 40  -MJ  20  -SC    Gait, Gait Pattern Analysis swing-to gait  -  swing-to gait  -SC    Gait, Gait Deviations right:;antalgic;aida decreased;forward flexed posture;step length decreased;toe-to-floor clearance decreased;weight-shifting ability decreased;narrow base  -  right:;other (see comments)   PWB  -SC    Gait, Maintain Weight Bearing Status assist to maintain weight bearing status;cues to maintain weight bearing status  -      Gait, Safety Issues sequencing ability decreased;step length decreased;weight-shifting ability decreased  -  balance decreased during turns  -SC    Gait, Impairments ROM decreased;strength decreased;pain  -MJ  motor control impaired;strength decreased;impaired balance  -SC    Gait, Comment Pt demo step to gait pattern at slow pace. Verbal cues for PWB, instructed to keep toes only on ground on R during stance. Cues to separate feet to improve MOODY, mild improvement. Cues to  feet during turn. Gait limited by fatigue  - Due to sustained increased heart rate and BP  - repeted cues to off loan wt  on R leg. patient able to ambulate to door and back to chaIR  -SC    Recorded by [MJ] Ezra Martines, PT [MJ] Ezra Martines, PT [SC] Prince Obrien, PT    Therapy Exercises    Exercise Protocols hip ORIF  - hip ORIF  - hip ORIF  -SC    Hip ORIF Exercises right:;10 reps;with assist;ankle pumps/circles;quad set;glut set;heel slides;hip abduction;SAQ;SLR   Cues for technique. Suzi w/ SLR, hip abd, heel slides  - --   deferred due to increased heart rate  - right:;with assist;5 reps;10 reps;quad set;hamstring set;hip abduction;LAQ  -SC    Recorded by [MJ] Ezra Martines, PT [] Ezra Martines, PT [SC] Prince Obrien, PT    Positioning and Restraints    Pre-Treatment Position in bed  - in bed  - in bed  -SC    Post Treatment Position chair  - bed  - chair  -SC    In Bed  notified nsg;supine;call light within reach;encouraged to call for assist;exit alarm on;SCD pump applied  -     In Chair notified nsg;reclined;call light within reach;encouraged to call for assist;exit alarm on;on mechanical lift sling  -  sitting;reclined;notified nsg;call light within reach;encouraged to call for assist;exit alarm on;with family/caregiver  -SC    Recorded by [MJ] Ezra Martines PT [MJ] Ezra Martines, PT [SC] Prince Obrien, PT      User Key  (r) = Recorded By, (t) = Taken By, (c) = Cosigned By    Initials Name Effective Dates    SC Prince Obrien, PT 06/19/15 -     RANJIT Martines PT 03/14/16 -                 IP PT Goals       01/02/18 1343 01/02/18 0934 12/31/17 1418    Bed Mobility PT LTG    Bed Mobility PT LTG, Date Established   12/31/17  -SC    Bed Mobility PT LTG, Time to Achieve   5 - 7 days  -SC    Bed Mobility PT LTG, Activity Type   supine to sit/sit to supine  -SC    Bed Mobility PT LTG, [place] Level   moderate assist (50% patient effort)  -SC    Bed Mobility PT Goal  LTG, Assist Device   bed rails  -SC    Bed Mobility PT LTG, Date Goal Reviewed 01/02/18  -MJ 01/02/18  -MJ     Bed Mobility PT LTG, Outcome goal  ongoing  -MJ goal ongoing  -MJ goal ongoing  -SC    Transfer Training PT LTG    Transfer Training PT LTG, Date Established   12/31/17  -SC    Transfer Training PT LTG, Time to Achieve   5 - 7 days  -SC    Transfer Training PT LTG, Activity Type   sit to stand/stand to sit  -SC    Transfer Training PT LTG, [place] Level   minimum assist (75% patient effort)  -SC    Transfer Training PT  LTG, Date Goal Reviewed 01/02/18  -MJ 01/02/18  -MJ     Transfer Training PT LTG, Outcome goal ongoing  -MJ goal ongoing  -MJ goal ongoing  -SC    Gait Training PT LTG    Gait Training Goal PT LTG, Date Established   12/31/17  -SC    Gait Training Goal PT LTG, Time to Achieve   5 - 7 days  -SC    Gait Training Goal PT LTG, [place] Level   minimum assist (75% patient effort);2 person assist required  -SC    Gait Training Goal PT LTG, Assist Device   walker, rolling  -SC    Gait Training Goal PT LTG, Distance to Achieve   15  -SC    Gait Training Goal PT LTG, Date Goal Reviewed 01/02/18  - 01/02/18  -MJ     Gait Training Goal PT LTG, Outcome goal met  -MJ goal ongoing  - goal ongoing  -SC      User Key  (r) = Recorded By, (t) = Taken By, (c) = Cosigned By    Initials Name Provider Type    SC Prince Obrien, PT Physical Therapist    RANJIT Martines, PT Physical Therapist          Physical Therapy Education     Title: PT OT SLP Therapies (Done)     Topic: Physical Therapy (Done)     Point: Mobility training (Done)    Learning Progress Summary    Learner Readiness Method Response Comment Documented by Status   Patient Acceptance E,D JOSEF CASILLAS Reviewed PWB status, HEP, gait mechanics  01/02/18 1343 Done    Acceptance E,D NR Reviewed WB status, benefits of mobility  01/02/18 0934 Active    Raven BAHENA REVIEWED Pwb PRECAUTIONS SC 01/01/18 1633 Active    ANDREA Chacon,NR reviewed benefits of acivity SC 12/31/17 1418 Done   Significant Other Acceptance E,D JOSEF CASILLAS Reviewed PWB status, HEP, gait mechanics  01/02/18 1343 Done                Point: Home exercise program (Done)    Learning Progress Summary    Learner Readiness Method Response Comment Documented by Status   Patient Acceptance E,D VU,NR Reviewed PWB status, HEP, gait mechanics  01/02/18 1343 Done    Acceptance E,D NR Reviewed WB status, benefits of mobility  01/02/18 0934 Active    Eager E NR REVIEWED Pwb PRECAUTIONS SC 01/01/18 1633 Active    Eager E ANDREA CASILLAS,NR reviewed benefits of acivity SC 12/31/17 1418 Done   Significant Other Acceptance E,D VU,NR Reviewed PWB status, HEP, gait mechanics  01/02/18 1343 Done               Point: Body mechanics (Done)    Learning Progress Summary    Learner Readiness Method Response Comment Documented by Status   Patient Acceptance E,D VU,NR Reviewed PWB status, HEP, gait mechanics  01/02/18 1343 Done    Acceptance E,D NR Reviewed WB status, benefits of mobility  01/02/18 0934 Active    Eager E NR REVIEWED Pwb PRECAUTIONS SC 01/01/18 1633 Active    Eager E ANDREA CASILLAS,NR reviewed benefits of acivity SC 12/31/17 1418 Done   Significant Other Acceptance E,D VU,NR Reviewed PWB status, HEP, gait mechanics  01/02/18 1343 Done               Point: Precautions (Done)    Learning Progress Summary    Learner Readiness Method Response Comment Documented by Status   Patient Acceptance E,D VU,NR Reviewed PWB status, HEP, gait mechanics  01/02/18 1343 Done    Acceptance E,D NR Reviewed WB status, benefits of mobility  01/02/18 0934 Active    Eager E NR REVIEWED Pwb PRECAUTIONS SC 01/01/18 1633 Active    Eager E ANDREA CASILLASNR reviewed benefits of acivity SC 12/31/17 1418 Done   Significant Other Acceptance E,D VU,NR Reviewed PWB status, HEP, gait mechanics  01/02/18 1343 Done                      User Key     Initials Effective Dates Name Provider Type Discipline    SC 06/19/15 -  Prince Obrien, PT Physical Therapist PT     03/14/16 -  Ezra Martines PT Physical Therapist PT                    PT Recommendation and Plan  Anticipated Discharge Disposition:  skilled nursing facility  Planned Therapy Interventions: balance training, bed mobility training, gait training, home exercise program, patient/family education, strengthening, transfer training  PT Frequency: 2 times/day  Plan of Care Review  Plan Of Care Reviewed With: patient  Progress: improving  Outcome Summary/Follow up Plan: Pt ambulated 40 feet with MinAx2 and RW, cues to maintain PWB on R LE throughout. Vitals improved this afternoon from AM session. Will continue to progress mobility as able.           Outcome Measures       01/02/18 1309 01/02/18 0824 01/02/18 0815    How much help from another person do you currently need...    Turning from your back to your side while in flat bed without using bedrails? 2  -MJ 2  -MJ     Moving from lying on back to sitting on the side of a flat bed without bedrails? 2  -MJ 2  -MJ     Moving to and from a bed to a chair (including a wheelchair)? 2  -MJ 2  -MJ     Standing up from a chair using your arms (e.g., wheelchair, bedside chair)? 2  -MJ 2  -MJ     Climbing 3-5 steps with a railing? 1  -MJ 1  -MJ     To walk in hospital room? 2  -MJ 2  -MJ     AM-PAC 6 Clicks Score 11  -MJ 11  -MJ     How much help from another is currently needed...    Putting on and taking off regular lower body clothing?   1  -AR    Bathing (including washing, rinsing, and drying)   2  -AR    Toileting (which includes using toilet bed pan or urinal)   1  -AR    Putting on and taking off regular upper body clothing   3  -AR    Taking care of personal grooming (such as brushing teeth)   3  -AR    Eating meals   3  -AR    Score   13  -AR    Functional Assessment    Outcome Measure Options AM-PAC 6 Clicks Basic Mobility (PT)  -MJ AM-PAC 6 Clicks Basic Mobility (PT)  -MJ AM-PAC 6 Clicks Daily Activity (OT)  -AR      01/01/18 1512 12/31/17 0820       How much help from another person do you currently need...    Turning from your back to your side while in flat bed without using bedrails? 2  -SC 2   -SC     Moving from lying on back to sitting on the side of a flat bed without bedrails? 1  -SC 1  -SC     Moving to and from a bed to a chair (including a wheelchair)? 2  -SC 2  -SC     Standing up from a chair using your arms (e.g., wheelchair, bedside chair)? 2  -SC 2  -SC     Climbing 3-5 steps with a railing? 1  -SC 1  -SC     To walk in hospital room? 2  -SC 2  -SC     AM-PAC 6 Clicks Score 10  -SC 10  -SC     Functional Assessment    Outcome Measure Options AM-PAC 6 Clicks Basic Mobility (PT)  -SC AM-PAC 6 Clicks Basic Mobility (PT)  -SC       User Key  (r) = Recorded By, (t) = Taken By, (c) = Cosigned By    Initials Name Provider Type    SC Prince Obrien, PT Physical Therapist    AR Vianey Arellano, OT Occupational Therapist    RANJIT Martines PT Physical Therapist           Time Calculation:         PT Charges       01/02/18 1345 01/02/18 0942       Time Calculation    Start Time 1309  -MJ 0824  -MJ     PT Received On 01/02/18  -MJ 01/02/18  -MJ     PT Goal Re-Cert Due Date 01/10/18  -MJ 01/10/18  -MJ     Time Calculation- PT    Total Timed Code Minutes- PT 26 minute(s)  -MJ 38 minute(s)  -MJ       User Key  (r) = Recorded By, (t) = Taken By, (c) = Cosigned By    Initials Name Provider Type    RANJIT Martines PT Physical Therapist          Therapy Charges for Today     Code Description Service Date Service Provider Modifiers Qty    30525173916 HC PT THER PROC EA 15 MIN 1/2/2018 Ezra Martines PT GP 3    07917553820 HC GAIT TRAINING EA 15 MIN 1/2/2018 Ezra Martines PT GP 1    44851771604 HC PT THER PROC EA 15 MIN 1/2/2018 Ezra Martines PT GP 1    83636286785 HC PT THER SUPP EA 15 MIN 1/2/2018 Ezra Martines PT GP 2          PT G-Codes  Outcome Measure Options: AM-PAC 6 Clicks Basic Mobility (PT)  Score: 10  Functional Limitation: Mobility: Walking and moving around  Mobility: Walking and Moving Around Current Status (): At least 60 percent but less than 80 percent impaired, limited or  restricted  Mobility: Walking and Moving Around Goal Status (): At least 60 percent but less than 80 percent impaired, limited or restricted    Ezra Martines, PT  1/2/2018

## 2018-01-02 NOTE — PLAN OF CARE
Problem: Patient Care Overview (Adult)  Goal: Plan of Care Review  Outcome: Ongoing (interventions implemented as appropriate)   01/02/18 0934   Coping/Psychosocial Response Interventions   Plan Of Care Reviewed With patient   Patient Care Overview   Progress progress toward functional goals is gradual   Outcome Evaluation   Outcome Summary/Follow up Plan Pt transferred to [facility] and back to bed with ModAx2 and RW, difficulty maintaining PWB throughout transfer. Pt limited this session by increased heart rate and BP with activity. Will continue to progress mobility as able        Problem: Inpatient Physical Therapy  Goal: Bed Mobility Goal LTG- PT  Outcome: Ongoing (interventions implemented as appropriate)   12/31/17 1418 01/02/18 0934   Bed Mobility PT LTG   Bed Mobility PT LTG, Date Established 12/31/17 --    Bed Mobility PT LTG, Time to Achieve 5 - 7 days --    Bed Mobility PT LTG, Activity Type supine to sit/sit to supine --    Bed Mobility PT LTG, [place] Level moderate assist (50% patient effort) --    Bed Mobility PT Goal LTG, Assist Device bed rails --    Bed Mobility PT LTG, Date Goal Reviewed --  01/02/18   Bed Mobility PT LTG, Outcome --  goal ongoing     Goal: Transfer Training Goal 1 LTG- PT  Outcome: Ongoing (interventions implemented as appropriate)   12/31/17 1418 01/02/18 0934   Transfer Training PT LTG   Transfer Training PT LTG, Date Established 12/31/17 --    Transfer Training PT LTG, Time to Achieve 5 - 7 days --    Transfer Training PT LTG, Activity Type sit to stand/stand to sit --    Transfer Training PT LTG, [place] Level minimum assist (75% patient effort) --    Transfer Training PT LTG, Date Goal Reviewed --  01/02/18   Transfer Training PT LTG, Outcome --  goal ongoing     Goal: Gait Training Goal LTG- PT  Outcome: Ongoing (interventions implemented as appropriate)   12/31/17 1418 01/02/18 0934   Gait Training PT LTG   Gait Training Goal PT LTG, Date Established 12/31/17 --     Gait Training Goal PT LTG, Time to Achieve 5 - 7 days --    Gait Training Goal PT LTG, [place] Level minimum assist (75% patient effort);2 person assist required --    Gait Training Goal PT LTG, Assist Device walker, rolling --    Gait Training Goal PT LTG, Distance to Achieve 15 --    Gait Training Goal PT LTG, Date Goal Reviewed --  01/02/18   Gait Training Goal PT LTG, Outcome --  goal ongoing

## 2018-01-02 NOTE — PROGRESS NOTES
Discharge Planning Assessment  [facility]     Patient Name: Cassidy Teran  MRN: [id number]  Today's Date: 1/2/2018    Admit Date: 12/30/2017          Discharge Needs Assessment       01/02/18 1041    Discharge Needs Assessment    Discharge Contact Information if Applicable Esperanza Paul [phone] - daughter      01/02/18 1037    Living Environment    Lives With spouse    Living Arrangements house    Home Accessibility no concerns;tub/shower is not walk in;stairs to enter home    Number of Stairs to Enter Home 2    Stair Railings at Home none    Type of Financial/Environmental Concern none    Transportation Available car;family or friend will provide    Living Environment    Provides Primary Care For no one, unable/limited ability to care for self    Primary Care Provided By spouse/significant other    Quality Of Family Relationships supportive;helpful;involved    Able to Return to Prior Living Arrangements no    Living Arrangement Comments will need short term rehab    Discharge Needs Assessment    Concerns To Be Addressed basic needs concerns    Readmission Within The Last 30 Days no previous admission in last 30 days    Anticipated Changes Related to Illness inability to care for self    Equipment Currently Used at Home walker, rolling    Equipment Needed After Discharge --   to be determined    Discharge Facility/Level Of Care Needs rehabilitation facility    Discharge Disposition --   to be determined            Discharge Plan       01/02/18 1042    Case Management/Social Work Plan    Plan inpt rehab    Patient/Family In Agreement With Plan yes    Additional Comments I met with Mrs Teran and  at bedside to discuss d/c plan. Her plan is to go to short term rehab. They live in a one level home, is available to assist with any care needs. She states prior to admit she was independent with most ADL's, using a rolling walker prn secondary to weakness, she thinks from one of her medications.She uses  no other equipment. She has insurance coverage for Rx meds. We discussed rehab options, she has requested a referral to [facility] [place], I have spoken with Milla who accepted referral. Case mgt will follow.        Discharge Placement     No information found        Expected Discharge Date and Time     Expected Discharge Date Expected Discharge Time    Jan 4, 2018               Demographic Summary       01/02/18 1034    Referral Information    Admission Type inpatient    Arrived From home or self-care    Referral Source physician    Reason For Consult discharge planning    Record Reviewed history and physical;medical record    Contact Information    Permission Granted to Share Information With ;family/designee    Primary Care Physician Information    Name Wm Baum            Functional Status       01/02/18 1036    Functional Status Prior    Ambulation 1-->assistive equipment   used a rolling walker prn    Transferring 0-->independent    Toileting 0-->independent    Bathing 0-->independent    Dressing 0-->independent    Eating 0-->independent    Communication 0-->understands/communicates without difficulty    Swallowing 0-->swallows foods/liquids without difficulty    IADL    Medications independent    Meal Preparation assistive person    Housekeeping assistive person    Laundry assistive person    Shopping assistive person    Oral Care independent    Activity Tolerance    Usual Activity Tolerance fair    Cognitive/Perceptual/Developmental    Current Mental Status/Cognitive Functioning no deficits noted    Recent Changes in Mental Status/Cognitive Functioning no changes    Employment/Financial    Employment/Finance Comments [employer] replacement            Psychosocial     None            Abuse/Neglect     None            Legal     None            Substance Abuse     None            Patient Forms     None          Sonja C Kellerman, RN

## 2018-01-02 NOTE — PAYOR COMM NOTE
"Cassidy Castorena (77 y.o. Female)     Date of Birth Social Security Number Address Home Phone MRN    1940  [id number] [id number] [phone] [id number]    Hinduism Marital Status          [place]        Admission Date Admission Type Admitting Provider Attending Provider Department, Room/Bed    17 Emergency Nomi Kraus MD Dossett, Lee M, MD [facility] 3H, S375/1    Discharge Date Discharge Disposition Discharge Destination                      Attending Provider: Nomi Kraus MD     Allergies:  Horse-derived Products    Isolation:  None   Infection:  None   Code Status:  FULL    Ht:  154.9 cm (61\")   Wt:  40.8 kg (90 lb)    Admission Cmt:  None   Principal Problem:  Closed right hip fracture [S72.001A]                 Active Insurance as of 2017     Primary Coverage     Payor Plan Insurance Group Employer/Plan Group    AETNA MEDICARE REPLACEMENT AETNA [id number]     Payor Plan Address Payor Plan Phone Number Effective From Effective To    [address] [phone] [address]     [place] [address]       Subscriber Name Subscriber Birth Date Member ID       CASSIDY CASTORENA [id number]                 Emergency Contacts      (Rel.) Home Phone Work Phone Mobile Phone    Esperanza Paul (Daughter) [phone] -- [phone]               History & Physical      Nilsa Justice MD at 2017  6:26 PM              [facility] Medicine Services  HISTORY AND PHYSICAL    Patient Name: Cassidy Castorena  : 1940  MRN: [id number]  Primary Care Physician: Luke Baum MD    Subjective   Subjective     Chief Complaint:  Fall, right hip fracture    HPI:  Cassidy Castorena is a pleasant 77 y.o.  female who is a patient of Dr. Lund with PMH significant for left breast cancer - 2003 s/p mastectomy and XRT followed by right breast cancer,  s/p mastectomy, herceptin, adriamycin and cytoxan.  She " did have some reduction of her systolic function to 45%  but since herceptin was stopped her systolic function recovered to 55-60% by January earlier this year.  The patient started Arimidex in April and the  states that she has been weak and dizzy every since.  In fact he called the heme-onc office through the exchange earlier today to discuss taking her off the Arimidex but was not able to talk to anyone. The patient has been walking around the house with a walker and she fell today, experiencing immediate pain in her right hip and inability to walk.  XRay in the ER shows right femoral neck fracture and Dr. Swanson plans to take her to the OR [facility] for percutaneous pinning.    Review of Systems   Constitutional: Positive for activity change.   HENT: Negative.    Eyes: Negative.    Respiratory: Negative.    Cardiovascular: Negative.    Gastrointestinal: Negative.    Endocrine: Negative.    Genitourinary: Negative.    Musculoskeletal: Positive for arthralgias and gait problem.   Skin: Negative.    Allergic/Immunologic: Negative.    Neurological: Positive for dizziness and weakness.   Hematological: Negative.    Psychiatric/Behavioral: Negative.           Otherwise 10-system ROS reviewed and is negative except as mentioned in the HPI.    Personal History     Past Medical History:   Diagnosis Date   • Allergic rhinitis    • Anxiety    • Cataract    • Malignant neoplasm of breast, right        Past Surgical History:   Procedure Laterality Date   • BREAST SURGERY      Left   • EYE SURGERY      cataract   • PORTACATH PLACEMENT     • RECONSTRUCTION BREAST IMMEDIATE / DELAYED W/ TISSUE EXPANDER Bilateral 05/2016    [facility]       Family History: family history is not on file.     Social History:  reports that she has quit smoking. She has never used smokeless tobacco. She reports that she drinks about 0.6 oz of alcohol per week  She reports that she uses illicit drugs.  Social History     Social  History Narrative       Medications:  Prescriptions Prior to Admission   Medication Sig Dispense Refill Last Dose   • anastrozole (ARIMIDEX) 1 MG tablet Take 1 tablet by mouth Daily. 90 tablet 1    • Calcium Carbonate-Vitamin D (CALCIUM + D PO) Take 1 tablet by mouth daily.   Taking   • Fexofenadine HCl (ALLERGY 24-HR PO) Take 1 tablet by mouth as needed.      • Multiple Vitamins-Minerals (CENTRUM ADULTS PO) Take 1 tablet by mouth daily.   Taking   • Multiple Vitamins-Minerals (PRESERVISION AREDS PO) Take 1 tablet by mouth daily.   Taking   • HYDROcodone-acetaminophen (NORCO) 5-325 MG per tablet Take 1 tablet by mouth Every 6 (Six) Hours As Needed for severe pain (7-10). 15 tablet 0        Allergies   Allergen Reactions   • Horse-Derived Products      Serum       Objective   Objective     Vital Signs:   Temp:  [98.3 °F (36.8 °C)] 98.3 °F (36.8 °C)  Heart Rate:  [] 96  Resp:  [16] 16  BP: (110-137)/(64-79) 113/77        Physical Exam   Constitutional: No acute distress, awake, alert, speech slow  Eyes: PERRLA, sclerae anicteric, no conjunctival injection  HENT: NCAT, mucous membranes moist  Neck: Supple, no thyromegaly, no lymphadenopathy, trachea midline  Respiratory: Clear to auscultation bilaterally, nonlabored respirations   Cardiovascular: RRR, palpable pedal pulses bilaterally  Gastrointestinal: Positive bowel sounds, soft, nontender, nondistended  Musculoskeletal: Tender right hip, No bilateral ankle edema, no clubbing or cyanosis to extremities  Psychiatric: Appropriate affect, cooperative  Neurologic: Oriented x 3, strength symmetric in all extremities, Cranial Nerves grossly intact to confrontation, speech clear  Skin: No rashes      Results Reviewed:  I have personally reviewed current lab, radiology, and data and agree.      Results from last 7 days  Lab Units 12/30/17  1312   WBC 10*3/mm3 10.46   HEMOGLOBIN g/dL 13.0   HEMATOCRIT % 38.7   PLATELETS 10*3/mm3 172       Results from last 7 days  Lab  Units 12/30/17  1312   SODIUM mmol/L 131*   POTASSIUM mmol/L 3.7   CHLORIDE mmol/L 99   CO2 mmol/L 28.0   BUN mg/dL 15   CREATININE mg/dL 0.50*   GLUCOSE mg/dL 113*   CALCIUM mg/dL 9.2   ALT (SGPT) U/L 31   AST (SGOT) U/L 34*     Brief Urine Lab Results  (Last result in the past 365 days)      Color   Clarity   Blood   Leuk Est   Nitrite   Protein   CREAT   Urine HCG        12/30/17 1339 Yellow Clear Negative Negative Negative Negative             No results found for: BNP  No results found for: PHART  Imaging Results (last 24 hours)     Procedure Component Value Units Date/Time    XR Chest 1 View [[id number]] Updated:  12/30/17 1523    XR Hip With or Without Pelvis 2 - 3 View Right [[id number]] Updated:  12/30/17 1525    CT Pelvis Without Contrast [[id number]] Updated:  12/30/17 1626        Results for orders placed during the hospital encounter of 11/30/16   Adult Transthoracic Echo Complete    Narrative · Left ventricular function is normal. Estimated EF = 55%.  · All left ventricular wall segments contract normally.  · Mild aortic valve stenosis is present.  · Mild tricuspid valve regurgitation is present.  · There is no evidence of pericardial effusion.  · Estimated right ventricular systolic pressure from tricuspid   regurgitation is normal (<35 mmHg).  · There are myxomatous changes of the mitral valve apparatus present.          Assessment/Plan   Assessment / Plan     Hospital Problem List     * (Principal)Closed right hip fracture    Fall at home    Hyponatremia    History of breast cancer (Chronic)            Assessment & Plan:  Ms. Teran is a 77 year old  woman who presents to [facility] ER s/p fall while going to the bathroom today in her home with a right femoral neck fracture.    Right femoral neck fracture  --Dr. Sky greco  --Pain management by surgeon  --PT/OT tomorrow  --Lovenox prophy after surgery per surgeon's note  --Case management    Weakness and dizziness:  Patient and   attribute this to the patient's Arimidex  Will courtesy consult Dr. Fortune and hopefully he can visit the patient prior to her discharge to rehab  Will hold arimidex for now pending further advice from Dr. Fortune    Hyponatremia  Will get fluids in the ER  Recheck in AM    DVT prophylaxis: Lovenox after surgery    CODE STATUS:  No Order FULL    INPATIENT status due to the need for care which can only be reasonably provided in an hospital setting such as aggressive/expedited ancillary services and consultation services, the necessity for IV medications, close physician monitoring and the need for surgery.  In such, I feel patient’s risk for adverse outcomes and need for care warrant INPATIENT evaluation and predict the patient’s care encounter to likely last beyond 2 midnights.      Nilsa Justice MD   12/30/17   6:26 PM           Electronically signed by Nilsa Justice MD at 12/30/2017  7:06 PM           Operative/Procedure Notes (all)      Corbin Swanson MD at 12/30/2017  7:51 PM  Version 1 of 1         HIP CANNULATED SCREW PLACEMENT  Progress Note    Cassidy Teran  12/30/2017    Pre-op Diagnosis:   right hip femoral neck fracture       Post-Op Diagnosis Codes:     * Fracture of femoral neck, right, closed [S72.001A]    Procedure/CPT® Codes:  LA PERCUT FIX PROX/NECK FEMUR FX [17606]    Procedure(s):  Right HIP CANNULATED SCREW fixation of femoral neck fracture    Surgeon(s):  Corbin Swanson MD    Anesthesia: General and iliofascial block    Staff:   Circulator: Kelly Montalvo RN  Radiology Technologist: RT Brain  Scrub Person: Jazmine Shaw; Elise Zarate    Estimated Blood Loss: minimal    Urine Voided: * No values recorded between 12/30/2017  6:18 PM and 12/30/2017  7:44 PM *    Specimens:                None      Drains: none          Findings: valgus impacted minimally displaced femoral neck fracture    Complications: none    Implants:  Synthes 6.5mm cannulated screws  x3      Corbin Swanson MD     Date: 12/30/2017  Time: 7:51 PM         Electronically signed by Corbin Swanson MD at 12/30/2017  7:56 PM      Corbin Swanson MD at 12/30/2017  7:56 PM  Version 2 of 2         PREOPERATIVE DIAGNOSIS:  Right hip valgus impacted minimally displaced femoral neck fracture.    POSTOPERATIVE DIAGNOSIS:  Right hip valgus impacted minimally displaced femoral neck fracture.     PROCEDURE PERFORMED: Right hip femoral neck fracture percutaneous pinning.     SURGEON:  Corbin Swanson MD    ANESTHESIA: General with iliofascial single shot block.     ESTIMATED BLOOD LOSS: Minimal.      COMPLICATIONS: None.    SPECIMENS: None.     IMPLANTS USED:  Synthes 6.5 mm cannulated screws x3.     INDICATIONS FOR PROCEDURE: The patient is a very pleasant 77-year-old female who tripped and fell at home this morning. She was unable to ambulate after this fall. She was brought to [facility] ER where evaluation with x-rays and a CT scan revealed a minimally displaced femoral neck fracture. I was consulted this evening and saw her in the ER. She is healthy other than a history of breast cancer, does not take any blood thinners. So we elected to proceed straight to surgery this evening. I discussed with her the risks, benefits and alternatives to right hip percutaneous pinning for this minimally displaced valgus impacted fracture. They agreed to proceed and surgical consent form was signed.     DESCRIPTION OF PROCEDURE: She was seen in the preoperative holding area. Right lower extremity was marked to confirm the proposed operative site. She was seen by anesthesia, she received Ancef 1 gram IV prophylactic antibiotics within 1 hour of incision time. She was brought back to the operating room. Iliofascial single shot block was performed. Catheter was placed but she was bleeding at the site, so the catheter was removed and they just did it as a single shot block. General anesthesia had been induced.  She was placed on the fracture table, brought down to the perineal post. Wire lag was placed in the wire lag baker and was padded. Right foot was placed in the boot. Minimal traction was applied to the right foot. C-arm was brought in and confirmed adequate AP and lateral images of the right hip and that the femoral neck fracture remained minimally displaced and was amenable to percutaneous pinning. C-arm was backed out. The right hip was prepped and draped in usual sterile fashion. Timeout was performed and confirmed right hip percutaneous pinning on patient. C-arm was brought back in. Under direct C-arm visualization I placed a lateral incision just distal to the greater This was brought down to subcutaneous tissue and fascia. Using C-arm visualization a guide pin was placed at the level of the lesser trochanter of the femoral neck and inferior thirds of the femoral neck and head. We then obtained a lateral image that revealed the guide pin was centrally placed in the femoral neck laterally. We then placed 2 more guide pins for the cannulated screws, these were both more proximal to the initial guide pin and 1 anterior and 1 posterior in the femoral neck for an inverted triangle type configuration. Satisfied with the position of the guide pin from the AP and lateral planes, we then placed 3 Synthes 6.5 mm cannulated screws. We premeasured with a depth gauge, drilled the cortex and then placed the screws which were measured at 85, 80 and 75 mm in length with a power screwdriver and then finished with a manual screwdriver. They all had good purchase. The guide pins were then removed and final images were taken and saved in AP and lateral planes with the C-arm which confirmed the fracture remained minimally displaced and that the guide pins were well placed in the inverted triangle configuration. The C-arm was backed out and the wound was copiously irrigated with bulb irrigation. The deep fascia was closed with 0  Vicryl, dermis was closed with 2-0 Vicryl, skin was closed with staples. We applied a sterile dressing with Adaptic and Covaderm. She was awoken from anesthesia without difficulty and transferred to the recovery room in stable condition. All sponge and needle counts were correct.     POSTOPERATIVE PLAN: She will be admitted to the hospital service for medical management. We will consult the  for rehab placement. Normally she is a minimal ambulator with walker assist. We will start Lovenox tomorrow for DVT prophylaxis and initiate PT and OT to mobilize tomorrow as well.          Electronically signed by Sahil Swanson MD at 2018 10:25 AM      Sahil Swanson MD at 2017  7:56 PM  Version 1 of 2          Dictation on: 2017  8:01 PM by: SAHIL SWANSON [[id number]]          Electronically signed by Sahil Swanson MD at 2017  8:01 PM           Physician Progress Notes (all)      Shannon Baum PA-C at 2017  8:29 AM  Version 1 of 1    Attestation signed by Nomi Kraus MD at 2017  9:32 AM        I have reviewed the documentation above and agree.                                     [facility] Medicine Services  PROGRESS NOTE    Patient Name: Cassidy Teran  : 1940  MRN: [id number]    Date of Admission: 2017  Length of Stay: 1  Primary Care Physician: Luke Baum MD    Subjective     CC: f/u R hip fracture    HPI:  Confused this morning. Laying in bed, BP tubing and pulse-ox cable balled up in her hands. Knows her name, phone number, location and why she's here. Not sure on the date or her birthday. She denied pain. Therapy in at end of visit to work with her. She was agreeable.     Review of Systems  Gen- No fevers, chills  CV- No chest pain, palpitations  Resp- No cough, dyspnea  GI- No N/V/D, abd pain    Otherwise ROS is negative except as mentioned in the HPI.    Objective     Vital Signs:    Temp:  [97.4 °F (36.3 °C)-98.6 °F (37 °C)] 97.4 °F (36.3 °C)  Heart Rate:  [] 85  Resp:  [16-20] 20  BP: (110-151)/(64-94) 119/65        Physical Exam:  Constitutional: Thin, elderly female. NAD. Awake, alert and conversant.   HENT: NCAT, mucous membranes moist  Respiratory: Clear to auscultation bilaterally, respiratory effort normal   Cardiovascular: RRR, no murmurs, rubs, or gallops, palpable pedal pulses bilaterally  Gastrointestinal: Positive bowel sounds, soft, nontender, nondistended  Musculoskeletal: No bilateral ankle edema. R hip incision is dressed with mild to moderate serosanguinous drainage.   Psychiatric: Appropriate affect, cooperative  Neurologic: Oriented x to self and location. Strength symmetric in all extremities, Cranial Nerves grossly intact to confrontation, speech clear  Skin: No rashes    Results Reviewed:  I have personally reviewed current lab, radiology, and data and agree.      Results from last 7 days  Lab Units 12/31/17  0511 12/30/17  2108 12/30/17  1312   WBC 10*3/mm3 11.20* 9.98 10.46   HEMOGLOBIN g/dL 10.7* 11.7 13.0   HEMATOCRIT % 32.9* 36.6 38.7   PLATELETS 10*3/mm3 165 160 172       Results from last 7 days  Lab Units 12/31/17  0511 12/30/17  1312   SODIUM mmol/L 130* 131*   POTASSIUM mmol/L 3.4* 3.7   CHLORIDE mmol/L 98* 99   CO2 mmol/L 26.0 28.0   BUN mg/dL 13 15   CREATININE mg/dL 0.50* 0.50*   GLUCOSE mg/dL 109* 113*   CALCIUM mg/dL 8.6* 9.2   ALT (SGPT) U/L  --  31   AST (SGOT) U/L  --  34*     No results found for: BNP  No results found for: PHART    Microbiology Results Abnormal     None        Imaging Results (last 24 hours)     Procedure Component Value Units Date/Time    XR Chest 1 View [[id number]] Updated:  12/30/17 1523    XR Hip With or Without Pelvis 2 - 3 View Right [[id number]] Updated:  12/30/17 1525    CT Pelvis Without Contrast [[id number]] Updated:  12/30/17 1626    FL C Arm During Surgery [[id number]] Updated:  12/30/17 1940        I have reviewed the  "medications.    Assessment/Plan   Assessment / Plan     Hospital Problem List     * (Principal)Closed right hip fracture    Fall at home    Hyponatremia    History of breast cancer (Chronic)        Brief Hospital Course to date:  Cassidy Teran is a 77 y.o. female with PMH significant for L breast cancer (2003- s/p mastectomy and XRT) followed by R breast cancer (s/p mastectomy, Herceptin, Adriamycin and Cytoxan). She did have some reduction of her systolic function (down to 45%) but EF recovered to 55-60% by January 2017 after discontinuation of Herceptin. She was started on Arimidex in April 2017 and the patient's  reports she has been \"weak and dizzy\" ever since. He has been hopeful that this medication can be discontinued.     Ms. Teran was admitted to [facility] on 12/30/17 after a fall at home, sustaining a R femoral neck fracture. She underwent repair by Dr. Corbin Swanson.     Right femoral neck fracture  Post-operative anemia  - s/p R hip cannulated screw fixation of the R femoral neck by Dr. Swanson on 12/30/17  - Pain management PRN  - PT/OT following, CM for rehab placement at d/c   - Begin Lovenox for DVT prophylaxis when OK with surgeon   - Hemoglobin appropriate. Transfuse for < 7 or symptomatic     R breast cancer   Weakness and dizziness  - Ongoing since April 2017 after starting Arimidex. Patient is followed by Dr. Lund  - Courtesy consult to Dr. Lund - hopefully he can visit the patient prior to d/c to rehab  - Holding Arimidex for now pending further input from ONC     Hyponatremia  - Continue IV fluids, recheck labs in the AM     Hypokalemia, replete potassium PRN     DVT Prophylaxis: Lovenox when OK with surgery   CODE STATUS: Full Code    Disposition: I expect the patient to be discharged to rehab in a few.    Shannon Baum PA-C  12/31/17  8:29 AM           Electronically signed by Nomi Kraus MD at 12/31/2017  9:32 AM      Corbin Swanson MD at 12/31/2017 10:16 AM  Version 1 of 1 " "        /65  Pulse 85  Temp 97.4 °F (36.3 °C) (Oral)   Resp 20  Ht 154.9 cm (61\")  Wt 40.8 kg (90 lb)  SpO2 99%  BMI 17.01 kg/m2    Lab Results (last 24 hours)     Procedure Component Value Units Date/Time    CBC Auto Differential [[id number]]  (Abnormal) Collected:  12/30/17 1312    Specimen:  Blood Updated:  12/30/17 1325     WBC 10.46 10*3/mm3      RBC 4.17 10*6/mm3      Hemoglobin 13.0 g/dL      Hematocrit 38.7 %      MCV 92.8 fL      MCH 31.2 (H) pg      MCHC 33.6 g/dL      RDW 14.3 %      RDW-SD 48.2 fl      MPV 11.5 fL      Platelets 172 10*3/mm3      Neutrophil % 82.4 (H) %      Lymphocyte % 7.2 (L) %      Monocyte % 9.4 %      Eosinophil % 0.5 %      Basophil % 0.3 %      Immature Grans % 0.2 %      Neutrophils, Absolute 8.63 (H) 10*3/mm3      Lymphocytes, Absolute 0.75 10*3/mm3      Monocytes, Absolute 0.98 10*3/mm3      Eosinophils, Absolute 0.05 10*3/mm3      Basophils, Absolute 0.03 10*3/mm3      Immature Grans, Absolute 0.02 10*3/mm3     CBC & Differential [[id number]] Collected:  12/30/17 1312    Specimen:  Blood Updated:  12/30/17 1325    Narrative:       The following orders were created for panel order CBC & Differential.  Procedure                               Abnormality         Status                     ---------                               -----------         ------                     CBC Auto Differential[[id number]]        Abnormal            Final result                 Please view results for these tests on the individual orders.    POC Troponin, Rapid [[id number]]  (Normal) Collected:  12/30/17 1321    Specimen:  Blood Updated:  12/30/17 1336     Troponin I 0.00 ng/mL       Serial Number: [id number]:  [id number]       Comprehensive Metabolic Panel [[id number]]  (Abnormal) Collected:  12/30/17 1312    Specimen:  Blood Updated:  12/30/17 1340     Glucose 113 (H) mg/dL      BUN 15 mg/dL      Creatinine 0.50 (L) mg/dL      Sodium 131 (L) mmol/L      Potassium 3.7 mmol/L      " Chloride 99 mmol/L      CO2 28.0 mmol/L      Calcium 9.2 mg/dL      Total Protein 7.1 g/dL      Albumin 4.10 g/dL      ALT (SGPT) 31 U/L      AST (SGOT) 34 (H) U/L      Alkaline Phosphatase 60 U/L      Total Bilirubin 1.2 mg/dL      eGFR Non African Amer 119 mL/min/1.73      Globulin 3.0 gm/dL      A/G Ratio 1.4 (L) g/dL      BUN/Creatinine Ratio 30.0 (H)     Anion Gap 4.0 mmol/L     Narrative:       [employer] Guidelines    Stage     Description        GFR  1         Normal or High     90+  2         Mild decrease      60-89  3         Moderate decrease  30-59  4         Severe decrease    15-29  5         Kidney failure     <15    Urinalysis With / Culture If Indicated - Urine, Clean Catch [[id number]]  (Abnormal) Collected:  12/30/17 1339    Specimen:  Urine from Urine, Clean Catch Updated:  12/30/17 1352     Color, UA Yellow     Appearance, UA Clear     pH, UA 7.5     Specific Gravity, UA 1.016     Glucose, UA Negative     Ketones, UA Trace (A)     Bilirubin, UA Negative     Blood, UA Negative     Protein, UA Negative     Leuk Esterase, UA Negative     Nitrite, UA Negative     Urobilinogen, UA 0.2 E.U./dL    Narrative:       Urine microscopic not indicated.    POC Troponin, Rapid [[id number]]  (Normal) Collected:  12/30/17 1521    Specimen:  Blood Updated:  12/30/17 1540     Troponin I 0.00 ng/mL       Serial Number: [id number]:  [id number]       CBC & Differential [[id number]] Collected:  12/30/17 2108    Specimen:  Blood Updated:  12/30/17 2135    Narrative:       The following orders were created for panel order CBC & Differential.  Procedure                               Abnormality         Status                     ---------                               -----------         ------                     CBC Auto Differential[[id number]]        Abnormal            Final result                 Please view results for these tests on the individual orders.    CBC Auto Differential [[id number]]   (Abnormal) Collected:  12/30/17 2108    Specimen:  Blood Updated:  12/30/17 2135     WBC 9.98 10*3/mm3      RBC 3.89 10*6/mm3      Hemoglobin 11.7 g/dL      Hematocrit 36.6 %      MCV 94.1 fL      MCH 30.1 pg      MCHC 32.0 g/dL      RDW 14.2 %      RDW-SD 48.6 fl      MPV 11.4 fL      Platelets 160 10*3/mm3      Neutrophil % 73.9 (H) %      Lymphocyte % 12.8 (L) %      Monocyte % 11.5 %      Eosinophil % 1.4 %      Basophil % 0.3 %      Immature Grans % 0.1 %      Neutrophils, Absolute 7.37 10*3/mm3      Lymphocytes, Absolute 1.28 10*3/mm3      Monocytes, Absolute 1.15 (H) 10*3/mm3      Eosinophils, Absolute 0.14 10*3/mm3      Basophils, Absolute 0.03 10*3/mm3      Immature Grans, Absolute 0.01 10*3/mm3     CBC & Differential [[id number]] Collected:  12/31/17 0511    Specimen:  Blood Updated:  12/31/17 0619    Narrative:       The following orders were created for panel order CBC & Differential.  Procedure                               Abnormality         Status                     ---------                               -----------         ------                     CBC Auto Differential[[id number]]        Abnormal            Final result                 Please view results for these tests on the individual orders.    CBC Auto Differential [[id number]]  (Abnormal) Collected:  12/31/17 0511    Specimen:  Blood Updated:  12/31/17 0619     WBC 11.20 (H) 10*3/mm3      RBC 3.51 (L) 10*6/mm3      Hemoglobin 10.7 (L) g/dL      Hematocrit 32.9 (L) %      MCV 93.7 fL      MCH 30.5 pg      MCHC 32.5 g/dL      RDW 14.3 %      RDW-SD 49.1 fl      MPV 11.7 fL      Platelets 165 10*3/mm3      Neutrophil % 80.9 (H) %      Lymphocyte % 8.4 (L) %      Monocyte % 9.8 %      Eosinophil % 0.4 %      Basophil % 0.3 %      Immature Grans % 0.2 %      Neutrophils, Absolute 9.07 (H) 10*3/mm3      Lymphocytes, Absolute 0.94 10*3/mm3      Monocytes, Absolute 1.10 (H) 10*3/mm3      Eosinophils, Absolute 0.04 10*3/mm3      Basophils, Absolute  0.03 10*3/mm3      Immature Grans, Absolute 0.02 10*3/mm3     Basic Metabolic Panel [[id number]]  (Abnormal) Collected:  12/31/17 0511    Specimen:  Blood Updated:  12/31/17 0641     Glucose 109 (H) mg/dL      BUN 13 mg/dL      Creatinine 0.50 (L) mg/dL      Sodium 130 (L) mmol/L      Potassium 3.4 (L) mmol/L      Chloride 98 (L) mmol/L      CO2 26.0 mmol/L      Calcium 8.6 (L) mg/dL      eGFR Non African Amer 119 mL/min/1.73      BUN/Creatinine Ratio 26.0 (H)     Anion Gap 6.0 mmol/L     Narrative:       [employer] Guidelines    Stage     Description        GFR  1         Normal or High     90+  2         Mild decrease      60-89  3         Moderate decrease  30-59  4         Severe decrease    15-29  5         Kidney failure     <15          Imaging Results (last 24 hours)     Procedure Component Value Units Date/Time    XR Chest 1 View [[id number]] Updated:  12/30/17 1523    XR Hip With or Without Pelvis 2 - 3 View Right [[id number]] Updated:  12/30/17 1525    CT Pelvis Without Contrast [[id number]] Updated:  12/30/17 1626    FL C Arm During Surgery [[id number]] Updated:  12/30/17 1940          Patient Care Team:  Luke Baum MD as PCP - General  Neftali Denise MD as Consulting Physician (General Surgery)    SUBJECTIVE: She is sitting up in the chair this morning.  Tolerating a regular diet.  Denies hip pain.  By report had some confusion overnight but is answering questions appropriately this morning.    PHYSICAL EXAM  Right hip dressing is clean, dry and intact  Thigh and calf soft and nontender  Neurovascular intact distally     Principal Problem:    Closed right hip fracture  Active Problems:    Malignant neoplasm of central portion of right female breast    Fall at home    Hyponatremia    History of breast cancer    Hypokalemia    Postoperative anemia      PLAN / DISPOSITION: 77-year-old female postop day #1 status post right hip percutaneous pinning  -Mobilize with PT/OT, partial  "weightbearing right lower extremity  -Lovenox for DVT prophylaxis okay to start today  -H&H stable  - for rehabilitation planning  -Follow-up in 2-3 weeks    Corbin Swanson MD  12/31/17  10:16 AM         Electronically signed by Corbin Swanson MD at 12/31/2017 10:17 AM      Corbin Swanson MD at 1/1/2018 10:01 AM  Version 1 of 1         /73  Pulse 100  Temp 98.2 °F (36.8 °C)  Resp 16  Ht 154.9 cm (61\")  Wt 40.8 kg (90 lb)  SpO2 97%  BMI 17.01 kg/m2    Lab Results (last 24 hours)     Procedure Component Value Units Date/Time    CBC & Differential [[id number]] Collected:  01/01/18 0546    Specimen:  Blood Updated:  01/01/18 0650    Narrative:       The following orders were created for panel order CBC & Differential.  Procedure                               Abnormality         Status                     ---------                               -----------         ------                     CBC Auto Differential[[id number]]        Abnormal            Final result                 Please view results for these tests on the individual orders.    CBC Auto Differential [[id number]]  (Abnormal) Collected:  01/01/18 0546    Specimen:  Blood Updated:  01/01/18 0650     WBC 7.45 10*3/mm3      RBC 3.21 (L) 10*6/mm3      Hemoglobin 9.9 (L) g/dL      Hematocrit 30.2 (L) %      MCV 94.1 fL      MCH 30.8 pg      MCHC 32.8 g/dL      RDW 14.2 %      RDW-SD 49.1 fl      MPV 11.6 fL      Platelets 140 (L) 10*3/mm3      Neutrophil % 67.2 %      Lymphocyte % 14.8 (L) %      Monocyte % 14.6 (H) %      Eosinophil % 2.8 %      Basophil % 0.3 %      Immature Grans % 0.3 %      Neutrophils, Absolute 5.01 10*3/mm3      Lymphocytes, Absolute 1.10 10*3/mm3      Monocytes, Absolute 1.09 (H) 10*3/mm3      Eosinophils, Absolute 0.21 10*3/mm3      Basophils, Absolute 0.02 10*3/mm3      Immature Grans, Absolute 0.02 10*3/mm3           Imaging Results (last 24 hours)     Procedure Component Value Units " Date/Time    CT Pelvis Without Contrast [[id number]] Collected:  12/31/17 1228     Updated:  12/31/17 1234    Narrative:       EXAMINATION: CT PELVIS WO CONTRAST - 12/30/2017     INDICATION: Evaluate for possible right hip fixation.      TECHNIQUE: CT pelvis without intravenous contrast.     The radiation dose reduction device was turned on for each scan per the  ALARA (As Low as Reasonably Achievable) protocol.     COMPARISON: CT dated 10/28/2015.     FINDINGS: Intrapelvic soft tissues unremarkable without free fluid or  bulky pelvic adenopathy. Moderately distended urinary bladder.     Osseous structures demonstrate atherosclerotic involvement of the  lumbosacral junction. SI joints symmetric and intact. Pubic symphysis  without significant widening. Abnormal cortical irregularity in the  right subcapital femoral region extending along the neck consistent with  subcapital impacted fracture in asymmetric appearance compared to the  left side. Minimal degenerative changes of the bilateral hips noted.       Impression:       Acute impacted right subcapital femoral fracture which is  nondisplaced producing mild foreshortening of the right leg due to  impacted asymmetric configuration.     DICTATED:     12/30/2017  EDITED:          12/31/2017     This report was finalized on 12/31/2017 12:31 PM by Dr. Bob Alfaro.       XR Hip With or Without Pelvis 2 - 3 View Right [[id number]] Collected:  12/31/17 1159     Updated:  12/31/17 1240    Narrative:       EXAMINATION: XR HIP, 2-3 VIEWS RIGHT - 12/30/2017     INDICATION: Fall, right hip pain.      COMPARISON: None.     FINDINGS: SI joints and pubic symphysis without significant widening.  Right femoral neck subcapital region has asymmetric somewhat impacted  appearance suspicious for fracture in the acute setting however no  discrete fracture line is clearly evident. Background degenerative  changes. Calcified phleboliths.           Impression:       Asymmetric appearance of  the right subcapital region  concerning for impacted fracture however no discrete fracture line.  Recommend CT bony pelvis for further evaluation.     DICTATED:     12/30/2017  EDITED:          12/31/2017     This report was finalized on 12/31/2017 12:38 PM by Dr. Bob Alfaro.       XR Chest 1 View [[id number]] Collected:  12/31/17 1200     Updated:  12/31/17 1241    Narrative:       EXAMINATION: XR CHEST 1 VW - 12/30/2017     INDICATION: Right rib pain after fall.     COMPARISON: Chest x-ray 10/27/2015.     FINDINGS: Chronic lung changes including hyperinflated appearance and  prior granulomatous involvement without focal consolidation or  pneumothorax. No pleural effusion. Cardiac silhouette within normal  limits.           Impression:       Chronic lung changes including hyperinflated appearance  without acute cardiopulmonary disease.     DICTATED:     12/30/2017  EDITED:          12/31/2017     This report was finalized on 12/31/2017 12:39 PM by Dr. Bob Alfaro.       FL C Arm During Surgery [[id number]] Collected:  01/01/18 0807     Updated:  01/01/18 0911    Narrative:       EXAMINATION: FLUOROSCOPY C-ARM DURING SURGERY-     INDICATION: Hip pinning; W19.XXXA-Unspecified fall, initial encounter;  Y92.099-Unspecified place in other non-institutional residence as the  place of occurrence of the external cause; S72.001A-Fracture of  unspecified part of neck of right femur, initial encounter for closed  fracture.      TECHNIQUE: Intraoperative fluoroscopy for improved localization and  treatment planning.     COMPARISON: CT bony pelvis 12/30/2017.     FINDINGS: Intraoperative fluoroscopy with total fluoroscopic time usage  1 minute 29 seconds and four representative images saved of ORIF right  hip involving three partially threaded screws spanning the subcapital  fracture right femur.       Impression:       Intraoperative fluoroscopy ORIF right hip.      D:  01/01/2018  E:  01/01/2018       CT Head With & Without  Contrast [[id number]] Collected:  01/01/18 0845     Updated:  01/01/18 0934    Narrative:       EXAMINATION: CT HEAD W WO CONTRAST-      INDICATION: Dizziness and fall in a patient with history of breast  cancer; W19.XXXA-Unspecified fall, initial encounter;  Y92.099-Unspecified place in other non-institutional residence as the  place of occurrence of the external cause; S72.001A-Fracture of  unspecified part of neck of right femur, initial encounter for closed  fracture; Z74.09-Other reduced mobility.      TECHNIQUE: CT head with and without intravenous contrast administration.     The radiation dose reduction device was turned on for each scan per the  ALARA (As Low as Reasonably Achievable) protocol.     COMPARISON: CT head 10/28/2015.     FINDINGS: Midline structures are symmetric without evidence of mass,  mass effect or midline shift. No intraaxial hemorrhage or extraaxial  fluid collection. Ventricles and sulci are mildly prominent. There is an  area of encephalomalacia left frontal lobe with ex vacuo dilatation of  the left frontal horn new within the interval between comparison  10/28/2015. No abnormal enhancing focus on postcontrast administration  sequences identified. Superior sagittal sinus is widely patent. Globes  and orbits unremarkable. Visualized paranasal sinuses and mastoid air  cells are grossly clear and well pneumatized. Calvarium intact.       Impression:       1. No acute intracranial abnormality.  2. Area of encephalomalacia left frontal lobe including ex vacuo  dilatation of the left frontal horn occurring within the interval  between prior comparison 10/28/2015 likely prior insult.  3. No abnormal enhancing focus to suggest intracranial metastasis.     D:  01/01/2018  E:  01/01/2018             Patient Care Team:  Luke Baum MD as PCP - General  Neftali Denise MD as Consulting Physician (General Surgery)    SUBJECTIVE: Cassidy reports she is doing okay.  She has not gotten up  to the chair yet today.  She reports right hip pain overnight but feels better this morning.  Walked to the chair with therapy yesterday.    PHYSICAL EXAM  Right hip incision is clean, dry and intact  Thigh and calf soft and nontender  Neurovascular intact distally     Principal Problem:    Closed right hip fracture  Active Problems:    Malignant neoplasm of central portion of right female breast    Fall at home    Hyponatremia    History of breast cancer    Hypokalemia    Postoperative anemia      PLAN / DISPOSITION: 77-year-old female postop day #2 status post right hip percutaneous pinning  -Partial weightbearing right lower extremity with PT/OT and walker assist  -Lovenox for DVT prophylaxis  - for rehabilitation planning  -Right hip dressing changed to Aquacel, incision healing well  -Follow-up in 2-3 weeks    Corbin Swanson MD  18  10:01 AM         Electronically signed by Corbin Swanson MD at 2018 10:03 AM      Nomi Kraus MD at 2018  1:41 PM  Version 1 of 1             [facility] Medicine Services  PROGRESS NOTE    Patient Name: Cassidy Teran  : 1940  MRN: [id number]    Date of Admission: 2017  Length of Stay: 1  Primary Care Physician: Luke Baum MD    Subjective     CC: f/u R hip fracture    HPI:  Seems less confused this morning, expresses frustration with nursing response time.  Pain is doing ok.   at bedside.  Eating well.    Review of Systems  Gen- No fevers, chills  CV- No chest pain, palpitations  Resp- No cough, dyspnea  GI- No N/V/D, abd pain    Otherwise ROS is negative except as mentioned in the HPI.    Objective     Vital Signs:   Temp:  [97.3 °F (36.3 °C)-98.2 °F (36.8 °C)] 98.2 °F (36.8 °C)  Heart Rate:  [] 100  Resp:  [14-16] 16  BP: (111-122)/(64-73) 121/73        Physical Exam:  Constitutional: Thin, elderly female. NAD. Awake, alert and conversant, feeding herself lunch  HENT: NCAT,  mucous membranes moist  Respiratory: Clear to auscultation bilaterally, respiratory effort normal   Cardiovascular: RRR, no murmurs, rubs, or gallops   Gastrointestinal: Positive bowel sounds, soft, nontender, nondistended  Musculoskeletal: No bilateral ankle edema. R hip incision is dressed and clean   Psychiatric: Appropriate affect, cooperative  Neurologic: Oriented x 3. Strength symmetric in all extremities, Cranial Nerves grossly intact to confrontation, speech clear, no focal deficits  Skin: No rashes    Results Reviewed:  I have personally reviewed current lab, radiology, and data and agree.      Results from last 7 days  Lab Units 01/01/18  0546 12/31/17  0511 12/30/17  2108   WBC 10*3/mm3 7.45 11.20* 9.98   HEMOGLOBIN g/dL 9.9* 10.7* 11.7   HEMATOCRIT % 30.2* 32.9* 36.6   PLATELETS 10*3/mm3 140* 165 160       Results from last 7 days  Lab Units 12/31/17  0511 12/30/17  1312   SODIUM mmol/L 130* 131*   POTASSIUM mmol/L 3.4* 3.7   CHLORIDE mmol/L 98* 99   CO2 mmol/L 26.0 28.0   BUN mg/dL 13 15   CREATININE mg/dL 0.50* 0.50*   GLUCOSE mg/dL 109* 113*   CALCIUM mg/dL 8.6* 9.2   ALT (SGPT) U/L  --  31   AST (SGOT) U/L  --  34*     No results found for: BNP  No results found for: PHART    Microbiology Results Abnormal     None        Imaging Results (last 24 hours)     Procedure Component Value Units Date/Time    CT Head With & Without Contrast [[id number]] Collected:  01/01/18 0845     Updated:  01/01/18 1014    Narrative:       EXAMINATION: CT HEAD W WO CONTRAST-      INDICATION: Dizziness and fall in a patient with history of breast  cancer; W19.XXXA-Unspecified fall, initial encounter;  Y92.099-Unspecified place in other non-institutional residence as the  place of occurrence of the external cause; S72.001A-Fracture of  unspecified part of neck of right femur, initial encounter for closed  fracture; Z74.09-Other reduced mobility.      TECHNIQUE: CT head with and without intravenous contrast administration.      The radiation dose reduction device was turned on for each scan per the  ALARA (As Low as Reasonably Achievable) protocol.     COMPARISON: CT head 10/28/2015.     FINDINGS: Midline structures are symmetric without evidence of mass,  mass effect or midline shift. No intraaxial hemorrhage or extraaxial  fluid collection. Ventricles and sulci are mildly prominent. There is an  area of encephalomalacia left frontal lobe with ex vacuo dilatation of  the left frontal horn new within the interval between comparison  10/28/2015. No abnormal enhancing focus on postcontrast administration  sequences identified. Superior sagittal sinus is widely patent. Globes  and orbits unremarkable. Visualized paranasal sinuses and mastoid air  cells are grossly clear and well pneumatized. Calvarium intact.       Impression:       1. No acute intracranial abnormality.  2. Area of encephalomalacia left frontal lobe including ex vacuo  dilatation of the left frontal horn occurring within the interval  between prior comparison 10/28/2015 likely prior insult.  3. No abnormal enhancing focus to suggest intracranial metastasis.     D:  01/01/2018  E:  01/01/2018     This report was finalized on 1/1/2018 10:11 AM by Dr. Bob Alfaro.       FL C Arm During Surgery [[id number]] Collected:  01/01/18 0807     Updated:  01/01/18 1014    Narrative:       EXAMINATION: FLUOROSCOPY C-ARM DURING SURGERY-     INDICATION: Hip pinning; W19.XXXA-Unspecified fall, initial encounter;  Y92.099-Unspecified place in other non-institutional residence as the  place of occurrence of the external cause; S72.001A-Fracture of  unspecified part of neck of right femur, initial encounter for closed  fracture.      TECHNIQUE: Intraoperative fluoroscopy for improved localization and  treatment planning.     COMPARISON: CT bony pelvis 12/30/2017.     FINDINGS: Intraoperative fluoroscopy with total fluoroscopic time usage  1 minute 29 seconds and four representative images  "saved of ORIF right  hip involving three partially threaded screws spanning the subcapital  fracture right femur.       Impression:       Intraoperative fluoroscopy ORIF right hip.      D:  01/01/2018  E:  01/01/2018     This report was finalized on 1/1/2018 10:12 AM by Dr. Bob Alfaro.           I have reviewed the medications.    Assessment/Plan   Assessment / Plan     Hospital Problem List     * (Principal)Closed right hip fracture    Malignant neoplasm of central portion of right female breast    Overview Signed 8/17/2016  9:44 AM by Jenni Izaguirre              Fall at home    Hyponatremia    History of breast cancer (Chronic)    Hypokalemia    Postoperative anemia        Brief Hospital Course to date:  Cassidy Teran is a 77 y.o. female with PMH significant for L breast cancer (2003- s/p mastectomy and XRT) followed by R breast cancer (s/p mastectomy, Herceptin, Adriamycin and Cytoxan). She did have some reduction of her systolic function (down to 45%) but EF recovered to 55-60% by January 2017 after discontinuation of Herceptin. She was started on Arimidex in April 2017 and the patient's  reports she has been \"weak and dizzy\" ever since. Ms. Teran was admitted to [facility] on 12/30/17 after a fall at home, sustaining a R femoral neck fracture. She underwent repair by Dr. Corbin Swanson.     Right femoral neck fracture  Post-operative anemia  - s/p R hip cannulated screw fixation of the R femoral neck by Dr. Swanson on 12/30/17  - Pain management PRN  - PT/OT following, CM for rehab placement at d/c     R breast cancer   Weakness and dizziness  - Ongoing since April 2017 after starting Arimidex. Patient is followed by Dr. Lund  - Reviewed Dr. Augustine note, seems unlikley that arimedex causing her symptoms but ok to stop for now.  Can f/u with Dr. Lund.  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  Findings d/w patient and .    Hyponatremia  - stable    Hypokalemia, " replete potassium PRN     DVT Prophylaxis: Lovenox      CODE STATUS: Full Code    Disposition: I expect the patient to be discharged to rehab once arranged.    Nomi Kraus MD  18  1:41 PM           Electronically signed by Nomi Kraus MD at 2018  1:45 PM      Nomi Kraus MD at 2018  8:24 AM  Version 1 of 1             [facility] Medicine Services  PROGRESS NOTE    Patient Name: Cassidy Teran  : 1940  MRN: [id number]    Date of Admission: 2017  Length of Stay: 1  Primary Care Physician: Luke Baum MD    Subjective     CC: f/u R hip fracture    HPI:   is not present.  She is more confused this morning.  Says her pain is better.  No issues overnight besides the confusion.  RN at bedside passing meds.    Review of Systems  Unable to obtain d/t confusion    Objective     Vital Signs:   Temp:  [98 °F (36.7 °C)-98.8 °F (37.1 °C)] 98.2 °F (36.8 °C)  Heart Rate:  [] 108  Resp:  [16] 16  BP: ()/(58-73) 122/73        Physical Exam:  Constitutional: Thin, elderly female. NAD. Awake, alert and conversant, but confused  HENT: NCAT, mucous membranes moist  Respiratory: Clear to auscultation bilaterally, respiratory effort normal   Cardiovascular: RRR, no murmurs, rubs, or gallops   Gastrointestinal: Positive bowel sounds, soft, nontender, nondistended  Musculoskeletal: No bilateral ankle edema. R hip incision is dressed and clean   Psychiatric: Appropriate affect, cooperative  Neurologic: Confused today, knows she is in the hospital but not which one.  Thinks year is  . Strength symmetric in all extremities, Cranial Nerves grossly intact to confrontation, speech clear, no focal deficits  Skin: No rashes    Results Reviewed:  I have personally reviewed current lab, radiology, and data and agree.      Results from last 7 days  Lab Units 18  0601 18  0546 17  0511   WBC 10*3/mm3 6.94 7.45 11.20*   HEMOGLOBIN g/dL 8.8* 9.9*  10.7*   HEMATOCRIT % 27.3* 30.2* 32.9*   PLATELETS 10*3/mm3 137* 140* 165       Results from last 7 days  Lab Units 12/31/17  0511 12/30/17  1312   SODIUM mmol/L 130* 131*   POTASSIUM mmol/L 3.4* 3.7   CHLORIDE mmol/L 98* 99   CO2 mmol/L 26.0 28.0   BUN mg/dL 13 15   CREATININE mg/dL 0.50* 0.50*   GLUCOSE mg/dL 109* 113*   CALCIUM mg/dL 8.6* 9.2   ALT (SGPT) U/L  --  31   AST (SGOT) U/L  --  34*     No results found for: BNP  No results found for: PHART    Microbiology Results Abnormal     None        Imaging Results (last 24 hours)     Procedure Component Value Units Date/Time    CT Head With & Without Contrast [[id number]] Collected:  01/01/18 0845     Updated:  01/01/18 1014    Narrative:       EXAMINATION: CT HEAD W WO CONTRAST-      INDICATION: Dizziness and fall in a patient with history of breast  cancer; W19.XXXA-Unspecified fall, initial encounter;  Y92.099-Unspecified place in other non-institutional residence as the  place of occurrence of the external cause; S72.001A-Fracture of  unspecified part of neck of right femur, initial encounter for closed  fracture; Z74.09-Other reduced mobility.      TECHNIQUE: CT head with and without intravenous contrast administration.     The radiation dose reduction device was turned on for each scan per the  ALARA (As Low as Reasonably Achievable) protocol.     COMPARISON: CT head 10/28/2015.     FINDINGS: Midline structures are symmetric without evidence of mass,  mass effect or midline shift. No intraaxial hemorrhage or extraaxial  fluid collection. Ventricles and sulci are mildly prominent. There is an  area of encephalomalacia left frontal lobe with ex vacuo dilatation of  the left frontal horn new within the interval between comparison  10/28/2015. No abnormal enhancing focus on postcontrast administration  sequences identified. Superior sagittal sinus is widely patent. Globes  and orbits unremarkable. Visualized paranasal sinuses and mastoid air  cells are grossly  clear and well pneumatized. Calvarium intact.       Impression:       1. No acute intracranial abnormality.  2. Area of encephalomalacia left frontal lobe including ex vacuo  dilatation of the left frontal horn occurring within the interval  between prior comparison 10/28/2015 likely prior insult.  3. No abnormal enhancing focus to suggest intracranial metastasis.     D:  01/01/2018  E:  01/01/2018     This report was finalized on 1/1/2018 10:11 AM by Dr. Bob Alfaro.       FL C Arm During Surgery [[id number]] Collected:  01/01/18 0807     Updated:  01/01/18 1014    Narrative:       EXAMINATION: FLUOROSCOPY C-ARM DURING SURGERY-     INDICATION: Hip pinning; W19.XXXA-Unspecified fall, initial encounter;  Y92.099-Unspecified place in other non-institutional residence as the  place of occurrence of the external cause; S72.001A-Fracture of  unspecified part of neck of right femur, initial encounter for closed  fracture.      TECHNIQUE: Intraoperative fluoroscopy for improved localization and  treatment planning.     COMPARISON: CT bony pelvis 12/30/2017.     FINDINGS: Intraoperative fluoroscopy with total fluoroscopic time usage  1 minute 29 seconds and four representative images saved of ORIF right  hip involving three partially threaded screws spanning the subcapital  fracture right femur.       Impression:       Intraoperative fluoroscopy ORIF right hip.      D:  01/01/2018  E:  01/01/2018     This report was finalized on 1/1/2018 10:12 AM by Dr. Bob Alfaro.           I have reviewed the medications.    Assessment/Plan   Assessment / Plan     Hospital Problem List     * (Principal)Closed right hip fracture    Malignant neoplasm of central portion of right female breast    Overview Signed 8/17/2016  9:44 AM by Jenni Izaguirre              Fall at home    Hyponatremia    History of breast cancer (Chronic)    Hypokalemia    Postoperative anemia    Delirium        Brief Hospital Course to date:  Cassidy Teran is a 77  "y.o. female with PMH significant for L breast cancer (2003- s/p mastectomy and XRT) followed by R breast cancer (s/p mastectomy, Herceptin, Adriamycin and Cytoxan). She did have some reduction of her systolic function (down to 45%) but EF recovered to 55-60% by January 2017 after discontinuation of Herceptin. She was started on Arimidex in April 2017 and the patient's  reports she has been \"weak and dizzy\" ever since. Ms. Teran was admitted to [facility] on 12/30/17 after a fall at home, sustaining a R femoral neck fracture. She underwent repair by Dr. Corbin Swanson.     Right femoral neck fracture  Post-operative anemia  - s/p R hip cannulated screw fixation of the R femoral neck by Dr. Swanson on 12/30/17  - Pain management PRN  - PT/OT following, CM for rehab placement at d/c     R breast cancer   Weakness and dizziness  - Ongoing since April 2017 after starting Arimidex. Patient is followed by Dr. Lund  - Reviewed Dr. Augustine note, seems unlikley that arimedex causing her symptoms but ok to stop for now.  Can f/u with Dr. Lund.  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  Findings d/w patient and .    Hyponatremia  - previously stable, will check in AM    Post-op Anemia  - continues to drift down, no overt bleeding.    Delirium   - multifactorial including anesthesia, narcotics, hospital acquired, with some probable underlying cognitive impairment  - will schedule some seroquel at night, should improve with time and being out of hospital.    Hypokalemia, replete potassium PRN     DVT Prophylaxis: Lovenox      CODE STATUS: Full Code    Disposition: I expect the patient to be discharged to rehab once arranged.    Nomi Kraus MD  01/02/18  8:24 AM           Electronically signed by Nomi Kraus MD at 1/2/2018  8:43 AM      Corbin Swanson MD at 1/2/2018 10:50 AM  Version 1 of 1         /73 (BP Location: Right arm, Patient Position: Lying)  Pulse 108  " "Temp 98.2 °F (36.8 °C) (Oral)   Resp 16  Ht 154.9 cm (61\")  Wt 40.8 kg (90 lb)  SpO2 99%  BMI 17.01 kg/m2    Lab Results (last 24 hours)     Procedure Component Value Units Date/Time    CBC Auto Differential [[id number]]  (Abnormal) Collected:  01/02/18 0601    Specimen:  Blood Updated:  01/02/18 0731     WBC 6.94 10*3/mm3      RBC 2.91 (L) 10*6/mm3      Hemoglobin 8.8 (L) g/dL      Hematocrit 27.3 (L) %      MCV 93.8 fL      MCH 30.2 pg      MCHC 32.2 g/dL      RDW 13.9 %      RDW-SD 48.1 fl      MPV 11.7 fL      Platelets 137 (L) 10*3/mm3      Neutrophil % 64.6 %      Lymphocyte % 17.9 (L) %      Monocyte % 14.7 (H) %      Eosinophil % 2.4 %      Basophil % 0.3 %      Immature Grans % 0.1 %      Neutrophils, Absolute 4.48 10*3/mm3      Lymphocytes, Absolute 1.24 10*3/mm3      Monocytes, Absolute 1.02 (H) 10*3/mm3      Eosinophils, Absolute 0.17 10*3/mm3      Basophils, Absolute 0.02 10*3/mm3      Immature Grans, Absolute 0.01 10*3/mm3     CBC & Differential [[id number]] Collected:  01/02/18 0601    Specimen:  Blood Updated:  01/02/18 0732    Narrative:       The following orders were created for panel order CBC & Differential.  Procedure                               Abnormality         Status                     ---------                               -----------         ------                     Scan Slide[[id number]]                                                                  CBC Auto Differential[[id number]]        Abnormal            Final result                 Please view results for these tests on the individual orders.          Imaging Results (last 24 hours)     ** No results found for the last 24 hours. **          Patient Care Team:  Luke Baum MD as PCP - General  Neftali Denise MD as Consulting Physician (General Surgery)    SUBJECTIVE: Pain is well-controlled.  Tolerating regular diet.  She walked 20 feet with therapy yesterday.  Unable to participate with rehabilitation today " due to dizziness.    PHYSICAL EXAM  Right hip Aquacel dressing clean, dry and intact  Thigh and calf soft and nontender  Neurovascularly intact distally     Principal Problem:    Closed right hip fracture  Active Problems:    Malignant neoplasm of central portion of right female breast    Fall at home    Hyponatremia    History of breast cancer    Hypokalemia    Postoperative anemia    Delirium      PLAN / DISPOSITION: 77-year-old female postop day #3 status post right hip percutaneous pinning  -Okay with me to transfer to rehabilitation at [facility] when bed available  -Continue to mobilize with therapy as tolerated, partial weightbearing right lower extremity  -Postop anemia, monitor H&H  -Lovenox for DVT prophylaxis  -Follow-up in 2 weeks upon discharge    Corbin Swanson MD  01/02/18  10:50 AM         Electronically signed by Corbin Swanson MD at 1/2/2018 10:52 AM           Consult Notes (all)      Corbin Swanson MD at 12/30/2017  5:50 PM  Version 1 of 1           Patient: Cassidy Teran    Date of Admission: 12/30/2017 12:52 PM    YOB: 1940    Medical Record Number: [id number]    Attending Physician: Corbin Swanson MD (being admitted to hospitalist service)    Primary Care Physician: Luke Baum MD    Consulting Physician: Corbin Swanson MD      Chief Complaints: R hip pain    History of Present Illness: Cassidy is a very pleasant 77-year-old female who presents to [facility] ER with right hip pain after falling at home this morning.  She was using her walker when she fell onto her right side.  She was unable to ambulate after this fall.  She was brought to [facility] ER by EMS where evaluation revealed a right hip fracture.  I was consulted for definitive management.  She reports problems with dizziness since she was started on Arimidex for history of breast cancer a few months ago.  She had mastectomy in 2004 and more recently in 2016.  She has an  isolated complaint of right hip pain.  She normally is a minimal community ambulator with walker assist and lives at home with her .       Allergies   Allergen Reactions   • Horse-Derived Products      Serum        Home Medications:    (Not in a hospital admission)    Current Medications:  Scheduled Meds:  fentaNYL citrate (PF) 25 mcg Intravenous Once     Continuous Infusions:   PRN Meds:.    Past Medical History:   Diagnosis Date   • Allergic rhinitis    • Anxiety    • Cataract    • Malignant neoplasm of breast, right         Past Surgical History:   Procedure Laterality Date   • BREAST SURGERY      Left   • EYE SURGERY      cataract   • PORTACATH PLACEMENT     • RECONSTRUCTION BREAST IMMEDIATE / DELAYED W/ TISSUE EXPANDER Bilateral 05/2016    [facility]        Social History     Occupational History   • Not on file.     Social History Main Topics   • Smoking status: Former Smoker   • Smokeless tobacco: Never Used      Comment: Quit 30 years ago   • Alcohol use 0.6 oz/week     1 Glasses of wine per week   • Drug use: Yes   • Sexual activity: Not on file    Social History     Social History Narrative      History reviewed. No pertinent family history.      Review of Systems:   Right hip pain and unable to ambulate    Physical Exam: 77 y.o. female  General Appearance:    Alert, cooperative, in no acute distress                 Vitals:    12/30/17 1715 12/30/17 1730 12/30/17 1745 12/30/17 1747   BP: 119/76 111/70 113/77    Patient Position:       Pulse: 88 94  96   Resp:       Temp:       TempSrc:       SpO2: 96% 95%  96%   Weight:       Height:            Extremities:  Right hip skin is intact with no obvious deformity  Thigh and calf soft and nontender  Neurovascularly intact distally      Diagnostic Tests:    Admission on 12/30/2017   Component Date Value Ref Range Status   • Glucose 12/30/2017 113* 70 - 100 mg/dL Final   • BUN 12/30/2017 15  9 - 23 mg/dL Final   • Creatinine 12/30/2017 0.50*  0.60 - 1.30 mg/dL Final   • Sodium 12/30/2017 131* 132 - 146 mmol/L Final   • Potassium 12/30/2017 3.7  3.5 - 5.5 mmol/L Final   • Chloride 12/30/2017 99  99 - 109 mmol/L Final   • CO2 12/30/2017 28.0  20.0 - 31.0 mmol/L Final   • Calcium 12/30/2017 9.2  8.7 - 10.4 mg/dL Final   • Total Protein 12/30/2017 7.1  5.7 - 8.2 g/dL Final   • Albumin 12/30/2017 4.10  3.20 - 4.80 g/dL Final   • ALT (SGPT) 12/30/2017 31  7 - 40 U/L Final   • AST (SGOT) 12/30/2017 34* 0 - 33 U/L Final   • Alkaline Phosphatase 12/30/2017 60  25 - 100 U/L Final   • Total Bilirubin 12/30/2017 1.2  0.3 - 1.2 mg/dL Final   • eGFR Non African Amer 12/30/2017 119  >60 mL/min/1.73 Final   • Globulin 12/30/2017 3.0  gm/dL Final   • A/G Ratio 12/30/2017 1.4* 1.5 - 2.5 g/dL Final   • BUN/Creatinine Ratio 12/30/2017 30.0* 7.0 - 25.0 Final   • Anion Gap 12/30/2017 4.0  3.0 - 11.0 mmol/L Final   • Color, UA 12/30/2017 Yellow  Yellow, Straw Final   • Appearance, UA 12/30/2017 Clear  Clear Final   • pH, UA 12/30/2017 7.5  5.0 - 8.0 Final   • Specific Gravity, UA 12/30/2017 1.016  1.001 - 1.030 Final   • Glucose, UA 12/30/2017 Negative  Negative Final   • Ketones, UA 12/30/2017 Trace* Negative Final   • Bilirubin, UA 12/30/2017 Negative  Negative Final   • Blood, UA 12/30/2017 Negative  Negative Final   • Protein, UA 12/30/2017 Negative  Negative Final   • Leuk Esterase, UA 12/30/2017 Negative  Negative Final   • Nitrite, UA 12/30/2017 Negative  Negative Final   • Urobilinogen, UA 12/30/2017 0.2 E.U./dL  0.2 - 1.0 E.U./dL Final   • WBC 12/30/2017 10.46  3.50 - 10.80 10*3/mm3 Final   • RBC 12/30/2017 4.17  3.89 - 5.14 10*6/mm3 Final   • Hemoglobin 12/30/2017 13.0  11.5 - 15.5 g/dL Final   • Hematocrit 12/30/2017 38.7  34.5 - 44.0 % Final   • MCV 12/30/2017 92.8  80.0 - 99.0 fL Final   • MCH 12/30/2017 31.2* 27.0 - 31.0 pg Final   • MCHC 12/30/2017 33.6  32.0 - 36.0 g/dL Final   • RDW 12/30/2017 14.3  11.3 - 14.5 % Final   • RDW-SD 12/30/2017 48.2  37.0 - 54.0  fl Final   • MPV 12/30/2017 11.5  6.0 - 12.0 fL Final   • Platelets 12/30/2017 172  150 - 450 10*3/mm3 Final   • Neutrophil % 12/30/2017 82.4* 41.0 - 71.0 % Final   • Lymphocyte % 12/30/2017 7.2* 24.0 - 44.0 % Final   • Monocyte % 12/30/2017 9.4  0.0 - 12.0 % Final   • Eosinophil % 12/30/2017 0.5  0.0 - 3.0 % Final   • Basophil % 12/30/2017 0.3  0.0 - 1.0 % Final   • Immature Grans % 12/30/2017 0.2  0.0 - 0.6 % Final   • Neutrophils, Absolute 12/30/2017 8.63* 1.50 - 8.30 10*3/mm3 Final   • Lymphocytes, Absolute 12/30/2017 0.75  0.60 - 4.80 10*3/mm3 Final   • Monocytes, Absolute 12/30/2017 0.98  0.00 - 1.00 10*3/mm3 Final   • Eosinophils, Absolute 12/30/2017 0.05  0.00 - 0.30 10*3/mm3 Final   • Basophils, Absolute 12/30/2017 0.03  0.00 - 0.20 10*3/mm3 Final   • Immature Grans, Absolute 12/30/2017 0.02  0.00 - 0.03 10*3/mm3 Final   • Troponin I 12/30/2017 0.00  0.00 - 0.07 ng/mL Final    Serial Number: [id number]:  [id number]   • Troponin I 12/30/2017 0.00  0.00 - 0.07 ng/mL Final    Serial Number: [id number]:  [id number]       No results found.    Right hip x-rays and pelvis CT scan reveal a valgus impacted minimally displaced right hip femoral neck fracture    Assessment: 77-year-old female with right hip valgus impacted minimally displaced femoral neck fracture    Patient Active Problem List   Diagnosis   • Malignant neoplasm of central portion of right female breast   • Decreased cardiac ejection fraction   • CHF (congestive heart failure)           Plan:  The patient voiced understanding of the risks, benefits, and alternative forms of treatment that were discussed and the patient consents to proceed with Right hip percutaneous pinning.  -I reviewed the imaging findings with Cassidy and her .  She has a valgus impacted femoral neck fracture amenable to percutaneous pinning.  I discussed all the risks, benefits and alternatives to surgery and they agreed to proceed.  They understand risks to include but  not limited to infection, pain, stiffness, functional decline, malunion/nonunion/screw cut out necessitating conversion to arthroplasty, neurovascular injury, leg length inequality and medical risks associated with surgery.  They understand the long rehabilitation course involved.  They understand the significant morbidity and mortality risks associated with hip fractures.  -We will proceed with right hip femoral neck fracture percutaneous pinning this evening pending OR availability.  -Keep her nothing by mouth and bedrest until surgery.  -Ancef ordered for prophylactic IV antibiotics.  -Hospitalist service consulted for medical management postoperatively.  -Mobilize with PT/OT starting tomorrow.  -Plan on Lovenox for DVT prophylaxis postoperatively.  - for rehabilitation placement.  -I answered all questions to their satisfaction and they're comfortable with the plan.  -Thank you very much for this consult, this patient was a pleasure to evaluate and treat.      Discharge Plan: to rehab in 3-5 days      Date: 12/30/2017    Corbin Swanson MD     Electronically signed by Corbin Swanson MD at 12/30/2017  5:59 PM      Laure Ndiaye MD at 12/31/2017 12:54 PM  Version 1 of 1     Consult Orders:    1. Inpatient Consult to Hematology and Oncology [[id number]] ordered by Nilsa Justice MD at 12/30/17 4961                REFERRING PHYSICIAN: Dr. Justice    PRIMARY CARE PROVIDER: Luke Baum MD    REASON FOR CONSULTATION: Breast cancer on Arimidex and a recent hip fracture      HISTORY OF PRESENT ILLNESS:  77-year-old lady with stage II a ER positive HER-2 positive right breast cancer currently on Revlimid X presents with a fall and hip fracture.  She reports that she has been feeling dizzy for the last several days.  Denies any significant headaches.  However has gotten progressively weak over the last several days.  I was asked to see the patient regarding her Arimidex.  The  patient feels that this drug has been causing some of her symptoms.      Allergies   Allergen Reactions   • Horse-Derived Products      Serum       Past Medical History:   Diagnosis Date   • Allergic rhinitis    • Anxiety    • Cataract    • Malignant neoplasm of breast, right      Oncology/Hematology History    1. 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 A 2.2 cm  primary, Dhillon-Alexander 4 out of 9, ER and GA positive, HER-2/javid 3+, status  post right mastectomy 09/29/2015:  a) Due to multiple somatic complaints, staging CT of the head chest, abdomen,  and pelvis was done on 10/28/2015. These were negative save for a stable 7 mm  left lower lobe nodule unchanged from 2004. Total body bone scan showed  degenerative joint disease and postsurgical rib changes but no areas worrisome  for metastasis.   b) Baseline echocardiogram 10/28/2015: Estimated ejection fraction 60% to 65%.  Repeat echocardiogram prior to beginning Herceptin on 01/08/2016: Estimated  ejection fraction 55% to 60%.  c) Began dose dense Adriamycin and Cytoxan cycle #1 of a planned 4 on  11/04/2015. Cycle #2 was delayed 1 week due to afebrile neutropenia, received  cycle #2 on 11/24/2015.  d) Started Herceptin and Taxol 01/13/2016.  e) Started Arimidex 04/04/2016.   f) Started Herceptin alone on 04/06/2016.   2. History of previous left breast cancer stage I, status post mastectomy and  radiation (data deficient).        Malignant neoplasm of central portion of right female breast    9/29/2015 Initial Diagnosis     Malignant neoplasm of central portion of right female breast         7/6/2016 Adverse Reaction     Decreased EF 45%, (baseline 60-65% 10/2015).  Herceptin held          8/3/2016 -  Other Event     Repeat ECHO EF 45-50% with mild global hypokinesis.  Will d/c Herceptin.                Current Facility-Administered Medications:   •   acetaminophen (TYLENOL) tablet 650 mg, 650 mg, Oral, Q4H PRN **OR** acetaminophen (TYLENOL) 160 MG/5ML solution 650 mg, 650 mg, Oral, Q4H PRN **OR** acetaminophen (TYLENOL) suppository 650 mg, 650 mg, Rectal, Q4H PRN, Corbin Swanson MD  •  acetaminophen (TYLENOL) tablet 650 mg, 650 mg, Oral, Q6H PRN, Nilsa Justice MD  •  bisacodyl (DULCOLAX) EC tablet 5 mg, 5 mg, Oral, Daily PRN, Nilsa Justice MD  •  bisacodyl (DULCOLAX) suppository 10 mg, 10 mg, Rectal, Daily PRN, Nilsa Justice MD  •  calcium carb-cholecalciferol 600-800 MG-UNIT tablet 1 tablet, 1 tablet, Oral, Daily, Nilsa Justice MD, 1 tablet at 12/31/17 0937  •  calcium carbonate (TUMS) chewable tablet 500 mg (200 mg elemental), 2 tablet, Oral, BID PRN, Nilsa Justice MD  •  diphenhydrAMINE (BENADRYL) capsule 25 mg, 25 mg, Oral, Q6H PRN **OR** diphenhydrAMINE (BENADRYL) injection 25 mg, 25 mg, Intravenous, Q6H PRN, Corbin Swanson MD  •  docusate sodium (COLACE) capsule 100 mg, 100 mg, Oral, BID PRN, Corbin Swanson MD  •  enoxaparin (LOVENOX) syringe 40 mg, 40 mg, Subcutaneous, Q24H, Corbin Swanson MD, 40 mg at 12/31/17 0938  •  fentaNYL citrate (PF) (SUBLIMAZE) injection 25 mcg, 25 mcg, Intravenous, Once, Pee Moreland MD  •  HYDROcodone-acetaminophen (NORCO) 5-325 MG per tablet 1 tablet, 1 tablet, Oral, Q4H PRN, Corbin Swanson MD  •  HYDROcodone-acetaminophen (NORCO) 5-325 MG per tablet 2 tablet, 2 tablet, Oral, Q4H PRN, Corbin Swanson MD  •  HYDROmorphone (DILAUDID) injection 0.5 mg, 0.5 mg, Intravenous, Q2H PRN **AND** naloxone (NARCAN) injection 0.1 mg, 0.1 mg, Intravenous, Q5 Min PRN, Corbin Swanson MD  •  lactated ringers infusion, 9 mL/hr, Intravenous, Continuous, Ze Chino MD, Last Rate: 9 mL/hr at 12/30/17 1808, 9 mL/hr at 12/30/17 1808  •  magnesium hydroxide (MILK OF MAGNESIA) suspension 2400 mg/10mL 10 mL, 10 mL, Oral, Daily PRN, Corbin Swanson MD  •   multivitamin with minerals 1 tablet, 1 tablet, Oral, Daily, Nilsa Justice MD, 1 tablet at 12/31/17 0938  •  OCUVITE-LUTEIN (OCUVITE) tablet 1 tablet, 1 tablet, Oral, Daily, Nilsa Justice MD, 1 tablet at 12/31/17 0938  •  ondansetron (ZOFRAN) injection 4 mg, 4 mg, Intravenous, Q6H PRN, Nilsa Justice MD  •  ondansetron (ZOFRAN) tablet 4 mg, 4 mg, Oral, Q6H PRN **OR** ondansetron (ZOFRAN) injection 4 mg, 4 mg, Intravenous, Q6H PRN, Corbin Swanson MD  •  polyethylene glycol 3350 powder (packet), 17 g, Oral, BID, Shannon Baum PA-C, 17 g at 12/31/17 0937  •  potassium chloride (MICRO-K) CR capsule 40 mEq, 40 mEq, Oral, PRN **OR** potassium chloride (KLOR-CON) packet 40 mEq, 40 mEq, Oral, PRN **OR** potassium chloride 10 mEq in 100 mL IVPB, 10 mEq, Intravenous, Q1H PRN, Nilsa Justice MD  •  sennosides-docusate sodium (SENOKOT-S) 8.6-50 MG tablet 2 tablet, 2 tablet, Oral, Nightly, Shannon Baum PA-C  •  sodium chloride 0.9 % flush 1-10 mL, 1-10 mL, Intravenous, PRN, Nilsa Justice MD  •  sodium chloride 0.9 % infusion, 75 mL/hr, Intravenous, Continuous, Shannon Baum PA-C, Last Rate: 75 mL/hr at 12/31/17 0939, 75 mL/hr at 12/31/17 0939    Past Surgical History:   Procedure Laterality Date   • BREAST SURGERY      Left   • EYE SURGERY      cataract   • PORTACATH PLACEMENT     • RECONSTRUCTION BREAST IMMEDIATE / DELAYED W/ TISSUE EXPANDER Bilateral 05/2016    [facility]       Social History     Social History   • Marital status:      Spouse name: N/A   • Number of children: N/A   • Years of education: N/A     Social History Main Topics   • Smoking status: Former Smoker   • Smokeless tobacco: Never Used      Comment: Quit 30 years ago   • Alcohol use 0.6 oz/week     1 Glasses of wine per week   • Drug use: Yes   • Sexual activity: Not Asked     Other Topics Concern   • None     Social History Narrative       History reviewed. No pertinent family  "history.      REVIEW OF SYSTEMS:  A 14 point review of systems was performed and is negative except as noted below.    Review of Systems   Constitutional: Positive for fatigue.   HENT:  Negative.    Eyes: Negative.    Respiratory: Negative.    Cardiovascular: Negative.    Gastrointestinal: Negative.    Endocrine: Negative.    Genitourinary: Negative.     Skin: Negative.    Neurological: Positive for dizziness and extremity weakness.   Hematological: Negative.          Objective     Vitals:    12/30/17 2035 12/31/17 0024 12/31/17 0532 12/31/17 0800   BP: 119/65 116/65 119/68 119/65   BP Location: Right arm Right arm Right arm    Patient Position: Lying Lying Lying    Pulse:   79 85   Resp: 16 16 16 20   Temp: 98.5 °F (36.9 °C) 98.6 °F (37 °C) 98.5 °F (36.9 °C) 97.4 °F (36.3 °C)   TempSrc: Oral Oral Oral Oral   SpO2: 95% 94% 97% 99%   Weight: 40.8 kg (90 lb)      Height: 154.9 cm (61\")          Performance Status: 2    Physical Exam    General: elderly thin  female in no acute distress  HEENT: sclera anicteric, oropharynx clear  Lymphatics: no cervical, supraclavicular, or axillary adenopathy  Cardiovascular: regular rate and rhythm, no murmurs  Lungs: clear to auscultation bilaterally  Abdomen: soft, nontender, nondistended.  No palpable organomegaly  Extremeties: wrapped after recent surgery  Skin: no rashes, lesions, bruising, or petechiae      LABS:    Lab Results   Component Value Date    HGB 10.7 (L) 12/31/2017    HCT 32.9 (L) 12/31/2017    MCV 93.7 12/31/2017     12/31/2017    WBC 11.20 (H) 12/31/2017    NEUTROABS 9.07 (H) 12/31/2017    LYMPHSABS 0.94 12/31/2017    MONOSABS 1.10 (H) 12/31/2017    EOSABS 0.04 12/31/2017    BASOSABS 0.03 12/31/2017     Lab Results   Component Value Date    GLUCOSE 109 (H) 12/31/2017    BUN 13 12/31/2017    CREATININE 0.50 (L) 12/31/2017    EGFRIFNONA 119 12/31/2017    BCR 26.0 (H) 12/31/2017    K 3.4 (L) 12/31/2017    CO2 26.0 12/31/2017    CALCIUM 8.6 (L) 12/31/2017    " ALBUMIN 4.10 12/30/2017    BILITOT 1.2 12/30/2017    AST 34 (H) 12/30/2017    ALT 31 12/30/2017         ASSESSMENT/PLAN:    1. Stage II A ER/ME positive HER-2 positive right breast cancer.  She is now 2 years out from her diagnosis.  She had been on Arimidex for quite some time.  I suspect this is not what's causing her symptoms.  Though she should hold the drug until she is feeling better.  I also recommend CAT scan of the head to make sure we are not dealing with metastatic disease.    2.  Right hip fracture secondary to fall: Patient is status post surgical intervention with 3 screws for nondisplaced femoral neck fracture on 12/30/2017 by Dr. Sky Ndiaye MD    12/31/2017      Please note that portions of this note may have been completed with a voice recognition program. Efforts were made to edit the dictations, but occasionally words are mistranscribed.     Electronically signed by Laure Ndiaye MD at 12/31/2017  1:01 PM

## 2018-01-02 NOTE — PLAN OF CARE
Problem: Patient Care Overview (Adult)  Goal: Plan of Care Review  Outcome: Ongoing (interventions implemented as appropriate)      Problem: Pain, Acute (Adult)  Goal: Identify Related Risk Factors and Signs and Symptoms  Outcome: Ongoing (interventions implemented as appropriate)   01/02/18 0307   Pain, Acute   Related Risk Factors (Acute Pain) communication barrier;patient perception

## 2018-01-02 NOTE — PROGRESS NOTES
"/73 (BP Location: Right arm, Patient Position: Lying)  Pulse 108  Temp 98.2 °F (36.8 °C) (Oral)   Resp 16  Ht 154.9 cm (61\")  Wt 40.8 kg (90 lb)  SpO2 99%  BMI 17.01 kg/m2    Lab Results (last 24 hours)     Procedure Component Value Units Date/Time    CBC Auto Differential [[id number]]  (Abnormal) Collected:  01/02/18 0601    Specimen:  Blood Updated:  01/02/18 0731     WBC 6.94 10*3/mm3      RBC 2.91 (L) 10*6/mm3      Hemoglobin 8.8 (L) g/dL      Hematocrit 27.3 (L) %      MCV 93.8 fL      MCH 30.2 pg      MCHC 32.2 g/dL      RDW 13.9 %      RDW-SD 48.1 fl      MPV 11.7 fL      Platelets 137 (L) 10*3/mm3      Neutrophil % 64.6 %      Lymphocyte % 17.9 (L) %      Monocyte % 14.7 (H) %      Eosinophil % 2.4 %      Basophil % 0.3 %      Immature Grans % 0.1 %      Neutrophils, Absolute 4.48 10*3/mm3      Lymphocytes, Absolute 1.24 10*3/mm3      Monocytes, Absolute 1.02 (H) 10*3/mm3      Eosinophils, Absolute 0.17 10*3/mm3      Basophils, Absolute 0.02 10*3/mm3      Immature Grans, Absolute 0.01 10*3/mm3     CBC & Differential [[id number]] Collected:  01/02/18 0601    Specimen:  Blood Updated:  01/02/18 0732    Narrative:       The following orders were created for panel order CBC & Differential.  Procedure                               Abnormality         Status                     ---------                               -----------         ------                     Scan Slide[[id number]]                                                                  CBC Auto Differential[[id number]]        Abnormal            Final result                 Please view results for these tests on the individual orders.          Imaging Results (last 24 hours)     ** No results found for the last 24 hours. **          Patient Care Team:  Luke Baum MD as PCP - General  Neftali Denise MD as Consulting Physician (General Surgery)    SUBJECTIVE: Pain is well-controlled.  Tolerating regular diet.  She walked 20 feet " with therapy yesterday.  Unable to participate with rehabilitation today due to dizziness.    PHYSICAL EXAM  Right hip Aquacel dressing clean, dry and intact  Thigh and calf soft and nontender  Neurovascularly intact distally     Principal Problem:    Closed right hip fracture  Active Problems:    Malignant neoplasm of central portion of right female breast    Fall at home    Hyponatremia    History of breast cancer    Hypokalemia    Postoperative anemia    Delirium      PLAN / DISPOSITION: 77-year-old female postop day #3 status post right hip percutaneous pinning  -Okay with me to transfer to rehabilitation at [facility] when bed available  -Continue to mobilize with therapy as tolerated, partial weightbearing right lower extremity  -Postop anemia, monitor H&H  -Lovenox for DVT prophylaxis  -Follow-up in 2 weeks upon discharge    Corbin Swanson MD  01/02/18  10:50 AM

## 2018-01-02 NOTE — PROGRESS NOTES
Continued Stay Note   Steph     Patient Name: Cassidy Teran  MRN: [id number]  Today's Date: 1/2/2018    Admit Date: 12/30/2017          Discharge Plan       01/02/18 1639    Case Management/Social Work Plan    Plan rehab at SNF    Patient/Family In Agreement With Plan yes    Additional Comments See previous Case mgt note. Ms Teran and  were interested in [facility], they accept her insurance in their acute rehab unit only. After lengthy discussion re: acute vs subacute level of rehab with  and daughter, it was decided to pursue subacute( skilled) level of rehab.  requested a referral to [facility]. I have spoken with [facility] who accepted referral. they will  initiate pre cert with her insurance.. Case mgt will follow and assist with final disposition.              Discharge Codes     None        Expected Discharge Date and Time     Expected Discharge Date Expected Discharge Time    Jan 4, 2018             Sonja C Kellerman, RN

## 2018-01-02 NOTE — PLAN OF CARE
Problem: Patient Care Overview (Adult)  Goal: Plan of Care Review  Outcome: Ongoing (interventions implemented as appropriate)   01/02/18 1007   Coping/Psychosocial Response Interventions   Plan Of Care Reviewed With patient   Outcome Evaluation   Outcome Summary/Follow up Plan Pt limited with tachycardia(161) and elevated BP (156/116) with activity, as well as nausea. Pt confused, reporting she lives alone as well as with spouse. Pt unable to maintain PWB RLE. Not a candidate for AE this date d/t confusion. Recommend rehab.        Problem: Inpatient Occupational Therapy  Goal: Transfer Training Goal 1 LTG- OT  Outcome: Ongoing (interventions implemented as appropriate)   01/02/18 1007   Transfer Training OT LTG   Transfer Training OT LTG, Date Established 01/02/18   Transfer Training OT LTG, Time to Achieve 1 wk   Transfer Training OT LTG, Activity Type sit to stand/stand to sit;toilet   Transfer Training OT LTG, [place] Level minimum assist (75% patient effort)   Transfer Training OT LTG, Assist Device commode, bedside;walker, rolling     Goal: Static Sitting Balance Goal LTG- OT  Outcome: Ongoing (interventions implemented as appropriate)   01/02/18 1007   Static Sitting Balance OT LTG   Static Sitting Balance OT LTG, Date Established 01/02/18   Static Sitting Balance OT LTG, Time to Achieve 1 wk   Static Sitting Balance OT LTG, [place] Level supervision required   Static Sitting Balance OT LTG, Assist Device UE Support     Goal: Orientation Goal LTG- OT  Outcome: Ongoing (interventions implemented as appropriate)   01/02/18 1007   Orientation OT LTG   Orientation OT LTG, Date Established 01/02/18   Orientation OT LTG, Time to Achieve 1 wk   Orientation OT LTG, Activity Type person;place;time;situation;50% treatment session;verbal cues     Goal: LB Dressing Goal LTG- OT  Outcome: Ongoing (interventions implemented as appropriate)   01/02/18 1007   LB Dressing OT LTG   LB Dressing Goal OT LTG, Date  Established 01/02/18   LB Dressing Goal OT LTG, Time to Achieve 1 wk   LB Dressing Goal OT LTG, Activity Type Pt will complete LB dressing task   LB Dressing Goal OT LTG, [place] Level verbal cues required;moderate assist (50% patient effort)   LB Dressing Goal OT LTG, Adaptive Equipment (AD PRN)

## 2018-01-02 NOTE — THERAPY TREATMENT NOTE
Acute Care - Physical Therapy Treatment Note  [facility]     Patient Name: Cassidy Teran  : 1940  MRN: [id number]  Today's Date: 2018  Onset of Illness/Injury or Date of Surgery Date: 17  Date of Referral to PT: 17  Referring Physician: Dr. Swanson    Admit Date: 2017    Visit Dx:    ICD-10-CM ICD-9-CM   1. Fall in home, initial encounter W19.XXXA E888.9    Y92.099 E849.0   2. Closed fracture of right hip, initial encounter S72.001A 820.8   3. Impaired functional mobility, balance, gait, and endurance Z74.09 V49.89   4. Impaired mobility and ADLs Z74.09 799.89     Patient Active Problem List   Diagnosis   • Malignant neoplasm of central portion of right female breast   • Decreased cardiac ejection fraction   • CHF (congestive heart failure)   • Fall at home   • Hyponatremia   • Closed right hip fracture   • History of breast cancer   • Hypokalemia   • Postoperative anemia   • Delirium               Adult Rehabilitation Note       18 0824 18 1512       Rehab Assessment/Intervention    Discipline physical therapist  -MJ physical therapist  -SC     Document Type therapy note (daily note)  -MJ therapy note (daily note)  -SC     Subjective Information agree to therapy;no complaints  -MJ complains of;pain  -SC     Patient Effort, Rehab Treatment good  -MJ good  -SC     Symptoms Noted During/After Treatment dizziness;significant change in vital signs;other (see comments)   nausea  -MJ      Symptoms Noted Comment Heart rate sustained 150-160 on BSC, RN notified  -MJ      Precautions/Limitations fall precautions;other (see comments)   PWB R LE  -MJ fall precautions;other (see comments)   PWB  -SC     Recorded by [MJ] Ezra Martines PT [SC] Prince Obrien PT     Vital Signs    Pre Systolic BP Rehab 122  -MJ      Pre Treatment Diastolic BP 73  -MJ      Intra Systolic BP Rehab 156  -MJ      Intra Treatment Diastolic   -MJ      Pretreatment Heart Rate (beats/min) 120  -MJ       Intratreatment Heart Rate (beats/min) 150   sustained 150-160  -MJ      Posttreatment Heart Rate (beats/min) 114  -MJ      Pre SpO2 (%) 100  -MJ      O2 Delivery Pre Treatment supplemental O2  -MJ      Post SpO2 (%) 100  -MJ      O2 Delivery Post Treatment supplemental O2  -MJ      Pre Patient Position Supine  -MJ      Intra Patient Position Sitting  -MJ      Post Patient Position Supine  -MJ      Recorded by [MJ] Ezra Martines, PT      Pain Assessment    Pain Assessment Rivera-Casas FACES  - Rivera-Casas FACES  -SC     Rivera-Casas FACES Pain Rating 2  -MJ 2  -SC     Post Pain Score 2  -MJ 2  -SC     Pain Type Acute pain  -MJ Acute pain  -SC     Pain Location Hip  -MJ Hip  -SC     Pain Orientation Right  -MJ Right  -SC     Pain Intervention(s) Repositioned;Ambulation/increased activity  - Repositioned  -SC     Recorded by [MJ] Ezra Martines, PT [SC] Prince Obrien, PT     Cognitive Assessment/Intervention    Current Cognitive/Communication Assessment impaired  - impaired  -SC     Orientation Status oriented to;person;place;required verbal cueing (specifiy in comments)  - oriented to;person;place;required verbal cueing (specifiy in comments);situation;time  -SC     Follows Commands/Answers Questions 75% of the time;able to follow single-step instructions;needs cueing;needs repetition  - 75% of the time  -SC     Personal Safety moderate impairment   cues for WB status  - moderate impairment;decreased awareness, need for assist;decreased awareness, need for safety;decreased insight to deficits  -SC     Personal Safety Interventions  fall prevention program maintained;gait belt  -SC     Recorded by [MJ] Ezra Martines, PT [SC] Prince Obrien, PT     Mobility Assessment/Training    Extremity Weight-Bearing Status right lower extremity  -      Right Lower Extremity Weight-Bearing partial weight-bearing  -      Recorded by [MJ] Ezra Martines PT      Bed Mobility, Assessment/Treatment    Bed Mobility, Assistive Device  bed rails;head of bed elevated;draw sheet  -MJ bed rails;head of bed elevated;draw sheet  -SC     Bed Mobility, Scoot/Bridge, [place] minimum assist (75% patient effort);verbal cues required   pt bridged to have soiled linens removed and clean replaced  -MJ maximum assist (25% patient effort)  -SC     Bed Mob, Supine to Sit, [place] maximum assist (25% patient effort);2 person assist required;verbal cues required  -MJ maximum assist (25% patient effort)  -SC     Bed Mob, Sit to Supine, [place] maximum assist (25% patient effort);2 person assist required;verbal cues required  -MJ      Bed Mobility, Safety Issues decreased use of arms for pushing/pulling;decreased use of legs for bridging/pushing  -MJ cognitive deficits limit understanding;decreased use of arms for pushing/pulling;decreased use of legs for bridging/pushing  -SC     Bed Mobility, Impairments ROM decreased;strength decreased;pain  -MJ strength decreased;motor control impaired  -SC     Bed Mobility, Comment Verbal cues to move LEs off EOB and to use UEs to push trunk to sitting. Assist with legs and trunk, increased time and effort to perform  -MJ repeted cues or sequencing up to edg of bed  -SC     Recorded by [MJ] Ezra Martines, PT [SC] Prince Obrien PT     Transfer Assessment/Treatment    Transfers, Chair-Bed [place] moderate assist (50% patient effort);2 person assist required;verbal cues required  -MJ      Transfers, Bed-Chair-Bed, Assist Device rolling walker  -MJ      Transfers, Sit-Stand [place] minimum assist (75% patient effort);2 person assist required;verbal cues required  -MJ minimum assist (75% patient effort);2 person assist required  -SC     Transfers, Stand-Sit [place] minimum assist (75% patient effort);2 person assist required;verbal cues required  -MJ minimum assist (75% patient effort);2 person assist required  -SC     Transfers, Sit-Stand-Sit, Assist Device rolling walker  -MJ other (see  comments);mechanical gait assist  -SC     Toilet Transfer, [place] moderate assist (50% patient effort);2 person assist required;verbal cues required  -      Toilet Transfer, Assistive Device rolling walker  -      Transfer, Maintain Weight Bearing Status unable to maintain weight bearing status;assist to maintain weight bearing status;cues to maintain weight bearing status  -      Transfer, Safety Issues step length decreased;weight-shifting ability decreased  -      Transfer, Impairments ROM decreased;strength decreased;pain  - motor control impaired;pain;strength decreased  -SC     Transfer, Comment Verbal cues for correct hand placement, to step R LE out prior to transfer and for PWB through R LE. Assisted pt to BS and back to bed. While on BS pt with increased heart rate, BP and nausea, RN notified. Symptoms improved once returned to bed  - cues for hand placement and safety  -SC     Recorded by [MJ] Ezra Martines, PT [SC] Prince Obrien, PT     Gait Assessment/Treatment    Gait, [place] Level unable to perform  - minimum assist (75% patient effort);2 person assist required  -SC     Gait, Assistive Device  other (see comments)   arjo  -SC     Gait, Distance (Feet)  20  -SC     Gait, Gait Pattern Analysis  swing-to gait  -SC     Gait, Gait Deviations  right:;other (see comments)   PWB  -SC     Gait, Safety Issues  balance decreased during turns  -SC     Gait, Impairments  motor control impaired;strength decreased;impaired balance  -SC     Gait, Comment Due to sustained increased heart rate and BP  - repeted cues to off loan wt on R leg. patient able to ambulate to door and back to chaIR  -SC     Recorded by [MJ] Ezra Martines, PT [SC] Prince Obrien, PT     Therapy Exercises    Exercise Protocols hip ORIF  - hip ORIF  -SC     Hip ORIF Exercises --   deferred due to increased heart rate  - right:;with assist;5 reps;10 reps;quad set;hamstring set;hip abduction;LAQ  -SC     Recorded by  [MJ] Ezra Martines, PT [SC] Prince Obrien, PT     Positioning and Restraints    Pre-Treatment Position in bed  -MJ in bed  -SC     Post Treatment Position bed  -MJ chair  -SC     In Bed notified nsg;supine;call light within reach;encouraged to call for assist;exit alarm on;SCD pump applied  -MJ      In Chair  sitting;reclined;notified nsg;call light within reach;encouraged to call for assist;exit alarm on;with family/caregiver  -SC     Recorded by [MJ] Ezra Martines, PT [SC] Prince Obrien, PT       User Key  (r) = Recorded By, (t) = Taken By, (c) = Cosigned By    Initials Name Effective Dates    SC Prince Obrien, PT 06/19/15 -     MJ Ezra Martines, PT 03/14/16 -                 IP PT Goals       01/02/18 0934 12/31/17 1418       Bed Mobility PT LTG    Bed Mobility PT LTG, Date Established  12/31/17  -SC     Bed Mobility PT LTG, Time to Achieve  5 - 7 days  -SC     Bed Mobility PT LTG, Activity Type  supine to sit/sit to supine  -SC     Bed Mobility PT LTG, [place] Level  moderate assist (50% patient effort)  -SC     Bed Mobility PT Goal  LTG, Assist Device  bed rails  -SC     Bed Mobility PT LTG, Date Goal Reviewed 01/02/18  -      Bed Mobility PT LTG, Outcome goal ongoing  - goal ongoing  -SC     Transfer Training PT LTG    Transfer Training PT LTG, Date Established  12/31/17  -SC     Transfer Training PT LTG, Time to Achieve  5 - 7 days  -SC     Transfer Training PT LTG, Activity Type  sit to stand/stand to sit  -SC     Transfer Training PT LTG, [place] Level  minimum assist (75% patient effort)  -SC     Transfer Training PT  LTG, Date Goal Reviewed 01/02/18  -      Transfer Training PT LTG, Outcome goal ongoing  - goal ongoing  -SC     Gait Training PT LTG    Gait Training Goal PT LTG, Date Established  12/31/17  -SC     Gait Training Goal PT LTG, Time to Achieve  5 - 7 days  -SC     Gait Training Goal PT LTG, [place] Level  minimum assist (75% patient effort);2 person assist required  -SC      Gait Training Goal PT LTG, Assist Device  walker, rolling  -SC     Gait Training Goal PT LTG, Distance to Achieve  15  -SC     Gait Training Goal PT LTG, Date Goal Reviewed 01/02/18  -MJ      Gait Training Goal PT LTG, Outcome goal ongoing  -MJ goal ongoing  -SC       User Key  (r) = Recorded By, (t) = Taken By, (c) = Cosigned By    Initials Name Provider Type    SC Prince Obrien, PT Physical Therapist    RANJIT Martines, PT Physical Therapist          Physical Therapy Education     Title: PT OT SLP Therapies (Active)     Topic: Physical Therapy (Active)     Point: Mobility training (Active)    Learning Progress Summary    Learner Readiness Method Response Comment Documented by Status   Patient Acceptance E,D NR Reviewed WB status, benefits of mobility  01/02/18 0934 Active    Eager E NR REVIEWED Pwb PRECAUTIONS SC 01/01/18 1633 Active    Eager E ANDREA CASILLAS,NR reviewed benefits of acivity SC 12/31/17 1418 Done               Point: Home exercise program (Active)    Learning Progress Summary    Learner Readiness Method Response Comment Documented by Status   Patient Acceptance E,D NR Reviewed WB status, benefits of mobility  01/02/18 0934 Active    Eager E NR REVIEWED Pwb PRECAUTIONS SC 01/01/18 1633 Active    Eager E ANDREA CASILLAS,NR reviewed benefits of acivity SC 12/31/17 1418 Done               Point: Body mechanics (Active)    Learning Progress Summary    Learner Readiness Method Response Comment Documented by Status   Patient Acceptance E,D NR Reviewed WB status, benefits of mobility  01/02/18 0934 Active    Eager E NR REVIEWED Pwb PRECAUTIONS SC 01/01/18 1633 Active    Eager E ANDREA CASILLAS,NR reviewed benefits of acivity SC 12/31/17 1418 Done               Point: Precautions (Active)    Learning Progress Summary    Learner Readiness Method Response Comment Documented by Status   Patient Acceptance E,D NR Reviewed WB status, benefits of mobility  01/02/18 0934 Active    Eager E NR REVIEWED Pwb PRECAUTIONS SC 01/01/18 1633  Active    [place] CASILLAS,ANDREA,NR reviewed benefits of acivity SC 12/31/17 1418 Done                      User Key     Initials Effective Dates Name Provider Type Discipline    SC 06/19/15 -  Prince Obrien, PT Physical Therapist PT     03/14/16 -  Ezra Martines PT Physical Therapist PT                    PT Recommendation and Plan  Anticipated Discharge Disposition: skilled nursing facility  Planned Therapy Interventions: balance training, bed mobility training, gait training, home exercise program, patient/family education, strengthening, transfer training  PT Frequency: daily  Plan of Care Review  Plan Of Care Reviewed With: patient  Progress: progress toward functional goals is gradual  Outcome Summary/Follow up Plan: Pt transferred to [facility] and back to bed with ModAx2 and RW, difficulty maintaining PWB throughout transfer. Pt limited this session by increased heart rate and BP with activity. Will continue to progress mobility as able           Outcome Measures       01/02/18 0824 01/01/18 1512 12/31/17 0820    How much help from another person do you currently need...    Turning from your back to your side while in flat bed without using bedrails? 2  - 2  -SC 2  -SC    Moving from lying on back to sitting on the side of a flat bed without bedrails? 2  - 1  -SC 1  -SC    Moving to and from a bed to a chair (including a wheelchair)? 2  - 2  -SC 2  -SC    Standing up from a chair using your arms (e.g., wheelchair, bedside chair)? 2  - 2  -SC 2  -SC    Climbing 3-5 steps with a railing? 1  - 1  -SC 1  -SC    To walk in hospital room? 2  - 2  -SC 2  -SC    AM-PAC 6 Clicks Score 11  - 10  -SC 10  -SC    Functional Assessment    Outcome Measure Options AM-PAC 6 Clicks Basic Mobility (PT)  - AM-PAC 6 Clicks Basic Mobility (PT)  -SC AM-PAC 6 Clicks Basic Mobility (PT)  -SC      User Key  (r) = Recorded By, (t) = Taken By, (c) = Cosigned By    Initials Name Provider Type    SC Prince Obrien, PT Physical  Therapist    RANJIT Martines PT Physical Therapist           Time Calculation:         PT Charges       01/02/18 0942          Time Calculation    Start Time 0824  -MJ      PT Received On 01/02/18  -RANJIT      PT Goal Re-Cert Due Date 01/10/18  -      Time Calculation- PT    Total Timed Code Minutes- PT 38 minute(s)  -        User Key  (r) = Recorded By, (t) = Taken By, (c) = Cosigned By    Initials Name Provider Type    RANJIT Martines PT Physical Therapist          Therapy Charges for Today     Code Description Service Date Service Provider Modifiers Qty    [id number] HC PT THER PROC EA 15 MIN 1/2/2018 Ezra Martines, PT GP 3          PT G-Codes  Outcome Measure Options: AM-PAC 6 Clicks Basic Mobility (PT)  Score: 10  Functional Limitation: Mobility: Walking and moving around  Mobility: Walking and Moving Around Current Status (): At least 60 percent but less than 80 percent impaired, limited or restricted  Mobility: Walking and Moving Around Goal Status (): At least 60 percent but less than 80 percent impaired, limited or restricted    Ezra Martines PT  1/2/2018

## 2018-01-02 NOTE — PLAN OF CARE
Problem: Patient Care Overview (Adult)  Goal: Plan of Care Review  Outcome: Ongoing (interventions implemented as appropriate)   01/02/18 1343   Coping/Psychosocial Response Interventions   Plan Of Care Reviewed With patient   Patient Care Overview   Progress improving   Outcome Evaluation   Outcome Summary/Follow up Plan Pt ambulated 40 feet with MinAx2 and RW, cues to maintain PWB on R LE throughout. Vitals improved this afternoon from AM session. Will continue to progress mobility as able.        Problem: Inpatient Physical Therapy  Goal: Bed Mobility Goal LTG- PT  Outcome: Ongoing (interventions implemented as appropriate)   12/31/17 1418 01/02/18 1343   Bed Mobility PT LTG   Bed Mobility PT LTG, Date Established 12/31/17 --    Bed Mobility PT LTG, Time to Achieve 5 - 7 days --    Bed Mobility PT LTG, Activity Type supine to sit/sit to supine --    Bed Mobility PT LTG, [place] Level moderate assist (50% patient effort) --    Bed Mobility PT Goal LTG, Assist Device bed rails --    Bed Mobility PT LTG, Date Goal Reviewed --  01/02/18   Bed Mobility PT LTG, Outcome --  goal ongoing     Goal: Transfer Training Goal 1 LTG- PT  Outcome: Ongoing (interventions implemented as appropriate)   12/31/17 1418 01/02/18 1343   Transfer Training PT LTG   Transfer Training PT LTG, Date Established 12/31/17 --    Transfer Training PT LTG, Time to Achieve 5 - 7 days --    Transfer Training PT LTG, Activity Type sit to stand/stand to sit --    Transfer Training PT LTG, [place] Level minimum assist (75% patient effort) --    Transfer Training PT LTG, Date Goal Reviewed --  01/02/18   Transfer Training PT LTG, Outcome --  goal ongoing     Goal: Gait Training Goal LTG- PT  Outcome: Outcome(s) achieved Date Met: 01/02/18 12/31/17 1418 01/02/18 1343   Gait Training PT LTG   Gait Training Goal PT LTG, Date Established 12/31/17 --    Gait Training Goal PT LTG, Time to Achieve 5 - 7 days --    Gait Training Goal PT LTG,  [place] Level minimum assist (75% patient effort);2 person assist required --    Gait Training Goal PT LTG, Assist Device walker, rolling --    Gait Training Goal PT LTG, Distance to Achieve 15 --    Gait Training Goal PT LTG, Date Goal Reviewed --  01/02/18   Gait Training Goal PT LTG, Outcome --  goal met

## 2018-01-03 NOTE — THERAPY TREATMENT NOTE
Acute Care - Physical Therapy Treatment Note  [facility]     Patient Name: Cassidy Teran  : 1940  MRN: [id number]  Today's Date: 1/3/2018  Onset of Illness/Injury or Date of Surgery Date: 17  Date of Referral to PT: 17  Referring Physician: Dr. Swanson    Admit Date: 2017    Visit Dx:    ICD-10-CM ICD-9-CM   1. Fall in home, initial encounter W19.XXXA E888.9    Y92.099 E849.0   2. Closed fracture of right hip, initial encounter S72.001A 820.8   3. Impaired functional mobility, balance, gait, and endurance Z74.09 V49.89   4. Impaired mobility and ADLs Z74.09 799.89     Patient Active Problem List   Diagnosis   • Malignant neoplasm of central portion of right female breast   • Decreased cardiac ejection fraction   • CHF (congestive heart failure)   • Fall at home   • Hyponatremia   • Closed right hip fracture   • History of breast cancer   • Hypokalemia   • Postoperative anemia   • Delirium               Adult Rehabilitation Note       18 0920 18 1309 18 0824    Rehab Assessment/Intervention    Discipline physical therapist  -MJ physical therapist  -MJ physical therapist  -MJ    Document Type therapy note (daily note)  -MJ therapy note (daily note)  -MJ therapy note (daily note)  -MJ    Subjective Information agree to therapy;complains of;pain  -MJ agree to therapy;complains of;fatigue  -MJ agree to therapy;no complaints  -MJ    Patient Effort, Rehab Treatment good  -MJ good  -MJ good  -MJ    Symptoms Noted During/After Treatment fatigue  -MJ fatigue  -MJ dizziness;significant change in vital signs;other (see comments)   nausea  -MJ    Symptoms Noted Comment   Heart rate sustained 150-160 on BSC, RN notified  -MJ    Precautions/Limitations fall precautions;other (see comments)   PWB R LE  -MJ fall precautions;other (see comments)   PWB R LE  -MJ fall precautions;other (see comments)   PWB R LE  -MJ    Recorded by [MJ] Ezra Matrines, PT [MJ] Ezra Martines PT [MJ] Ezra Martines, PT     Vital Signs    Pre Systolic BP Rehab   122  -MJ    Pre Treatment Diastolic BP   73  -MJ    Intra Systolic BP Rehab   156  -MJ    Intra Treatment Diastolic BP   116  -MJ    Pretreatment Heart Rate (beats/min)  94  -  -MJ    Intratreatment Heart Rate (beats/min)   150   sustained 150-160  -MJ    Posttreatment Heart Rate (beats/min)  97  -  -MJ    Pre SpO2 (%)   100  -MJ    O2 Delivery Pre Treatment   supplemental O2  -MJ    Post SpO2 (%)   100  -MJ    O2 Delivery Post Treatment   supplemental O2  -MJ    Pre Patient Position  Supine  -MJ Supine  -MJ    Intra Patient Position   Sitting  -MJ    Post Patient Position  Sitting  -MJ Supine  -MJ    Recorded by  [MJ] Ezra Martines, PT [MJ] Ezra Martines, PT    Pain Assessment    Pain Assessment 0-10  -MJ 0-10  -MJ Rivera-Casas FACES  -MJ    Rivera-Casas FACES Pain Rating   2  -MJ    Pain Score 4  -MJ 0  -MJ     Post Pain Score 0  -MJ 0  -MJ 2  -MJ    Pain Type Acute pain  -MJ  Acute pain  -MJ    Pain Location Hip  -MJ  Hip  -MJ    Pain Orientation Right  -MJ  Right  -MJ    Pain Intervention(s) Repositioned;Ambulation/increased activity  -MJ  Repositioned;Ambulation/increased activity  -MJ    Recorded by [MJ] Ezra Martines, PT [MJ] Ezra Martines, PT [MJ] Ezra Martines, PT    Cognitive Assessment/Intervention    Current Cognitive/Communication Assessment impaired  -MJ impaired  -MJ impaired  -MJ    Orientation Status oriented to;person;place;time;required verbal cueing (specifiy in comments)  -MJ oriented to;person;place;required verbal cueing (specifiy in comments)  -MJ oriented to;person;place;required verbal cueing (specifiy in comments)  -MJ    Follows Commands/Answers Questions 75% of the time;100% of the time;able to follow single-step instructions;needs cueing;needs repetition  -MJ 75% of the time;able to follow single-step instructions;needs cueing;needs repetition  -MJ 75% of the time;able to follow single-step instructions;needs cueing;needs repetition  -MJ     Personal Safety moderate impairment  -MJ moderate impairment   cues for WB status  -MJ moderate impairment   cues for WB status  -MJ    Recorded by [MJ] Ezra Martines, PT [MJ] Ezra Martines PT [MJ] Ezra Martines PT    Mobility Assessment/Training    Extremity Weight-Bearing Status  right lower extremity  -MJ right lower extremity  -MJ    Right Lower Extremity Weight-Bearing partial weight-bearing  -MJ partial weight-bearing  -MJ partial weight-bearing  -MJ    Recorded by [MJ] Ezra Martines, PT [MJ] Ezra Martines, PT [MJ] Ezra Martines PT    Bed Mobility, Assessment/Treatment    Bed Mobility, Assistive Device bed rails;head of bed elevated  -MJ bed rails;head of bed elevated;leg   -MJ bed rails;head of bed elevated;draw sheet  -MJ    Bed Mobility, Scoot/Bridge, [place]   minimum assist (75% patient effort);verbal cues required   pt bridged to have soiled linens removed and clean replaced  -MJ    Bed Mob, Supine to Sit, [place] moderate assist (50% patient effort);2 person assist required;verbal cues required  -MJ moderate assist (50% patient effort);2 person assist required;verbal cues required  -MJ maximum assist (25% patient effort);2 person assist required;verbal cues required  -MJ    Bed Mob, Sit to Supine, [place] not tested   Pt UIC  -MJ not tested   Pt UIC  -MJ maximum assist (25% patient effort);2 person assist required;verbal cues required  -MJ    Bed Mobility, Safety Issues decreased use of arms for pushing/pulling;decreased use of legs for bridging/pushing  -MJ decreased use of arms for pushing/pulling;decreased use of legs for bridging/pushing  -MJ decreased use of arms for pushing/pulling;decreased use of legs for bridging/pushing  -MJ    Bed Mobility, Impairments ROM decreased;strength decreased;pain  -MJ ROM decreased;strength decreased;pain  -MJ ROM decreased;strength decreased;pain  -MJ    Bed Mobility, Comment Verbal cues for sequencing, assist with legs and trunk, increased time and  effort to perform  -MJ Verbal cues for sequencing, assist with legs and trunk, increased time and effort to perform  -MJ Verbal cues to move LEs off EOB and to use UEs to push trunk to sitting. Assist with legs and trunk, increased time and effort to perform  -MJ    Recorded by [MJ] Ezra Martines, PT [MJ] Ezra Martines PT [MJ] Ezra Martines, PT    Transfer Assessment/Treatment    Transfers, Chair-Bed [place]   moderate assist (50% patient effort);2 person assist required;verbal cues required  -MJ    Transfers, Bed-Chair-Bed, Assist Device   rolling walker  -MJ    Transfers, Sit-Stand [place] minimum assist (75% patient effort);2 person assist required;verbal cues required  -MJ minimum assist (75% patient effort);2 person assist required;verbal cues required  -MJ minimum assist (75% patient effort);2 person assist required;verbal cues required  -MJ    Transfers, Stand-Sit [place] minimum assist (75% patient effort);2 person assist required;verbal cues required  -MJ minimum assist (75% patient effort);2 person assist required;verbal cues required  -MJ minimum assist (75% patient effort);2 person assist required;verbal cues required  -MJ    Transfers, Sit-Stand-Sit, Assist Device rolling walker  -MJ rolling walker  -MJ rolling walker  -MJ    Toilet Transfer, [place] minimum assist (75% patient effort);2 person assist required;verbal cues required  -MJ  moderate assist (50% patient effort);2 person assist required;verbal cues required  -MJ    Toilet Transfer, Assistive Device bedside commode without drop arms;rolling walker  -MJ  rolling walker  -MJ    Transfer, Maintain Weight Bearing Status able to maintain weight bearing status;assist to maintain weight bearing status;cues to maintain weight bearing status  -MJ able to maintain weight bearing status;assist to maintain weight bearing status;cues to maintain weight bearing status  -MJ unable to maintain weight bearing status;assist to maintain weight  bearing status;cues to maintain weight bearing status  -MJ    Transfer, Safety Issues sequencing ability decreased;step length decreased;weight-shifting ability decreased  -MJ step length decreased;weight-shifting ability decreased  -MJ step length decreased;weight-shifting ability decreased  -MJ    Transfer, Impairments ROM decreased;strength decreased;pain  -MJ ROM decreased;strength decreased;pain  -MJ ROM decreased;strength decreased;pain  -MJ    Transfer, Comment Verbal cues for correct hand placement and for PWB on R LE. Assisted pt to BSC, pt impulsive with transfer trying to sit before lined up to BSC. Pt dependent for hygiene after toileting.   -MJ Verbal cues for correct hand placement and for PWB status  -MJ Verbal cues for correct hand placement, to step R LE out prior to transfer and for PWB through R LE. Assisted pt to BSC and back to bed. While on BSC pt with increased heart rate, BP and nausea, RN notified. Symptoms improved once returned to bed  -MJ    Recorded by [MJ] Ezra Martines, PT [MJ] Ezra Martines PT [MJ] Ezra Martines PT    Gait Assessment/Treatment    Gait, [place] Level minimum assist (75% patient effort);2 person assist required;verbal cues required  -MJ minimum assist (75% patient effort);2 person assist required;verbal cues required  -MJ unable to perform  -MJ    Gait, Assistive Device rolling walker  -MJ rolling walker  -MJ     Gait, Distance (Feet) 40  -MJ 40  -MJ     Gait, Gait Pattern Analysis swing-to gait  -MJ swing-to gait  -MJ     Gait, Gait Deviations right:;antalgic;aida decreased;forward flexed posture;step length decreased;weight-shifting ability decreased  -MJ right:;antalgic;aida decreased;forward flexed posture;step length decreased;toe-to-floor clearance decreased;weight-shifting ability decreased;narrow base  -MJ     Gait, Maintain Weight Bearing Status able to maintain weight bearing status;assist to maintain weight bearing status;cues to maintain weight  bearing status  -MJ assist to maintain weight bearing status;cues to maintain weight bearing status  -MJ     Gait, Safety Issues sequencing ability decreased;step length decreased;weight-shifting ability decreased  -MJ sequencing ability decreased;step length decreased;weight-shifting ability decreased  -MJ     Gait, Impairments ROM decreased;strength decreased;pain  -MJ ROM decreased;strength decreased;pain  -MJ     Gait, Comment Pt demo step to gait pattern at slow pace. Verbal cues for PWB on R LE, pt needs reminders throughout gait. Cues to stay in middle of RW and for upright posture. Gait limited by pain and fatigue  -MJ Pt demo step to gait pattern at slow pace. Verbal cues for PWB, instructed to keep toes only on ground on R during stance. Cues to separate feet to improve [place], mild improvement. Cues to  feet during turn. Gait limited by fatigue  -MJ Due to sustained increased heart rate and BP  -MJ    Recorded by [MJ] Ezra Martines, PT [MJ] Ezra Martines PT [MJ] Ezra Martines PT    Therapy Exercises    Exercise Protocols hip ORIF  -MJ hip ORIF  -MJ hip ORIF  -MJ    Hip ORIF Exercises right:;15 reps;completed protocol;with assist;ankle pumps/circles;quad set;glut set;heel slides;hip abduction;SAQ;LAQ;hip flexion;SLR   Cues for technique. Suzi w/ SLR, hip abd, heel slides  -MJ right:;10 reps;with assist;ankle pumps/circles;quad set;glut set;heel slides;hip abduction;SAQ;SLR   Cues for technique. Suzi w/ SLR, hip abd, heel slides  -MJ --   deferred due to increased heart rate  -MJ    Recorded by [MJ] Ezra Martines, PT [MJ] Ezra Martines, PT [MJ] Ezra Martines, PT    Positioning and Restraints    Pre-Treatment Position in bed  -MJ in bed  -MJ in bed  -MJ    Post Treatment Position chair  -MJ chair  -MJ bed  -MJ    In Bed   notified nsg;supine;call light within reach;encouraged to call for assist;exit alarm on;SCD pump applied  -MJ    In Chair notified nsg;reclined;call light within reach;encouraged to call for  assist;exit alarm on  - notified nsg;reclined;call light within reach;encouraged to call for assist;exit alarm on;on mechanical lift sling  -     Recorded by [MJ] Ezra Martines PT [MJ] Ezra Martines PT [MJ] Ezra Martines PT      01/01/18 1512          Rehab Assessment/Intervention    Discipline physical therapist  -SC      Document Type therapy note (daily note)  -SC      Subjective Information complains of;pain  -SC      Patient Effort, Rehab Treatment good  -SC      Precautions/Limitations fall precautions;other (see comments)   PWB  -SC      Recorded by [SC] Prince Obrien PT      Pain Assessment    Pain Assessment Rivera-Casas FACES  -SC      Rivera-Casas FACES Pain Rating 2  -SC      Post Pain Score 2  -SC      Pain Type Acute pain  -SC      Pain Location Hip  -SC      Pain Orientation Right  -SC      Pain Intervention(s) Repositioned  -SC      Recorded by [SC] Prince Obrien, PT      Cognitive Assessment/Intervention    Current Cognitive/Communication Assessment impaired  -SC      Orientation Status oriented to;person;place;required verbal cueing (specifiy in comments);situation;time  -SC      Follows Commands/Answers Questions 75% of the time  -SC      Personal Safety moderate impairment;decreased awareness, need for assist;decreased awareness, need for safety;decreased insight to deficits  -SC      Personal Safety Interventions fall prevention program maintained;gait belt  -SC      Recorded by [SC] Prince Obrien, PT      Bed Mobility, Assessment/Treatment    Bed Mobility, Assistive Device bed rails;head of bed elevated;draw sheet  -SC      Bed Mobility, Scoot/Bridge, [place] maximum assist (25% patient effort)  -SC      Bed Mob, Supine to Sit, [place] maximum assist (25% patient effort)  -SC      Bed Mobility, Safety Issues cognitive deficits limit understanding;decreased use of arms for pushing/pulling;decreased use of legs for bridging/pushing  -SC      Bed Mobility, Impairments strength  decreased;motor control impaired  -SC      Bed Mobility, Comment repeted cues or sequencing up to edg of bed  -SC      Recorded by [SC] Prince Obrien PT      Transfer Assessment/Treatment    Transfers, Sit-Stand [place] minimum assist (75% patient effort);2 person assist required  -SC      Transfers, Stand-Sit [place] minimum assist (75% patient effort);2 person assist required  -SC      Transfers, Sit-Stand-Sit, Assist Device other (see comments);mechanical gait assist  -SC      Transfer, Impairments motor control impaired;pain;strength decreased  -SC      Transfer, Comment cues for hand placement and safety  -SC      Recorded by [SC] Prince Obrien PT      Gait Assessment/Treatment    Gait, [place] Level minimum assist (75% patient effort);2 person assist required  -SC      Gait, Assistive Device other (see comments)   arjo  -SC      Gait, Distance (Feet) 20  -SC      Gait, Gait Pattern Analysis swing-to gait  -SC      Gait, Gait Deviations right:;other (see comments)   PWB  -SC      Gait, Safety Issues balance decreased during turns  -SC      Gait, Impairments motor control impaired;strength decreased;impaired balance  -SC      Gait, Comment repeted cues to off loan wt on R leg. patient able to ambulate to door and back to chaIR  -SC      Recorded by [SC] Prince Obrien PT      Therapy Exercises    Exercise Protocols hip ORIF  -SC      Hip ORIF Exercises right:;with assist;5 reps;10 reps;quad set;hamstring set;hip abduction;LAQ  -SC      Recorded by [SC] Prince Obrien PT      Positioning and Restraints    Pre-Treatment Position in bed  -SC      Post Treatment Position chair  -SC      In Chair sitting;reclined;notified nsg;call light within reach;encouraged to call for assist;exit alarm on;with family/caregiver  -SC      Recorded by [SC] Prince Obrien PT        User Key  (r) = Recorded By, (t) = Taken By, (c) = Cosigned By    Initials Name Effective Dates    LAURIE Obrien PT 06/19/15 -      MJ Ezra Martines, PT 03/14/16 -                 IP PT Goals       01/03/18 1038 01/02/18 1343 01/02/18 0934    Bed Mobility PT LTG    Bed Mobility PT LTG, Date Goal Reviewed 01/03/18  -MJ 01/02/18  -MJ 01/02/18  -MJ    Bed Mobility PT LTG, Outcome goal ongoing  -MJ goal ongoing  -MJ goal ongoing  -MJ    Transfer Training PT LTG    Transfer Training PT  LTG, Date Goal Reviewed 01/03/18  -MJ 01/02/18  -MJ 01/02/18  -MJ    Transfer Training PT LTG, Outcome goal ongoing  -MJ goal ongoing  -MJ goal ongoing  -MJ    Gait Training PT LTG    Gait Training Goal PT LTG, Date Established 01/03/18  -MJ      Gait Training Goal PT LTG, Time to Achieve 5 days  -MJ      Gait Training Goal PT LTG, [place] Level contact guard assist  -MJ      Gait Training Goal PT LTG, Assist Device walker, rolling  -MJ      Gait Training Goal PT LTG, Distance to Achieve 60 feet  -MJ      Gait Training Goal PT LTG, Additional Goal PWB R LE  -MJ      Gait Training Goal PT LTG, Date Goal Reviewed 01/03/18  -MJ 01/02/18  -MJ 01/02/18  -MJ    Gait Training Goal PT LTG, Outcome goal ongoing  - goal met  -MJ goal ongoing  -MJ      12/31/17 1418          Bed Mobility PT LTG    Bed Mobility PT LTG, Date Established 12/31/17  -SC      Bed Mobility PT LTG, Time to Achieve 5 - 7 days  -SC      Bed Mobility PT LTG, Activity Type supine to sit/sit to supine  -SC      Bed Mobility PT LTG, [place] Level moderate assist (50% patient effort)  -SC      Bed Mobility PT Goal  LTG, Assist Device bed rails  -SC      Bed Mobility PT LTG, Outcome goal ongoing  -SC      Transfer Training PT LTG    Transfer Training PT LTG, Date Established 12/31/17  -SC      Transfer Training PT LTG, Time to Achieve 5 - 7 days  -SC      Transfer Training PT LTG, Activity Type sit to stand/stand to sit  -SC      Transfer Training PT LTG, [place] Level minimum assist (75% patient effort)  -SC      Transfer Training PT LTG, Outcome goal ongoing  -SC      Gait Training PT LTG     Gait Training Goal PT LTG, Date Established 12/31/17  -SC      Gait Training Goal PT LTG, Time to Achieve 5 - 7 days  -SC      Gait Training Goal PT LTG, [place] Level minimum assist (75% patient effort);2 person assist required  -SC      Gait Training Goal PT LTG, Assist Device walker, rolling  -SC      Gait Training Goal PT LTG, Distance to Achieve 15  -SC      Gait Training Goal PT LTG, Outcome goal ongoing  -SC        User Key  (r) = Recorded By, (t) = Taken By, (c) = Cosigned By    Initials Name Provider Type    SC Prince Obrien, PT Physical Therapist    RANJIT Martines, PT Physical Therapist          Physical Therapy Education     Title: PT OT SLP Therapies (Done)     Topic: Physical Therapy (Done)     Point: Mobility training (Done)    Learning Progress Summary    Learner Readiness Method Response Comment Documented by Status   Patient Acceptance E,D VU,NR Reviewed WB status, HEP, gait mechanics  01/03/18 1037 Done    Acceptance E,D SONAJ,NR Reviewed PWB status, HEP, gait mechanics  01/02/18 1343 Done    Acceptance E,D NR Reviewed WB status, benefits of mobility  01/02/18 0934 Active    Eager E NR REVIEWED Pwb PRECAUTIONS SC 01/01/18 1633 Active    Eager E ANDREA CASILLAS,NR reviewed benefits of acivity SC 12/31/17 1418 Done   Significant Other Acceptance E,D SONJA,NR Reviewed PWB status, HEP, gait mechanics  01/02/18 1343 Done               Point: Home exercise program (Done)    Learning Progress Summary    Learner Readiness Method Response Comment Documented by Status   Patient Acceptance E,D VU,NR Reviewed WB status, HEP, gait mechanics  01/03/18 1037 Done    Acceptance E,D VU,NR Reviewed PWB status, HEP, gait mechanics  01/02/18 1343 Done    Acceptance E,D NR Reviewed WB status, benefits of mobility  01/02/18 0934 Active    Eager E NR REVIEWED Pwb PRECAUTIONS SC 01/01/18 1633 Active    Eager E ANDREA CASILLAS,NR reviewed benefits of acivity SC 12/31/17 1418 Done   Significant Other Acceptance E,D VU,NR Reviewed  PWB status, HEP, gait mechanics  01/02/18 1343 Done               Point: Body mechanics (Done)    Learning Progress Summary    Learner Readiness Method Response Comment Documented by Status   Patient Acceptance ED VU,NR Reviewed WB status, HEP, gait mechanics  01/03/18 1037 Done    Acceptance E,D VU,NR Reviewed PWB status, HEP, gait mechanics  01/02/18 1343 Done    Acceptance E,D NR Reviewed WB status, benefits of mobility  01/02/18 0934 Active    Eager E NR REVIEWED Pwb PRECAUTIONS SC 01/01/18 1633 Active    Eager E ANDREA CASILLAS,NR reviewed benefits of acivity SC 12/31/17 1418 Done   Significant Other Acceptance E,D VU,NR Reviewed PWB status, HEP, gait mechanics  01/02/18 1343 Done               Point: Precautions (Done)    Learning Progress Summary    Learner Readiness Method Response Comment Documented by Status   Patient Acceptance ED VU,NR Reviewed WB status, HEP, gait mechanics  01/03/18 1037 Done    Acceptance EMARI,NR Reviewed PWB status, HEP, gait mechanics  01/02/18 1343 Done    Acceptance ED NR Reviewed WB status, benefits of mobility  01/02/18 0934 Active    Eager E NR REVIEWED Pwb PRECAUTIONS SC 01/01/18 1633 Active    EagANDREA EsquivelNR reviewed benefits of acivity SC 12/31/17 1418 Done   Significant Other Acceptance EMARI,NR Reviewed PWB status, HEP, gait mechanics  01/02/18 1343 Done                      User Key     Initials Effective Dates Name Provider Type Discipline    SC 06/19/15 -  Prince Obrien, PT Physical Therapist PT     03/14/16 -  Ezra Martines, PT Physical Therapist PT                    PT Recommendation and Plan  Anticipated Discharge Disposition: skilled nursing facility  Planned Therapy Interventions: balance training, bed mobility training, gait training, home exercise program, patient/family education, strengthening, transfer training  PT Frequency: 2 times/day  Plan of Care Review  Plan Of Care Reviewed With: patient  Progress: improving  Outcome Summary/Follow up  Plan: Pt ambulated 40 feet with MinAx2 and RW, still requires cues for PWB on R LE. Pt limited by fatigue. Will continue to progress mobility as able          Outcome Measures       01/03/18 0920 01/02/18 1309 01/02/18 0824    How much help from another person do you currently need...    Turning from your back to your side while in flat bed without using bedrails? 3  -MJ 2  -MJ 2  -MJ    Moving from lying on back to sitting on the side of a flat bed without bedrails? 2  -MJ 2  -MJ 2  -MJ    Moving to and from a bed to a chair (including a wheelchair)? 2  -MJ 2  -MJ 2  -MJ    Standing up from a chair using your arms (e.g., wheelchair, bedside chair)? 2  -MJ 2  -MJ 2  -MJ    Climbing 3-5 steps with a railing? 1  -MJ 1  -MJ 1  -MJ    To walk in hospital room? 2  -MJ 2  -MJ 2  -MJ    AM-PAC 6 Clicks Score 12  -MJ 11  -MJ 11  -MJ    Functional Assessment    Outcome Measure Options AM-PAC 6 Clicks Basic Mobility (PT)  -MJ AM-PAC 6 Clicks Basic Mobility (PT)  -MJ AM-PAC 6 Clicks Basic Mobility (PT)  -MJ      01/02/18 0815 01/01/18 1512       How much help from another person do you currently need...    Turning from your back to your side while in flat bed without using bedrails?  2  -SC     Moving from lying on back to sitting on the side of a flat bed without bedrails?  1  -SC     Moving to and from a bed to a chair (including a wheelchair)?  2  -SC     Standing up from a chair using your arms (e.g., wheelchair, bedside chair)?  2  -SC     Climbing 3-5 steps with a railing?  1  -SC     To walk in hospital room?  2  -SC     AM-PAC 6 Clicks Score  10  -SC     How much help from another is currently needed...    Putting on and taking off regular lower body clothing? 1  -AR      Bathing (including washing, rinsing, and drying) 2  -AR      Toileting (which includes using toilet bed pan or urinal) 1  -AR      Putting on and taking off regular upper body clothing 3  -AR      Taking care of personal grooming (such as brushing  teeth) 3  -AR      Eating meals 3  -AR      Score 13  -AR      Functional Assessment    Outcome Measure Options AM-PAC 6 Clicks Daily Activity (OT)  -AR AM-PAC 6 Clicks Basic Mobility (PT)  -SC       User Key  (r) = Recorded By, (t) = Taken By, (c) = Cosigned By    Initials Name Provider Type    SC Prince Obrien, PT Physical Therapist    AR Vianey Arellano, OT Occupational Therapist    RANJIT Martines, PT Physical Therapist           Time Calculation:         PT Charges       01/03/18 1040          Time Calculation    Start Time 0920  -MJ      PT Received On 01/03/18  -MJ      PT Goal Re-Cert Due Date 01/10/18  -      Time Calculation- PT    Total Timed Code Minutes- PT 23 minute(s)  -        User Key  (r) = Recorded By, (t) = Taken By, (c) = Cosigned By    Initials Name Provider Type    RANJIT Martines, PT Physical Therapist          Therapy Charges for Today     Code Description Service Date Service Provider Modifiers Qty    51036140189 HC PT THER PROC EA 15 MIN 1/2/2018 Ezra Martines, PT GP 3    40732076080 HC GAIT TRAINING EA 15 MIN 1/2/2018 Ezra Martines, PT GP 1    [id number] HC PT THER PROC EA 15 MIN 1/2/2018 Ezra Martines, PT GP 1    [id number] HC PT THER SUPP EA 15 MIN 1/2/2018 Ezra Martines, PT GP 2    [id number] HC GAIT TRAINING EA 15 MIN 1/3/2018 Ezra Martines, PT GP 1    [id number] HC PT THER PROC EA 15 MIN 1/3/2018 Ezra Martines, PT GP 1    [id number] HC PT THER SUPP EA 15 MIN 1/3/2018 Ezra Martines, PT GP 2          PT G-Codes  Outcome Measure Options: AM-PAC 6 Clicks Basic Mobility (PT)  Score: 10  Functional Limitation: Mobility: Walking and moving around  Mobility: Walking and Moving Around Current Status (): At least 60 percent but less than 80 percent impaired, limited or restricted  Mobility: Walking and Moving Around Goal Status (): At least 60 percent but less than 80 percent impaired, limited or restricted    Ezra Martines, JANETTE  1/3/2018

## 2018-01-03 NOTE — PLAN OF CARE
Problem: Patient Care Overview (Adult)  Goal: Plan of Care Review  Outcome: Ongoing (interventions implemented as appropriate)   01/03/18 1503   Coping/Psychosocial Response Interventions   Plan Of Care Reviewed With patient   Patient Care Overview   Progress improving   Outcome Evaluation   Outcome Summary/Follow up Plan Pt increased gait distance to 70 feet with MinAx2 and RW, improved ability to maintain PWB R LE. Will continue to progress mobility as able.       Problem: Inpatient Physical Therapy  Goal: Bed Mobility Goal LTG- PT  Outcome: Ongoing (interventions implemented as appropriate)   12/31/17 1418 01/03/18 1503   Bed Mobility PT LTG   Bed Mobility PT LTG, Date Established 12/31/17 --    Bed Mobility PT LTG, Time to Achieve 5 - 7 days --    Bed Mobility PT LTG, Activity Type supine to sit/sit to supine --    Bed Mobility PT LTG, Stanley Level moderate assist (50% patient effort) --    Bed Mobility PT Goal LTG, Assist Device bed rails --    Bed Mobility PT LTG, Date Goal Reviewed --  01/03/18   Bed Mobility PT LTG, Outcome --  goal ongoing     Goal: Transfer Training Goal 1 LTG- PT  Outcome: Ongoing (interventions implemented as appropriate)   12/31/17 1418 01/03/18 1503   Transfer Training PT LTG   Transfer Training PT LTG, Date Established 12/31/17 --    Transfer Training PT LTG, Time to Achieve 5 - 7 days --    Transfer Training PT LTG, Activity Type sit to stand/stand to sit --    Transfer Training PT LTG, Stanley Level minimum assist (75% patient effort) --    Transfer Training PT LTG, Date Goal Reviewed --  01/03/18   Transfer Training PT LTG, Outcome --  goal ongoing     Goal: Gait Training Goal LTG- PT  Outcome: Ongoing (interventions implemented as appropriate)   01/03/18 1038 01/03/18 1503   Gait Training PT LTG   Gait Training Goal PT LTG, Date Established 01/03/18 --    Gait Training Goal PT LTG, Time to Achieve 5 days --    Gait Training Goal PT LTG, Stanley Level contact guard  assist --    Gait Training Goal PT LTG, Assist Device walker, rolling --    Gait Training Goal PT LTG, Distance to Achieve 60 feet --    Gait Training Goal PT LTG, Additional Goal PWB R LE --    Gait Training Goal PT LTG, Date Goal Reviewed --  01/03/18   Gait Training Goal PT LTG, Outcome --  goal partially met  (achieved distance)

## 2018-01-03 NOTE — PAYOR COMM NOTE
"Cassidy Castorena (77 y.o. Female)     Date of Birth Social Security Number Address Home Phone MRN    1940  [id number] [id number] [phone] [id number]    Mormon Marital Status          Sikhism        Admission Date Admission Type Admitting Provider Attending Provider Department, Room/Bed    17 Emergency Nomi Kraus MD Dossett, Lee M, MD [facility] 3H, S375/1    Discharge Date Discharge Disposition Discharge Destination                      Attending Provider: Nomi Kraus MD     Allergies:  Horse-derived Products    Isolation:  None   Infection:  None   Code Status:  FULL    Ht:  154.9 cm (61\")   Wt:  40.8 kg (90 lb)    Admission Cmt:  None   Principal Problem:  Closed right hip fracture [S72.001A]                 Active Insurance as of 2017     Primary Coverage     Payor Plan Insurance Group Employer/Plan Group    AETNA MEDICARE REPLACEMENT AETNA [id number]     Payor Plan Address Payor Plan Phone Number Effective From Effective To    [address] [phone] 2016     [place] [address]       Subscriber Name Subscriber Birth Date Member ID       CASSIDY CASTORENA 1940 [id number]                 Emergency Contacts      (Rel.) Home Phone Work Phone Mobile Phone    Esperanza Paul (Daughter) [phone] -- [phone]               History & Physical      Nilsa Justice MD at 2017  6:26 PM              [facility]  HISTORY AND PHYSICAL    Patient Name: Cassidy Castorena  : 1940  MRN: [id number]  Primary Care Physician: Luke Baum MD    Subjective   Subjective     Chief Complaint:  Fall, right hip fracture    HPI:  Cassidy Castorena is a pleasant 77 y.o.  female who is a patient of Dr. Lund with PMH significant for left breast cancer - 2003 s/p mastectomy and XRT followed by right breast cancer,  s/p mastectomy, herceptin, adriamycin and cytoxan.  She " did have some reduction of her systolic function to 45%  but since herceptin was stopped her systolic function recovered to 55-60% by January earlier this year.  The patient started Arimidex in April and the  states that she has been weak and dizzy every since.  In fact he called the heme-onc office through the exchange earlier today to discuss taking her off the Arimidex but was not able to talk to anyone. The patient has been walking around the house with a walker and she fell today, experiencing immediate pain in her right hip and inability to walk.  XRay in the ER shows right femoral neck fracture and Dr. Swanson plans to take her to the OR [facility] for percutaneous pinning.    Review of Systems   Constitutional: Positive for activity change.   HENT: Negative.    Eyes: Negative.    Respiratory: Negative.    Cardiovascular: Negative.    Gastrointestinal: Negative.    Endocrine: Negative.    Genitourinary: Negative.    Musculoskeletal: Positive for arthralgias and gait problem.   Skin: Negative.    Allergic/Immunologic: Negative.    Neurological: Positive for dizziness and weakness.   Hematological: Negative.    Psychiatric/Behavioral: Negative.           Otherwise 10-system ROS reviewed and is negative except as mentioned in the HPI.    Personal History     Past Medical History:   Diagnosis Date   • Allergic rhinitis    • Anxiety    • Cataract    • Malignant neoplasm of breast, right        Past Surgical History:   Procedure Laterality Date   • BREAST SURGERY      Left   • EYE SURGERY      cataract   • PORTACATH PLACEMENT     • RECONSTRUCTION BREAST IMMEDIATE / DELAYED W/ TISSUE EXPANDER Bilateral 05/2016    [facility]       Family History: family history is not on file.     Social History:  reports that she has quit smoking. She has never used smokeless tobacco. She reports that she drinks about 0.6 oz of alcohol per week  She reports that she uses illicit drugs.  Social History     Social  History Narrative       Medications:  Prescriptions Prior to Admission   Medication Sig Dispense Refill Last Dose   • anastrozole (ARIMIDEX) 1 MG tablet Take 1 tablet by mouth Daily. 90 tablet 1    • Calcium Carbonate-Vitamin D (CALCIUM + D PO) Take 1 tablet by mouth daily.   Taking   • Fexofenadine HCl (ALLERGY 24-HR PO) Take 1 tablet by mouth as needed.      • Multiple Vitamins-Minerals (CENTRUM ADULTS PO) Take 1 tablet by mouth daily.   Taking   • Multiple Vitamins-Minerals (PRESERVISION AREDS PO) Take 1 tablet by mouth daily.   Taking   • HYDROcodone-acetaminophen (NORCO) 5-325 MG per tablet Take 1 tablet by mouth Every 6 (Six) Hours As Needed for severe pain (7-10). 15 tablet 0        Allergies   Allergen Reactions   • Horse-Derived Products      Serum       Objective   Objective     Vital Signs:   Temp:  [98.3 °F (36.8 °C)] 98.3 °F (36.8 °C)  Heart Rate:  [] 96  Resp:  [16] 16  BP: (110-137)/(64-79) 113/77        Physical Exam   Constitutional: No acute distress, awake, alert, speech slow  Eyes: PERRLA, sclerae anicteric, no conjunctival injection  HENT: NCAT, mucous membranes moist  Neck: Supple, no thyromegaly, no lymphadenopathy, trachea midline  Respiratory: Clear to auscultation bilaterally, nonlabored respirations   Cardiovascular: RRR, palpable pedal pulses bilaterally  Gastrointestinal: Positive bowel sounds, soft, nontender, nondistended  Musculoskeletal: Tender right hip, No bilateral ankle edema, no clubbing or cyanosis to extremities  Psychiatric: Appropriate affect, cooperative  Neurologic: Oriented x 3, strength symmetric in all extremities, Cranial Nerves grossly intact to confrontation, speech clear  Skin: No rashes      Results Reviewed:  I have personally reviewed current lab, radiology, and data and agree.      Results from last 7 days  Lab Units 12/30/17  1312   WBC 10*3/mm3 10.46   HEMOGLOBIN g/dL 13.0   HEMATOCRIT % 38.7   PLATELETS 10*3/mm3 172       Results from last 7 days  Lab  Units 12/30/17  1312   SODIUM mmol/L 131*   POTASSIUM mmol/L 3.7   CHLORIDE mmol/L 99   CO2 mmol/L 28.0   BUN mg/dL 15   CREATININE mg/dL 0.50*   GLUCOSE mg/dL 113*   CALCIUM mg/dL 9.2   ALT (SGPT) U/L 31   AST (SGOT) U/L 34*     Brief Urine Lab Results  (Last result in the past 365 days)      Color   Clarity   Blood   Leuk Est   Nitrite   Protein   CREAT   Urine HCG        12/30/17 1339 Yellow Clear Negative Negative Negative Negative             No results found for: BNP  No results found for: PHART  Imaging Results (last 24 hours)     Procedure Component Value Units Date/Time    XR Chest 1 View [[id number]] Updated:  12/30/17 1523    XR Hip With or Without Pelvis 2 - 3 View Right [[id number]] Updated:  12/30/17 1525    CT Pelvis Without Contrast [[id number]] Updated:  12/30/17 1626        Results for orders placed during the hospital encounter of 11/30/16   Adult Transthoracic Echo Complete    Narrative · Left ventricular function is normal. Estimated EF = 55%.  · All left ventricular wall segments contract normally.  · Mild aortic valve stenosis is present.  · Mild tricuspid valve regurgitation is present.  · There is no evidence of pericardial effusion.  · Estimated right ventricular systolic pressure from tricuspid   regurgitation is normal (<35 mmHg).  · There are myxomatous changes of the mitral valve apparatus present.          Assessment/Plan   Assessment / Plan     Hospital Problem List     * (Principal)Closed right hip fracture    Fall at home    Hyponatremia    History of breast cancer (Chronic)            Assessment & Plan:  Ms. Teran is a 77 year old  woman who presents to [facility] ER s/p fall while going to the bathroom today in her home with a right femoral neck fracture.    Right femoral neck fracture  --Dr. Sky greco  --Pain management by surgeon  --PT/OT tomorrow  --Lovenox prophy after surgery per surgeon's note  --Case management    Weakness and dizziness:  Patient and   attribute this to the patient's Arimidex  Will courtesy consult Dr. Fortune and hopefully he can visit the patient prior to her discharge to rehab  Will hold arimidex for now pending further advice from Dr. Fortune    Hyponatremia  Will get fluids in the ER  Recheck in AM    DVT prophylaxis: Lovenox after surgery    CODE STATUS:  No Order FULL    INPATIENT status due to the need for care which can only be reasonably provided in an hospital setting such as aggressive/expedited ancillary services and consultation services, the necessity for IV medications, close physician monitoring and the need for surgery.  In such, I feel patient’s risk for adverse outcomes and need for care warrant INPATIENT evaluation and predict the patient’s care encounter to likely last beyond 2 midnights.      Nilsa Justice MD   17   6:26 PM           Electronically signed by Nilsa Justice MD at 2017  7:06 PM           Physician Progress Notes (all)      Shannon Baum PA-C at 2017  8:29 AM  Version 1 of 1    Attestation signed by Nomi Kraus MD at 2017  9:32 AM        I have reviewed the documentation above and agree.                                     [facility] Medicine Services  PROGRESS NOTE    Patient Name: Cassidy Teran  : 1940  MRN: [id number]    Date of Admission: 2017  Length of Stay: 1  Primary Care Physician: Luke Baum MD    Subjective     CC: f/u R hip fracture    HPI:  Confused this morning. Laying in bed, BP tubing and pulse-ox cable balled up in her hands. Knows her name, phone number, location and why she's here. Not sure on the date or her birthday. She denied pain. Therapy in at end of visit to work with her. She was agreeable.     Review of Systems  Gen- No fevers, chills  CV- No chest pain, palpitations  Resp- No cough, dyspnea  GI- No N/V/D, abd pain    Otherwise ROS is negative except as mentioned in the HPI.    Objective     Vital  Signs:   Temp:  [97.4 °F (36.3 °C)-98.6 °F (37 °C)] 97.4 °F (36.3 °C)  Heart Rate:  [] 85  Resp:  [16-20] 20  BP: (110-151)/(64-94) 119/65        Physical Exam:  Constitutional: Thin, elderly female. NAD. Awake, alert and conversant.   HENT: NCAT, mucous membranes moist  Respiratory: Clear to auscultation bilaterally, respiratory effort normal   Cardiovascular: RRR, no murmurs, rubs, or gallops, palpable pedal pulses bilaterally  Gastrointestinal: Positive bowel sounds, soft, nontender, nondistended  Musculoskeletal: No bilateral ankle edema. R hip incision is dressed with mild to moderate serosanguinous drainage.   Psychiatric: Appropriate affect, cooperative  Neurologic: Oriented x to self and location. Strength symmetric in all extremities, Cranial Nerves grossly intact to confrontation, speech clear  Skin: No rashes    Results Reviewed:  I have personally reviewed current lab, radiology, and data and agree.      Results from last 7 days  Lab Units 12/31/17  0511 12/30/17  2108 12/30/17  1312   WBC 10*3/mm3 11.20* 9.98 10.46   HEMOGLOBIN g/dL 10.7* 11.7 13.0   HEMATOCRIT % 32.9* 36.6 38.7   PLATELETS 10*3/mm3 165 160 172       Results from last 7 days  Lab Units 12/31/17  0511 12/30/17  1312   SODIUM mmol/L 130* 131*   POTASSIUM mmol/L 3.4* 3.7   CHLORIDE mmol/L 98* 99   CO2 mmol/L 26.0 28.0   BUN mg/dL 13 15   CREATININE mg/dL 0.50* 0.50*   GLUCOSE mg/dL 109* 113*   CALCIUM mg/dL 8.6* 9.2   ALT (SGPT) U/L  --  31   AST (SGOT) U/L  --  34*     No results found for: BNP  No results found for: PHART    Microbiology Results Abnormal     None        Imaging Results (last 24 hours)     Procedure Component Value Units Date/Time    XR Chest 1 View [[id number]] Updated:  12/30/17 1523    XR Hip With or Without Pelvis 2 - 3 View Right [[id number]] Updated:  12/30/17 1525    CT Pelvis Without Contrast [[id number]] Updated:  12/30/17 1626    FL C Arm During Surgery [[id number]] Updated:  12/30/17 1940        I have  "reviewed the medications.    Assessment/Plan   Assessment / Plan     Hospital Problem List     * (Principal)Closed right hip fracture    Fall at home    Hyponatremia    History of breast cancer (Chronic)        Brief Hospital Course to date:  Cassidy Teran is a 77 y.o. female with PMH significant for L breast cancer (2003- s/p mastectomy and XRT) followed by R breast cancer (s/p mastectomy, Herceptin, Adriamycin and Cytoxan). She did have some reduction of her systolic function (down to 45%) but EF recovered to 55-60% by January 2017 after discontinuation of Herceptin. She was started on Arimidex in April 2017 and the patient's  reports she has been \"weak and dizzy\" ever since. He has been hopeful that this medication can be discontinued.     Ms. Teran was admitted to [facility] on 12/30/17 after a fall at home, sustaining a R femoral neck fracture. She underwent repair by Dr. Corbin Swanson.     Right femoral neck fracture  Post-operative anemia  - s/p R hip cannulated screw fixation of the R femoral neck by Dr. Swanson on 12/30/17  - Pain management PRN  - PT/OT following, CM for rehab placement at d/c   - Begin Lovenox for DVT prophylaxis when OK with surgeon   - Hemoglobin appropriate. Transfuse for < 7 or symptomatic     R breast cancer   Weakness and dizziness  - Ongoing since April 2017 after starting Arimidex. Patient is followed by Dr. Lund  - Courtesy consult to Dr. Lund - hopefully he can visit the patient prior to d/c to rehab  - Holding Arimidex for now pending further input from ONC     Hyponatremia  - Continue IV fluids, recheck labs in the AM     Hypokalemia, replete potassium PRN     DVT Prophylaxis: Lovenox when OK with surgery   CODE STATUS: Full Code    Disposition: I expect the patient to be discharged to rehab in a few.    Shannon Baum PA-C  12/31/17  8:29 AM           Electronically signed by Nomi Kraus MD at 12/31/2017  9:32 AM      Corbin Swanson MD at 12/31/2017 10:16 AM  " "Version 1 of 1         /65  Pulse 85  Temp 97.4 °F (36.3 °C) (Oral)   Resp 20  Ht 154.9 cm (61\")  Wt 40.8 kg (90 lb)  SpO2 99%  BMI 17.01 kg/m2    Lab Results (last 24 hours)     Procedure Component Value Units Date/Time    CBC Auto Differential [[id number]]  (Abnormal) Collected:  12/30/17 1312    Specimen:  Blood Updated:  12/30/17 1325     WBC 10.46 10*3/mm3      RBC 4.17 10*6/mm3      Hemoglobin 13.0 g/dL      Hematocrit 38.7 %      MCV 92.8 fL      MCH 31.2 (H) pg      MCHC 33.6 g/dL      RDW 14.3 %      RDW-SD 48.2 fl      MPV 11.5 fL      Platelets 172 10*3/mm3      Neutrophil % 82.4 (H) %      Lymphocyte % 7.2 (L) %      Monocyte % 9.4 %      Eosinophil % 0.5 %      Basophil % 0.3 %      Immature Grans % 0.2 %      Neutrophils, Absolute 8.63 (H) 10*3/mm3      Lymphocytes, Absolute 0.75 10*3/mm3      Monocytes, Absolute 0.98 10*3/mm3      Eosinophils, Absolute 0.05 10*3/mm3      Basophils, Absolute 0.03 10*3/mm3      Immature Grans, Absolute 0.02 10*3/mm3     CBC & Differential [[id number]] Collected:  12/30/17 1312    Specimen:  Blood Updated:  12/30/17 1325    Narrative:       The following orders were created for panel order CBC & Differential.  Procedure                               Abnormality         Status                     ---------                               -----------         ------                     CBC Auto Differential[[id number]]        Abnormal            Final result                 Please view results for these tests on the individual orders.    POC Troponin, Rapid [[id number]]  (Normal) Collected:  12/30/17 1321    Specimen:  Blood Updated:  12/30/17 1336     Troponin I 0.00 ng/mL       Serial Number: [id number]:  [id number]       Comprehensive Metabolic Panel [[id number]]  (Abnormal) Collected:  12/30/17 1312    Specimen:  Blood Updated:  12/30/17 1340     Glucose 113 (H) mg/dL      BUN 15 mg/dL      Creatinine 0.50 (L) mg/dL      Sodium 131 (L) mmol/L      Potassium 3.7 " mmol/L      Chloride 99 mmol/L      CO2 28.0 mmol/L      Calcium 9.2 mg/dL      Total Protein 7.1 g/dL      Albumin 4.10 g/dL      ALT (SGPT) 31 U/L      AST (SGOT) 34 (H) U/L      Alkaline Phosphatase 60 U/L      Total Bilirubin 1.2 mg/dL      eGFR Non African Amer 119 mL/min/1.73      Globulin 3.0 gm/dL      A/G Ratio 1.4 (L) g/dL      BUN/Creatinine Ratio 30.0 (H)     Anion Gap 4.0 mmol/L     Narrative:       National Kidney Foundation Guidelines    Stage     Description        GFR  1         Normal or High     90+  2         Mild decrease      60-89  3         Moderate decrease  30-59  4         Severe decrease    15-29  5         Kidney failure     <15    Urinalysis With / Culture If Indicated - Urine, Clean Catch [[id number]]  (Abnormal) Collected:  12/30/17 1339    Specimen:  Urine from Urine, Clean Catch Updated:  12/30/17 1352     Color, UA Yellow     Appearance, UA Clear     pH, UA 7.5     Specific Gravity, UA 1.016     Glucose, UA Negative     Ketones, UA Trace (A)     Bilirubin, UA Negative     Blood, UA Negative     Protein, UA Negative     Leuk Esterase, UA Negative     Nitrite, UA Negative     Urobilinogen, UA 0.2 E.U./dL    Narrative:       Urine microscopic not indicated.    POC Troponin, Rapid [[id number]]  (Normal) Collected:  12/30/17 1521    Specimen:  Blood Updated:  12/30/17 1540     Troponin I 0.00 ng/mL       Serial Number: [id number]:  [id number]       CBC & Differential [[id number]] Collected:  12/30/17 2108    Specimen:  Blood Updated:  12/30/17 2135    Narrative:       The following orders were created for panel order CBC & Differential.  Procedure                               Abnormality         Status                     ---------                               -----------         ------                     CBC Auto Differential[[id number]]        Abnormal            Final result                 Please view results for these tests on the individual orders.    CBC Auto Differential  [[id number]]  (Abnormal) Collected:  12/30/17 2108    Specimen:  Blood Updated:  12/30/17 2135     WBC 9.98 10*3/mm3      RBC 3.89 10*6/mm3      Hemoglobin 11.7 g/dL      Hematocrit 36.6 %      MCV 94.1 fL      MCH 30.1 pg      MCHC 32.0 g/dL      RDW 14.2 %      RDW-SD 48.6 fl      MPV 11.4 fL      Platelets 160 10*3/mm3      Neutrophil % 73.9 (H) %      Lymphocyte % 12.8 (L) %      Monocyte % 11.5 %      Eosinophil % 1.4 %      Basophil % 0.3 %      Immature Grans % 0.1 %      Neutrophils, Absolute 7.37 10*3/mm3      Lymphocytes, Absolute 1.28 10*3/mm3      Monocytes, Absolute 1.15 (H) 10*3/mm3      Eosinophils, Absolute 0.14 10*3/mm3      Basophils, Absolute 0.03 10*3/mm3      Immature Grans, Absolute 0.01 10*3/mm3     CBC & Differential [[id number]] Collected:  12/31/17 0511    Specimen:  Blood Updated:  12/31/17 0619    Narrative:       The following orders were created for panel order CBC & Differential.  Procedure                               Abnormality         Status                     ---------                               -----------         ------                     CBC Auto Differential[[id number]]        Abnormal            Final result                 Please view results for these tests on the individual orders.    CBC Auto Differential [[id number]]  (Abnormal) Collected:  12/31/17 0511    Specimen:  Blood Updated:  12/31/17 0619     WBC 11.20 (H) 10*3/mm3      RBC 3.51 (L) 10*6/mm3      Hemoglobin 10.7 (L) g/dL      Hematocrit 32.9 (L) %      MCV 93.7 fL      MCH 30.5 pg      MCHC 32.5 g/dL      RDW 14.3 %      RDW-SD 49.1 fl      MPV 11.7 fL      Platelets 165 10*3/mm3      Neutrophil % 80.9 (H) %      Lymphocyte % 8.4 (L) %      Monocyte % 9.8 %      Eosinophil % 0.4 %      Basophil % 0.3 %      Immature Grans % 0.2 %      Neutrophils, Absolute 9.07 (H) 10*3/mm3      Lymphocytes, Absolute 0.94 10*3/mm3      Monocytes, Absolute 1.10 (H) 10*3/mm3      Eosinophils, Absolute 0.04 10*3/mm3       Basophils, Absolute 0.03 10*3/mm3      Immature Grans, Absolute 0.02 10*3/mm3     Basic Metabolic Panel [[id number]]  (Abnormal) Collected:  12/31/17 0511    Specimen:  Blood Updated:  12/31/17 0641     Glucose 109 (H) mg/dL      BUN 13 mg/dL      Creatinine 0.50 (L) mg/dL      Sodium 130 (L) mmol/L      Potassium 3.4 (L) mmol/L      Chloride 98 (L) mmol/L      CO2 26.0 mmol/L      Calcium 8.6 (L) mg/dL      eGFR Non African Amer 119 mL/min/1.73      BUN/Creatinine Ratio 26.0 (H)     Anion Gap 6.0 mmol/L     Narrative:       National Kidney Foundation Guidelines    Stage     Description        GFR  1         Normal or High     90+  2         Mild decrease      60-89  3         Moderate decrease  30-59  4         Severe decrease    15-29  5         Kidney failure     <15          Imaging Results (last 24 hours)     Procedure Component Value Units Date/Time    XR Chest 1 View [[id number]] Updated:  12/30/17 1523    XR Hip With or Without Pelvis 2 - 3 View Right [[id number]] Updated:  12/30/17 1525    CT Pelvis Without Contrast [[id number]] Updated:  12/30/17 1626    FL C Arm During Surgery [[id number]] Updated:  12/30/17 1940          Patient Care Team:  Luke Baum MD as PCP - General  Neftali Denise MD as Consulting Physician (General Surgery)    SUBJECTIVE: She is sitting up in the chair this morning.  Tolerating a regular diet.  Denies hip pain.  By report had some confusion overnight but is answering questions appropriately this morning.    PHYSICAL EXAM  Right hip dressing is clean, dry and intact  Thigh and calf soft and nontender  Neurovascular intact distally     Principal Problem:    Closed right hip fracture  Active Problems:    Malignant neoplasm of central portion of right female breast    Fall at home    Hyponatremia    History of breast cancer    Hypokalemia    Postoperative anemia      PLAN / DISPOSITION: 77-year-old female postop day #1 status post right hip percutaneous pinning  -Mobilize  "with PT/OT, partial weightbearing right lower extremity  -Lovenox for DVT prophylaxis okay to start today  -H&H stable  - for rehabilitation planning  -Follow-up in 2-3 weeks    Corbin Swanson MD  12/31/17  10:16 AM         Electronically signed by Corbin Swanson MD at 12/31/2017 10:17 AM      Corbin Swanson MD at 1/1/2018 10:01 AM  Version 1 of 1         /73  Pulse 100  Temp 98.2 °F (36.8 °C)  Resp 16  Ht 154.9 cm (61\")  Wt 40.8 kg (90 lb)  SpO2 97%  BMI 17.01 kg/m2    Lab Results (last 24 hours)     Procedure Component Value Units Date/Time    CBC & Differential [[id number]] Collected:  01/01/18 0546    Specimen:  Blood Updated:  01/01/18 0650    Narrative:       The following orders were created for panel order CBC & Differential.  Procedure                               Abnormality         Status                     ---------                               -----------         ------                     CBC Auto Differential[[id number]]        Abnormal            Final result                 Please view results for these tests on the individual orders.    CBC Auto Differential [[id number]]  (Abnormal) Collected:  01/01/18 0546    Specimen:  Blood Updated:  01/01/18 0650     WBC 7.45 10*3/mm3      RBC 3.21 (L) 10*6/mm3      Hemoglobin 9.9 (L) g/dL      Hematocrit 30.2 (L) %      MCV 94.1 fL      MCH 30.8 pg      MCHC 32.8 g/dL      RDW 14.2 %      RDW-SD 49.1 fl      MPV 11.6 fL      Platelets 140 (L) 10*3/mm3      Neutrophil % 67.2 %      Lymphocyte % 14.8 (L) %      Monocyte % 14.6 (H) %      Eosinophil % 2.8 %      Basophil % 0.3 %      Immature Grans % 0.3 %      Neutrophils, Absolute 5.01 10*3/mm3      Lymphocytes, Absolute 1.10 10*3/mm3      Monocytes, Absolute 1.09 (H) 10*3/mm3      Eosinophils, Absolute 0.21 10*3/mm3      Basophils, Absolute 0.02 10*3/mm3      Immature Grans, Absolute 0.02 10*3/mm3           Imaging Results (last 24 hours)     Procedure " Component Value Units Date/Time    CT Pelvis Without Contrast [[id number]] Collected:  12/31/17 1228     Updated:  12/31/17 1234    Narrative:       EXAMINATION: CT PELVIS WO CONTRAST - 12/30/2017     INDICATION: Evaluate for possible right hip fixation.      TECHNIQUE: CT pelvis without intravenous contrast.     The radiation dose reduction device was turned on for each scan per the  ALARA (As Low as Reasonably Achievable) protocol.     COMPARISON: CT dated 10/28/2015.     FINDINGS: Intrapelvic soft tissues unremarkable without free fluid or  bulky pelvic adenopathy. Moderately distended urinary bladder.     Osseous structures demonstrate atherosclerotic involvement of the  lumbosacral junction. SI joints symmetric and intact. Pubic symphysis  without significant widening. Abnormal cortical irregularity in the  right subcapital femoral region extending along the neck consistent with  subcapital impacted fracture in asymmetric appearance compared to the  left side. Minimal degenerative changes of the bilateral hips noted.       Impression:       Acute impacted right subcapital femoral fracture which is  nondisplaced producing mild foreshortening of the right leg due to  impacted asymmetric configuration.     DICTATED:     12/30/2017  EDITED:          12/31/2017     This report was finalized on 12/31/2017 12:31 PM by Dr. Bob Alfaro.       XR Hip With or Without Pelvis 2 - 3 View Right [[id number]] Collected:  12/31/17 1159     Updated:  12/31/17 1240    Narrative:       EXAMINATION: XR HIP, 2-3 VIEWS RIGHT - 12/30/2017     INDICATION: Fall, right hip pain.      COMPARISON: None.     FINDINGS: SI joints and pubic symphysis without significant widening.  Right femoral neck subcapital region has asymmetric somewhat impacted  appearance suspicious for fracture in the acute setting however no  discrete fracture line is clearly evident. Background degenerative  changes. Calcified phleboliths.           Impression:        Asymmetric appearance of the right subcapital region  concerning for impacted fracture however no discrete fracture line.  Recommend CT bony pelvis for further evaluation.     DICTATED:     12/30/2017  EDITED:          12/31/2017     This report was finalized on 12/31/2017 12:38 PM by Dr. Bob Alfaro.       XR Chest 1 View [[id number]] Collected:  12/31/17 1200     Updated:  12/31/17 1241    Narrative:       EXAMINATION: XR CHEST 1 VW - 12/30/2017     INDICATION: Right rib pain after fall.     COMPARISON: Chest x-ray 10/27/2015.     FINDINGS: Chronic lung changes including hyperinflated appearance and  prior granulomatous involvement without focal consolidation or  pneumothorax. No pleural effusion. Cardiac silhouette within normal  limits.           Impression:       Chronic lung changes including hyperinflated appearance  without acute cardiopulmonary disease.     DICTATED:     12/30/2017  EDITED:          12/31/2017     This report was finalized on 12/31/2017 12:39 PM by Dr. Bob Alfaro.       FL C Arm During Surgery [[id number]] Collected:  01/01/18 0807     Updated:  01/01/18 0911    Narrative:       EXAMINATION: FLUOROSCOPY C-ARM DURING SURGERY-     INDICATION: Hip pinning; W19.XXXA-Unspecified fall, initial encounter;  Y92.099-Unspecified place in other non-institutional residence as the  place of occurrence of the external cause; S72.001A-Fracture of  unspecified part of neck of right femur, initial encounter for closed  fracture.      TECHNIQUE: Intraoperative fluoroscopy for improved localization and  treatment planning.     COMPARISON: CT bony pelvis 12/30/2017.     FINDINGS: Intraoperative fluoroscopy with total fluoroscopic time usage  1 minute 29 seconds and four representative images saved of ORIF right  hip involving three partially threaded screws spanning the subcapital  fracture right femur.       Impression:       Intraoperative fluoroscopy ORIF right hip.      D:  01/01/2018  E:  01/01/2018        CT Head With & Without Contrast [[id number]] Collected:  01/01/18 0845     Updated:  01/01/18 0934    Narrative:       EXAMINATION: CT HEAD W WO CONTRAST-      INDICATION: Dizziness and fall in a patient with history of breast  cancer; W19.XXXA-Unspecified fall, initial encounter;  Y92.099-Unspecified place in other non-institutional residence as the  place of occurrence of the external cause; S72.001A-Fracture of  unspecified part of neck of right femur, initial encounter for closed  fracture; Z74.09-Other reduced mobility.      TECHNIQUE: CT head with and without intravenous contrast administration.     The radiation dose reduction device was turned on for each scan per the  ALARA (As Low as Reasonably Achievable) protocol.     COMPARISON: CT head 10/28/2015.     FINDINGS: Midline structures are symmetric without evidence of mass,  mass effect or midline shift. No intraaxial hemorrhage or extraaxial  fluid collection. Ventricles and sulci are mildly prominent. There is an  area of encephalomalacia left frontal lobe with ex vacuo dilatation of  the left frontal horn new within the interval between comparison  10/28/2015. No abnormal enhancing focus on postcontrast administration  sequences identified. Superior sagittal sinus is widely patent. Globes  and orbits unremarkable. Visualized paranasal sinuses and mastoid air  cells are grossly clear and well pneumatized. Calvarium intact.       Impression:       1. No acute intracranial abnormality.  2. Area of encephalomalacia left frontal lobe including ex vacuo  dilatation of the left frontal horn occurring within the interval  between prior comparison 10/28/2015 likely prior insult.  3. No abnormal enhancing focus to suggest intracranial metastasis.     D:  01/01/2018  E:  01/01/2018             Patient Care Team:  Luke Baum MD as PCP - General  Neftali Denise MD as Consulting Physician (General Surgery)    SUBJECTIVE: Cassidy reports she is doing  okay.  She has not gotten up to the chair yet today.  She reports right hip pain overnight but feels better this morning.  Walked to the chair with therapy yesterday.    PHYSICAL EXAM  Right hip incision is clean, dry and intact  Thigh and calf soft and nontender  Neurovascular intact distally     Principal Problem:    Closed right hip fracture  Active Problems:    Malignant neoplasm of central portion of right female breast    Fall at home    Hyponatremia    History of breast cancer    Hypokalemia    Postoperative anemia      PLAN / DISPOSITION: 77-year-old female postop day #2 status post right hip percutaneous pinning  -Partial weightbearing right lower extremity with PT/OT and walker assist  -Lovenox for DVT prophylaxis  - for rehabilitation planning  -Right hip dressing changed to Aquacel, incision healing well  -Follow-up in 2-3 weeks    Corbin Swanson MD  18  10:01 AM         Electronically signed by Corbin Swanson MD at 2018 10:03 AM      Nomi Kraus MD at 2018  1:41 PM  Version 1 of 1             [facility]  PROGRESS NOTE    Patient Name: Cassidy Teran  : 1940  MRN: [id number]    Date of Admission: 2017  Length of Stay: 1  Primary Care Physician: Luke Baum MD    Subjective     CC: f/u R hip fracture    HPI:  Seems less confused this morning, expresses frustration with nursing response time.  Pain is doing ok.   at bedside.  Eating well.    Review of Systems  Gen- No fevers, chills  CV- No chest pain, palpitations  Resp- No cough, dyspnea  GI- No N/V/D, abd pain    Otherwise ROS is negative except as mentioned in the HPI.    Objective     Vital Signs:   Temp:  [97.3 °F (36.3 °C)-98.2 °F (36.8 °C)] 98.2 °F (36.8 °C)  Heart Rate:  [] 100  Resp:  [14-16] 16  BP: (111-122)/(64-73) 121/73        Physical Exam:  Constitutional: Thin, elderly female. NAD. Awake, alert and conversant, feeding  herself lunch  HENT: NCAT, mucous membranes moist  Respiratory: Clear to auscultation bilaterally, respiratory effort normal   Cardiovascular: RRR, no murmurs, rubs, or gallops   Gastrointestinal: Positive bowel sounds, soft, nontender, nondistended  Musculoskeletal: No bilateral ankle edema. R hip incision is dressed and clean   Psychiatric: Appropriate affect, cooperative  Neurologic: Oriented x 3. Strength symmetric in all extremities, Cranial Nerves grossly intact to confrontation, speech clear, no focal deficits  Skin: No rashes    Results Reviewed:  I have personally reviewed current lab, radiology, and data and agree.      Results from last 7 days  Lab Units 01/01/18  0546 12/31/17  0511 12/30/17  2108   WBC 10*3/mm3 7.45 11.20* 9.98   HEMOGLOBIN g/dL 9.9* 10.7* 11.7   HEMATOCRIT % 30.2* 32.9* 36.6   PLATELETS 10*3/mm3 140* 165 160       Results from last 7 days  Lab Units 12/31/17  0511 12/30/17  1312   SODIUM mmol/L 130* 131*   POTASSIUM mmol/L 3.4* 3.7   CHLORIDE mmol/L 98* 99   CO2 mmol/L 26.0 28.0   BUN mg/dL 13 15   CREATININE mg/dL 0.50* 0.50*   GLUCOSE mg/dL 109* 113*   CALCIUM mg/dL 8.6* 9.2   ALT (SGPT) U/L  --  31   AST (SGOT) U/L  --  34*     No results found for: BNP  No results found for: PHART    Microbiology Results Abnormal     None        Imaging Results (last 24 hours)     Procedure Component Value Units Date/Time    CT Head With & Without Contrast [[id number]] Collected:  01/01/18 0845     Updated:  01/01/18 1014    Narrative:       EXAMINATION: CT HEAD W WO CONTRAST-      INDICATION: Dizziness and fall in a patient with history of breast  cancer; W19.XXXA-Unspecified fall, initial encounter;  Y92.099-Unspecified place in other non-institutional residence as the  place of occurrence of the external cause; S72.001A-Fracture of  unspecified part of neck of right femur, initial encounter for closed  fracture; Z74.09-Other reduced mobility.      TECHNIQUE: CT head with and without intravenous  contrast administration.     The radiation dose reduction device was turned on for each scan per the  ALARA (As Low as Reasonably Achievable) protocol.     COMPARISON: CT head 10/28/2015.     FINDINGS: Midline structures are symmetric without evidence of mass,  mass effect or midline shift. No intraaxial hemorrhage or extraaxial  fluid collection. Ventricles and sulci are mildly prominent. There is an  area of encephalomalacia left frontal lobe with ex vacuo dilatation of  the left frontal horn new within the interval between comparison  10/28/2015. No abnormal enhancing focus on postcontrast administration  sequences identified. Superior sagittal sinus is widely patent. Globes  and orbits unremarkable. Visualized paranasal sinuses and mastoid air  cells are grossly clear and well pneumatized. Calvarium intact.       Impression:       1. No acute intracranial abnormality.  2. Area of encephalomalacia left frontal lobe including ex vacuo  dilatation of the left frontal horn occurring within the interval  between prior comparison 10/28/2015 likely prior insult.  3. No abnormal enhancing focus to suggest intracranial metastasis.     D:  01/01/2018  E:  01/01/2018     This report was finalized on 1/1/2018 10:11 AM by Dr. Bob Alfaro.       FL C Arm During Surgery [[id number]] Collected:  01/01/18 0807     Updated:  01/01/18 1014    Narrative:       EXAMINATION: FLUOROSCOPY C-ARM DURING SURGERY-     INDICATION: Hip pinning; W19.XXXA-Unspecified fall, initial encounter;  Y92.099-Unspecified place in other non-institutional residence as the  place of occurrence of the external cause; S72.001A-Fracture of  unspecified part of neck of right femur, initial encounter for closed  fracture.      TECHNIQUE: Intraoperative fluoroscopy for improved localization and  treatment planning.     COMPARISON: CT bony pelvis 12/30/2017.     FINDINGS: Intraoperative fluoroscopy with total fluoroscopic time usage  1 minute 29 seconds and four  "representative images saved of ORIF right  hip involving three partially threaded screws spanning the subcapital  fracture right femur.       Impression:       Intraoperative fluoroscopy ORIF right hip.      D:  01/01/2018  E:  01/01/2018     This report was finalized on 1/1/2018 10:12 AM by Dr. Bob Alfaro.           I have reviewed the medications.    Assessment/Plan   Assessment / Plan     Hospital Problem List     * (Principal)Closed right hip fracture    Malignant neoplasm of central portion of right female breast    Overview Signed 8/17/2016  9:44 AM by Jenni Izaguirre              Fall at home    Hyponatremia    History of breast cancer (Chronic)    Hypokalemia    Postoperative anemia        Brief Hospital Course to date:  Cassidy Teran is a 77 y.o. female with PMH significant for L breast cancer (2003- s/p mastectomy and XRT) followed by R breast cancer (s/p mastectomy, Herceptin, Adriamycin and Cytoxan). She did have some reduction of her systolic function (down to 45%) but EF recovered to 55-60% by January 2017 after discontinuation of Herceptin. She was started on Arimidex in April 2017 and the patient's  reports she has been \"weak and dizzy\" ever since. Ms. Teran was admitted to [facility] on 12/30/17 after a fall at home, sustaining a R femoral neck fracture. She underwent repair by Dr. Corbin Swanson.     Right femoral neck fracture  Post-operative anemia  - s/p R hip cannulated screw fixation of the R femoral neck by Dr. Swanson on 12/30/17  - Pain management PRN  - PT/OT following, CM for rehab placement at d/c     R breast cancer   Weakness and dizziness  - Ongoing since April 2017 after starting Arimidex. Patient is followed by Dr. Lund  - Reviewed Dr. Augustine note, seems unlikley that arimedex causing her symptoms but ok to stop for now.  Can f/u with Dr. Lund.  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  Findings d/w patient and .    Hyponatremia  - " stable    Hypokalemia, replete potassium PRN     DVT Prophylaxis: Lovenox      CODE STATUS: Full Code    Disposition: I expect the patient to be discharged to rehab once arranged.    Nomi Kraus MD  18  1:41 PM           Electronically signed by Nomi Kraus MD at 2018  1:45 PM      Nomi Kraus MD at 2018  8:24 AM  Version 1 of 1             [facility] Medicine Services  PROGRESS NOTE    Patient Name: Cassidy Teran  : 1940  MRN: [id number]    Date of Admission: 2017  Length of Stay: 1  Primary Care Physician: Luke Baum MD    Subjective     CC: f/u R hip fracture    HPI:   is not present.  She is more confused this morning.  Says her pain is better.  No issues overnight besides the confusion.  RN at bedside passing meds.    Review of Systems  Unable to obtain d/t confusion    Objective     Vital Signs:   Temp:  [98 °F (36.7 °C)-98.8 °F (37.1 °C)] 98.2 °F (36.8 °C)  Heart Rate:  [] 108  Resp:  [16] 16  BP: ()/(58-73) 122/73        Physical Exam:  Constitutional: Thin, elderly female. NAD. Awake, alert and conversant, but confused  HENT: NCAT, mucous membranes moist  Respiratory: Clear to auscultation bilaterally, respiratory effort normal   Cardiovascular: RRR, no murmurs, rubs, or gallops   Gastrointestinal: Positive bowel sounds, soft, nontender, nondistended  Musculoskeletal: No bilateral ankle edema. R hip incision is dressed and clean   Psychiatric: Appropriate affect, cooperative  Neurologic: Confused today, knows she is in the hospital but not which one.  Thinks year is  . Strength symmetric in all extremities, Cranial Nerves grossly intact to confrontation, speech clear, no focal deficits  Skin: No rashes    Results Reviewed:  I have personally reviewed current lab, radiology, and data and agree.      Results from last 7 days  Lab Units 18  0601 18  0546 17  0511   WBC 10*3/mm3 6.94 7.45 11.20*    HEMOGLOBIN g/dL 8.8* 9.9* 10.7*   HEMATOCRIT % 27.3* 30.2* 32.9*   PLATELETS 10*3/mm3 137* 140* 165       Results from last 7 days  Lab Units 12/31/17  0511 12/30/17  1312   SODIUM mmol/L 130* 131*   POTASSIUM mmol/L 3.4* 3.7   CHLORIDE mmol/L 98* 99   CO2 mmol/L 26.0 28.0   BUN mg/dL 13 15   CREATININE mg/dL 0.50* 0.50*   GLUCOSE mg/dL 109* 113*   CALCIUM mg/dL 8.6* 9.2   ALT (SGPT) U/L  --  31   AST (SGOT) U/L  --  34*     No results found for: BNP  No results found for: PHART    Microbiology Results Abnormal     None        Imaging Results (last 24 hours)     Procedure Component Value Units Date/Time    CT Head With & Without Contrast [[id number]] Collected:  01/01/18 0845     Updated:  01/01/18 1014    Narrative:       EXAMINATION: CT HEAD W WO CONTRAST-      INDICATION: Dizziness and fall in a patient with history of breast  cancer; W19.XXXA-Unspecified fall, initial encounter;  Y92.099-Unspecified place in other non-institutional residence as the  place of occurrence of the external cause; S72.001A-Fracture of  unspecified part of neck of right femur, initial encounter for closed  fracture; Z74.09-Other reduced mobility.      TECHNIQUE: CT head with and without intravenous contrast administration.     The radiation dose reduction device was turned on for each scan per the  ALARA (As Low as Reasonably Achievable) protocol.     COMPARISON: CT head 10/28/2015.     FINDINGS: Midline structures are symmetric without evidence of mass,  mass effect or midline shift. No intraaxial hemorrhage or extraaxial  fluid collection. Ventricles and sulci are mildly prominent. There is an  area of encephalomalacia left frontal lobe with ex vacuo dilatation of  the left frontal horn new within the interval between comparison  10/28/2015. No abnormal enhancing focus on postcontrast administration  sequences identified. Superior sagittal sinus is widely patent. Globes  and orbits unremarkable. Visualized paranasal sinuses and  mastoid air  cells are grossly clear and well pneumatized. Calvarium intact.       Impression:       1. No acute intracranial abnormality.  2. Area of encephalomalacia left frontal lobe including ex vacuo  dilatation of the left frontal horn occurring within the interval  between prior comparison 10/28/2015 likely prior insult.  3. No abnormal enhancing focus to suggest intracranial metastasis.     D:  01/01/2018  E:  01/01/2018     This report was finalized on 1/1/2018 10:11 AM by Dr. Bob Alfaro.       FL C Arm During Surgery [[id number]] Collected:  01/01/18 0807     Updated:  01/01/18 1014    Narrative:       EXAMINATION: FLUOROSCOPY C-ARM DURING SURGERY-     INDICATION: Hip pinning; W19.XXXA-Unspecified fall, initial encounter;  Y92.099-Unspecified place in other non-institutional residence as the  place of occurrence of the external cause; S72.001A-Fracture of  unspecified part of neck of right femur, initial encounter for closed  fracture.      TECHNIQUE: Intraoperative fluoroscopy for improved localization and  treatment planning.     COMPARISON: CT bony pelvis 12/30/2017.     FINDINGS: Intraoperative fluoroscopy with total fluoroscopic time usage  1 minute 29 seconds and four representative images saved of ORIF right  hip involving three partially threaded screws spanning the subcapital  fracture right femur.       Impression:       Intraoperative fluoroscopy ORIF right hip.      D:  01/01/2018  E:  01/01/2018     This report was finalized on 1/1/2018 10:12 AM by Dr. Bob Alfaro.           I have reviewed the medications.    Assessment/Plan   Assessment / Plan     Hospital Problem List     * (Principal)Closed right hip fracture    Malignant neoplasm of central portion of right female breast    Overview Signed 8/17/2016  9:44 AM by Jenni Izaguirre              Fall at home    Hyponatremia    History of breast cancer (Chronic)    Hypokalemia    Postoperative anemia    Delirium        Brief Hospital Course to  "date:  Cassidy Teran is a 77 y.o. female with PMH significant for L breast cancer (2003- s/p mastectomy and XRT) followed by R breast cancer (s/p mastectomy, Herceptin, Adriamycin and Cytoxan). She did have some reduction of her systolic function (down to 45%) but EF recovered to 55-60% by January 2017 after discontinuation of Herceptin. She was started on Arimidex in April 2017 and the patient's  reports she has been \"weak and dizzy\" ever since. Ms. Teran was admitted to [facility] on 12/30/17 after a fall at home, sustaining a R femoral neck fracture. She underwent repair by Dr. Corbin Swanson.     Right femoral neck fracture  Post-operative anemia  - s/p R hip cannulated screw fixation of the R femoral neck by Dr. Swanson on 12/30/17  - Pain management PRN  - PT/OT following, CM for rehab placement at d/c     R breast cancer   Weakness and dizziness  - Ongoing since April 2017 after starting Arimidex. Patient is followed by Dr. Lund  - Reviewed Dr. Augustine note, seems unlikley that arimedex causing her symptoms but ok to stop for now.  Can f/u with Dr. Lund.  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  Findings d/w patient and .    Hyponatremia  - previously stable, will check in AM    Post-op Anemia  - continues to drift down, no overt bleeding.    Delirium   - multifactorial including anesthesia, narcotics, hospital acquired, with some probable underlying cognitive impairment  - will schedule some seroquel at night, should improve with time and being out of hospital.    Hypokalemia, replete potassium PRN     DVT Prophylaxis: Lovenox      CODE STATUS: Full Code    Disposition: I expect the patient to be discharged to rehab once arranged.    Nomi Kraus MD  01/02/18  8:24 AM           Electronically signed by Nomi Kraus MD at 1/2/2018  8:43 AM      Corbin Swanson MD at 1/2/2018 10:50 AM  Version 1 of 1         /73 (BP Location: Right arm, Patient " "Position: Lying)  Pulse 108  Temp 98.2 °F (36.8 °C) (Oral)   Resp 16  Ht 154.9 cm (61\")  Wt 40.8 kg (90 lb)  SpO2 99%  BMI 17.01 kg/m2    Lab Results (last 24 hours)     Procedure Component Value Units Date/Time    CBC Auto Differential [[id number]]  (Abnormal) Collected:  01/02/18 0601    Specimen:  Blood Updated:  01/02/18 0731     WBC 6.94 10*3/mm3      RBC 2.91 (L) 10*6/mm3      Hemoglobin 8.8 (L) g/dL      Hematocrit 27.3 (L) %      MCV 93.8 fL      MCH 30.2 pg      MCHC 32.2 g/dL      RDW 13.9 %      RDW-SD 48.1 fl      MPV 11.7 fL      Platelets 137 (L) 10*3/mm3      Neutrophil % 64.6 %      Lymphocyte % 17.9 (L) %      Monocyte % 14.7 (H) %      Eosinophil % 2.4 %      Basophil % 0.3 %      Immature Grans % 0.1 %      Neutrophils, Absolute 4.48 10*3/mm3      Lymphocytes, Absolute 1.24 10*3/mm3      Monocytes, Absolute 1.02 (H) 10*3/mm3      Eosinophils, Absolute 0.17 10*3/mm3      Basophils, Absolute 0.02 10*3/mm3      Immature Grans, Absolute 0.01 10*3/mm3     CBC & Differential [[id number]] Collected:  01/02/18 0601    Specimen:  Blood Updated:  01/02/18 0732    Narrative:       The following orders were created for panel order CBC & Differential.  Procedure                               Abnormality         Status                     ---------                               -----------         ------                     Scan Slide[[id number]]                                                                  CBC Auto Differential[[id number]]        Abnormal            Final result                 Please view results for these tests on the individual orders.          Imaging Results (last 24 hours)     ** No results found for the last 24 hours. **          Patient Care Team:  Luke Baum MD as PCP - General  Neftali Denise MD as Consulting Physician (General Surgery)    SUBJECTIVE: Pain is well-controlled.  Tolerating regular diet.  She walked 20 feet with therapy yesterday.  Unable to " "participate with rehabilitation today due to dizziness.    PHYSICAL EXAM  Right hip Aquacel dressing clean, dry and intact  Thigh and calf soft and nontender  Neurovascularly intact distally     Principal Problem:    Closed right hip fracture  Active Problems:    Malignant neoplasm of central portion of right female breast    Fall at home    Hyponatremia    History of breast cancer    Hypokalemia    Postoperative anemia    Delirium      PLAN / DISPOSITION: 77-year-old female postop day #3 status post right hip percutaneous pinning  -Okay with me to transfer to rehabilitation at [facility] when bed available  -Continue to mobilize with therapy as tolerated, partial weightbearing right lower extremity  -Postop anemia, monitor H&H  -Lovenox for DVT prophylaxis  -Follow-up in 2 weeks upon discharge    Corbin Swanson MD  01/02/18  10:50 AM         Electronically signed by Corbin Swanson MD at 1/2/2018 10:52 AM      Corbin Swanson MD at 1/3/2018  7:46 AM  Version 1 of 1         /72 (BP Location: Right arm, Patient Position: Lying)  Pulse 92  Temp 98.8 °F (37.1 °C) (Oral)   Resp 16  Ht 154.9 cm (61\")  Wt 40.8 kg (90 lb)  SpO2 96%  BMI 17.01 kg/m2    Lab Results (last 24 hours)     Procedure Component Value Units Date/Time    Hemoglobin & Hematocrit, Blood [[id number]]  (Abnormal) Collected:  01/03/18 0508    Specimen:  Blood Updated:  01/03/18 0526     Hemoglobin 9.6 (L) g/dL      Hematocrit 29.5 (L) %     Basic Metabolic Panel [[id number]]  (Abnormal) Collected:  01/03/18 0508    Specimen:  Blood Updated:  01/03/18 0558     Glucose 101 (H) mg/dL      BUN 9 mg/dL      Creatinine 0.40 (L) mg/dL      Sodium 135 mmol/L      Potassium 3.5 mmol/L      Chloride 98 (L) mmol/L      CO2 27.0 mmol/L      Calcium 8.9 mg/dL      eGFR Non African Amer >150 mL/min/1.73      BUN/Creatinine Ratio 22.5     Anion Gap 10.0 mmol/L     Narrative:       [employer] Guidelines    Stage     " Description        GFR  1         Normal or High     90+  2         Mild decrease      60-89  3         Moderate decrease  30-59  4         Severe decrease    15-29  5         Kidney failure     <15          Imaging Results (last 24 hours)     ** No results found for the last 24 hours. **          Patient Care Team:  Luke Baum MD as PCP - General  Neftali Denise MD as Consulting Physician (General Surgery)    SUBJECTIVE: Cassidy reports she is doing well.  Pain is well-controlled.  She walked 40 feet with therapy yesterday.    PHYSICAL EXAM  Right hip Aquacel dressings clean, dry and intact  Thigh and calf soft and nontender  Neurovascular intact distally     Principal Problem:    Closed right hip fracture  Active Problems:    Malignant neoplasm of central portion of right female breast    Fall at home    Hyponatremia    History of breast cancer    Hypokalemia    Postoperative anemia    Delirium      PLAN / DISPOSITION: 77-year-old female postop day #4 status post right hip percutaneous pinning  -Continue to mobilize with therapy, partial weightbearing right lower extremity  -Lovenox for DVT prophylaxis  -Okay with me to transfer to rehabilitation when bed available  -Follow-up in 2 weeks    Corbin Swanson MD  18  7:46 AM         Electronically signed by Corbin Swanson MD at 1/3/2018  7:47 AM      Nomi Kraus MD at 1/3/2018 10:46 AM  Version 1 of 1             [facility] Medicine Services  PROGRESS NOTE    Patient Name: Cassidy Teran  : 1940  MRN: [id number]    Date of Admission: 2017  Length of Stay: 4  Primary Care Physician: Luke Baum MD    Subjective     CC: f/u R hip fracture    HPI:   is not present.  She is doing better today.  Less confused.  Complains of some burning, but otherwise pain is ok.  Says she is eating well.    Review of Systems  Gen- No fevers, chills  CV- No chest pain, palpitations  Resp- No cough,  dyspnea  GI- No N/V/D, abd pain      Objective     Vital Signs:   Temp:  [98 °F (36.7 °C)-98.8 °F (37.1 °C)] 98.2 °F (36.8 °C)  Heart Rate:  [90-94] 90  Resp:  [16] 16  BP: (114-155)/(72-74) 155/73        Physical Exam:  Constitutional: Thin, elderly female. NAD. Awake, alert and conversant, sitting up in chair  HENT: NCAT, mucous membranes moist  Respiratory: Clear to auscultation bilaterally, respiratory effort normal   Cardiovascular: RRR, no murmurs, rubs, or gallops   Gastrointestinal: Positive bowel sounds, soft, nontender, nondistended  Musculoskeletal: No bilateral ankle edema. R hip incision is dressed and clean   Psychiatric: Appropriate affect, cooperative  Neurologic: oriented to [facility], hospital.  Said 2019 for year.  No focal deficits.  Conversation much more appropriate today  Skin: No rashes    Results Reviewed:  I have personally reviewed current lab, radiology, and data and agree.      Results from last 7 days  Lab Units 01/03/18  0508 01/02/18  0601 01/01/18  0546 12/31/17  0511   WBC 10*3/mm3  --  6.94 7.45 11.20*   HEMOGLOBIN g/dL 9.6* 8.8* 9.9* 10.7*   HEMATOCRIT % 29.5* 27.3* 30.2* 32.9*   PLATELETS 10*3/mm3  --  137* 140* 165       Results from last 7 days  Lab Units 01/03/18  0508 12/31/17  0511 12/30/17  1312   SODIUM mmol/L 135 130* 131*   POTASSIUM mmol/L 3.5 3.4* 3.7   CHLORIDE mmol/L 98* 98* 99   CO2 mmol/L 27.0 26.0 28.0   BUN mg/dL 9 13 15   CREATININE mg/dL 0.40* 0.50* 0.50*   GLUCOSE mg/dL 101* 109* 113*   CALCIUM mg/dL 8.9 8.6* 9.2   ALT (SGPT) U/L  --   --  31   AST (SGOT) U/L  --   --  34*     No results found for: BNP  No results found for: PHART    Microbiology Results Abnormal     None        Imaging Results (last 24 hours)     ** No results found for the last 24 hours. **        I have reviewed the medications.    Assessment/Plan   Assessment / Plan     Hospital Problem List     * (Principal)Closed right hip fracture    Malignant neoplasm of central portion of right female  "breast    Overview Signed 8/17/2016  9:44 AM by Jenni Izaguirre              Fall at home    Hyponatremia    History of breast cancer (Chronic)    Hypokalemia    Postoperative anemia    Delirium        Brief Hospital Course to date:  Cassidy Teran is a 77 y.o. female with PMH significant for L breast cancer (2003- s/p mastectomy and XRT) followed by R breast cancer (s/p mastectomy, Herceptin, Adriamycin and Cytoxan). She did have some reduction of her systolic function (down to 45%) but EF recovered to 55-60% by January 2017 after discontinuation of Herceptin. She was started on Arimidex in April 2017 and the patient's  reports she has been \"weak and dizzy\" ever since. Ms. Teran was admitted to [facility] on 12/30/17 after a fall at home, sustaining a R femoral neck fracture. She underwent repair by Dr. Corbin Swanson.     Right femoral neck fracture  Post-operative anemia  - s/p R hip cannulated screw fixation of the R femoral neck by Dr. Swanson on 12/30/17  - Pain management PRN  - PT/OT following    R breast cancer   Weakness and dizziness  - Ongoing since April 2017 after starting Arimidex. Patient is followed by Dr. Lund  - Reviewed Dr. Augustine note, seems unlikley that arimedex causing her symptoms but ok to stop for now.  Can f/u with Dr. Lund.  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  Findings d/w patient and .    Hyponatremia  - resolved on 1/3 BMP    Post-op Anemia  - stabilized    Delirium   - multifactorial including anesthesia, narcotics, hospital acquired, with some probable underlying cognitive impairment  - seems better today.  Will continue low dose seroquel at night for now.    Hypokalemia, replete potassium PRN     DVT Prophylaxis: Lovenox      CODE STATUS: Full Code    Disposition: D/w CM - anticipate bed available tomorrow.    Nomi Kraus MD  01/03/18  10:47 AM           Electronically signed by Nomi Kraus MD at 1/3/2018 10:50 AM        Corbin" Erich Swanson MD Physician Signed Orthopedics Op Note Date of Service: 12/30/2017  7:56 PM     Case ID: [id number] Case Time: 12/30/2017  6:58 PM Surgeon: Corbin Swanson MD     Procedure: HIP CANNULATED SCREW PLACEMENT Location: [facility] OR          []Hide copied text  []Hover for attribution information  PREOPERATIVE DIAGNOSIS:  Right hip valgus impacted minimally displaced femoral neck fracture.     POSTOPERATIVE DIAGNOSIS:  Right hip valgus impacted minimally displaced femoral neck fracture.      PROCEDURE PERFORMED: Right hip femoral neck fracture percutaneous pinning.      SURGEON:  Corbin Swanson MD     ANESTHESIA: General with iliofascial single shot block.      ESTIMATED BLOOD LOSS: Minimal.       COMPLICATIONS: None.     SPECIMENS: None.      IMPLANTS USED:  Synthes 6.5 mm cannulated screws x3.      INDICATIONS FOR PROCEDURE: The patient is a very pleasant 77-year-old female who tripped and fell at home this morning. She was unable to ambulate after this fall. She was brought to [facility] ER where evaluation with x-rays and a CT scan revealed a minimally displaced femoral neck fracture. I was consulted this evening and saw her in the ER. She is healthy other than a history of breast cancer, does not take any blood thinners. So we elected to proceed straight to surgery this evening. I discussed with her the risks, benefits and alternatives to right hip percutaneous pinning for this minimally displaced valgus impacted fracture. They agreed to proceed and surgical consent form was signed.      DESCRIPTION OF PROCEDURE: She was seen in the preoperative holding area. Right lower extremity was marked to confirm the proposed operative site. She was seen by anesthesia, she received Ancef 1 gram IV prophylactic antibiotics within 1 hour of incision time. She was brought back to the operating room. Iliofascial single shot block was performed. Catheter was placed but she was bleeding at the site, so the catheter was  removed and they just did it as a single shot block. General anesthesia had been induced. She was placed on the fracture table, brought down to the perineal post. Wire lag was placed in the wire lag baker and was padded. Right foot was placed in the boot. Minimal traction was applied to the right foot. C-arm was brought in and confirmed adequate AP and lateral images of the right hip and that the femoral neck fracture remained minimally displaced and was amenable to percutaneous pinning. C-arm was backed out. The right hip was prepped and draped in usual sterile fashion. Timeout was performed and confirmed right hip percutaneous pinning on patient. C-arm was brought back in. Under direct C-arm visualization I placed a lateral incision just distal to the greater This was brought down to subcutaneous tissue and fascia. Using C-arm visualization a guide pin was placed at the level of the lesser trochanter of the femoral neck and inferior thirds of the femoral neck and head. We then obtained a lateral image that revealed the guide pin was centrally placed in the femoral neck laterally. We then placed 2 more guide pins for the cannulated screws, these were both more proximal to the initial guide pin and 1 anterior and 1 posterior in the femoral neck for an inverted triangle type configuration. Satisfied with the position of the guide pin from the AP and lateral planes, we then placed 3 Synthes 6.5 mm cannulated screws. We premeasured with a depth gauge, drilled the cortex and then placed the screws which were measured at 85, 80 and 75 mm in length with a power screwdriver and then finished with a manual screwdriver. They all had good purchase. The guide pins were then removed and final images were taken and saved in AP and lateral planes with the C-arm which confirmed the fracture remained minimally displaced and that the guide pins were well placed in the inverted triangle configuration. The C-arm was backed out and  the wound was copiously irrigated with bulb irrigation. The deep fascia was closed with 0 Vicryl, dermis was closed with 2-0 Vicryl, skin was closed with staples. We applied a sterile dressing with Adaptic and Covaderm. She was awoken from anesthesia without difficulty and transferred to the recovery room in stable condition. All sponge and needle counts were correct.      POSTOPERATIVE PLAN: She will be admitted to the hospital service for medical management. We will consult the  for rehab placement. Normally she is a minimal ambulator with walker assist. We will start Lovenox tomorrow for DVT prophylaxis and initiate PT and OT to mobilize tomorrow as well.                 Revision History       Date/Time User Provider Type Action     1/2/2018 10:25 AM Corbin Swanson MD Physician Sign     12/30/2017  8:01 PM Corbin Swanson MD Physician Sign     View Details Report          Routing History       Date/Time From To Method     1/2/2018 10:25 AM MD Corbin Lynch MD Fax                        Consult Notes (all)      Corbin Swanson MD at 12/30/2017  5:50 PM  Version 1 of 1           Patient: Cassidy Teran    Date of Admission: 12/30/2017 12:52 PM    YOB: 1940    Medical Record Number: [id number]    Attending Physician: Corbin Swanson MD (being admitted to hospitalist service)    Primary Care Physician: Luke Baum MD    Consulting Physician: Corbin Swanson MD      Chief Complaints: R hip pain    History of Present Illness: Cassidy is a very pleasant 77-year-old female who presents to [facility] ER with right hip pain after falling at home this morning.  She was using her walker when she fell onto her right side.  She was unable to ambulate after this fall.  She was brought to [facility] ER by EMS where evaluation revealed a right hip fracture.  I was consulted for definitive management.  She reports problems with  dizziness since she was started on Arimidex for history of breast cancer a few months ago.  She had mastectomy in 2004 and more recently in 2016.  She has an isolated complaint of right hip pain.  She normally is a minimal community ambulator with walker assist and lives at home with her .       Allergies   Allergen Reactions   • Horse-Derived Products      Serum        Home Medications:    (Not in a hospital admission)    Current Medications:  Scheduled Meds:  fentaNYL citrate (PF) 25 mcg Intravenous Once     Continuous Infusions:   PRN Meds:.    Past Medical History:   Diagnosis Date   • Allergic rhinitis    • Anxiety    • Cataract    • Malignant neoplasm of breast, right         Past Surgical History:   Procedure Laterality Date   • BREAST SURGERY      Left   • EYE SURGERY      cataract   • PORTACATH PLACEMENT     • RECONSTRUCTION BREAST IMMEDIATE / DELAYED W/ TISSUE EXPANDER Bilateral 05/2016    [facility]        Social History     Occupational History   • Not on file.     Social History Main Topics   • Smoking status: Former Smoker   • Smokeless tobacco: Never Used      Comment: Quit 30 years ago   • Alcohol use 0.6 oz/week     1 Glasses of wine per week   • Drug use: Yes   • Sexual activity: Not on file    Social History     Social History Narrative      History reviewed. No pertinent family history.      Review of Systems:   Right hip pain and unable to ambulate    Physical Exam: 77 y.o. female  General Appearance:    Alert, cooperative, in no acute distress                 Vitals:    12/30/17 1715 12/30/17 1730 12/30/17 1745 12/30/17 1747   BP: 119/76 111/70 113/77    Patient Position:       Pulse: 88 94  96   Resp:       Temp:       TempSrc:       SpO2: 96% 95%  96%   Weight:       Height:            Extremities:  Right hip skin is intact with no obvious deformity  Thigh and calf soft and nontender  Neurovascularly intact distally      Diagnostic Tests:    Admission on 12/30/2017    Component Date Value Ref Range Status   • Glucose 12/30/2017 113* 70 - 100 mg/dL Final   • BUN 12/30/2017 15  9 - 23 mg/dL Final   • Creatinine 12/30/2017 0.50* 0.60 - 1.30 mg/dL Final   • Sodium 12/30/2017 131* 132 - 146 mmol/L Final   • Potassium 12/30/2017 3.7  3.5 - 5.5 mmol/L Final   • Chloride 12/30/2017 99  99 - 109 mmol/L Final   • CO2 12/30/2017 28.0  20.0 - 31.0 mmol/L Final   • Calcium 12/30/2017 9.2  8.7 - 10.4 mg/dL Final   • Total Protein 12/30/2017 7.1  5.7 - 8.2 g/dL Final   • Albumin 12/30/2017 4.10  3.20 - 4.80 g/dL Final   • ALT (SGPT) 12/30/2017 31  7 - 40 U/L Final   • AST (SGOT) 12/30/2017 34* 0 - 33 U/L Final   • Alkaline Phosphatase 12/30/2017 60  25 - 100 U/L Final   • Total Bilirubin 12/30/2017 1.2  0.3 - 1.2 mg/dL Final   • eGFR Non African Amer 12/30/2017 119  >60 mL/min/1.73 Final   • Globulin 12/30/2017 3.0  gm/dL Final   • A/G Ratio 12/30/2017 1.4* 1.5 - 2.5 g/dL Final   • BUN/Creatinine Ratio 12/30/2017 30.0* 7.0 - 25.0 Final   • Anion Gap 12/30/2017 4.0  3.0 - 11.0 mmol/L Final   • Color, UA 12/30/2017 Yellow  Yellow, Straw Final   • Appearance, UA 12/30/2017 Clear  Clear Final   • pH, UA 12/30/2017 7.5  5.0 - 8.0 Final   • Specific Gravity, UA 12/30/2017 1.016  1.001 - 1.030 Final   • Glucose, UA 12/30/2017 Negative  Negative Final   • Ketones, UA 12/30/2017 Trace* Negative Final   • Bilirubin, UA 12/30/2017 Negative  Negative Final   • Blood, UA 12/30/2017 Negative  Negative Final   • Protein, UA 12/30/2017 Negative  Negative Final   • Leuk Esterase, UA 12/30/2017 Negative  Negative Final   • Nitrite, UA 12/30/2017 Negative  Negative Final   • Urobilinogen, UA 12/30/2017 0.2 E.U./dL  0.2 - 1.0 E.U./dL Final   • WBC 12/30/2017 10.46  3.50 - 10.80 10*3/mm3 Final   • RBC 12/30/2017 4.17  3.89 - 5.14 10*6/mm3 Final   • Hemoglobin 12/30/2017 13.0  11.5 - 15.5 g/dL Final   • Hematocrit 12/30/2017 38.7  34.5 - 44.0 % Final   • MCV 12/30/2017 92.8  80.0 - 99.0 fL Final   • MCH 12/30/2017  31.2* 27.0 - 31.0 pg Final   • MCHC 12/30/2017 33.6  32.0 - 36.0 g/dL Final   • RDW 12/30/2017 14.3  11.3 - 14.5 % Final   • RDW-SD 12/30/2017 48.2  37.0 - 54.0 fl Final   • MPV 12/30/2017 11.5  6.0 - 12.0 fL Final   • Platelets 12/30/2017 172  150 - 450 10*3/mm3 Final   • Neutrophil % 12/30/2017 82.4* 41.0 - 71.0 % Final   • Lymphocyte % 12/30/2017 7.2* 24.0 - 44.0 % Final   • Monocyte % 12/30/2017 9.4  0.0 - 12.0 % Final   • Eosinophil % 12/30/2017 0.5  0.0 - 3.0 % Final   • Basophil % 12/30/2017 0.3  0.0 - 1.0 % Final   • Immature Grans % 12/30/2017 0.2  0.0 - 0.6 % Final   • Neutrophils, Absolute 12/30/2017 8.63* 1.50 - 8.30 10*3/mm3 Final   • Lymphocytes, Absolute 12/30/2017 0.75  0.60 - 4.80 10*3/mm3 Final   • Monocytes, Absolute 12/30/2017 0.98  0.00 - 1.00 10*3/mm3 Final   • Eosinophils, Absolute 12/30/2017 0.05  0.00 - 0.30 10*3/mm3 Final   • Basophils, Absolute 12/30/2017 0.03  0.00 - 0.20 10*3/mm3 Final   • Immature Grans, Absolute 12/30/2017 0.02  0.00 - 0.03 10*3/mm3 Final   • Troponin I 12/30/2017 0.00  0.00 - 0.07 ng/mL Final    Serial Number: [id number]  [id number]   • Troponin I 12/30/2017 0.00  0.00 - 0.07 ng/mL Final    Serial Number: [id number]:  [id number]       No results found.    Right hip x-rays and pelvis CT scan reveal a valgus impacted minimally displaced right hip femoral neck fracture    Assessment: 77-year-old female with right hip valgus impacted minimally displaced femoral neck fracture    Patient Active Problem List   Diagnosis   • Malignant neoplasm of central portion of right female breast   • Decreased cardiac ejection fraction   • CHF (congestive heart failure)           Plan:  The patient voiced understanding of the risks, benefits, and alternative forms of treatment that were discussed and the patient consents to proceed with Right hip percutaneous pinning.  -I reviewed the imaging findings with Cassidy and her .  She has a valgus impacted femoral neck fracture  amenable to percutaneous pinning.  I discussed all the risks, benefits and alternatives to surgery and they agreed to proceed.  They understand risks to include but not limited to infection, pain, stiffness, functional decline, malunion/nonunion/screw cut out necessitating conversion to arthroplasty, neurovascular injury, leg length inequality and medical risks associated with surgery.  They understand the long rehabilitation course involved.  They understand the significant morbidity and mortality risks associated with hip fractures.  -We will proceed with right hip femoral neck fracture percutaneous pinning this evening pending OR availability.  -Keep her nothing by mouth and bedrest until surgery.  -Ancef ordered for prophylactic IV antibiotics.  -Hospitalist service consulted for medical management postoperatively.  -Mobilize with PT/OT starting tomorrow.  -Plan on Lovenox for DVT prophylaxis postoperatively.  - for rehabilitation placement.  -I answered all questions to their satisfaction and they're comfortable with the plan.  -Thank you very much for this consult, this patient was a pleasure to evaluate and treat.      Discharge Plan: to rehab in 3-5 days      Date: 12/30/2017    Corbin Swanson MD     Electronically signed by Corbin Swanson MD at 12/30/2017  5:59 PM      Laure Ndiaye MD at 12/31/2017 12:54 PM  Version 1 of 1     Consult Orders:    1. Inpatient Consult to Hematology and Oncology [[id number]] ordered by Nilsa Justice MD at 12/30/17 6816                REFERRING PHYSICIAN: Dr. Justice    PRIMARY CARE PROVIDER: Luke Baum MD    REASON FOR CONSULTATION: Breast cancer on Arimidex and a recent hip fracture      HISTORY OF PRESENT ILLNESS:  77-year-old lady with stage II a ER positive HER-2 positive right breast cancer currently on Revlimid X presents with a fall and hip fracture.  She reports that she has been feeling dizzy for the last several days.   Denies any significant headaches.  However has gotten progressively weak over the last several days.  I was asked to see the patient regarding her Arimidex.  The patient feels that this drug has been causing some of her symptoms.      Allergies   Allergen Reactions   • Horse-Derived Products      Serum       Past Medical History:   Diagnosis Date   • Allergic rhinitis    • Anxiety    • Cataract    • Malignant neoplasm of breast, right      Oncology/Hematology History    1. 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 A 2.2 cm  primary, Dhillon-Alexander 4 out of 9, ER and WI positive, HER-2/javid 3+, status  post right mastectomy 09/29/2015:  a) Due to multiple somatic complaints, staging CT of the head chest, abdomen,  and pelvis was done on 10/28/2015. These were negative save for a stable 7 mm  left lower lobe nodule unchanged from 2004. Total body bone scan showed  degenerative joint disease and postsurgical rib changes but no areas worrisome  for metastasis.   b) Baseline echocardiogram 10/28/2015: Estimated ejection fraction 60% to 65%.  Repeat echocardiogram prior to beginning Herceptin on 01/08/2016: Estimated  ejection fraction 55% to 60%.  c) Began dose dense Adriamycin and Cytoxan cycle #1 of a planned 4 on  11/04/2015. Cycle #2 was delayed 1 week due to afebrile neutropenia, received  cycle #2 on 11/24/2015.  d) Started Herceptin and Taxol 01/13/2016.  e) Started Arimidex 04/04/2016.   f) Started Herceptin alone on 04/06/2016.   2. History of previous left breast cancer stage I, status post mastectomy and  radiation (data deficient).        Malignant neoplasm of central portion of right female breast    9/29/2015 Initial Diagnosis     Malignant neoplasm of central portion of right female breast         7/6/2016 Adverse Reaction     Decreased EF 45%, (baseline 60-65% 10/2015).  Herceptin held          8/3/2016  -  Other Event     Repeat ECHO EF 45-50% with mild global hypokinesis.  Will d/c Herceptin.                Current Facility-Administered Medications:   •  acetaminophen (TYLENOL) tablet 650 mg, 650 mg, Oral, Q4H PRN **OR** acetaminophen (TYLENOL) 160 MG/5ML solution 650 mg, 650 mg, Oral, Q4H PRN **OR** acetaminophen (TYLENOL) suppository 650 mg, 650 mg, Rectal, Q4H PRN, Corbin Swanson MD  •  acetaminophen (TYLENOL) tablet 650 mg, 650 mg, Oral, Q6H PRN, Nilsa Justice MD  •  bisacodyl (DULCOLAX) EC tablet 5 mg, 5 mg, Oral, Daily PRN, Nilsa Justice MD  •  bisacodyl (DULCOLAX) suppository 10 mg, 10 mg, Rectal, Daily PRN, Nilsa Justice MD  •  calcium carb-cholecalciferol 600-800 MG-UNIT tablet 1 tablet, 1 tablet, Oral, Daily, Nilsa Justice MD, 1 tablet at 12/31/17 0937  •  calcium carbonate (TUMS) chewable tablet 500 mg (200 mg elemental), 2 tablet, Oral, BID PRN, Nilsa Justice MD  •  diphenhydrAMINE (BENADRYL) capsule 25 mg, 25 mg, Oral, Q6H PRN **OR** diphenhydrAMINE (BENADRYL) injection 25 mg, 25 mg, Intravenous, Q6H PRN, Corbin Swanson MD  •  docusate sodium (COLACE) capsule 100 mg, 100 mg, Oral, BID PRN, Corbin Swanson MD  •  enoxaparin (LOVENOX) syringe 40 mg, 40 mg, Subcutaneous, Q24H, Corbin Swanson MD, 40 mg at 12/31/17 0938  •  fentaNYL citrate (PF) (SUBLIMAZE) injection 25 mcg, 25 mcg, Intravenous, Once, Pee Moreland MD  •  HYDROcodone-acetaminophen (NORCO) 5-325 MG per tablet 1 tablet, 1 tablet, Oral, Q4H PRN, Corbin Swanson MD  •  HYDROcodone-acetaminophen (NORCO) 5-325 MG per tablet 2 tablet, 2 tablet, Oral, Q4H PRN, Corbin Swanson MD  •  HYDROmorphone (DILAUDID) injection 0.5 mg, 0.5 mg, Intravenous, Q2H PRN **AND** naloxone (NARCAN) injection 0.1 mg, 0.1 mg, Intravenous, Q5 Min PRN, Corbin Swanson MD  •  lactated ringers infusion, 9 mL/hr, Intravenous, Continuous, Ze Chino MD, Last Rate: 9 mL/hr at 12/30/17 1808, 9  mL/hr at 12/30/17 1808  •  magnesium hydroxide (MILK OF MAGNESIA) suspension 2400 mg/10mL 10 mL, 10 mL, Oral, Daily PRN, Corbin Swanson MD  •  multivitamin with minerals 1 tablet, 1 tablet, Oral, Daily, Nilsa Justice MD, 1 tablet at 12/31/17 0938  •  OCUVITE-LUTEIN (OCUVITE) tablet 1 tablet, 1 tablet, Oral, Daily, Nilsa Justice MD, 1 tablet at 12/31/17 0938  •  ondansetron (ZOFRAN) injection 4 mg, 4 mg, Intravenous, Q6H PRN, Nilsa Justice MD  •  ondansetron (ZOFRAN) tablet 4 mg, 4 mg, Oral, Q6H PRN **OR** ondansetron (ZOFRAN) injection 4 mg, 4 mg, Intravenous, Q6H PRN, Corbin Swanson MD  •  polyethylene glycol 3350 powder (packet), 17 g, Oral, BID, Shannon Baum PA-C, 17 g at 12/31/17 0937  •  potassium chloride (MICRO-K) CR capsule 40 mEq, 40 mEq, Oral, PRN **OR** potassium chloride (KLOR-CON) packet 40 mEq, 40 mEq, Oral, PRN **OR** potassium chloride 10 mEq in 100 mL IVPB, 10 mEq, Intravenous, Q1H PRN, Nilsa Justice MD  •  sennosides-docusate sodium (SENOKOT-S) 8.6-50 MG tablet 2 tablet, 2 tablet, Oral, Nightly, Shannon Baum PA-C  •  sodium chloride 0.9 % flush 1-10 mL, 1-10 mL, Intravenous, PRN, Nilsa Justice MD  •  sodium chloride 0.9 % infusion, 75 mL/hr, Intravenous, Continuous, Shannon Baum PA-C, Last Rate: 75 mL/hr at 12/31/17 0939, 75 mL/hr at 12/31/17 0939    Past Surgical History:   Procedure Laterality Date   • BREAST SURGERY      Left   • EYE SURGERY      cataract   • PORTACATH PLACEMENT     • RECONSTRUCTION BREAST IMMEDIATE / DELAYED W/ TISSUE EXPANDER Bilateral 05/2016    [facility]       Social History     Social History   • Marital status:      Spouse name: N/A   • Number of children: N/A   • Years of education: N/A     Social History Main Topics   • Smoking status: Former Smoker   • Smokeless tobacco: Never Used      Comment: Quit 30 years ago   • Alcohol use 0.6 oz/week     1 Glasses of wine per week   • Drug use:  "Yes   • Sexual activity: Not Asked     Other Topics Concern   • None     Social History Narrative       History reviewed. No pertinent family history.      REVIEW OF SYSTEMS:  A 14 point review of systems was performed and is negative except as noted below.    Review of Systems   Constitutional: Positive for fatigue.   HENT:  Negative.    Eyes: Negative.    Respiratory: Negative.    Cardiovascular: Negative.    Gastrointestinal: Negative.    Endocrine: Negative.    Genitourinary: Negative.     Skin: Negative.    Neurological: Positive for dizziness and extremity weakness.   Hematological: Negative.          Objective     Vitals:    12/30/17 2035 12/31/17 0024 12/31/17 0532 12/31/17 0800   BP: 119/65 116/65 119/68 119/65   BP Location: Right arm Right arm Right arm    Patient Position: Lying Lying Lying    Pulse:   79 85   Resp: 16 16 16 20   Temp: 98.5 °F (36.9 °C) 98.6 °F (37 °C) 98.5 °F (36.9 °C) 97.4 °F (36.3 °C)   TempSrc: Oral Oral Oral Oral   SpO2: 95% 94% 97% 99%   Weight: 40.8 kg (90 lb)      Height: 154.9 cm (61\")          Performance Status: 2    Physical Exam    General: elderly thin  female in no acute distress  HEENT: sclera anicteric, oropharynx clear  Lymphatics: no cervical, supraclavicular, or axillary adenopathy  Cardiovascular: regular rate and rhythm, no murmurs  Lungs: clear to auscultation bilaterally  Abdomen: soft, nontender, nondistended.  No palpable organomegaly  Extremeties: wrapped after recent surgery  Skin: no rashes, lesions, bruising, or petechiae      LABS:    Lab Results   Component Value Date    HGB 10.7 (L) 12/31/2017    HCT 32.9 (L) 12/31/2017    MCV 93.7 12/31/2017     12/31/2017    WBC 11.20 (H) 12/31/2017    NEUTROABS 9.07 (H) 12/31/2017    LYMPHSABS 0.94 12/31/2017    MONOSABS 1.10 (H) 12/31/2017    EOSABS 0.04 12/31/2017    BASOSABS 0.03 12/31/2017     Lab Results   Component Value Date    GLUCOSE 109 (H) 12/31/2017    BUN 13 12/31/2017    CREATININE 0.50 (L) " 12/31/2017    EGFRIFNONA 119 12/31/2017    BCR 26.0 (H) 12/31/2017    K 3.4 (L) 12/31/2017    CO2 26.0 12/31/2017    CALCIUM 8.6 (L) 12/31/2017    ALBUMIN 4.10 12/30/2017    BILITOT 1.2 12/30/2017    AST 34 (H) 12/30/2017    ALT 31 12/30/2017         ASSESSMENT/PLAN:    1. Stage II A ER/PA positive HER-2 positive right breast cancer.  She is now 2 years out from her diagnosis.  She had been on Arimidex for quite some time.  I suspect this is not what's causing her symptoms.  Though she should hold the drug until she is feeling better.  I also recommend CAT scan of the head to make sure we are not dealing with metastatic disease.    2.  Right hip fracture secondary to fall: Patient is status post surgical intervention with 3 screws for nondisplaced femoral neck fracture on 12/30/2017 by Dr. Sky Ndiaye MD    12/31/2017      Please note that portions of this note may have been completed with a voice recognition program. Efforts were made to edit the dictations, but occasionally words are mistranscribed.     Electronically signed by Laure Ndiaye MD at 12/31/2017  1:01 PM

## 2018-01-03 NOTE — THERAPY TREATMENT NOTE
Acute Care - Physical Therapy Treatment Note  [facility]     Patient Name: Cassidy Teran  : 1940  MRN: [id number]  Today's Date: 1/3/2018  Onset of Illness/Injury or Date of Surgery Date: 17  Date of Referral to PT: 17  Referring Physician: Dr. Swanson    Admit Date: 2017    Visit Dx:    ICD-10-CM ICD-9-CM   1. Fall in home, initial encounter W19.XXXA E888.9    Y92.099 E849.0   2. Closed fracture of right hip, initial encounter S72.001A 820.8   3. Impaired functional mobility, balance, gait, and endurance Z74.09 V49.89   4. Impaired mobility and ADLs Z74.09 799.89     Patient Active Problem List   Diagnosis   • Malignant neoplasm of central portion of right female breast   • Decreased cardiac ejection fraction   • CHF (congestive heart failure)   • Fall at home   • Hyponatremia   • Closed right hip fracture   • History of breast cancer   • Hypokalemia   • Postoperative anemia   • Delirium               Adult Rehabilitation Note       18 1353 18 0920 18 1309    Rehab Assessment/Intervention    Discipline physical therapist  -MJ physical therapist  -MJ physical therapist  -MJ    Document Type therapy note (daily note)  -MJ therapy note (daily note)  -MJ therapy note (daily note)  -MJ    Subjective Information agree to therapy;complains of;pain  -MJ agree to therapy;complains of;pain  -MJ agree to therapy;complains of;fatigue  -MJ    Patient Effort, Rehab Treatment good  -MJ good  -MJ good  -MJ    Symptoms Noted During/After Treatment fatigue  -MJ fatigue  -MJ fatigue  -MJ    Precautions/Limitations fall precautions;other (see comments)   PWB R LE  -MJ fall precautions;other (see comments)   PWB R LE  -MJ fall precautions;other (see comments)   PWB R LE  -MJ    Recorded by [MJ] Ezra Martines PT [MJ] Ezra Martines PT [MJ] Ezra Martines PT    Vital Signs    Pretreatment Heart Rate (beats/min)   94  -MJ    Posttreatment Heart Rate (beats/min)   97  -MJ    Pre Patient Position    Supine  -MJ    Post Patient Position   Sitting  -MJ    Recorded by   [MJ] Ezra Martines PT    Pain Assessment    Pain Assessment 0-10  -MJ 0-10  -MJ 0-10  -MJ    Pain Score 2  -MJ 4  -MJ 0  -MJ    Post Pain Score 2  -MJ 0  -MJ 0  -MJ    Pain Type Acute pain  -MJ Acute pain  -MJ     Pain Location Hip  -MJ Hip  -MJ     Pain Orientation Right  -MJ Right  -MJ     Pain Intervention(s) Repositioned;Ambulation/increased activity  -MJ Repositioned;Ambulation/increased activity  -MJ     Recorded by [MJ] Ezra Martines, PT [MJ] Ezra Martines PT [MJ] Ezra Martines PT    Cognitive Assessment/Intervention    Current Cognitive/Communication Assessment impaired  -MJ impaired  -MJ impaired  -MJ    Orientation Status oriented to;person;place;time;required verbal cueing (specifiy in comments)  -MJ oriented to;person;place;time;required verbal cueing (specifiy in comments)  -MJ oriented to;person;place;required verbal cueing (specifiy in comments)  -MJ    Follows Commands/Answers Questions 75% of the time;100% of the time;able to follow single-step instructions;needs cueing;needs repetition  -MJ 75% of the time;100% of the time;able to follow single-step instructions;needs cueing;needs repetition  -MJ 75% of the time;able to follow single-step instructions;needs cueing;needs repetition  -MJ    Personal Safety mild impairment  -MJ moderate impairment  -MJ moderate impairment   cues for WB status  -MJ    Recorded by [MJ] Ezra Martines, PT [MJ] Ezra Martines, PT [MJ] Ezra Martines PT    Mobility Assessment/Training    Extremity Weight-Bearing Status   right lower extremity  -MJ    Right Lower Extremity Weight-Bearing partial weight-bearing  -MJ partial weight-bearing  -MJ partial weight-bearing  -MJ    Recorded by [MJ] Ezra Martines, PT [MJ] Ezra Martines, PT [MJ] Ezra Martines PT    Bed Mobility, Assessment/Treatment    Bed Mobility, Assistive Device  bed rails;head of bed elevated  -MJ bed rails;head of bed elevated;leg   -MJ    Bed Mob, Supine  to Sit, [place] not tested   Pt UIC  -MJ moderate assist (50% patient effort);2 person assist required;verbal cues required  -MJ moderate assist (50% patient effort);2 person assist required;verbal cues required  -MJ    Bed Mob, Sit to Supine, [place] moderate assist (50% patient effort);2 person assist required;verbal cues required  -MJ not tested   Pt UIC  -MJ not tested   Pt UIC  -MJ    Bed Mobility, Safety Issues decreased use of legs for bridging/pushing  -MJ decreased use of arms for pushing/pulling;decreased use of legs for bridging/pushing  -MJ decreased use of arms for pushing/pulling;decreased use of legs for bridging/pushing  -MJ    Bed Mobility, Impairments ROM decreased;strength decreased;pain  -MJ ROM decreased;strength decreased;pain  -MJ ROM decreased;strength decreased;pain  -MJ    Bed Mobility, Comment Assist with legs and trunk  -MJ Verbal cues for sequencing, assist with legs and trunk, increased time and effort to perform  -MJ Verbal cues for sequencing, assist with legs and trunk, increased time and effort to perform  -MJ    Recorded by [MJ] Ezra Martines, PT [MJ] Ezra Martines PT [MJ] Ezra Martines, PT    Transfer Assessment/Treatment    Transfers, Sit-Stand [place] contact guard assist;2 person assist required;verbal cues required  -MJ minimum assist (75% patient effort);2 person assist required;verbal cues required  -MJ minimum assist (75% patient effort);2 person assist required;verbal cues required  -MJ    Transfers, Stand-Sit [place] minimum assist (75% patient effort);2 person assist required;verbal cues required  -MJ minimum assist (75% patient effort);2 person assist required;verbal cues required  -MJ minimum assist (75% patient effort);2 person assist required;verbal cues required  -MJ    Transfers, Sit-Stand-Sit, Assist Device rolling walker  -MJ rolling walker  -MJ rolling walker  -MJ    Toilet Transfer, [place]  minimum assist (75% patient effort);2 person  assist required;verbal cues required  -MJ     Toilet Transfer, Assistive Device  bedside commode without drop arms;rolling walker  -MJ     Transfer, Maintain Weight Bearing Status able to maintain weight bearing status;assist to maintain weight bearing status;cues to maintain weight bearing status  -MJ able to maintain weight bearing status;assist to maintain weight bearing status;cues to maintain weight bearing status  -MJ able to maintain weight bearing status;assist to maintain weight bearing status;cues to maintain weight bearing status  -MJ    Transfer, Safety Issues sequencing ability decreased;step length decreased;weight-shifting ability decreased  -MJ sequencing ability decreased;step length decreased;weight-shifting ability decreased  -MJ step length decreased;weight-shifting ability decreased  -MJ    Transfer, Impairments ROM decreased;strength decreased;pain  -MJ ROM decreased;strength decreased;pain  -MJ ROM decreased;strength decreased;pain  -MJ    Transfer, Comment Verbal cues for correct hand placement and for PWB on R LE.   -MJ Verbal cues for correct hand placement and for PWB on R LE. Assisted pt to BSC, pt impulsive with transfer trying to sit before lined up to BSC. Pt dependent for hygiene after toileting.   -MJ Verbal cues for correct hand placement and for PWB status  -MJ    Recorded by [MJ] Ezra Martines PT [MJ] Ezra Martines PT [MJ] Ezra Martines PT    Gait Assessment/Treatment    Gait, [place] Level minimum assist (75% patient effort);2 person assist required;verbal cues required  -MJ minimum assist (75% patient effort);2 person assist required;verbal cues required  -MJ minimum assist (75% patient effort);2 person assist required;verbal cues required  -MJ    Gait, Assistive Device rolling walker  -MJ rolling walker  -MJ rolling walker  -MJ    Gait, Distance (Feet) 70  -MJ 40  -MJ 40  -MJ    Gait, Gait Pattern Analysis swing-to gait  -MJ swing-to gait  -MJ swing-to gait  -MJ    Gait, Gait  Deviations right:;antalgic;aida decreased;step length decreased;weight-shifting ability decreased  -MJ right:;antalgic;aida decreased;forward flexed posture;step length decreased;weight-shifting ability decreased  -MJ right:;antalgic;aida decreased;forward flexed posture;step length decreased;toe-to-floor clearance decreased;weight-shifting ability decreased;narrow base  -MJ    Gait, Maintain Weight Bearing Status able to maintain weight bearing status;assist to maintain weight bearing status;cues to maintain weight bearing status  -MJ able to maintain weight bearing status;assist to maintain weight bearing status;cues to maintain weight bearing status  -MJ assist to maintain weight bearing status;cues to maintain weight bearing status  -MJ    Gait, Safety Issues sequencing ability decreased;step length decreased;weight-shifting ability decreased  -MJ sequencing ability decreased;step length decreased;weight-shifting ability decreased  -MJ sequencing ability decreased;step length decreased;weight-shifting ability decreased  -MJ    Gait, Impairments ROM decreased;strength decreased;pain  -MJ ROM decreased;strength decreased;pain  -MJ ROM decreased;strength decreased;pain  -MJ    Gait, Comment Pt demo step to gait pattern at slow pace. Verbal cues to keep heel off ground to maintain PWB significantly improved ability to maintain PWB. Cues to  feet during turn. Gait limited by pain and fatigue  -MJ Pt demo step to gait pattern at slow pace. Verbal cues for PWB on R LE, pt needs reminders throughout gait. Cues to stay in middle of RW and for upright posture. Gait limited by pain and fatigue  -MJ Pt demo step to gait pattern at slow pace. Verbal cues for PWB, instructed to keep toes only on ground on R during stance. Cues to separate feet to improve MOODY, mild improvement. Cues to  feet during turn. Gait limited by fatigue  -MJ    Recorded by [MJ] Ezra Martines, PT [MJ] Ezra Martines, PT [MJ] Ezra  JANETTE Martines    Therapy Exercises    Exercise Protocols hip ORIF  -MJ hip ORIF  -MJ hip ORIF  -MJ    Hip ORIF Exercises right:;15 reps;with assist;ankle pumps/circles;quad set;glut set;heel slides;hip abduction;SAQ;SLR   Cues for technique. Suzi w/ SLR, hip abd, heel slides  -MJ right:;15 reps;completed protocol;with assist;ankle pumps/circles;quad set;glut set;heel slides;hip abduction;SAQ;LAQ;hip flexion;SLR   Cues for technique. Suzi w/ SLR, hip abd, heel slides  -MJ right:;10 reps;with assist;ankle pumps/circles;quad set;glut set;heel slides;hip abduction;SAQ;SLR   Cues for technique. Suzi w/ SLR, hip abd, heel slides  -MJ    Recorded by [MJ] Ezra Martines, PT [MJ] Ezra Martines PT [MJ] Ezra Martines PT    Positioning and Restraints    Pre-Treatment Position sitting in chair/recliner  -MJ in bed  -MJ in bed  -MJ    Post Treatment Position bed  -MJ chair  -MJ chair  -MJ    In Bed notified nsg;supine;call light within reach;encouraged to call for assist;exit alarm on;with family/caregiver  -MJ      In Chair  notified nsg;reclined;call light within reach;encouraged to call for assist;exit alarm on  -MJ notified nsg;reclined;call light within reach;encouraged to call for assist;exit alarm on;on mechanical lift sling  -MJ    Recorded by [MJ] Ezra Martines, PT [MJ] Ezra Martines PT [MJ] Ezra Martines PT      01/02/18 0824 01/01/18 1512       Rehab Assessment/Intervention    Discipline physical therapist  - physical therapist  -SC     Document Type therapy note (daily note)  - therapy note (daily note)  -SC     Subjective Information agree to therapy;no complaints  - complains of;pain  -SC     Patient Effort, Rehab Treatment good  - good  -SC     Symptoms Noted During/After Treatment dizziness;significant change in vital signs;other (see comments)   nausea  -      Symptoms Noted Comment Heart rate sustained 150-160 on BSC, RN notified  -      Precautions/Limitations fall precautions;other (see comments)   PWB ARIELLA CHANEY  -  fall precautions;other (see comments)   PWB  -SC     Recorded by [MJ] Ezra Martines, PT [SC] Prince Obrien, PT     Vital Signs    Pre Systolic BP Rehab 122  -MJ      Pre Treatment Diastolic BP 73  -MJ      Intra Systolic BP Rehab 156  -MJ      Intra Treatment Diastolic   -MJ      Pretreatment Heart Rate (beats/min) 120  -MJ      Intratreatment Heart Rate (beats/min) 150   sustained 150-160  -MJ      Posttreatment Heart Rate (beats/min) 114  -MJ      Pre SpO2 (%) 100  -MJ      O2 Delivery Pre Treatment supplemental O2  -MJ      Post SpO2 (%) 100  -MJ      O2 Delivery Post Treatment supplemental O2  -MJ      Pre Patient Position Supine  -MJ      Intra Patient Position Sitting  -MJ      Post Patient Position Supine  -MJ      Recorded by [MJ] Ezra Martines PT      Pain Assessment    Pain Assessment Rivera-Casas FACES  - Rivera-Casas FACES  -SC     Rivera-Casas FACES Pain Rating 2  -MJ 2  -SC     Post Pain Score 2  -MJ 2  -SC     Pain Type Acute pain  - Acute pain  -SC     Pain Location Hip  - Hip  -SC     Pain Orientation Right  -MJ Right  -SC     Pain Intervention(s) Repositioned;Ambulation/increased activity  - Repositioned  -SC     Recorded by [MJ] Ezra Martines, PT [SC] Prince Obrien, PT     Cognitive Assessment/Intervention    Current Cognitive/Communication Assessment impaired  - impaired  -SC     Orientation Status oriented to;person;place;required verbal cueing (specifiy in comments)  - oriented to;person;place;required verbal cueing (specifiy in comments);situation;time  -SC     Follows Commands/Answers Questions 75% of the time;able to follow single-step instructions;needs cueing;needs repetition  - 75% of the time  -SC     Personal Safety moderate impairment   cues for WB status  - moderate impairment;decreased awareness, need for assist;decreased awareness, need for safety;decreased insight to deficits  -SC     Personal Safety Interventions  fall prevention program maintained;gait belt  -SC      Recorded by [MJ] Ezra Martines PT [SC] Prince Obrien, PT     Mobility Assessment/Training    Extremity Weight-Bearing Status right lower extremity  -      Right Lower Extremity Weight-Bearing partial weight-bearing  -      Recorded by [MJ] Ezra Martines PT      Bed Mobility, Assessment/Treatment    Bed Mobility, Assistive Device bed rails;head of bed elevated;draw sheet  - bed rails;head of bed elevated;draw sheet  -SC     Bed Mobility, Scoot/Bridge, [place] minimum assist (75% patient effort);verbal cues required   pt bridged to have soiled linens removed and clean replaced  - maximum assist (25% patient effort)  -SC     Bed Mob, Supine to Sit, [place] maximum assist (25% patient effort);2 person assist required;verbal cues required  - maximum assist (25% patient effort)  -SC     Bed Mob, Sit to Supine, [place] maximum assist (25% patient effort);2 person assist required;verbal cues required  -      Bed Mobility, Safety Issues decreased use of arms for pushing/pulling;decreased use of legs for bridging/pushing  - cognitive deficits limit understanding;decreased use of arms for pushing/pulling;decreased use of legs for bridging/pushing  -SC     Bed Mobility, Impairments ROM decreased;strength decreased;pain  - strength decreased;motor control impaired  -SC     Bed Mobility, Comment Verbal cues to move LEs off EOB and to use UEs to push trunk to sitting. Assist with legs and trunk, increased time and effort to perform  - repeted cues or sequencing up to edg of bed  -SC     Recorded by [MJ] Ezra Martines, PT [SC] Prince Obrien, PT     Transfer Assessment/Treatment    Transfers, Chair-Bed [place] moderate assist (50% patient effort);2 person assist required;verbal cues required  -      Transfers, Bed-Chair-Bed, Assist Device rolling walker  -      Transfers, Sit-Stand [place] minimum assist (75% patient effort);2 person assist required;verbal cues required  - minimum  assist (75% patient effort);2 person assist required  -SC     Transfers, Stand-Sit [place] minimum assist (75% patient effort);2 person assist required;verbal cues required  - minimum assist (75% patient effort);2 person assist required  -SC     Transfers, Sit-Stand-Sit, Assist Device rolling walker  - other (see comments);mechanical gait assist  -SC     Toilet Transfer, [place] moderate assist (50% patient effort);2 person assist required;verbal cues required  -      Toilet Transfer, Assistive Device rolling walker  -      Transfer, Maintain Weight Bearing Status unable to maintain weight bearing status;assist to maintain weight bearing status;cues to maintain weight bearing status  -      Transfer, Safety Issues step length decreased;weight-shifting ability decreased  -      Transfer, Impairments ROM decreased;strength decreased;pain  - motor control impaired;pain;strength decreased  -SC     Transfer, Comment Verbal cues for correct hand placement, to step R LE out prior to transfer and for PWB through R LE. Assisted pt to BSC and back to bed. While on BSC pt with increased heart rate, BP and nausea, RN notified. Symptoms improved once returned to bed  - cues for hand placement and safety  -SC     Recorded by [MJ] Ezra Martines, PT [SC] Prince Obrien, PT     Gait Assessment/Treatment    Gait, [place] Level unable to perform  - minimum assist (75% patient effort);2 person assist required  -SC     Gait, Assistive Device  other (see comments)   arjo  -SC     Gait, Distance (Feet)  20  -SC     Gait, Gait Pattern Analysis  swing-to gait  -SC     Gait, Gait Deviations  right:;other (see comments)   PWB  -SC     Gait, Safety Issues  balance decreased during turns  -SC     Gait, Impairments  motor control impaired;strength decreased;impaired balance  -SC     Gait, Comment Due to sustained increased heart rate and BP  - repeted cues to off loan wt on R leg. patient able to ambulate to door  and back to chaIR  -SC     Recorded by [MJ] Ezra Martines, PT [SC] Prince Obrien, PT     Therapy Exercises    Exercise Protocols hip ORIF  - hip ORIF  -SC     Hip ORIF Exercises --   deferred due to increased heart rate  - right:;with assist;5 reps;10 reps;quad set;hamstring set;hip abduction;LAQ  -SC     Recorded by [MJ] Ezra Martines, PT [SC] Prince SANTANA Micah, PT     Positioning and Restraints    Pre-Treatment Position in bed  -MJ in bed  -SC     Post Treatment Position bed  - chair  -SC     In Bed notified nsg;supine;call light within reach;encouraged to call for assist;exit alarm on;SCD pump applied  -      In Chair  sitting;reclined;notified nsg;call light within reach;encouraged to call for assist;exit alarm on;with family/caregiver  -SC     Recorded by [MJ] Ezra Martines, PT [SC] Prince SANTANA Micah, PT       User Key  (r) = Recorded By, (t) = Taken By, (c) = Cosigned By    Initials Name Effective Dates    SC Prince SANTANA Micah, PT 06/19/15 -     MJ Ezra Martines, PT 03/14/16 -                 IP PT Goals       01/03/18 1503 01/03/18 1038 01/02/18 1343    Bed Mobility PT LTG    Bed Mobility PT LTG, Date Goal Reviewed 01/03/18  -MJ 01/03/18  -MJ 01/02/18  -MJ    Bed Mobility PT LTG, Outcome goal ongoing  - goal ongoing  -MJ goal ongoing  -MJ    Transfer Training PT LTG    Transfer Training PT  LTG, Date Goal Reviewed 01/03/18  -MJ 01/03/18  -MJ 01/02/18  -MJ    Transfer Training PT LTG, Outcome goal ongoing  - goal ongoing  - goal ongoing  -MJ    Gait Training PT LTG    Gait Training Goal PT LTG, Date Established  01/03/18  -MJ     Gait Training Goal PT LTG, Time to Achieve  5 days  -MJ     Gait Training Goal PT LTG, [place] Level  contact guard assist  -MJ     Gait Training Goal PT LTG, Assist Device  walker, rolling  -MJ     Gait Training Goal PT LTG, Distance to Achieve  60 feet  -MJ     Gait Training Goal PT LTG, Additional Goal  PWB R LE  -MJ     Gait Training Goal PT LTG, Date Goal Reviewed 01/03/18   - 01/03/18  -MJ 01/02/18  -    Gait Training Goal PT LTG, Outcome goal partially met   achieved distance  - goal ongoing  - goal met  -      01/02/18 0934 12/31/17 1418       Bed Mobility PT LTG    Bed Mobility PT LTG, Date Established  12/31/17  -SC     Bed Mobility PT LTG, Time to Achieve  5 - 7 days  -SC     Bed Mobility PT LTG, Activity Type  supine to sit/sit to supine  -SC     Bed Mobility PT LTG, [place] Level  moderate assist (50% patient effort)  -SC     Bed Mobility PT Goal  LTG, Assist Device  bed rails  -SC     Bed Mobility PT LTG, Date Goal Reviewed 01/02/18  -      Bed Mobility PT LTG, Outcome goal ongoing  - goal ongoing  -SC     Transfer Training PT LTG    Transfer Training PT LTG, Date Established  12/31/17  -SC     Transfer Training PT LTG, Time to Achieve  5 - 7 days  -SC     Transfer Training PT LTG, Activity Type  sit to stand/stand to sit  -SC     Transfer Training PT LTG, [place] Level  minimum assist (75% patient effort)  -SC     Transfer Training PT  LTG, Date Goal Reviewed 01/02/18  -      Transfer Training PT LTG, Outcome goal ongoing  - goal ongoing  -SC     Gait Training PT LTG    Gait Training Goal PT LTG, Date Established  12/31/17  -SC     Gait Training Goal PT LTG, Time to Achieve  5 - 7 days  -SC     Gait Training Goal PT LTG, [place] Level  minimum assist (75% patient effort);2 person assist required  -SC     Gait Training Goal PT LTG, Assist Device  walker, rolling  -SC     Gait Training Goal PT LTG, Distance to Achieve  15  -SC     Gait Training Goal PT LTG, Date Goal Reviewed 01/02/18  -      Gait Training Goal PT LTG, Outcome goal ongoing  - goal ongoing  -SC       User Key  (r) = Recorded By, (t) = Taken By, (c) = Cosigned By    Initials Name Provider Type    SC Prince Obrien, PT Physical Therapist    RANJIT Martines, PT Physical Therapist          Physical Therapy Education     Title: PT OT SLP Therapies (Done)     Topic: Physical Therapy  (Done)     Point: Mobility training (Done)    Learning Progress Summary    Learner Readiness Method Response Comment Documented by Status   Patient Acceptance E,D VU,NR Reviewed WB status, HEP, gait mechanics  01/03/18 1502 Done    Acceptance E,D VU,NR Reviewed WB status, HEP, gait mechanics  01/03/18 1037 Done    Acceptance E,D VU,NR Reviewed PWB status, HEP, gait mechanics  01/02/18 1343 Done    Acceptance E,D NR Reviewed WB status, benefits of mobility  01/02/18 0934 Active    Eager E NR REVIEWED Pwb PRECAUTIONS SC 01/01/18 1633 Active    Eager E ANDREA CASILLAS,NR reviewed benefits of acivity SC 12/31/17 1418 Done   Significant Other Acceptance E,D VU,NR Reviewed WB status, HEP, gait mechanics  01/03/18 1502 Done    Acceptance E,D VU,NR Reviewed PWB status, HEP, gait mechanics  01/02/18 1343 Done               Point: Home exercise program (Done)    Learning Progress Summary    Learner Readiness Method Response Comment Documented by Status   Patient Acceptance E,D VU,NR Reviewed WB status, HEP, gait mechanics  01/03/18 1502 Done    Acceptance E,D VU,NR Reviewed WB status, HEP, gait mechanics  01/03/18 1037 Done    Acceptance E,D VU,NR Reviewed PWB status, HEP, gait mechanics  01/02/18 1343 Done    Acceptance E,D NR Reviewed WB status, benefits of mobility  01/02/18 0934 Active    Eager E NR REVIEWED Pwb PRECAUTIONS SC 01/01/18 1633 Active    Eager E ANDREA CASILLAS,NR reviewed benefits of acivity SC 12/31/17 1418 Done   Significant Other Acceptance E,D VU,NR Reviewed WB status, HEP, gait mechanics  01/03/18 1502 Done    Acceptance E,D VU,NR Reviewed PWB status, HEP, gait mechanics  01/02/18 1343 Done               Point: Body mechanics (Done)    Learning Progress Summary    Learner Readiness Method Response Comment Documented by Status   Patient Acceptance E,D VU,NR Reviewed WB status, HEP, gait mechanics  01/03/18 1502 Done    Acceptance E,D VU,NR Reviewed WB status, HEP, gait mechanics  01/03/18 1037  Done    Acceptance E,D SONJA,NR Reviewed PWB status, HEP, gait mechanics  01/02/18 1343 Done    Acceptance E,D NR Reviewed WB status, benefits of mobility  01/02/18 0934 Active    Eager E NR REVIEWED Pwb PRECAUTIONS SC 01/01/18 1633 Active    Eager E ANDREA CASILLASNR reviewed benefits of acivity SC 12/31/17 1418 Done   Significant Other Acceptance E,D SONJA,NR Reviewed WB status, HEP, gait mechanics  01/03/18 1502 Done    Acceptance E,D SONJA,NR Reviewed PWB status, HEP, gait mechanics  01/02/18 1343 Done               Point: Precautions (Done)    Learning Progress Summary    Learner Readiness Method Response Comment Documented by Status   Patient Acceptance E,D VU,NR Reviewed WB status, HEP, gait mechanics  01/03/18 1502 Done    Acceptance E,D SONJA,NR Reviewed WB status, HEP, gait mechanics  01/03/18 1037 Done    Acceptance E,D SONJA,NR Reviewed PWB status, HEP, gait mechanics  01/02/18 1343 Done    Acceptance E,D NR Reviewed WB status, benefits of mobility  01/02/18 0934 Active    Eager E NR REVIEWED Pwb PRECAUTIONS SC 01/01/18 1633 Active    Eager E ANDREA CASILLASNR reviewed benefits of acivity SC 12/31/17 1418 Done   Significant Other Acceptance ED SONJANR Reviewed WB status, HEP, gait mechanics  01/03/18 1502 Done    Acceptance E,D SONJANR Reviewed PWB status, HEP, gait mechanics  01/02/18 1343 Done                      User Key     Initials Effective Dates Name Provider Type Discipline    SC 06/19/15 -  Prince Obrien, PT Physical Therapist PT     03/14/16 -  Ezra Martines PT Physical Therapist PT                    PT Recommendation and Plan  Anticipated Discharge Disposition: skilled nursing facility  Planned Therapy Interventions: balance training, bed mobility training, gait training, home exercise program, patient/family education, strengthening, transfer training  PT Frequency: 2 times/day  Plan of Care Review  Plan Of Care Reviewed With: patient  Progress: improving  Outcome Summary/Follow up Plan: Pt increased  gait distance to 70 feet with MinAx2 and RW, improved ability to maintain PWB R LE. Will continue to progress mobility as able.          Outcome Measures       01/03/18 1353 01/03/18 0920 01/02/18 1309    How much help from another person do you currently need...    Turning from your back to your side while in flat bed without using bedrails? 3  -MJ 3  -MJ 2  -MJ    Moving from lying on back to sitting on the side of a flat bed without bedrails? 2  -MJ 2  -MJ 2  -MJ    Moving to and from a bed to a chair (including a wheelchair)? 2  -MJ 2  -MJ 2  -MJ    Standing up from a chair using your arms (e.g., wheelchair, bedside chair)? 3  -MJ 2  -MJ 2  -MJ    Climbing 3-5 steps with a railing? 1  -MJ 1  -MJ 1  -MJ    To walk in hospital room? 2  -MJ 2  -MJ 2  -MJ    AM-PAC 6 Clicks Score 13  -MJ 12  -MJ 11  -MJ    Functional Assessment    Outcome Measure Options AM-PAC 6 Clicks Basic Mobility (PT)  -MJ AM-PAC 6 Clicks Basic Mobility (PT)  -MJ AM-PAC 6 Clicks Basic Mobility (PT)  -MJ      01/02/18 0824 01/02/18 0815 01/01/18 1512    How much help from another person do you currently need...    Turning from your back to your side while in flat bed without using bedrails? 2  -MJ  2  -SC    Moving from lying on back to sitting on the side of a flat bed without bedrails? 2  -MJ  1  -SC    Moving to and from a bed to a chair (including a wheelchair)? 2  -MJ  2  -SC    Standing up from a chair using your arms (e.g., wheelchair, bedside chair)? 2  -MJ  2  -SC    Climbing 3-5 steps with a railing? 1  -MJ  1  -SC    To walk in hospital room? 2  -MJ  2  -SC    AM-PAC 6 Clicks Score 11  -MJ  10  -SC    How much help from another is currently needed...    Putting on and taking off regular lower body clothing?  1  -AR     Bathing (including washing, rinsing, and drying)  2  -AR     Toileting (which includes using toilet bed pan or urinal)  1  -AR     Putting on and taking off regular upper body clothing  3  -AR     Taking care of  personal grooming (such as brushing teeth)  3  -AR     Eating meals  3  -AR     Score  13  -AR     Functional Assessment    Outcome Measure Options AM-PAC 6 Clicks Basic Mobility (PT)  -MJ AM-PAC 6 Clicks Daily Activity (OT)  -AR AM-PAC 6 Clicks Basic Mobility (PT)  -SC      User Key  (r) = Recorded By, (t) = Taken By, (c) = Cosigned By    Initials Name Provider Type    SC Prince Obrien, PT Physical Therapist    AR Vianey Arellano, OT Occupational Therapist    RANJIT Martines, PT Physical Therapist           Time Calculation:         PT Charges       01/03/18 1504 01/03/18 1040       Time Calculation    Start Time 1353  -MJ 0920  -MJ     PT Received On 01/03/18  -MJ 01/03/18  -MJ     PT Goal Re-Cert Due Date 01/10/18  -MJ 01/10/18  -MJ     Time Calculation- PT    Total Timed Code Minutes- PT 19 minute(s)  -MJ 23 minute(s)  -MJ       User Key  (r) = Recorded By, (t) = Taken By, (c) = Cosigned By    Initials Name Provider Type    RANJIT Martines, PT Physical Therapist          Therapy Charges for Today     Code Description Service Date Service Provider Modifiers Qty    66493853175 HC PT THER PROC EA 15 MIN 1/2/2018 Ezra Martines, PT GP 3    55162390929 HC GAIT TRAINING EA 15 MIN 1/2/2018 Ezra Martines, PT GP 1    62578276575 HC PT THER PROC EA 15 MIN 1/2/2018 Ezra Martines, PT GP 1    16514676871 HC PT THER SUPP EA 15 MIN 1/2/2018 Ezra Martines, PT GP 2    75265728288 HC GAIT TRAINING EA 15 MIN 1/3/2018 Ezra Martines, PT GP 1    70048976931 HC PT THER PROC EA 15 MIN 1/3/2018 Ezra Martines, PT GP 1    03809841300 HC PT THER SUPP EA 15 MIN 1/3/2018 Ezra Martines, PT GP 2    64768521332 HC GAIT TRAINING EA 15 MIN 1/3/2018 Ezra Martines, PT GP 1    52151086182 HC PT THER SUPP EA 15 MIN 1/3/2018 Ezra Martines, PT GP 1          PT G-Codes  Outcome Measure Options: AM-PAC 6 Clicks Basic Mobility (PT)  Score: 10  Functional Limitation: Mobility: Walking and moving around  Mobility: Walking and Moving Around Current Status (): At least  60 percent but less than 80 percent impaired, limited or restricted  Mobility: Walking and Moving Around Goal Status (): At least 60 percent but less than 80 percent impaired, limited or restricted    Ezra Martines, PT  1/3/2018

## 2018-01-03 NOTE — PLAN OF CARE
Problem: Patient Care Overview (Adult)  Goal: Plan of Care Review  Outcome: Ongoing (interventions implemented as appropriate)   01/03/18 1038   Coping/Psychosocial Response Interventions   Plan Of Care Reviewed With patient   Patient Care Overview   Progress improving   Outcome Evaluation   Outcome Summary/Follow up Plan Pt ambulated 40 feet with MinAx2 and RW, still requires cues for PWB on R LE. Pt limited by fatigue. Will continue to progress mobility as able       Problem: Inpatient Physical Therapy  Goal: Bed Mobility Goal LTG- PT  Outcome: Ongoing (interventions implemented as appropriate)   12/31/17 1418 01/03/18 1038   Bed Mobility PT LTG   Bed Mobility PT LTG, Date Established 12/31/17 --    Bed Mobility PT LTG, Time to Achieve 5 - 7 days --    Bed Mobility PT LTG, Activity Type supine to sit/sit to supine --    Bed Mobility PT LTG, Ransom Level moderate assist (50% patient effort) --    Bed Mobility PT Goal LTG, Assist Device bed rails --    Bed Mobility PT LTG, Date Goal Reviewed --  01/03/18   Bed Mobility PT LTG, Outcome --  goal ongoing     Goal: Transfer Training Goal 1 LTG- PT  Outcome: Ongoing (interventions implemented as appropriate)   12/31/17 1418 01/03/18 1038   Transfer Training PT LTG   Transfer Training PT LTG, Date Established 12/31/17 --    Transfer Training PT LTG, Time to Achieve 5 - 7 days --    Transfer Training PT LTG, Activity Type sit to stand/stand to sit --    Transfer Training PT LTG, Ransom Level minimum assist (75% patient effort) --    Transfer Training PT LTG, Date Goal Reviewed --  01/03/18   Transfer Training PT LTG, Outcome --  goal ongoing     Goal: Gait Training Goal LTG- PT  Outcome: Ongoing (interventions implemented as appropriate)   01/03/18 1038   Gait Training PT LTG   Gait Training Goal PT LTG, Date Established 01/03/18   Gait Training Goal PT LTG, Time to Achieve 5 days   Gait Training Goal PT LTG, Ransom Level contact guard assist   Gait  Training Goal PT LTG, Assist Device walker, rolling   Gait Training Goal PT LTG, Distance to Achieve 60 feet   Gait Training Goal PT LTG, Additional Goal PWB R LE   Gait Training Goal PT LTG, Date Goal Reviewed 01/03/18   Gait Training Goal PT LTG, Outcome goal ongoing

## 2018-01-03 NOTE — PROGRESS NOTES
Continued Stay Note  [facility]     Patient Name: Cassidy Teran  MRN: [id number]  Today's Date: 1/3/2018    Admit Date: 12/30/2017          Discharge Plan     Consent obtained for the participation in the [facility] Transitions Program. Isabelle Casas RN                Discharge Codes     None        Expected Discharge Date and Time     Expected Discharge Date Expected Discharge Time    Jan 4, 2018             Isabelle Casas RN

## 2018-01-03 NOTE — PROGRESS NOTES
"/72 (BP Location: Right arm, Patient Position: Lying)  Pulse 92  Temp 98.8 °F (37.1 °C) (Oral)   Resp 16  Ht 154.9 cm (61\")  Wt 40.8 kg (90 lb)  SpO2 96%  BMI 17.01 kg/m2    Lab Results (last 24 hours)     Procedure Component Value Units Date/Time    Hemoglobin & Hematocrit, Blood [[id number]]  (Abnormal) Collected:  01/03/18 0508    Specimen:  Blood Updated:  01/03/18 0526     Hemoglobin 9.6 (L) g/dL      Hematocrit 29.5 (L) %     Basic Metabolic Panel [[id number]]  (Abnormal) Collected:  01/03/18 0508    Specimen:  Blood Updated:  01/03/18 0558     Glucose 101 (H) mg/dL      BUN 9 mg/dL      Creatinine 0.40 (L) mg/dL      Sodium 135 mmol/L      Potassium 3.5 mmol/L      Chloride 98 (L) mmol/L      CO2 27.0 mmol/L      Calcium 8.9 mg/dL      eGFR Non African Amer >150 mL/min/1.73      BUN/Creatinine Ratio 22.5     Anion Gap 10.0 mmol/L     Narrative:       [employer] Guidelines    Stage     Description        GFR  1         Normal or High     90+  2         Mild decrease      60-89  3         Moderate decrease  30-59  4         Severe decrease    15-29  5         Kidney failure     <15          Imaging Results (last 24 hours)     ** No results found for the last 24 hours. **          Patient Care Team:  Luke Baum MD as PCP - General  Neftali Denise MD as Consulting Physician (General Surgery)    SUBJECTIVE: Cassidy reports she is doing well.  Pain is well-controlled.  She walked 40 feet with therapy yesterday.    PHYSICAL EXAM  Right hip Aquacel dressings clean, dry and intact  Thigh and calf soft and nontender  Neurovascular intact distally     Principal Problem:    Closed right hip fracture  Active Problems:    Malignant neoplasm of central portion of right female breast    Fall at home    Hyponatremia    History of breast cancer    Hypokalemia    Postoperative anemia    Delirium      PLAN / DISPOSITION: 77-year-old female postop day #4 status post right hip percutaneous " pinning  -Continue to mobilize with therapy, partial weightbearing right lower extremity  -Lovenox for DVT prophylaxis  -Okay with me to transfer to rehabilitation when bed available  -Follow-up in 2 weeks    Corbin Swanson MD  01/03/18  7:46 AM

## 2018-01-03 NOTE — PROGRESS NOTES
[facility]  PROGRESS NOTE    Patient Name: Cassidy Teran  : 1940  MRN: [id number]    Date of Admission: 2017  Length of Stay: 4  Primary Care Physician: Luke Baum MD    Subjective     CC: f/u R hip fracture    HPI:   is not present.  She is doing better today.  Less confused.  Complains of some burning, but otherwise pain is ok.  Says she is eating well.    Review of Systems  Gen- No fevers, chills  CV- No chest pain, palpitations  Resp- No cough, dyspnea  GI- No N/V/D, abd pain      Objective     Vital Signs:   Temp:  [98 °F (36.7 °C)-98.8 °F (37.1 °C)] 98.2 °F (36.8 °C)  Heart Rate:  [90-94] 90  Resp:  [16] 16  BP: (114-155)/(72-74) 155/73        Physical Exam:  Constitutional: Thin, elderly female. NAD. Awake, alert and conversant, sitting up in chair  HENT: NCAT, mucous membranes moist  Respiratory: Clear to auscultation bilaterally, respiratory effort normal   Cardiovascular: RRR, no murmurs, rubs, or gallops   Gastrointestinal: Positive bowel sounds, soft, nontender, nondistended  Musculoskeletal: No bilateral ankle edema. R hip incision is dressed and clean   Psychiatric: Appropriate affect, cooperative  Neurologic: oriented to [facility], [facility] [place].  Said 2019 for year.  No focal deficits.  Conversation much more appropriate today  Skin: No rashes    Results Reviewed:  I have personally reviewed current lab, radiology, and data and agree.      Results from last 7 days  Lab Units 18  0508 18  0601 18  0546 17  0511   WBC 10*3/mm3  --  6.94 7.45 11.20*   HEMOGLOBIN g/dL 9.6* 8.8* 9.9* 10.7*   HEMATOCRIT % 29.5* 27.3* 30.2* 32.9*   PLATELETS 10*3/mm3  --  137* 140* 165       Results from last 7 days  Lab Units 18  0508 17  0511 17  1312   SODIUM mmol/L 135 130* 131*   POTASSIUM mmol/L 3.5 3.4* 3.7   CHLORIDE mmol/L 98* 98* 99   CO2 mmol/L 27.0 26.0 28.0   BUN mg/dL 9 13 15   CREATININE mg/dL 0.40*  "0.50* 0.50*   GLUCOSE mg/dL 101* 109* 113*   CALCIUM mg/dL 8.9 8.6* 9.2   ALT (SGPT) U/L  --   --  31   AST (SGOT) U/L  --   --  34*     No results found for: BNP  No results found for: PHART    Microbiology Results Abnormal     None        Imaging Results (last 24 hours)     ** No results found for the last 24 hours. **        I have reviewed the medications.    Assessment/Plan   Assessment / Plan     Hospital Problem List     * (Principal)Closed right hip fracture    Malignant neoplasm of central portion of right female breast    Overview Signed 8/17/2016  9:44 AM by Jenni Izaguirre              Fall at home    Hyponatremia    History of breast cancer (Chronic)    Hypokalemia    Postoperative anemia    Delirium        Brief Hospital Course to date:  Cassidy Teran is a 77 y.o. female with PMH significant for L breast cancer (2003- s/p mastectomy and XRT) followed by R breast cancer (s/p mastectomy, Herceptin, Adriamycin and Cytoxan). She did have some reduction of her systolic function (down to 45%) but EF recovered to 55-60% by January 2017 after discontinuation of Herceptin. She was started on Arimidex in April 2017 and the patient's  reports she has been \"weak and dizzy\" ever since. Ms. Teran was admitted to [facility] on 12/30/17 after a fall at home, sustaining a R femoral neck fracture. She underwent repair by Dr. Corbin Swanson.     Right femoral neck fracture  Post-operative anemia  - s/p R hip cannulated screw fixation of the R femoral neck by Dr. Swanson on 12/30/17  - Pain management PRN  - PT/OT following    R breast cancer   Weakness and dizziness  - Ongoing since April 2017 after starting Arimidex. Patient is followed by Dr. Lund  - Reviewed Dr. Augustine note, seems unlikley that arimedex causing her symptoms but ok to stop for now.  Can f/u with Dr. Lund.  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  Findings d/w patient and .    Hyponatremia  - resolved on 1/3 " BMP    Post-op Anemia  - stabilized    Delirium   - multifactorial including anesthesia, narcotics, hospital acquired, with some probable underlying cognitive impairment  - seems better today.  Will continue low dose seroquel at night for now.    Hypokalemia, replete potassium PRN     DVT Prophylaxis: Lovenox      CODE STATUS: Full Code    Disposition: D/w CM - anticipate bed available tomorrow.    Nomi Kraus MD  01/03/18  10:47 AM

## 2018-01-03 NOTE — PLAN OF CARE
Problem: Patient Care Overview (Adult)  Goal: Plan of Care Review  Outcome: Ongoing (interventions implemented as appropriate)    Goal: Adult Individualization and Mutuality  Outcome: Ongoing (interventions implemented as appropriate)    Goal: Discharge Needs Assessment  Outcome: Ongoing (interventions implemented as appropriate)      Problem: Pain, Acute (Adult)  Goal: Identify Related Risk Factors and Signs and Symptoms  Outcome: Ongoing (interventions implemented as appropriate)    Goal: Acceptable Pain Control/Comfort Level  Outcome: Ongoing (interventions implemented as appropriate)      Problem: Skin Integrity Impairment, Risk/Actual (Adult)  Goal: Identify Related Risk Factors and Signs and Symptoms  Outcome: Ongoing (interventions implemented as appropriate)    Goal: Skin Integrity/Wound Healing  Outcome: Ongoing (interventions implemented as appropriate)

## 2018-01-03 NOTE — PROGRESS NOTES
Adult Nutrition  Assessment/PES    Patient Name:  Cassidy Teran  YOB: 1940  MRN: [id number]  Admit Date:  12/30/2017    Assessment Date:  1/3/2018              Reason for Assessment       01/03/18 1216    Reason for Assessment    Reason For Assessment/Visit follow up protocol    Time Spent (min) 20    Diagnosis --   per notes this admission    Metabolic/ICU Hyponatremia    Other diagnosis delirium              Nutrition/Diet History       01/03/18 1216    Nutrition/Diet History    Reported/Observed By Patient    Other Patient reports good appetite but decreased PO intake due to not being pleased with the food. Reports food does not have much taste and needs more salt/seasoning. Declines Boost and Ensure supplement drinks but willing to try Boost Pudding. Requests fresh fruit with each meal (banana, grapes, oranges).              Labs/Tests/Procedures/Meds       01/03/18 1219    Labs/Tests/Procedures/Meds    Labs/Tests Review Reviewed                Nutrition Prescription Ordered       01/03/18 1219    Nutrition Prescription PO    Current PO Diet Regular    Fluid Consistency Thin            Evaluation of Received Nutrient/Fluid Intake       01/03/18 1219    PO Evaluation    Number of Meals 5    % PO Intake 50            Problem/Interventions:        Problem 1       01/03/18 1219    Nutrition Diagnoses Problem 1    Problem 1 Inadequate Intake/Infusion    Inadequate Intake Type Oral    Etiology (related to) --   limited food acceptance    Signs/Symptoms (evidenced by) PO Intake    Percent (%) intake recorded 50 %    Over number of meals 5                    Intervention Goal       01/03/18 1221    Intervention Goal    General Nutrition support treatment    PO Increase intake            Nutrition Intervention       01/03/18 1221    Nutrition Intervention    RD/Tech Action Care plan reviewd;Follow Tx progress;Recommend/ordered;Encourage intake;Interview for preference    Recommended/Ordered Supplement             Nutrition Prescription       01/03/18 1221    Nutrition Prescription PO    PO Prescription Begin/change supplement    Supplement Boost Pudding    Supplement Frequency Daily   dinner    New PO Prescription Ordered? Yes            Education/Evaluation       01/03/18 1221    Monitor/Evaluation    Monitor Per protocol;PO intake;Supplement intake        Electronically signed by:  Ana Paige RD  01/03/18 12:21 PM

## 2018-01-04 PROBLEM — E87.1 HYPONATREMIA: Status: RESOLVED | Noted: 2017-01-01 | Resolved: 2018-01-01

## 2018-01-04 PROBLEM — E87.6 HYPOKALEMIA: Status: RESOLVED | Noted: 2017-01-01 | Resolved: 2018-01-01

## 2018-01-04 NOTE — PROGRESS NOTES
Continued Stay Note  [facility]     Patient Name: Cassidy Teran  MRN: [id number]  Today's Date: 1/4/2018    Admit Date: 12/30/2017          Discharge Plan       01/04/18 1207    Case Management/Social Work Plan    Plan SNF- [facility]    Patient/Family In Agreement With Plan yes    Additional Comments Per Helene at the [facility], they have received insurance approval for transfer to a skilled bed today. Mrs Teran and  are in agreement with plan.  will transport by car. Call report to [phone].              Discharge Codes     None        Expected Discharge Date and Time     Expected Discharge Date Expected Discharge Time    Jan 4, 2018             Sonja C Kellerman, RN

## 2018-01-04 NOTE — DISCHARGE SUMMARY
"    [facility] Medicine Services  TRANSFER SUMMARY    Patient Name: Cassidy Teran  : 1940  MRN: [id number]    Date of Admission: 2017  Date of Discharge:  18  Length of Stay: 5  Primary Care Physician: Luke Baum MD    Consults     Date and Time Order Name Status Description    2017 Inpatient Consult to Hospitalist      2017 Inpatient Consult to Hematology and Oncology Completed         Hospital Course     Presenting Problem:   Fall in home, initial encounter [W19.XXXA, Y92.099]    Active Hospital Problems (** Indicates Principal Problem)    Diagnosis Date Noted   • **Closed right hip fracture [S72.001A] 2017   • Delirium [R41.0] 2018   • Postoperative anemia [D64.9] 2017   • Fall at home [W19.XXXA, Y92.099] 2017   • History of breast cancer [Z85.3] 2017   • Malignant neoplasm of central portion of right female breast [C50.111] 2016      Resolved Hospital Problems    Diagnosis Date Noted Date Resolved   • Hypokalemia [E87.6] 2017   • Hyponatremia [E87.1] 2017      Hospital Course:  Ms. Cassidy Teran is a 76yo female with PMH significant for L breast cancer (- s/p mastectomy and XRT) followed by R breast cancer (s/p mastectomy, Herceptin, Adriamycin and Cytoxan). She did have some reduction of her systolic function (down to 45%) but EF recovered to 55-60% by 2017 after discontinuation of Herceptin. She was started on Arimidex in 2017 and the patient's  reports she has been \"weak and dizzy\" ever since. He has been hopeful that this medication can be discontinued.     Ms. Teran was admitted to [facility] on 17 after a fall at home, sustaining a R femoral neck fracture. She underwent repair by Dr. Corbin Swanson on 17.   The patient did experience some post-operative delirium. Confusion has improved but is not resolved at time of discharge. She has " received scheduled Seroquel QHS to assist with confusion.     Post-operative anemia is stable. No blood transfusion needed.   Hyponatremia resolved with IV fluid administration.   Dr. Swanson recommends Lovenox for DVT prophylaxis.   Partial weight-bearing to the E   Follow up with Dr. Swanson on Wednesday 1/24 @ 1:30pm    Arimidex was held and oncology was consulted. Dr. Augustine saw Ms. Teran and felt that the medication was likely not contributing to her weakness and dizziness. He did check a CT head to rule out metastasis.  No metastasis were visualized however CT head did show an area of encephalomalacia in the left frontal lobe including eczema vacuum low dilatation of the left frontal horn occurring within the interval between prior comparison on 10/28/15, likely a prior CVA. At discharge, will continue to hold Arimidex - encouraged patient and wife to further discuss this medication with her oncologist. Hospital follow up has been arranged for 2/1/18 @ 1:00pm with Dr. Lund.     Ms. Teran will discharge to [facility] for acute rehabilitation in stable condition on 1/4/18.     Procedure(s):  HIP CANNULATED SCREW PLACEMENT     Day of Discharge     HPI:   Doing well today, less confused today.  at bedside this afternoon, answered questions.   Pain is improving, not using much for pain medications. Mobility improving. Bowels moved yesterday.     Review of Systems  Gen- No fevers, chills  CV- No chest pain, palpitations  Resp- No cough, dyspnea  GI- No N/V/D, abd pain    Otherwise complete ROS is negative except as mentioned in the HPI.    Vital Signs:   Temp:  [98.1 °F (36.7 °C)-98.3 °F (36.8 °C)] 98.1 °F (36.7 °C)  Heart Rate:  [] 99  Resp:  [16] 16  BP: (116-123)/(71-81) 122/77     Physical Exam:  Constitutional: Thin, elderly female. NAD. Awake, alert and conversant, sitting upright in bed.   HENT: NCAT, mucous membranes moist  Respiratory: Clear to auscultation bilaterally, respiratory effort normal    Cardiovascular: RRR, no murmurs, rubs, or gallops   Gastrointestinal: Positive bowel sounds, soft, nontender, nondistended  Musculoskeletal: No bilateral ankle edema. R hip incision is dressed and clean   Psychiatric: Appropriate affect, cooperative  Neurologic: Oriented to person and place. Said 1980 to year but did know it was January. Conversation is appropriate today.   Skin: No rashes    Pertinent Results       Results from last 7 days  Lab Units 01/03/18  0508 01/02/18  0601 01/01/18  0546 12/31/17  0511   WBC 10*3/mm3  --  6.94 7.45 11.20*   HEMOGLOBIN g/dL 9.6* 8.8* 9.9* 10.7*   HEMATOCRIT % 29.5* 27.3* 30.2* 32.9*   PLATELETS 10*3/mm3  --  137* 140* 165       Results from last 7 days  Lab Units 01/03/18  1640 01/03/18  0508 12/31/17  0511 12/30/17  1312   SODIUM mmol/L  --  135 130* 131*   POTASSIUM mmol/L 4.7 3.5 3.4* 3.7   CHLORIDE mmol/L  --  98* 98* 99   CO2 mmol/L  --  27.0 26.0 28.0   BUN mg/dL  --  9 13 15   CREATININE mg/dL  --  0.40* 0.50* 0.50*   GLUCOSE mg/dL  --  101* 109* 113*   CALCIUM mg/dL  --  8.9 8.6* 9.2       Results from last 7 days  Lab Units 12/30/17  1312   BILIRUBIN mg/dL 1.2   ALK PHOS U/L 60   ALT (SGPT) U/L 31   AST (SGOT) U/L 34*         Brief Urine Lab Results  (Last result in the past 365 days)      Color   Clarity   Blood   Leuk Est   Nitrite   Protein   CREAT   Urine HCG        12/30/17 1339 Yellow Clear Negative Negative Negative Negative             Microbiology Results Abnormal     None        Imaging Results (all)     Procedure Component Value Units Date/Time    CT Pelvis Without Contrast [[id number]] Collected:  12/31/17 1228     Updated:  12/31/17 1234    Narrative:       EXAMINATION: CT PELVIS WO CONTRAST - 12/30/2017     INDICATION: Evaluate for possible right hip fixation.      TECHNIQUE: CT pelvis without intravenous contrast.     The radiation dose reduction device was turned on for each scan per the  ALARA (As Low as Reasonably Achievable) protocol.      COMPARISON: CT dated 10/28/2015.     FINDINGS: Intrapelvic soft tissues unremarkable without free fluid or  bulky pelvic adenopathy. Moderately distended urinary bladder.     Osseous structures demonstrate atherosclerotic involvement of the  lumbosacral junction. SI joints symmetric and intact. Pubic symphysis  without significant widening. Abnormal cortical irregularity in the  right subcapital femoral region extending along the neck consistent with  subcapital impacted fracture in asymmetric appearance compared to the  left side. Minimal degenerative changes of the bilateral hips noted.       Impression:       Acute impacted right subcapital femoral fracture which is  nondisplaced producing mild foreshortening of the right leg due to  impacted asymmetric configuration.     DICTATED:     12/30/2017  EDITED:          12/31/2017     This report was finalized on 12/31/2017 12:31 PM by Dr. Bob Alfaro.       XR Hip With or Without Pelvis 2 - 3 View Right [[id number]] Collected:  12/31/17 1159     Updated:  12/31/17 1240    Narrative:       EXAMINATION: XR HIP, 2-3 VIEWS RIGHT - 12/30/2017     INDICATION: Fall, right hip pain.      COMPARISON: None.     FINDINGS: SI joints and pubic symphysis without significant widening.  Right femoral neck subcapital region has asymmetric somewhat impacted  appearance suspicious for fracture in the acute setting however no  discrete fracture line is clearly evident. Background degenerative  changes. Calcified phleboliths.           Impression:       Asymmetric appearance of the right subcapital region  concerning for impacted fracture however no discrete fracture line.  Recommend CT bony pelvis for further evaluation.     DICTATED:     12/30/2017  EDITED:          12/31/2017     This report was finalized on 12/31/2017 12:38 PM by Dr. Bob Alfaro.       XR Chest 1 View [[id number]] Collected:  12/31/17 1200     Updated:  12/31/17 1241    Narrative:       EXAMINATION: XR CHEST 1 VW -  12/30/2017     INDICATION: Right rib pain after fall.     COMPARISON: Chest x-ray 10/27/2015.     FINDINGS: Chronic lung changes including hyperinflated appearance and  prior granulomatous involvement without focal consolidation or  pneumothorax. No pleural effusion. Cardiac silhouette within normal  limits.           Impression:       Chronic lung changes including hyperinflated appearance  without acute cardiopulmonary disease.     DICTATED:     12/30/2017  EDITED:          12/31/2017     This report was finalized on 12/31/2017 12:39 PM by Dr. Bob Alfaro.       CT Head With & Without Contrast [[id number]] Collected:  01/01/18 0845     Updated:  01/01/18 1014    Narrative:       EXAMINATION: CT HEAD W WO CONTRAST-      INDICATION: Dizziness and fall in a patient with history of breast  cancer; W19.XXXA-Unspecified fall, initial encounter;  Y92.099-Unspecified place in other non-institutional residence as the  place of occurrence of the external cause; S72.001A-Fracture of  unspecified part of neck of right femur, initial encounter for closed  fracture; Z74.09-Other reduced mobility.      TECHNIQUE: CT head with and without intravenous contrast administration.     The radiation dose reduction device was turned on for each scan per the  ALARA (As Low as Reasonably Achievable) protocol.     COMPARISON: CT head 10/28/2015.     FINDINGS: Midline structures are symmetric without evidence of mass,  mass effect or midline shift. No intraaxial hemorrhage or extraaxial  fluid collection. Ventricles and sulci are mildly prominent. There is an  area of encephalomalacia left frontal lobe with ex vacuo dilatation of  the left frontal horn new within the interval between comparison  10/28/2015. No abnormal enhancing focus on postcontrast administration  sequences identified. Superior sagittal sinus is widely patent. Globes  and orbits unremarkable. Visualized paranasal sinuses and mastoid air  cells are grossly clear and well  pneumatized. Calvarium intact.       Impression:       1. No acute intracranial abnormality.  2. Area of encephalomalacia left frontal lobe including ex vacuo  dilatation of the left frontal horn occurring within the interval  between prior comparison 10/28/2015 likely prior insult.  3. No abnormal enhancing focus to suggest intracranial metastasis.     D:  01/01/2018  E:  01/01/2018     This report was finalized on 1/1/2018 10:11 AM by Dr. Bob Alfaro.       FL C Arm During Surgery [[id number]] Collected:  01/01/18 0807     Updated:  01/01/18 1014    Narrative:       EXAMINATION: FLUOROSCOPY C-ARM DURING SURGERY-     INDICATION: Hip pinning; W19.XXXA-Unspecified fall, initial encounter;  Y92.099-Unspecified place in other non-institutional residence as the  place of occurrence of the external cause; S72.001A-Fracture of  unspecified part of neck of right femur, initial encounter for closed  fracture.      TECHNIQUE: Intraoperative fluoroscopy for improved localization and  treatment planning.     COMPARISON: CT bony pelvis 12/30/2017.     FINDINGS: Intraoperative fluoroscopy with total fluoroscopic time usage  1 minute 29 seconds and four representative images saved of ORIF right  hip involving three partially threaded screws spanning the subcapital  fracture right femur.       Impression:       Intraoperative fluoroscopy ORIF right hip.      D:  01/01/2018  E:  01/01/2018     This report was finalized on 1/1/2018 10:12 AM by Dr. Bob Alfaro.        Results for orders placed during the hospital encounter of 11/30/16   Adult Transthoracic Echo Complete    Narrative · Left ventricular function is normal. Estimated EF = 55%.  · All left ventricular wall segments contract normally.  · Mild aortic valve stenosis is present.  · Mild tricuspid valve regurgitation is present.  · There is no evidence of pericardial effusion.  · Estimated right ventricular systolic pressure from tricuspid   regurgitation is normal (<35  mmHg).  · There are myxomatous changes of the mitral valve apparatus present.        Discharge Details      Cassidy Teran   Home Medication Instructions MERLY:[id number]    Printed on:01/04/18 1301   Medication Information                      acetaminophen (TYLENOL) 325 MG tablet  Take 2 tablets by mouth Every 4 (Four) Hours As Needed for Mild Pain .             Calcium Carbonate-Vitamin D (CALCIUM + D PO)  Take 1 tablet by mouth daily.             enoxaparin (LOVENOX) 30 MG/0.3ML solution syringe  Inject 0.3 mL under the skin Daily.             Fexofenadine HCl (ALLERGY 24-HR PO)  Take 1 tablet by mouth as needed.             HYDROcodone-acetaminophen (NORCO) 5-325 MG per tablet  Take 1 tablet by mouth Every 6 (Six) Hours As Needed for Moderate Pain  or Severe Pain .             Multiple Vitamins-Minerals (CENTRUM ADULTS PO)  Take 1 tablet by mouth daily.             Multiple Vitamins-Minerals (PRESERVISION AREDS PO)  Take 1 tablet by mouth daily.             polyethylene glycol (MIRALAX) pack packet  Take 17 g by mouth Daily.             QUEtiapine (SEROquel) 25 MG tablet  Take 1 tablet by mouth Every Night for 7 days.             sennosides-docusate sodium (SENOKOT-S) 8.6-50 MG tablet  Take 2 tablets by mouth 2 (Two) Times a Day As Needed for Constipation.               Discharge Disposition:  Rehab Facility or Unit (DC - External)    Discharge Diet:  Diet Instructions     Diet: Regular; Thin       Discharge Diet:  Regular   Fluid Consistency:  Thin               Discharge Activity:  Activity Instructions     Discharge Activity Restrictions       PARTIAL WEIGHT-BEARING TO RIGHT LOWER EXTREMITY               Future Appointments  Date Time Provider Department Center   2/1/2018 1:00 PM Nomi Lund MD [facility]   9/4/2018 11:30 AM KRISS Yousif [facility]     Time Spent on Discharge: 45 minutes    Shannon Baum PA-C  01/04/18  1:01 PM

## 2018-01-04 NOTE — PROGRESS NOTES
HEMATOLOGY/ONCOLOGY PROGRESS NOTE     CC: Recent nonpathological traumatic hip fracture    SUBJECTIVE: She had been feeling dizzy and fell and fractured her hip.  She is going to physical rehabilitation dilatation.  Is off of her Arimidex.        Past Medical History, Past Surgical History, Social History, Family History have been reviewed and are without significant changes except as mentioned.      Medications:  The current medication list was reviewed in the EMR    ALLERGIES:   Allergies   Allergen Reactions   • Horse-Derived Products      Serum       ROS:  A comprehensive 14 point review of systems was performed and was negative except as mentioned.      Objective      Vitals:    01/03/18 0800 01/03/18 1916 01/04/18 0319 01/04/18 0737   BP: 155/73 116/71 123/81 122/77   BP Location: Right arm Right arm Right arm Right arm   Patient Position: Lying Lying Lying Lying   Pulse: 90 104 102 99   Resp: 16 16 16 16   Temp: 98.2 °F (36.8 °C) 98.3 °F (36.8 °C) 98.2 °F (36.8 °C) 98.1 °F (36.7 °C)   TempSrc: Oral Oral Oral Oral   SpO2:  97% 95% 98%   Weight:       Height:            ECOG: (3) Capable of limited self-care, confined to bed or chair > 50% of waking hours    General: well appearing, in no acute distress  HEENT: sclera anicteric, oropharynx clear  Lymphatics: no cervical, supraclavicular, inguinal, or axillary adenopathy  Cardiovascular: regular rate and rhythm, no murmurs  Lungs: clear to auscultation bilaterally  Abdomen: soft, nontender, nondistended.  No palpable organomegaly  ExtremIties: no lower extremity edema  Skin: no rashes, lesions, bruising, or petechiae  Neuro: Alert and oriented x 3. Moves all extremities.    RECENT LABS:        Results from last 7 days  Lab Units 01/03/18  0508 01/02/18  0601 01/01/18  0546 12/31/17  0511   WBC 10*3/mm3  --  6.94 7.45 11.20*   HEMOGLOBIN g/dL 9.6* 8.8* 9.9* 10.7*   PLATELETS 10*3/mm3  --  137* 140* 165       Results from last 7 days  Lab Units 01/03/18  1640  01/03/18  0508 12/31/17  0511 12/30/17  1312   SODIUM mmol/L  --  135 130* 131*   POTASSIUM mmol/L 4.7 3.5 3.4* 3.7   CO2 mmol/L  --  27.0 26.0 28.0   BUN mg/dL  --  9 13 15   CREATININE mg/dL  --  0.40* 0.50* 0.50*   GLUCOSE mg/dL  --  101* 109* 113*   AST (SGOT) U/L  --   --   --  34*   ALT (SGPT) U/L  --   --   --  31   BILIRUBIN mg/dL  --   --   --  1.2   ALK PHOS U/L  --   --   --  60     Estimated Creatinine Clearance: 36.7 mL/min (by C-G formula based on Cr of 0.4).      Imaging Results (last 72 hours)     ** No results found for the last 72 hours. **          ASSESSMENT & PLAN:    1. Breast cancer hormone receptor positive  2. Recent hip fracture    Discussion: while I doubt that this is related to the Arimidex, it could be related to the Arimidex from a dizziness standpoint.  Fortunately I reviewed the scan of her brain and there is no evidence of metastasis though there was old injury with encephalomalacia.  For now she will stay off the Arimidex and we will see her back in my office the first part of February to discuss what else we may consider but virtually all hormonal manipulations can cause some vascular effects that might cause such dizziness and if one did a diet cannot be sure that others might not cause similar side effects .  I spoke with her  by phone to confirm this plan.                Nomi Lund MD    1/4/2018

## 2018-01-04 NOTE — PROGRESS NOTES
"/77 (BP Location: Right arm, Patient Position: Lying)  Pulse 99  Temp 98.1 °F (36.7 °C) (Oral)   Resp 16  Ht 154.9 cm (61\")  Wt 40.8 kg (90 lb)  SpO2 98%  BMI 17.01 kg/m2    Lab Results (last 24 hours)     Procedure Component Value Units Date/Time    Potassium [[id number]]  (Normal) Collected:  01/03/18 1640    Specimen:  Blood Updated:  01/03/18 1740     Potassium 4.7 mmol/L           Imaging Results (last 24 hours)     ** No results found for the last 24 hours. **          Patient Care Team:  Luke Baum MD as PCP - General  Neftali Denise MD as Consulting Physician (General Surgery)    SUBJECTIVE: She reports she is doing well.  Pain is well-controlled.  Tolerating a regular diet.  She walked 70 feet with therapy yesterday.    PHYSICAL EXAM  Right hip incision is clean, dry and intact  Thigh and calf soft nontender  Neurovascular intact distally     Principal Problem:    Closed right hip fracture  Active Problems:    Malignant neoplasm of central portion of right female breast    Fall at home    Hyponatremia    History of breast cancer    Hypokalemia    Postoperative anemia    Delirium      PLAN / DISPOSITION: 77-year-old female postop day #5 status post right hip percutaneous pinning  -Continue to mobilize with therapy as tolerated  -Right hip Aquacel dressing changed, incision healing well  -Continue Lovenox for DVT prophylaxis  -Okay with me to transfer to rehabilitation when bed available  -Follow-up in 2-3 weeks    Corbin Swanson MD  01/04/18  8:20 AM      "

## 2018-01-04 NOTE — PLAN OF CARE
Problem: Patient Care Overview (Adult)  Goal: Plan of Care Review  Outcome: Ongoing (interventions implemented as appropriate)   01/04/18 1121   Coping/Psychosocial Response Interventions   Plan Of Care Reviewed With patient   Patient Care Overview   Progress improving   Outcome Evaluation   Outcome Summary/Follow up Plan Pt ambulated 60 feet with Suzi and RW, maintaining PWB R LE. Pt anticipates discharge to rehab today, will check back in PM to confirm       Problem: Inpatient Physical Therapy  Goal: Bed Mobility Goal LTG- PT  Outcome: Ongoing (interventions implemented as appropriate)   12/31/17 1418 01/04/18 1121   Bed Mobility PT LTG   Bed Mobility PT LTG, Date Established 12/31/17 --    Bed Mobility PT LTG, Time to Achieve 5 - 7 days --    Bed Mobility PT LTG, Activity Type supine to sit/sit to supine --    Bed Mobility PT LTG, [place] Level moderate assist (50% patient effort) --    Bed Mobility PT Goal LTG, Assist Device bed rails --    Bed Mobility PT LTG, Date Goal Reviewed --  01/04/18   Bed Mobility PT LTG, Outcome --  goal ongoing     Goal: Transfer Training Goal 1 LTG- PT  Outcome: Ongoing (interventions implemented as appropriate)   12/31/17 1418 01/04/18 1121   Transfer Training PT LTG   Transfer Training PT LTG, Date Established 12/31/17 --    Transfer Training PT LTG, Time to Achieve 5 - 7 days --    Transfer Training PT LTG, Activity Type sit to stand/stand to sit --    Transfer Training PT LTG, [place] Level minimum assist (75% patient effort) --    Transfer Training PT LTG, Date Goal Reviewed --  01/04/18   Transfer Training PT LTG, Outcome --  goal ongoing     Goal: Gait Training Goal LTG- PT  Outcome: Ongoing (interventions implemented as appropriate)   01/03/18 1038 01/04/18 1121   Gait Training PT LTG   Gait Training Goal PT LTG, Date Established 01/03/18 --    Gait Training Goal PT LTG, Time to Achieve 5 days --    Gait Training Goal PT LTG, [place] Level contact guard assist  --    Gait Training Goal PT LTG, Assist Device walker, rolling --    Gait Training Goal PT LTG, Distance to Achieve 60 feet --    Gait Training Goal PT LTG, Additional Goal PWB R LE --    Gait Training Goal PT LTG, Date Goal Reviewed --  01/04/18   Gait Training Goal PT LTG, Outcome --  goal partially met  (achieved distance)

## 2018-01-04 NOTE — THERAPY TREATMENT NOTE
Acute Care - Physical Therapy Treatment Note  [facility]     Patient Name: Cassidy Teran  : 1940  MRN: [id number]  Today's Date: 2018  Onset of Illness/Injury or Date of Surgery Date: 17  Date of Referral to PT: 17  Referring Physician: Dr. Swanson    Admit Date: 2017    Visit Dx:    ICD-10-CM ICD-9-CM   1. Fall in home, initial encounter W19.XXXA E888.9    Y92.099 E849.0   2. Closed fracture of right hip, initial encounter S72.001A 820.8   3. Impaired functional mobility, balance, gait, and endurance Z74.09 V49.89   4. Impaired mobility and ADLs Z74.09 799.89     Patient Active Problem List   Diagnosis   • Malignant neoplasm of central portion of right female breast   • Decreased cardiac ejection fraction   • CHF (congestive heart failure)   • Fall at home   • Hyponatremia   • Closed right hip fracture   • History of breast cancer   • Hypokalemia   • Postoperative anemia   • Delirium               Adult Rehabilitation Note       18 1022 18 1353 18 0920    Rehab Assessment/Intervention    Discipline physical therapist  -MJ physical therapist  -MJ physical therapist  -MJ    Document Type therapy note (daily note)  -MJ therapy note (daily note)  -MJ therapy note (daily note)  -MJ    Subjective Information agree to therapy;complains of;pain  -MJ agree to therapy;complains of;pain  -MJ agree to therapy;complains of;pain  -MJ    Patient Effort, Rehab Treatment good  -MJ good  -MJ good  -MJ    Symptoms Noted During/After Treatment fatigue  -MJ fatigue  -MJ fatigue  -MJ    Precautions/Limitations fall precautions;other (see comments)   PWB R LE  -MJ fall precautions;other (see comments)   PWB R LE  -MJ fall precautions;other (see comments)   PWB R LE  -MJ    Recorded by [MJ] Ezra Martines, PT [MJ] Ezra Martines PT [MJ] Ezra Martines PT    Pain Assessment    Pain Assessment 0-10  -MJ 0-10  -MJ 0-10  -MJ    Pain Score 3  -MJ 2  -MJ 4  -MJ    Post Pain Score 1  -MJ 2  -MJ 0  -MJ     Pain Type Acute pain  -MJ Acute pain  -MJ Acute pain  -MJ    Pain Location Hip  -MJ Hip  -MJ Hip  -MJ    Pain Orientation Right  -MJ Right  -MJ Right  -MJ    Pain Intervention(s) Repositioned;Ambulation/increased activity  -MJ Repositioned;Ambulation/increased activity  -MJ Repositioned;Ambulation/increased activity  -MJ    Recorded by [MJ] Ezra Martines, PT [MJ] Ezra Martines PT [MJ] Ezra Martines PT    Cognitive Assessment/Intervention    Current Cognitive/Communication Assessment impaired  -MJ impaired  -MJ impaired  -MJ    Orientation Status oriented to;person;place;time;required verbal cueing (specifiy in comments)   states 1997 for birth year at firt  -MJ oriented to;person;place;time;required verbal cueing (specifiy in comments)  -MJ oriented to;person;place;time;required verbal cueing (specifiy in comments)  -MJ    Follows Commands/Answers Questions 75% of the time;able to follow single-step instructions;needs cueing;needs repetition  -MJ 75% of the time;100% of the time;able to follow single-step instructions;needs cueing;needs repetition  -MJ 75% of the time;100% of the time;able to follow single-step instructions;needs cueing;needs repetition  -MJ    Personal Safety mild impairment  -MJ mild impairment  -MJ moderate impairment  -MJ    Recorded by [MJ] Ezra Martines, PT [MJ] Ezra Martines PT [MJ] Ezra Martines PT    Mobility Assessment/Training    Right Lower Extremity Weight-Bearing partial weight-bearing  -MJ partial weight-bearing  -MJ partial weight-bearing  -MJ    Recorded by [MJ] Ezra Martines PT [MJ] Ezra Martines PT [MJ] Ezra Martines PT    Bed Mobility, Assessment/Treatment    Bed Mobility, Assistive Device bed rails;head of bed elevated  -MJ  bed rails;head of bed elevated  -MJ    Bed Mob, Supine to Sit, [place] moderate assist (50% patient effort);verbal cues required  -MJ not tested   Pt UIC  -MJ moderate assist (50% patient effort);2 person assist required;verbal cues required  -MJ    Bed Mob, Sit  to Supine, [place] moderate assist (50% patient effort);verbal cues required  -MJ moderate assist (50% patient effort);2 person assist required;verbal cues required  -MJ not tested   Pt UIC  -MJ    Bed Mobility, Safety Issues decreased use of legs for bridging/pushing  -MJ decreased use of legs for bridging/pushing  -MJ decreased use of arms for pushing/pulling;decreased use of legs for bridging/pushing  -MJ    Bed Mobility, Impairments ROM decreased;strength decreased;pain  -MJ ROM decreased;strength decreased;pain  -MJ ROM decreased;strength decreased;pain  -MJ    Bed Mobility, Comment Verbal cues for sequencing, assist with trunk and legs, increased time and effort to perform  -MJ Assist with legs and trunk  -MJ Verbal cues for sequencing, assist with legs and trunk, increased time and effort to perform  -MJ    Recorded by [MJ] Ezra Martines, PT [MJ] Ezra Martines PT [MJ] Ezra Martines, PT    Transfer Assessment/Treatment    Transfers, Sit-Stand [place] minimum assist (75% patient effort);verbal cues required  -MJ contact guard assist;2 person assist required;verbal cues required  -MJ minimum assist (75% patient effort);2 person assist required;verbal cues required  -MJ    Transfers, Stand-Sit [place] minimum assist (75% patient effort);verbal cues required  -MJ minimum assist (75% patient effort);2 person assist required;verbal cues required  -MJ minimum assist (75% patient effort);2 person assist required;verbal cues required  -MJ    Transfers, Sit-Stand-Sit, Assist Device rolling walker  -MJ rolling walker  -MJ rolling walker  -MJ    Toilet Transfer, [place]   minimum assist (75% patient effort);2 person assist required;verbal cues required  -MJ    Toilet Transfer, Assistive Device   bedside commode without drop arms;rolling walker  -MJ    Transfer, Maintain Weight Bearing Status able to maintain weight bearing status;assist to maintain weight bearing status;cues to maintain weight bearing  status  -MJ able to maintain weight bearing status;assist to maintain weight bearing status;cues to maintain weight bearing status  -MJ able to maintain weight bearing status;assist to maintain weight bearing status;cues to maintain weight bearing status  -MJ    Transfer, Safety Issues sequencing ability decreased;step length decreased;weight-shifting ability decreased;loses balance backward  -MJ sequencing ability decreased;step length decreased;weight-shifting ability decreased  -MJ sequencing ability decreased;step length decreased;weight-shifting ability decreased  -MJ    Transfer, Impairments ROM decreased;strength decreased;pain  -MJ ROM decreased;strength decreased;pain  -MJ ROM decreased;strength decreased;pain  -MJ    Transfer, Comment Verbal cues for correct hand placement and for PWB R LE. Pt required cues for upright posture upon standing  -MJ Verbal cues for correct hand placement and for PWB on R LE.   -MJ Verbal cues for correct hand placement and for PWB on R LE. Assisted pt to BSC, pt impulsive with transfer trying to sit before lined up to BSC. Pt dependent for hygiene after toileting.   -MJ    Recorded by [MJ] Ezra Martines PT [MJ] Ezra Martines PT [MJ] Ezra Martines PT    Gait Assessment/Treatment    Gait, [place] Level minimum assist (75% patient effort);verbal cues required  -MJ minimum assist (75% patient effort);2 person assist required;verbal cues required  -MJ minimum assist (75% patient effort);2 person assist required;verbal cues required  -MJ    Gait, Assistive Device rolling walker  -MJ rolling walker  -MJ rolling walker  -MJ    Gait, Distance (Feet) 60  -MJ 70  -MJ 40  -MJ    Gait, Gait Pattern Analysis swing-to gait  -MJ swing-to gait  -MJ swing-to gait  -MJ    Gait, Gait Deviations right:;antalgic;aida decreased;step length decreased;weight-shifting ability decreased  -MJ right:;antalgic;aida decreased;step length decreased;weight-shifting ability decreased  -MJ  right:;antalgic;aida decreased;forward flexed posture;step length decreased;weight-shifting ability decreased  -MJ    Gait, Maintain Weight Bearing Status able to maintain weight bearing status;assist to maintain weight bearing status;cues to maintain weight bearing status  -MJ able to maintain weight bearing status;assist to maintain weight bearing status;cues to maintain weight bearing status  -MJ able to maintain weight bearing status;assist to maintain weight bearing status;cues to maintain weight bearing status  -MJ    Gait, Safety Issues sequencing ability decreased;step length decreased;weight-shifting ability decreased  -MJ sequencing ability decreased;step length decreased;weight-shifting ability decreased  -MJ sequencing ability decreased;step length decreased;weight-shifting ability decreased  -MJ    Gait, Impairments ROM decreased;strength decreased;pain  -MJ ROM decreased;strength decreased;pain  -MJ ROM decreased;strength decreased;pain  -MJ    Gait, Comment Pt demo step to gait pattern at slow pace. Cues to keep heel off ground to maintain PWB R LE. Cues to stay in middle of RW and to  feet during turn. Gait limited by pain and fatigue  -MJ Pt demo step to gait pattern at slow pace. Verbal cues to keep heel off ground to maintain PWB significantly improved ability to maintain PWB. Cues to  feet during turn. Gait limited by pain and fatigue  -MJ Pt demo step to gait pattern at slow pace. Verbal cues for PWB on R LE, pt needs reminders throughout gait. Cues to stay in middle of RW and for upright posture. Gait limited by pain and fatigue  -MJ    Recorded by [MJ] Ezra Martines, PT [MJ] Ezra Martines PT [MJ] Ezra Martines PT    Therapy Exercises    Exercise Protocols hip ORIF  -MJ hip ORIF  -MJ hip ORIF  -MJ    Hip ORIF Exercises right:;15 reps;with assist;ankle pumps/circles;quad set;glut set;heel slides;hip abduction;SAQ;SLR   Cues for technique. Suzi w/ SLR, hip abd  -MJ right:;15  reps;with assist;ankle pumps/circles;quad set;glut set;heel slides;hip abduction;SAQ;SLR   Cues for technique. Suzi w/ SLR, hip abd, heel slides  -MJ right:;15 reps;completed protocol;with assist;ankle pumps/circles;quad set;glut set;heel slides;hip abduction;SAQ;LAQ;hip flexion;SLR   Cues for technique. Suzi w/ SLR, hip abd, heel slides  -MJ    Recorded by [MJ] Ezra Martines PT [MJ] Ezra Martines PT [MJ] Ezra Martines, PT    Positioning and Restraints    Pre-Treatment Position in bed  -MJ sitting in chair/recliner  -MJ in bed  -MJ    Post Treatment Position bed  -MJ bed  -MJ chair  -MJ    In Bed notified nsg;supine;call light within reach;encouraged to call for assist;exit alarm on  -MJ notified nsg;supine;call light within reach;encouraged to call for assist;exit alarm on;with family/caregiver  -MJ     In Chair   notified nsg;reclined;call light within reach;encouraged to call for assist;exit alarm on  -MJ    Recorded by [MJ] Ezra Martines, PT [MJ] Ezra Martines PT [MJ] Ezra Martines PT      01/02/18 1309 01/02/18 0824 01/01/18 1512    Rehab Assessment/Intervention    Discipline physical therapist  - physical therapist  - physical therapist  -SC    Document Type therapy note (daily note)  - therapy note (daily note)  - therapy note (daily note)  -SC    Subjective Information agree to therapy;complains of;fatigue  - agree to therapy;no complaints  - complains of;pain  -SC    Patient Effort, Rehab Treatment good  -MJ good  -MJ good  -SC    Symptoms Noted During/After Treatment fatigue  -MJ dizziness;significant change in vital signs;other (see comments)   nausea  -     Symptoms Noted Comment  Heart rate sustained 150-160 on BSC, RN notified  -     Precautions/Limitations fall precautions;other (see comments)   PWB R LE  -MJ fall precautions;other (see comments)   PWB R LE  -MJ fall precautions;other (see comments)   PWB  -SC    Recorded by [MJ] Ezra Martines, PT [MJ] Ezra Martines PT [SC] Prince Obrien PT     Vital Signs    Pre Systolic BP Rehab  122  -MJ     Pre Treatment Diastolic BP  73  -MJ     Intra Systolic BP Rehab  156  -MJ     Intra Treatment Diastolic BP  116  -MJ     Pretreatment Heart Rate (beats/min) 94  -  -MJ     Intratreatment Heart Rate (beats/min)  150   sustained 150-160  -MJ     Posttreatment Heart Rate (beats/min) 97  -  -MJ     Pre SpO2 (%)  100  -MJ     O2 Delivery Pre Treatment  supplemental O2  -MJ     Post SpO2 (%)  100  -MJ     O2 Delivery Post Treatment  supplemental O2  -MJ     Pre Patient Position Supine  -MJ Supine  -MJ     Intra Patient Position  Sitting  -MJ     Post Patient Position Sitting  -MJ Supine  -MJ     Recorded by [MJ] Ezra Martines, PT [MJ] Ezra Martines PT     Pain Assessment    Pain Assessment 0-10  -MJ Rivera-Casas FACES  -MJ Rivera-Casas FACES  -SC    Rivera-Casas FACES Pain Rating  2  -MJ 2  -SC    Pain Score 0  -MJ      Post Pain Score 0  -MJ 2  -MJ 2  -SC    Pain Type  Acute pain  -MJ Acute pain  -SC    Pain Location  Hip  -MJ Hip  -SC    Pain Orientation  Right  -MJ Right  -SC    Pain Intervention(s)  Repositioned;Ambulation/increased activity  - Repositioned  -SC    Recorded by [MJ] Ezra Martines, PT [MJ] Ezra Martines PT [SC] Prince Obrien, PT    Cognitive Assessment/Intervention    Current Cognitive/Communication Assessment impaired  -MJ impaired  -MJ impaired  -SC    Orientation Status oriented to;person;place;required verbal cueing (specifiy in comments)  - oriented to;person;place;required verbal cueing (specifiy in comments)  - oriented to;person;place;required verbal cueing (specifiy in comments);situation;time  -SC    Follows Commands/Answers Questions 75% of the time;able to follow single-step instructions;needs cueing;needs repetition  - 75% of the time;able to follow single-step instructions;needs cueing;needs repetition  - 75% of the time  -SC    Personal Safety moderate impairment   cues for WB status  - moderate impairment   cues for WB  status  - moderate impairment;decreased awareness, need for assist;decreased awareness, need for safety;decreased insight to deficits  -SC    Personal Safety Interventions   fall prevention program maintained;gait belt  -SC    Recorded by [MJ] Ezra Martines, PT [MJ] Ezra Martines PT [SC] Prince Obrien, PT    Mobility Assessment/Training    Extremity Weight-Bearing Status right lower extremity  -MJ right lower extremity  -MJ     Right Lower Extremity Weight-Bearing partial weight-bearing  - partial weight-bearing  -     Recorded by [MJ] Ezra Martines PT [MJ] Ezra Martines PT     Bed Mobility, Assessment/Treatment    Bed Mobility, Assistive Device bed rails;head of bed elevated;leg   - bed rails;head of bed elevated;draw sheet  - bed rails;head of bed elevated;draw sheet  -SC    Bed Mobility, Scoot/Bridge, [place]  minimum assist (75% patient effort);verbal cues required   pt bridged to have soiled linens removed and clean replaced  - maximum assist (25% patient effort)  -SC    Bed Mob, Supine to Sit, [place] moderate assist (50% patient effort);2 person assist required;verbal cues required  - maximum assist (25% patient effort);2 person assist required;verbal cues required  - maximum assist (25% patient effort)  -SC    Bed Mob, Sit to Supine, [place] not tested   Pt UIC  - maximum assist (25% patient effort);2 person assist required;verbal cues required  -     Bed Mobility, Safety Issues decreased use of arms for pushing/pulling;decreased use of legs for bridging/pushing  - decreased use of arms for pushing/pulling;decreased use of legs for bridging/pushing  - cognitive deficits limit understanding;decreased use of arms for pushing/pulling;decreased use of legs for bridging/pushing  -SC    Bed Mobility, Impairments ROM decreased;strength decreased;pain  -MJ ROM decreased;strength decreased;pain  -MJ strength decreased;motor control impaired  -SC    Bed Mobility, Comment Verbal  cues for sequencing, assist with legs and trunk, increased time and effort to perform  -MJ Verbal cues to move LEs off EOB and to use UEs to push trunk to sitting. Assist with legs and trunk, increased time and effort to perform  -MJ repeted cues or sequencing up to edg of bed  -SC    Recorded by [MJ] Ezra Martines, PT [MJ] Ezra Martines PT [SC] Prince SANTANA Micah, PT    Transfer Assessment/Treatment    Transfers, Chair-Bed [place]  moderate assist (50% patient effort);2 person assist required;verbal cues required  -MJ     Transfers, Bed-Chair-Bed, Assist Device  rolling walker  -MJ     Transfers, Sit-Stand [place] minimum assist (75% patient effort);2 person assist required;verbal cues required  -MJ minimum assist (75% patient effort);2 person assist required;verbal cues required  -MJ minimum assist (75% patient effort);2 person assist required  -SC    Transfers, Stand-Sit [place] minimum assist (75% patient effort);2 person assist required;verbal cues required  -MJ minimum assist (75% patient effort);2 person assist required;verbal cues required  -MJ minimum assist (75% patient effort);2 person assist required  -SC    Transfers, Sit-Stand-Sit, Assist Device rolling walker  -MJ rolling walker  -MJ other (see comments);mechanical gait assist  -SC    Toilet Transfer, [place]  moderate assist (50% patient effort);2 person assist required;verbal cues required  -MJ     Toilet Transfer, Assistive Device  rolling walker  -MJ     Transfer, Maintain Weight Bearing Status able to maintain weight bearing status;assist to maintain weight bearing status;cues to maintain weight bearing status  -MJ unable to maintain weight bearing status;assist to maintain weight bearing status;cues to maintain weight bearing status  -MJ     Transfer, Safety Issues step length decreased;weight-shifting ability decreased  -MJ step length decreased;weight-shifting ability decreased  -MJ     Transfer, Impairments ROM decreased;strength  decreased;pain  -MJ ROM decreased;strength decreased;pain  -MJ motor control impaired;pain;strength decreased  -SC    Transfer, Comment Verbal cues for correct hand placement and for PWB status  - Verbal cues for correct hand placement, to step R LE out prior to transfer and for PWB through R LE. Assisted pt to BSC and back to bed. While on BSC pt with increased heart rate, BP and nausea, RN notified. Symptoms improved once returned to bed  - cues for hand placement and safety  -SC    Recorded by [MJ] Ezra Martines, PT [MJ] Ezra Martines, PT [SC] Prince Obrien, PT    Gait Assessment/Treatment    Gait, [place] Level minimum assist (75% patient effort);2 person assist required;verbal cues required  - unable to perform  - minimum assist (75% patient effort);2 person assist required  -SC    Gait, Assistive Device rolling walker  -  other (see comments)   arjo  -SC    Gait, Distance (Feet) 40  -MJ  20  -SC    Gait, Gait Pattern Analysis swing-to gait  -  swing-to gait  -SC    Gait, Gait Deviations right:;antalgic;aida decreased;forward flexed posture;step length decreased;toe-to-floor clearance decreased;weight-shifting ability decreased;narrow base  -  right:;other (see comments)   PWB  -SC    Gait, Maintain Weight Bearing Status assist to maintain weight bearing status;cues to maintain weight bearing status  -      Gait, Safety Issues sequencing ability decreased;step length decreased;weight-shifting ability decreased  -  balance decreased during turns  -SC    Gait, Impairments ROM decreased;strength decreased;pain  -MJ  motor control impaired;strength decreased;impaired balance  -SC    Gait, Comment Pt demo step to gait pattern at slow pace. Verbal cues for PWB, instructed to keep toes only on ground on R during stance. Cues to separate feet to improve [place], mild improvement. Cues to  feet during turn. Gait limited by fatigue  - Due to sustained increased heart rate and BP  - repeted  cues to off loan wt on R leg. patient able to ambulate to door and back to chaIR  -SC    Recorded by [MJ] Ezra Martines, PT [MJ] Ezra Martines, PT [SC] Prince Obrien, PT    Therapy Exercises    Exercise Protocols hip ORIF  - hip ORIF  - hip ORIF  -SC    Hip ORIF Exercises right:;10 reps;with assist;ankle pumps/circles;quad set;glut set;heel slides;hip abduction;SAQ;SLR   Cues for technique. Suzi w/ SLR, hip abd, heel slides  - --   deferred due to increased heart rate  - right:;with assist;5 reps;10 reps;quad set;hamstring set;hip abduction;LAQ  -SC    Recorded by [MJ] Ezra Martines, PT [MJ] Ezra Martines, PT [SC] Prince Obrien, PT    Positioning and Restraints    Pre-Treatment Position in bed  - in bed  - in bed  -SC    Post Treatment Position chair  - bed  - chair  -SC    In Bed  notified nsg;supine;call light within reach;encouraged to call for assist;exit alarm on;SCD pump applied  -     In Chair notified nsg;reclined;call light within reach;encouraged to call for assist;exit alarm on;on mechanical lift sling  -  sitting;reclined;notified nsg;call light within reach;encouraged to call for assist;exit alarm on;with family/caregiver  -SC    Recorded by [MJ] Ezra Martines, PT [MJ] Ezra Martines, PT [SC] Prince Obrien, PT      User Key  (r) = Recorded By, (t) = Taken By, (c) = Cosigned By    Initials Name Effective Dates    SC Prince Obrien, PT 06/19/15 -     RANJIT Martines, PT 03/14/16 -                 IP PT Goals       01/04/18 1121 01/03/18 1503 01/03/18 1038    Bed Mobility PT LTG    Bed Mobility PT LTG, Date Goal Reviewed 01/04/18  -MJ 01/03/18  -MJ 01/03/18  -    Bed Mobility PT LTG, Outcome goal ongoing  -MJ goal ongoing  - goal ongoing  -    Transfer Training PT LTG    Transfer Training PT  LTG, Date Goal Reviewed 01/04/18  -MJ 01/03/18  -MJ 01/03/18  -MJ    Transfer Training PT LTG, Outcome goal ongoing  -MJ goal ongoing  -MJ goal ongoing  -MJ    Gait Training PT LTG    Gait Training Goal  PT LTG, Date Established   01/03/18  -    Gait Training Goal PT LTG, Time to Achieve   5 days  -    Gait Training Goal PT LTG, [place] Level   contact guard assist  -    Gait Training Goal PT LTG, Assist Device   walker, rolling  -MJ    Gait Training Goal PT LTG, Distance to Achieve   60 feet  -    Gait Training Goal PT LTG, Additional Goal   PWB R LE  -MJ    Gait Training Goal PT LTG, Date Goal Reviewed 01/04/18  -MJ 01/03/18  -MJ 01/03/18  -    Gait Training Goal PT LTG, Outcome goal partially met   achieved distance  -MJ goal partially met   achieved distance  -MJ goal ongoing  -MJ      01/02/18 1343 01/02/18 0934 12/31/17 1418    Bed Mobility PT LTG    Bed Mobility PT LTG, Date Established   12/31/17  -SC    Bed Mobility PT LTG, Time to Achieve   5 - 7 days  -SC    Bed Mobility PT LTG, Activity Type   supine to sit/sit to supine  -SC    Bed Mobility PT LTG, [place] Level   moderate assist (50% patient effort)  -SC    Bed Mobility PT Goal  LTG, Assist Device   bed rails  -SC    Bed Mobility PT LTG, Date Goal Reviewed 01/02/18  -MJ 01/02/18  -     Bed Mobility PT LTG, Outcome goal ongoing  - goal ongoing  - goal ongoing  -SC    Transfer Training PT LTG    Transfer Training PT LTG, Date Established   12/31/17  -SC    Transfer Training PT LTG, Time to Achieve   5 - 7 days  -SC    Transfer Training PT LTG, Activity Type   sit to stand/stand to sit  -SC    Transfer Training PT LTG, [place] Level   minimum assist (75% patient effort)  -SC    Transfer Training PT  LTG, Date Goal Reviewed 01/02/18  -MJ 01/02/18  -     Transfer Training PT LTG, Outcome goal ongoing  - goal ongoing  - goal ongoing  -SC    Gait Training PT LTG    Gait Training Goal PT LTG, Date Established   12/31/17  -SC    Gait Training Goal PT LTG, Time to Achieve   5 - 7 days  -SC    Gait Training Goal PT LTG, [place] Level   minimum assist (75% patient effort);2 person assist required  -SC    Gait Training Goal  PT LTG, Assist Device   walker, rolling  -SC    Gait Training Goal PT LTG, Distance to Achieve   15  -SC    Gait Training Goal PT LTG, Date Goal Reviewed 01/02/18  - 01/02/18  -     Gait Training Goal PT LTG, Outcome goal met  -MJ goal ongoing  - goal ongoing  -SC      User Key  (r) = Recorded By, (t) = Taken By, (c) = Cosigned By    Initials Name Provider Type    LAURIE Obrien, PT Physical Therapist    RANJIT Martines, PT Physical Therapist          Physical Therapy Education     Title: PT OT SLP Therapies (Done)     Topic: Physical Therapy (Done)     Point: Mobility training (Done)    Learning Progress Summary    Learner Readiness Method Response Comment Documented by Status   Patient Acceptance E,D VU,NR Reviewed WB status, HEP, gait mechanics  01/04/18 1121 Done    Acceptance E,D VU,NR Reviewed WB status, HEP, gait mechanics  01/03/18 1502 Done    Acceptance E,D VU,NR Reviewed WB status, HEP, gait mechanics  01/03/18 1037 Done    Acceptance E,D VU,NR Reviewed PWB status, HEP, gait mechanics  01/02/18 1343 Done    Acceptance E,D NR Reviewed WB status, benefits of mobility  01/02/18 0934 Active    Eager E NR REVIEWED Pwb PRECAUTIONS SC 01/01/18 1633 Active    Eager E ANDREA CASILLASNR reviewed benefits of acivity SC 12/31/17 1418 Done   Significant Other Acceptance E,D VU,NR Reviewed WB status, HEP, gait mechanics  01/03/18 1502 Done    Acceptance E,D VU,NR Reviewed PWB status, HEP, gait mechanics  01/02/18 1343 Done               Point: Home exercise program (Done)    Learning Progress Summary    Learner Readiness Method Response Comment Documented by Status   Patient Acceptance E,D VU,NR Reviewed WB status, HEP, gait mechanics  01/04/18 1121 Done    Acceptance E,D VU,NR Reviewed WB status, HEP, gait mechanics  01/03/18 1502 Done    Acceptance E,D VU,NR Reviewed WB status, HEP, gait mechanics  01/03/18 1037 Done    Acceptance E,D VU,NR Reviewed PWB status, HEP, gait mechanics  01/02/18 1343  Done    Acceptance E,D NR Reviewed WB status, benefits of mobility  01/02/18 0934 Active    Eager E NR REVIEWED Pwb PRECAUTIONS SC 01/01/18 1633 Active    Eager E ANDREA CASILLASNR reviewed benefits of acivity SC 12/31/17 1418 Done   Significant Other Acceptance E,D VU,NR Reviewed WB status, HEP, gait mechanics  01/03/18 1502 Done    Acceptance E,D VU,NR Reviewed PWB status, HEP, gait mechanics  01/02/18 1343 Done               Point: Body mechanics (Done)    Learning Progress Summary    Learner Readiness Method Response Comment Documented by Status   Patient Acceptance E,D VU,NR Reviewed WB status, HEP, gait mechanics  01/04/18 1121 Done    Acceptance E,D VU,NR Reviewed WB status, HEP, gait mechanics  01/03/18 1502 Done    Acceptance E,D VU,NR Reviewed WB status, HEP, gait mechanics  01/03/18 1037 Done    Acceptance E,D VU,NR Reviewed PWB status, HEP, gait mechanics  01/02/18 1343 Done    Acceptance E,D NR Reviewed WB status, benefits of mobility  01/02/18 0934 Active    Eager E NR REVIEWED Pwb PRECAUTIONS SC 01/01/18 1633 Active    Eager E ANDREA CASILLASNR reviewed benefits of acivity SC 12/31/17 1418 Done   Significant Other Acceptance E,D VU,NR Reviewed WB status, HEP, gait mechanics  01/03/18 1502 Done    Acceptance E,D VU,NR Reviewed PWB status, HEP, gait mechanics  01/02/18 1343 Done               Point: Precautions (Done)    Learning Progress Summary    Learner Readiness Method Response Comment Documented by Status   Patient Acceptance E,D VU,NR Reviewed WB status, HEP, gait mechanics  01/04/18 1121 Done    Acceptance E,D VU,NR Reviewed WB status, HEP, gait mechanics  01/03/18 1502 Done    Acceptance E,D VU,NR Reviewed WB status, HEP, gait mechanics  01/03/18 1037 Done    Acceptance E,D VU,NR Reviewed PWB status, HEP, gait mechanics  01/02/18 1343 Done    Acceptance E,D NR Reviewed WB status, benefits of mobility  01/02/18 0934 Active    Eager E NR REVIEWED Pwb PRECAUTIONS SC 01/01/18 1633  Active    Eager ANDREA MURRAY NR reviewed benefits of acivity SC 12/31/17 1418 Done   Significant Other Acceptance EMARI NR Reviewed WB status, HEP, gait mechanics  01/03/18 1502 Done    Acceptance MARI WYNNE NR Reviewed PWB status, HEP, gait mechanics  01/02/18 1343 Done                      User Key     Initials Effective Dates Name Provider Type Discipline    SC 06/19/15 -  Prince Obrien, PT Physical Therapist PT     03/14/16 -  Ezra Martines, PT Physical Therapist PT                    PT Recommendation and Plan  Anticipated Discharge Disposition: skilled nursing facility  Planned Therapy Interventions: balance training, bed mobility training, gait training, home exercise program, patient/family education, strengthening, transfer training  PT Frequency: 2 times/day  Plan of Care Review  Plan Of Care Reviewed With: patient  Progress: improving  Outcome Summary/Follow up Plan: Pt ambulated 60 feet with Suzi and RW, maintaining PWB R LE. Pt anticipates discharge to rehab today, will check back in PM to confirm          Outcome Measures       01/04/18 1022 01/03/18 1353 01/03/18 0920    How much help from another person do you currently need...    Turning from your back to your side while in flat bed without using bedrails? 3  -MJ 3  -MJ 3  -MJ    Moving from lying on back to sitting on the side of a flat bed without bedrails? 2  -MJ 2  -MJ 2  -MJ    Moving to and from a bed to a chair (including a wheelchair)? 2  -MJ 2  -MJ 2  -MJ    Standing up from a chair using your arms (e.g., wheelchair, bedside chair)? 3  -MJ 3  -MJ 2  -MJ    Climbing 3-5 steps with a railing? 1  -MJ 1  -MJ 1  -MJ    To walk in hospital room? 3  -MJ 2  -MJ 2  -MJ    AM-PAC 6 Clicks Score 14  -MJ 13  -MJ 12  -MJ    Functional Assessment    Outcome Measure Options AM-PAC 6 Clicks Basic Mobility (PT)  -MJ AM-PAC 6 Clicks Basic Mobility (PT)  -MJ AM-PAC 6 Clicks Basic Mobility (PT)  -MJ      01/02/18 1309 01/02/18 0824 01/02/18 0815    How much help  from another person do you currently need...    Turning from your back to your side while in flat bed without using bedrails? 2  -MJ 2  -MJ     Moving from lying on back to sitting on the side of a flat bed without bedrails? 2  -MJ 2  -MJ     Moving to and from a bed to a chair (including a wheelchair)? 2  -MJ 2  -MJ     Standing up from a chair using your arms (e.g., wheelchair, bedside chair)? 2  -MJ 2  -MJ     Climbing 3-5 steps with a railing? 1  -MJ 1  -MJ     To walk in hospital room? 2  -MJ 2  -MJ     AM-PAC 6 Clicks Score 11  -MJ 11  -MJ     How much help from another is currently needed...    Putting on and taking off regular lower body clothing?   1  -AR    Bathing (including washing, rinsing, and drying)   2  -AR    Toileting (which includes using toilet bed pan or urinal)   1  -AR    Putting on and taking off regular upper body clothing   3  -AR    Taking care of personal grooming (such as brushing teeth)   3  -AR    Eating meals   3  -AR    Score   13  -AR    Functional Assessment    Outcome Measure Options AM-PAC 6 Clicks Basic Mobility (PT)  -MJ AM-PAC 6 Clicks Basic Mobility (PT)  - AM-PAC 6 Clicks Daily Activity (OT)  -AR      01/01/18 1512          How much help from another person do you currently need...    Turning from your back to your side while in flat bed without using bedrails? 2  -SC      Moving from lying on back to sitting on the side of a flat bed without bedrails? 1  -SC      Moving to and from a bed to a chair (including a wheelchair)? 2  -SC      Standing up from a chair using your arms (e.g., wheelchair, bedside chair)? 2  -SC      Climbing 3-5 steps with a railing? 1  -SC      To walk in hospital room? 2  -SC      AM-PAC 6 Clicks Score 10  -SC      Functional Assessment    Outcome Measure Options AM-PAC 6 Clicks Basic Mobility (PT)  -SC        User Key  (r) = Recorded By, (t) = Taken By, (c) = Cosigned By    Initials Name Provider Type    SC Prince Obrien, PT Physical Therapist     DHEERAJ Arellano, OT Occupational Therapist    RANJIT Martines, PT Physical Therapist           Time Calculation:         PT Charges       01/04/18 1123          Time Calculation    Start Time 1022  -MJ      PT Received On 01/04/18  -MJ      PT Goal Re-Cert Due Date 01/10/18  -MJ      Time Calculation- PT    Total Timed Code Minutes- PT 23 minute(s)  -MJ        User Key  (r) = Recorded By, (t) = Taken By, (c) = Cosigned By    Initials Name Provider Type    RANJIT Martines, PT Physical Therapist          Therapy Charges for Today     Code Description Service Date Service Provider Modifiers Qty    76409546210 HC GAIT TRAINING EA 15 MIN 1/3/2018 Ezra Martines, PT GP 1    85929898299 HC PT THER PROC EA 15 MIN 1/3/2018 Ezra Martines, PT GP 1    51427753838 HC PT THER SUPP EA 15 MIN 1/3/2018 Ezra Martines, PT GP 2    42200824768 HC GAIT TRAINING EA 15 MIN 1/3/2018 Ezra Martines, PT GP 1    74936835203 HC PT THER SUPP EA 15 MIN 1/3/2018 Ezra Martines, PT GP 1    13031857852 HC GAIT TRAINING EA 15 MIN 1/4/2018 Ezra Martines, PT GP 1    59350208830 HC PT THER PROC EA 15 MIN 1/4/2018 Ezra Martines, PT GP 1          PT G-Codes  Outcome Measure Options: AM-PAC 6 Clicks Basic Mobility (PT)  Score: 10  Functional Limitation: Mobility: Walking and moving around  Mobility: Walking and Moving Around Current Status (): At least 60 percent but less than 80 percent impaired, limited or restricted  Mobility: Walking and Moving Around Goal Status (): At least 60 percent but less than 80 percent impaired, limited or restricted    Ezra Martines, PT  1/4/2018

## 2018-01-09 NOTE — ED PROVIDER NOTES
Subjective   HPI Comments: Cassidy Teran is a 77 y.o.female who presents to the emergency department with c/o leg swelling. The patient had a hip fracture repair with Dr. Swanson on 12/30. She was transferred to [facility] for rehabilitation but was told by the nurse practitioner during physical therapy today that the area of swelling around the surgical site on her right leg looks worse today than it did yesterday. She was told to come here for evaluation. The patient has full range of motion in the right leg and denies any pain or tenderness. She has no other acute complaints at this time.         Patient is a 77 y.o. female presenting with lower extremity pain.   History provided by:  Patient  Lower Extremity Issue   Location:  Leg  Time since incident:  8 days  Injury: no    Leg location:  R upper leg  Pain details:     Severity:  No pain    Onset quality:  Gradual    Duration:  8 days    Timing:  Constant    Progression:  Worsening  Chronicity:  New  Prior injury to area:  Yes (hip fracture repair on 12/30)  Relieved by:  Nothing  Worsened by:  Nothing  Ineffective treatments:  None tried  Associated symptoms: swelling (around surgical site)    Associated symptoms: no back pain, no fever and no neck pain        Review of Systems   Constitutional: Negative for chills and fever.   HENT: Negative for congestion, rhinorrhea, sore throat and trouble swallowing.    Respiratory: Negative for cough and shortness of breath.    Cardiovascular: Positive for leg swelling (right upper leg). Negative for chest pain.   Gastrointestinal: Negative for abdominal pain, diarrhea, nausea and vomiting.   Genitourinary: Negative for difficulty urinating, dysuria, frequency, hematuria and urgency.   Musculoskeletal: Negative for back pain and neck pain.   Neurological: Negative for dizziness, weakness and headaches.   All other systems reviewed and are negative.      Past Medical History:   Diagnosis Date   • Allergic rhinitis    • Anxiety     • Cataract    • Malignant neoplasm of breast, right        Allergies   Allergen Reactions   • Horse-Derived Products      Serum       Past Surgical History:   Procedure Laterality Date   • BREAST SURGERY      Left   • EYE SURGERY      cataract   • HIP CANNULATED SCREW PLACEMENT Right 12/30/2017    Procedure: HIP CANNULATED SCREW PLACEMENT;  Surgeon: Corbin Swanson MD;  Location: [facility];  Service:    • PORTACATH PLACEMENT     • RECONSTRUCTION BREAST IMMEDIATE / DELAYED W/ TISSUE EXPANDER Bilateral 05/2016    [facility]       History reviewed. No pertinent family history.    Social History     Social History   • Marital status:      Spouse name: N/A   • Number of children: N/A   • Years of education: N/A     Social History Main Topics   • Smoking status: Former Smoker   • Smokeless tobacco: Never Used      Comment: Quit 30 years ago   • Alcohol use 0.6 oz/week     1 Glasses of wine per week   • Drug use: Yes   • Sexual activity: Not Asked     Other Topics Concern   • None     Social History Narrative         Objective   Physical Exam   Constitutional: She is oriented to person, place, and time. She appears well-developed and well-nourished. No distress.   HENT:   Head: Normocephalic and atraumatic.   Nose: Nose normal.   Mouth/Throat: Oropharynx is clear and moist.   Eyes: Conjunctivae are normal. No scleral icterus.   Neck: Normal range of motion. Neck supple. No JVD present. No thyromegaly present.   Cardiovascular: Normal rate, regular rhythm and normal heart sounds.  Exam reveals no gallop and no friction rub.    No murmur heard.  Pulses:       Dorsalis pedis pulses are 1+ on the right side, and 1+ on the left side.   Pulmonary/Chest: Effort normal and breath sounds normal. No respiratory distress. She has no wheezes. She has no rales.   Abdominal: Soft. Bowel sounds are normal. She exhibits no mass. There is no tenderness. There is no rebound and no guarding.    Musculoskeletal: Normal range of motion. She exhibits no edema or tenderness.   Right hip has a post op surgical wound that is vertically oriented and closed with staples. It is clean, dry, and intact. No warmth or erythema indicating infection. No significant tenderness to palpation. Mild contusions inferior to the wound on the buttock.     Lymphadenopathy:     She has no cervical adenopathy.   Neurological: She is alert and oriented to person, place, and time.   Fine touch sensation and motor function intact in the right lower extremity.    Skin: Skin is warm and dry. No erythema.   Psychiatric: She has a normal mood and affect. Her behavior is normal.   Nursing note and vitals reviewed.      Procedures         ED Course  ED Course     No results found for this or any previous visit (from the past 24 hour(s)).  Note: In addition to lab results from this visit, the labs listed above may include labs taken at another facility or during a different encounter within the last 24 hours. Please correlate lab times with ED admission and discharge times for further clarification of the services performed during this visit.    No orders to display     Vitals:    01/08/18 2121 01/08/18 2215 01/08/18 2230 01/08/18 2300   BP: 123/74 122/78 129/69 111/78   BP Location:       Patient Position:       Pulse:   111 99   Resp:       Temp:       TempSrc:       SpO2:   99% 99%   Weight:       Height:         Medications - No data to display  ECG/EMG Results (last 24 hours)     ** No results found for the last 24 hours. **                      MDM    Final diagnoses:   Hematoma of right hip, initial encounter       Documentation assistance provided by elyssa Padilla.  Information recorded by the elyssa was done at my direction and has been verified and validated by me.     Sarah Padilla  01/08/18 2388       Stephen Yee MD  01/10/18 2016

## 2018-01-09 NOTE — DISCHARGE INSTRUCTIONS
Avoid aggressive movements of the right hip.  Notify Dr. Londono if you develop redness or warmth to the area.    Call Dr. Londono's office tomorrow morning to arrange close outpatient follow-up.  He is not in the office tomorrow but an assistant may be able to see you.  If you can wait until Wednesday I would recommend doing so as Dr. londono will be in the office then.    Please review the medications you are supposed to be taking according to prior physician recommendations. I have not changed your home medications during this visit. If your discharge instructions indicate that I have changed your home medications, this is not the case, and you should disregard. If you have any questions about the medication you should be taking at home, please call your physician.

## 2018-01-15 PROBLEM — N76.0 VAGINITIS: Status: ACTIVE | Noted: 2018-01-01

## 2018-01-15 PROBLEM — J10.1 INFLUENZA A: Status: ACTIVE | Noted: 2018-01-01

## 2018-01-15 PROBLEM — A41.9 SEPSIS (HCC): Status: ACTIVE | Noted: 2018-01-01

## 2018-01-15 PROBLEM — S00.93XA TRAUMATIC HEMATOMA OF HEAD: Status: ACTIVE | Noted: 2018-01-01

## 2018-01-15 PROBLEM — N28.9 RENAL INSUFFICIENCY: Status: ACTIVE | Noted: 2018-01-01

## 2018-01-15 PROBLEM — Z98.890 HISTORY OF REPAIR OF HIP FRACTURE: Status: ACTIVE | Noted: 2018-01-01

## 2018-01-15 PROBLEM — I50.42 CHRONIC COMBINED SYSTOLIC AND DIASTOLIC HEART FAILURE (HCC): Chronic | Status: ACTIVE | Noted: 2018-01-01

## 2018-01-15 PROBLEM — D64.9 CHRONIC ANEMIA: Chronic | Status: ACTIVE | Noted: 2017-01-01

## 2018-01-15 PROBLEM — R41.82 ALTERED MENTAL STATUS: Status: ACTIVE | Noted: 2018-01-01

## 2018-01-16 PROBLEM — G93.41 METABOLIC ENCEPHALOPATHY: Status: ACTIVE | Noted: 2018-01-01

## 2018-01-17 PROBLEM — E46 PROTEIN-CALORIE MALNUTRITION (HCC): Status: ACTIVE | Noted: 2018-01-01

## 2018-01-24 PROBLEM — E87.3 RESPIRATORY ALKALOSIS: Status: ACTIVE | Noted: 2018-01-01

## 2018-01-28 PROBLEM — I48.0 PAROXYSMAL ATRIAL FIBRILLATION (HCC): Status: ACTIVE | Noted: 2018-01-01

## 2018-02-01 NOTE — PROGRESS NOTES
[facility] Medicine Services  PROGRESS NOTE    Patient Name: Cassidy Teran  : 1940  MRN: [id number]    Date of Admission: 1/15/2018  Length of Stay: 8  Primary Care Physician: Luke Baum MD    Subjective   Subjective     CC:  Encephalopathy following influenza    HPI:  Still not waking up, no improvement in lethargy/obtunded state  Review of Systems  uto    Otherwise ROS is negative except as mentioned in the HPI.    Objective   Objective     Vital Signs:   Temp:  [96.4 °F (35.8 °C)-97.4 °F (36.3 °C)] 97.4 °F (36.3 °C)  Heart Rate:  [52-73] 61  Resp:  [18-20] 18  BP: (122-151)/(69-93) 130/72        Physical Exam:  Nonverbal, eyes closed, does grimace on occasion and mumble, does not follow commands  Face symmetric, pupilis grossly equal, not moving right arm much, moves both legs but not by command, doesn't talk.  rrr currently  Lungs grossly clear  abd soft, nondistended  No cce  No truncal rash  Psych:uto    Results Reviewed:  I have personally reviewed current lab, radiology, and data and agree.      Results from last 7 days  Lab Units 18  0718  0733  18  1741   WBC 10*3/mm3 8.95 7.43 5.98  < > 4.39   HEMOGLOBIN g/dL 9.6* 9.4* 10.3*  < > 10.6*   HEMATOCRIT % 29.7* 29.1* 31.4*  < > 32.9*   PLATELETS 10*3/mm3 242 289 356  < > 451*   INR   --   --   --   --  1.18   < > = values in this interval not displayed.    Results from last 7 days  Lab Units 18  0726 18  0057 18  0718  0733  18  0354 18  1741 18  1020   SODIUM mmol/L 130*  --  132  --  131*  < > 136 139 141  141   POTASSIUM mmol/L 4.5  4.5 3.6 3.2*  < > 3.3*  < > 2.9* 2.5* 3.0*  3.0*   CHLORIDE mmol/L 103  --  102  --  102  < > 109 110* 111*  111*   CO2 mmol/L 24.0  --  21.0  --  22.0  < > 15.0* 12.0* 18.0*  18.0*   BUN mg/dL 15  --  13  --  14  < > 17 22 21  21   CREATININE mg/dL 0.30*  --  0.30*  --  0.30*  < > 0.40* 0.40*  0.40*  0.40*   GLUCOSE mg/dL 143*  --  170*  --  131*  < > 172* 143* 126*  126*   CALCIUM mg/dL 8.3*  --  8.0*  --  8.3*  < > 8.4* 8.4* 9.2  9.2   ALT (SGPT) U/L  --   --   --   --   --   --  30 32 38  38   AST (SGOT) U/L  --   --   --   --   --   --  23 24 25  25   < > = values in this interval not displayed.  Estimated Creatinine Clearance: 35.9 mL/min (by C-G formula based on Cr of 0.3).  No results found for: BNP  No results found for: PHART    Microbiology Results Abnormal     Procedure Component Value - Date/Time    Cryptococcal AG, CSF - Cerebrospinal Fluid, Lumbar Puncture [[id number]]  (Normal) Collected:  01/31/18 1515    Lab Status:  Final result Specimen:  Cerebrospinal Fluid from Lumbar Puncture Updated:  02/01/18 1027     Cryptococcal Antigen, CSF Negative    Blood Culture - Blood, [[id number]]  (Normal) Collected:  01/22/18 2233    Lab Status:  Final result Specimen:  Blood from Hand, Right Updated:  01/27/18 2301     Blood Culture No growth at 5 days    Blood Culture - Blood, [[id number]]  (Normal) Collected:  01/22/18 2233    Lab Status:  Final result Specimen:  Blood from Arm, Right Updated:  01/27/18 2301     Blood Culture No growth at 5 days    Urine Culture - Urine, Urine, Clean Catch [[id number]]  (Normal) Collected:  01/23/18 1013    Lab Status:  Final result Specimen:  Urine from Urine, Clean Catch Updated:  01/25/18 0855     Urine Culture No growth at 2 days    Blood Culture - Blood, [[id number]]  (Normal) Collected:  01/15/18 1927    Lab Status:  Final result Specimen:  Blood from Arm, Left Updated:  01/20/18 2046     Blood Culture No growth at 5 days    Blood Culture - Blood, [[id number]]  (Normal) Collected:  01/15/18 1927    Lab Status:  Final result Specimen:  Blood from Arm, Right Updated:  01/20/18 2046     Blood Culture No growth at 5 days    Influenza Antigen, Rapid - Swab, Nasopharynx [[id number]]  (Abnormal) Collected:  01/15/18 4642    Lab Status:  Final result Specimen:  Swab from  Nasopharynx Updated:  01/15/18 1918     Influenza A Ag, EIA Positive (A)     Influenza B Ag, EIA Negative          Imaging Results (last 24 hours)     Procedure Component Value Units Date/Time    IR Lumbar Puncture Diagnosis [[id number]] Collected:  01/31/18 1516     Updated:  01/31/18 1659    Narrative:       EXAMINATION: IR LUMBAR PUNCTURE DIAGNOSIS-     INDICATION: encephalopathy, abnormal MRI; A41.9-Sepsis, unspecified  organism; J10.1-Influenza due to other identified influenza virus with  other respiratory manifestations; R41.0-Disorientation, unspecified;  E86.0-Dehydration; Z74.09-Other reduced mobility; Z74.09-Other reduced  mobility; I48.1-Persistent atrial fibrillation; I48.91-Unspecified  atrial fibrillation; R13.10-Dysphagia, unspecified     TECHNIQUE: 1 second of fluoroscopic time was used for this procedure. 1  associated image was saved. The informed consent was obtained from the  patient's . The patient was placed in the prone position on the  table. The back was prepped and draped in a sterile fashion. 1%  lidocaine was used as a local anesthetic to the skin and subcutaneous  tissues. Under fluoroscopic guidance a 22-gauge spinal needle was  advanced the L2-L3 level to the CSF space. Opening pressure was measured  and recorded at 10 cm H2O. Approximately 5 cc of clear and colorless CSF  was returned and collected in 4 numbered vials. Sample vials were  labeled and sent to pathology for further analysis. The needle was  removed. The patient tolerated the procedure well, and no immediate  complications occurred.     COMPARISON: NONE     FINDINGS: Opening pressure was 10 cm H2O.          Impression:       Successful fluoroscopic guided lumbar puncture as above with  no immediate complications. Opening pressure was 10 cm H2O.         This report was finalized on 1/31/2018 4:57 PM by Dr. Ze Espitia MD.           Results for orders placed during the hospital encounter of 11/30/16   Adult  Transthoracic Echo Complete    Narrative · Left ventricular function is normal. Estimated EF = 55%.  · All left ventricular wall segments contract normally.  · Mild aortic valve stenosis is present.  · Mild tricuspid valve regurgitation is present.  · There is no evidence of pericardial effusion.  · Estimated right ventricular systolic pressure from tricuspid   regurgitation is normal (<35 mmHg).  · There are myxomatous changes of the mitral valve apparatus present.          I have reviewed the medications.    Assessment/Plan   Assessment / Plan     Hospital Problem List     * (Principal)Metabolic encephalopathy    Malignant neoplasm of central portion of right female breast (Chronic)    Overview Signed 8/17/2016  9:44 AM by Jenni Izaguirre              CHF (congestive heart failure)    Chronic anemia (Chronic)    Influenza A    Chronic combined systolic and diastolic heart failure (Chronic)    Traumatic hematoma of head    Vaginitis    Sepsis    Renal insufficiency    Protein-calorie malnutrition    Respiratory alkalosis    Paroxysmal atrial fibrillation      -----------------------------------------  *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)  *s/p Influenza A (completed rx)  *s/p sepsis/febrile illness (due to above)   -s/p empiric abx, cultures negative  *Afib w/ rvr (s/p cardioversion), now on sotalol per cards  *protein calorie malnutrition  *gen weakness/failure to thrive  *Hypokalemia  *Hx breast cancer and mastectomies (remote hx, see above)  *Hyperglycemia (a1c 5.5)  --------------------------------------------------     Brief Hospital Course to date:  77-year-old female with a history of breast cancer treated in 2004 status post left mastectomy with another episode of breast cancer, stage IIa in the right breast in 2015 status post mastectomy, Adriamycin, Cytoxan, Herceptin/Taxol and maintained on Arimidex who has had multiple hospital admissions in the past few months.  She had a fall  at home at the end of December status post right femoral neck fracture/pinning, was discharged to rehabilitation and was readmitted on 1/15/2018 with influenza.  Since that time she has had progressive altered mental status and is currently obtunded.      Pulmonary was consulted by Dr. Moreno secondary to the patient's respiratory alkalosis.  She has had a respiratory alkalosis and actually was given Diamox.  She currently has a metabolic acidosis with a respiratory alkalosis and is overall alkalotic.      The patient is currently obtunded and unable to relay any significant history.  She seems to try to mouth answers to some questions but does not follow commands.  She moves all extremities.  She has lost a significant amount of weight.  She apparently developed a fever a few days ago and was placed on empiric antibiotics.  Cultures from that event have been negative to date.  CT scan of the head from 1/15/2015 showed age-related changes with no acute process.  CT scan of the head from January 13 was read as a small superficial hematoma in the left frontal region.     She was transferred to the ICU on 1/27 secondary to Afib w/ RVR. She was cardioverted by Cardiology. She received Versed for the cardioversion. Transferred out of icu 1/29 and picked up by hospitalists 1/30/18    ---------------------------------------------------------------------    Assessment & Plan:  -severe encephalopathy, neuro felt to represent ADEM s/p influenza   -s/p tamiflu   -ammonia normal; tsh normal, b12 normal, cortisol normal; repeat MR brain 1/30/18 no acute dz; repeat eeg 1/30/18 no seizures    -on sotalol 40mg bid for afib; future anticoagulation for afib once procedures ruled out and clinical improvement    -Neurology following for severe encephalopathy;  -s/p 5 days trial high dose steroids (no improvement)  -initiated on ivig (5 day trial) on 1/31/18  -s/p LP 1/31/18   -tp was high, o/w negative thus far    -continue thiamine      -continue tube feeds  -replace electrolytes prn    -continue sliding scale humalog (while on tube feeds for hyperglycemia related to tube feeds and high steroids)   -a1c 5.5    -bmp, phos, ica in a.m.    *I explained to  that longer we go without meaningful improvement in status (still not responding or verbal) the less likely to have a response. If no improvement in coming days, plan to discuss having palliative care speak w/  if/when neurology agrees    DVT Prophylaxis:  Sq heparin    CODE STATUS: Full Code    Disposition: I expect the patient to be discharged unknown timeframe at this point  Erich Ryder MD  02/01/18  12:11 PM

## 2018-02-01 NOTE — PROGRESS NOTES
Daily Progress Note  Neurology     LOS: 8 days     Subjective     Chief Complaint: Cough, fever    Interval History:  Patient still remains fairly obtunded    ROS: Negative for fever    Objective     Vital signs in last 24 hours:  Temp:  [96.4 °F (35.8 °C)-97.5 °F (36.4 °C)] 97.5 °F (36.4 °C)  Heart Rate:  [52-73] 65  Resp:  [18-20] 18  BP: (122-151)/(69-93) 140/77      Physical Exam:   General: Lying in bed with eyes closed. In NAD.     Respiratory: Respirations unlabored   CV: RRR       Neurologic Exam:   Mental status: Somnolent      CN: PERRL, face symmetric                    Results from last 7 days  Lab Units 02/01/18  0726   WBC 10*3/mm3 8.95   HEMOGLOBIN g/dL 9.6*   HEMATOCRIT % 29.7*   PLATELETS 10*3/mm3 242           Results Review:  Labs reviewed        Assessment/Plan     Principal Problem:    Metabolic encephalopathy  Active Problems:    Malignant neoplasm of central portion of right female breast    CHF (congestive heart failure)    Chronic anemia    Influenza A    Chronic combined systolic and diastolic heart failure    Traumatic hematoma of head    Vaginitis    Sepsis    Renal insufficiency    Protein-calorie malnutrition    Respiratory alkalosis    Paroxysmal atrial fibrillation        1.  ADEM = Completed 5 days of Solumedrol. No significant improvement in arousal. Her repeat EEG negative for epileptiform activity. Her repeat MRI showed the previously seen deep WM hyperintensities, possibly mildly worsened from previous exam. On day 2/5 of IVIG today. LP done under fluoroscopy yesterday. Appreciate radiology's assistance. Initial CSF labs showed normal WBC count. Glucose was elevated at 112 (likely secondary to recent steroids) and protein elevated at 70, suspicious for inflammatory process. Cytology negative. HSV PCR and paraneoplastic profile pending.            Sara Rucker MD  02/01/18  1:59 PM

## 2018-02-01 NOTE — PLAN OF CARE
Problem: Pressure Ulcer Risk (Miguelito Scale) (Adult,Obstetrics,Pediatric)  Goal: Skin Integrity  Outcome: Ongoing (interventions implemented as appropriate)

## 2018-02-01 NOTE — NURSING NOTE
Pt continues with low kurt score, limited mobility and high risk for skin breakdown. Low Airloss mattress ordered from [facility] [phone].  Please consult [facility] nurse for any skin issues - Thank you

## 2018-02-02 NOTE — PROGRESS NOTES
[facility] Medicine Services  PROGRESS NOTE    Patient Name: Cassidy Teran  : 1940  MRN: [id number]    Date of Admission: 1/15/2018  Length of Stay: 9  Primary Care Physician: Luke Baum MD    Subjective   Subjective     CC:  Encephalopathy following influenza    HPI:  Obtunded, no waking up. nonverbal  Review of Systems  uto    Otherwise ROS is negative except as mentioned in the HPI.    Objective   Objective     Vital Signs:   Temp:  [97.2 °F (36.2 °C)-99.2 °F (37.3 °C)] (P) 98.1 °F (36.7 °C)  Heart Rate:  [67-93] (P) 83  Resp:  [16-24] (P) 20  BP: (134-144)/(76-84) (P) 146/81        Physical Exam:  Nonverbal, eyes closed, does grimace on occasion and mumble, does not follow c ommands  Face symmetric, pupilis grossly equal, not moving right arm much, moves both legs but not by command, doesn't talk.  rrr currently  Lungs grossly clear  abd soft, nondistended  No cce  No truncal rash  Psych:uto    Results Reviewed:  I have personally reviewed current lab, radiology, and data and agree.      Results from last 7 days  Lab Units 18  0718  0718  0733  18  1741   WBC 10*3/mm3 8.95 7.43 5.98  < > 4.39   HEMOGLOBIN g/dL 9.6* 9.4* 10.3*  < > 10.6*   HEMATOCRIT % 29.7* 29.1* 31.4*  < > 32.9*   PLATELETS 10*3/mm3 242 289 356  < > 451*   INR   --   --   --   --  1.18   < > = values in this interval not displayed.    Results from last 7 days  Lab Units 18  0712 18  0726 18  0057 18  0712  18  0354 18  1741   SODIUM mmol/L 127* 130*  --  132  < > 136 139   POTASSIUM mmol/L 4.3 4.5  4.5 3.6 3.2*  < > 2.9* 2.5*   CHLORIDE mmol/L 96* 103  --  102  < > 109 110*   CO2 mmol/L 27.0 24.0  --  21.0  < > 15.0* 12.0*   BUN mg/dL 18 15  --  13  < > 17 22   CREATININE mg/dL 0.30* 0.30*  --  0.30*  < > 0.40* 0.40*   GLUCOSE mg/dL 97 143*  --  170*  < > 172* 143*   CALCIUM mg/dL 8.4* 8.3*  --  8.0*  < > 8.4* 8.4*   ALT (SGPT) U/L  99*  --   --   --   --  30 32   AST (SGOT) U/L 36*  --   --   --   --  23 24   < > = values in this interval not displayed.  Estimated Creatinine Clearance: 35.9 mL/min (by C-G formula based on Cr of 0.3).  No results found for: BNP  No results found for: PHART    Microbiology Results Abnormal     Procedure Component Value - Date/Time    UK HSV Types 1 / 2, DNA PCR - Cerebrospinal Fluid, Lumbar Puncture [[id number]] Collected:  01/31/18 1515    Lab Status:  Final result Specimen:  Cerebrospinal Fluid from Lumbar Puncture Updated:  02/01/18 1436     Reference Lab Report See Attached Report    Cryptococcal AG, CSF - Cerebrospinal Fluid, Lumbar Puncture [[id number]]  (Normal) Collected:  01/31/18 1515    Lab Status:  Final result Specimen:  Cerebrospinal Fluid from Lumbar Puncture Updated:  02/01/18 1027     Cryptococcal Antigen, CSF Negative    Blood Culture - Blood, [[id number]]  (Normal) Collected:  01/22/18 2233    Lab Status:  Final result Specimen:  Blood from Hand, Right Updated:  01/27/18 2301     Blood Culture No growth at 5 days    Blood Culture - Blood, [[id number]]  (Normal) Collected:  01/22/18 2233    Lab Status:  Final result Specimen:  Blood from Arm, Right Updated:  01/27/18 2301     Blood Culture No growth at 5 days    Urine Culture - Urine, Urine, Clean Catch [[id number]]  (Normal) Collected:  01/23/18 1013    Lab Status:  Final result Specimen:  Urine from Urine, Clean Catch Updated:  01/25/18 0855     Urine Culture No growth at 2 days    Blood Culture - Blood, [[id number]]  (Normal) Collected:  01/15/18 1927    Lab Status:  Final result Specimen:  Blood from Arm, Left Updated:  01/20/18 2046     Blood Culture No growth at 5 days    Blood Culture - Blood, [[id number]]  (Normal) Collected:  01/15/18 1927    Lab Status:  Final result Specimen:  Blood from Arm, Right Updated:  01/20/18 2046     Blood Culture No growth at 5 days    Influenza Antigen, Rapid - Swab, Nasopharynx [[id number]]  (Abnormal) Collected:   01/15/18 1854    Lab Status:  Final result Specimen:  Swab from Nasopharynx Updated:  01/15/18 1918     Influenza A Ag, EIA Positive (A)     Influenza B Ag, EIA Negative          Imaging Results (last 24 hours)     Procedure Component Value Units Date/Time    XR Chest 1 View [[id number]] Collected:  02/02/18 1308     Updated:  02/02/18 1308    Narrative:       EXAMINATION: XR CHEST 1 VW-      INDICATION: Coarse breath sounds; A41.9-Sepsis, unspecified organism;  J10.1-Influenza due to other identified influenza virus with other  respiratory manifestations; R41.0-Disorientation, unspecified;  E86.0-Dehydration; Z74.09-Other reduced mobility; I48.1-Persistent  atrial fibrillation; I48.91-Unspecified atrial fibrillation;  R13.10-Dysphagia, unspecified.      COMPARISON: 01/28/2018.     FINDINGS: Portable chest reveals cardiac and mediastinal silhouettes  within normal limits. Feeding tube identified with tip below the level  of the diaphragm. PICC line catheter placed on the right with tip in the  SVC. Lung fields are grossly clear. Underlying chronic changes are  present.  No pleural effusion or pneumothorax. The bony structures are  unremarkable.           Impression:       Chronic changes seen within the lung fields with no evidence  of acute parenchymal disease.     D:  02/02/2018  E:  02/02/2018       XR Abdomen KUB [[id number]] Collected:  02/02/18 1335     Updated:  02/02/18 1335    Narrative:       EXAMINATION: XR ABDOMEN, KUB-02/02/2018:      INDICATION: Verify tube placement; A41.9-Sepsis, unspecified organism;  J10.1-Influenza due to other identified influenza virus with other  respiratory manifestations; R41.0-Disorientation, unspecified;  E86.0-Dehydration; Z74.09-Other reduced mobility; Z74.09-Other reduced  mobility; I48.1-Persistent atrial fibrillation; I48.91-Unspecified  atrial fibrillation; R13.10-Dysphagia, unspecified.      COMPARISON: 01/27/2018.     FINDINGS: KUB reveals a bowel gas pattern that is  nonspecific with  increased stool seen in the ascending colon. Degenerative changes seen  within the spine with curvature with convexity to the right. There is a  feeding tube identified with tip folded back upon itself in the distal  stomach.           Impression:       Feeding tube tip folded back upon itself in the distal  stomach. Bowel gas pattern is nonspecific.     D:  02/02/2018  E:  02/02/2018                 Results for orders placed during the hospital encounter of 11/30/16   Adult Transthoracic Echo Complete    Narrative · Left ventricular function is normal. Estimated EF = 55%.  · All left ventricular wall segments contract normally.  · Mild aortic valve stenosis is present.  · Mild tricuspid valve regurgitation is present.  · There is no evidence of pericardial effusion.  · Estimated right ventricular systolic pressure from tricuspid   regurgitation is normal (<35 mmHg).  · There are myxomatous changes of the mitral valve apparatus present.          I have reviewed the medications.    Assessment/Plan   Assessment / Plan     Hospital Problem List     * (Principal)Metabolic encephalopathy    Malignant neoplasm of central portion of right female breast (Chronic)    Overview Signed 8/17/2016  9:44 AM by Jenni Izaguirre              CHF (congestive heart failure)    Chronic anemia (Chronic)    Influenza A    Chronic combined systolic and diastolic heart failure (Chronic)    Traumatic hematoma of head    Vaginitis    Sepsis    Renal insufficiency    Protein-calorie malnutrition    Respiratory alkalosis    Paroxysmal atrial fibrillation      -----------------------------------------  *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)  *s/p Influenza A (completed rx)  *s/p sepsis/febrile illness (due to above)   -s/p empiric abx, cultures negative  *Afib w/ rvr (s/p cardioversion), now on sotalol per cards  *protein calorie malnutrition  *gen weakness/failure to  thrive  *Hypokalemia  *Hyponatremia  *Hx breast cancer and mastectomies (remote hx, see above)  *Hyperglycemia (a1c 5.5)  --------------------------------------------------     Brief Hospital Course to date:  77-year-old female with a history of breast cancer treated in 2004 status post left mastectomy with another episode of breast cancer, stage IIa in the right breast in 2015 status post mastectomy, Adriamycin, Cytoxan, Herceptin/Taxol and maintained on Arimidex who has had multiple hospital admissions in the past few months.  She had a fall at home at the end of December status post right femoral neck fracture/pinning, was discharged to rehabilitation and was readmitted on 1/15/2018 with influenza.  Since that time she has had progressive altered mental status and is currently obtunded.      Pulmonary was consulted by Dr. Moreno secondary to the patient's respiratory alkalosis.  She has had a respiratory alkalosis and actually was given Diamox.  She currently has a metabolic acidosis with a respiratory alkalosis and is overall alkalotic.      The patient is currently obtunded and unable to relay any significant history.  She seems to try to mouth answers to some questions but does not follow commands.  She moves all extremities.  She has lost a significant amount of weight.  She apparently developed a fever a few days ago and was placed on empiric antibiotics.  Cultures from that event have been negative to date.  CT scan of the head from 1/15/2015 showed age-related changes with no acute process.  CT scan of the head from January 13 was read as a small superficial hematoma in the left frontal region.     She was transferred to the ICU on 1/27 secondary to Afib w/ RVR. She was cardioverted by Cardiology. She received Versed for the cardioversion. Transferred out of icu 1/29 and picked up by hospitalists 1/30/18    ---------------------------------------------------------------------    Assessment &  Plan:  -severe encephalopathy, neuro felt to represent ADEM s/p influenza   -s/p tamiflu   -ammonia normal; tsh normal, b12 normal, cortisol normal; repeat MR brain 1/30/18 no acute dz; repeat eeg 1/30/18 no seizures  -Neurology following for severe encephalopathy;  -s/p 5 days trial high dose steroids (no improvement)  -initiated on ivig (5 day trial) on 1/31/18  -s/p LP 1/31/18   -tp was high, o/w negative thus far   -ms profile pending and paraneoplastic profile pending  -no improvement 2/2; neuro ordering eeg, repeat mr  -cosyntropin stim test  -ns 75cc/hr (clinically dry, tube feeds were held, some hyponatremia)    -continue tube feeds  -replace electrolytes prn  -continue thiamine    -on sotalol 40mg bid for afib; future anticoagulation for afib once procedures ruled out and clinical improvement    -continue sliding scale humalog (while on tube feeds for hyperglycemia related to tube feeds and high steroids)   -a1c 5.5     -cbc, bmp, mag in a.m.    *I explained to  that longer we go without meaningful improvement in status (still not responding or verbal) the less likely to have a response. If no improvement in coming days, plan to discuss having palliative care speak w/  if/when neurology agrees    DVT Prophylaxis:  Sq heparin    CODE STATUS: Full Code    Disposition: I expect the patient to be discharged unknown timeframe at this point  Erich Ryder MD  02/02/18  2:10 PM    Addendum:  -neuro added keppra for sharp waves on eeg  -MR ordered by neuro and pending

## 2018-02-02 NOTE — PLAN OF CARE
Problem: Sepsis (Adult)  Goal: Signs and Symptoms of Listed Potential Problems Will be Absent or Manageable (Sepsis)  Outcome: Ongoing (interventions implemented as appropriate)   02/02/18 1421   Sepsis   Problems Assessed (Sepsis) infection progression;situational response   Problems Present (Sepsis) progression of infection;situational response

## 2018-02-02 NOTE — PROGRESS NOTES
Daily Progress Note  Neurology     LOS: 9 days     Subjective     Chief Complaint: Cough, fever    Interval History:  Patient more obtunded today per nursing    ROS: Negative for fever    Objective     Vital signs in last 24 hours:  Temp:  [97.2 °F (36.2 °C)-99.2 °F (37.3 °C)] (P) 98.1 °F (36.7 °C)  Heart Rate:  [67-93] (P) 83  Resp:  [16-24] (P) 20  BP: (134-144)/(76-84) (P) 146/81      Physical Exam:   General: Lying in bed with eyes closed. In NAD.     Respiratory: Respirations unlabored   CV: RRR       Neurologic Exam:   Mental status: Obtunded but withdraws to nailbed pressure      CN: PERRL, face symmetric                    Results from last 7 days  Lab Units 02/01/18  0726   WBC 10*3/mm3 8.95   HEMOGLOBIN g/dL 9.6*   HEMATOCRIT % 29.7*   PLATELETS 10*3/mm3 242           Results Review:  Labs reviewed        Assessment/Plan     Principal Problem:    Metabolic encephalopathy  Active Problems:    Malignant neoplasm of central portion of right female breast    CHF (congestive heart failure)    Chronic anemia    Influenza A    Chronic combined systolic and diastolic heart failure    Traumatic hematoma of head    Vaginitis    Sepsis    Renal insufficiency    Protein-calorie malnutrition    Respiratory alkalosis    Paroxysmal atrial fibrillation        1.  ADEM = Completed 5 days of Solumedrol. Still no significant improvement and even less responsive today. On day 3/5 of IVIG today. CSF analysis appears unremarkable. HSV PCR negative. Paraneoplastic and MS profiles pending. Will repeat her MRI brain and EEG.            Sara Rucker MD  02/02/18  2:14 PM

## 2018-02-02 NOTE — PROGRESS NOTES
Continued Stay Note  [facility]     Patient Name: Cassidy Teran  MRN: [id number]  Today's Date: 2/2/2018    Admit Date: 1/15/2018          Discharge Plan       02/02/18 1228    Case Management/Social Work Plan    Plan Dependent upon Medical Condition    Additional Comments Per chart, patient's condition is unchanged and remains guarded.  There is mention of palliative/Hospice consult if patient doesn't respond to IVIG in the next few days.  Patient is not skillable at this point, more longterm care appropriate.  Per chart, patient still with keofeed and and restraints.  CM will continue to follow and make appropriate referrals closer to discharge.  Zaina Subramanian RN [phone]                Discharge Codes     None        Expected Discharge Date and Time     Expected Discharge Date Expected Discharge Time    Feb 9, 2018             Zaina Subramanian RN

## 2018-02-02 NOTE — THERAPY EVALUATION
Acute Care - Speech Language Pathology   Swallow Initial Evaluation  Steph   Clinical Swallow Re-Evaluation       Patient Name: Cassidy Teran  : 1940  MRN: [id number]  Today's Date: 2018  Onset of Illness/Injury or Date of Surgery Date: 01/15/18            Admit Date: 1/15/2018    Visit Dx:     ICD-10-CM ICD-9-CM   1. Sepsis, due to unspecified organism A41.9 038.9     995.91   2. Influenza A J10.1 487.1   3. Confusion R41.0 298.9   4. Dehydration, moderate E86.0 276.51   5. Impaired mobility and ADLs Z74.09 799.89   6. Impaired functional mobility, balance, gait, and endurance Z74.09 V49.89   7. Persistent atrial fibrillation I48.1 427.31   8. Atrial fibrillation with RVR I48.91 427.31   9. Dysphagia, unspecified type R13.10 787.20     Patient Active Problem List   Diagnosis   • Malignant neoplasm of central portion of right female breast   • Decreased cardiac ejection fraction   • CHF (congestive heart failure)   • Fall at home   • Closed right hip fracture   • History of breast cancer   • Hypokalemia   • Chronic anemia   • Delirium   • Influenza A   • Metabolic encephalopathy   • History of repair of right hip fracture   • Chronic combined systolic and diastolic heart failure   • Traumatic hematoma of head   • Vaginitis   • Sepsis   • Renal insufficiency   • Protein-calorie malnutrition   • Respiratory alkalosis   • Paroxysmal atrial fibrillation     Past Medical History:   Diagnosis Date   • Allergic rhinitis    • Altered mental status 1/15/2018   • Anxiety    • Cataract    • Chronic combined systolic and diastolic heart failure 1/15/2018   • History of repair of right hip fracture 1/15/2018   • Malignant neoplasm of breast, right     BREAST CANCER X2   • Renal insufficiency 1/15/2018   • Sepsis 1/15/2018   • Traumatic hematoma of head 1/15/2018   • Vaginitis 1/15/2018     Past Surgical History:   Procedure Laterality Date   • BREAST SURGERY      Left   • EYE SURGERY      cataract   • HIP  CANNULATED SCREW PLACEMENT Right 12/30/2017    Procedure: HIP CANNULATED SCREW PLACEMENT;  Surgeon: Corbin Swanson MD;  Location: [facility];  Service:    • PORTACATH PLACEMENT     • RECONSTRUCTION BREAST IMMEDIATE / DELAYED W/ TISSUE EXPANDER Bilateral 05/2016    [facility]          SWALLOW EVALUATION (last 72 hours)      Swallow Evaluation       02/02/18 1015                Rehab Evaluation    Document Type evaluation  -RD        Subjective Information unable to respond  -RD        Patient Effort, Rehab Treatment poor  -RD        General Information    Patient Profile Review yes  -RD        Onset of Illness/Injury 01/15/18  -RD        Subjective Patient Observations pt not alert or arousable  -RD        Pertinent History Of Current Problem Adm w/ influenza and metabolic encephalopathy  -RD        Current Diet Limitations NPO  -RD        Prior Level of Function- Communication functional for ADL's with assistance;other (comment)   baseline dementia  -RD        Prior Level of Function- Swallowing no diet consistency restrictions  -RD        Plans/Goals Discussed With patient;spouse/S.O.;agreed upon  -RD        Barriers to Rehab medically complex  -RD        Clinical Impression    Patient's Goals For Discharge patient could not state  -RD        Family Goals For Discharge patient able to return to all previous activities/roles  -RD        SLP Swallowing Diagnosis other (see comments)   r/o pharyngeal dysphagia  -RD        Rehab Potential/Prognosis, Swallowing fair, will monitor progress closely  -RD        Criteria for Skilled Therapeutic Interventions Met not appropriate for intervention;other (see comments)   at this time  -RD        FCM, Swallowing 1-->Level 1  -RD        Therapy Frequency evaluation only;PRN  -RD        Predicted Duration Therapy Interv (days) until discharge  -RD        SLP Diet Recommendation NPO: unsafe for food/liquid intake;long-term alternative means of  nutrition/hydration;comfort diet  -RD        Recommended Diagnostics reassess via clinical swallow (non-instrumental exam);other (see comments)   when pt becomes appropriate, will place on hold until then  -RD        SLP Rec. for Method of Medication Administration meds via alternate route  -RD        Pain Assessment    Pain Assessment Rivera-Casas FACES  -RD        Rivera-Baker FACES Pain Rating 2  -RD        Oral Motor Structure and Function    Oral Motor Anatomy and Physiology patient demonstrates anatomy that is WNL  -RD        Dentition Assessment present and adequate  -RD        Volitional Swallow unable to initiate volitional swallow  -RD        Oral Motor Assessment Comment Unable to follow oral commands, suspect generalized wkns  -RD        General Feeding/Swallowing Observations    Current Feeding Method nasogastric feedings  -RD        Respiratory Support Currently in Use nasal cannula in use  -RD        Flow (L/min) 2  -RD        Clinical Swallow Exam    Mode of Presentation other (see comments)   not alert enough to accept PO trials  -RD        Pharyngeal Phase Results unable to assess;other (see comments)   2' pt not alert enough for PO trials  -RD        Summary of Clinical Exam Attempted to see pt for repeat clinical dysphagia evaluation. Pt was not alert enough for any PO trials today. Did not arouse w/ sternal rub or repositioning. Pt's spouse was present. SLP provided extensive education regarding current risk of aspiration, previous BS swallow eval results, and discussed comfort diet vs. safest NPO w/ cont'd alternate nutrition. Pt's spouse understood feeding options, but was not ready to make further decisions at this point. Pt not currently appropriate for further ST intervention until able to maintain alertness. Defer to MD for PEG vs. comfort diet. Pt is currently not safe to maintain nutrition by PO alone. Re-consult SLP if any changes occur in pt status, otherwise SLP will place pt on hold.  RECS: NPO, meds alt route, Defer to MD for alternate nutrition vs. comfort decisions, Re-consult SLP if change in pt status occurs.   -RD        Swallow Recommendations    Oral Care oral care with toothbrush and dentifrice BID and PRN  -RD        Recommended Diet NPO: unsafe for food/liquid intake;temproary alternative means of nutrition/hydration;long-term alternative means of nutrition/hydration;comfort diet   POC decisions, defer to MD  -RD          User Key  (r) = Recorded By, (t) = Taken By, (c) = Cosigned By    Initials Name Effective Dates    RD Rosa Arreola, MS CCC-SLP 09/27/17 -         EDUCATION  The patient has been educated in the following areas:   Dysphagia (Swallowing Impairment) Oral Care/Hydration NPO rationale.    SLP Recommendation and Plan  SLP Swallowing Diagnosis: other (see comments) (r/o pharyngeal dysphagia)  SLP Diet Recommendation: NPO: unsafe for food/liquid intake, long-term alternative means of nutrition/hydration, comfort diet     SLP Rec. for Method of Medication Administration: meds via alternate route     Recommended Diagnostics: reassess via clinical swallow (non-instrumental exam), other (see comments) (when pt becomes appropriate, will place on hold until then)  Criteria for Skilled Therapeutic Interventions Met: not appropriate for intervention, other (see comments) (at this time)     Rehab Potential/Prognosis, Swallowing: fair, will monitor progress closely  Therapy Frequency: evaluation only, PRN             Plan of Care Review  Plan Of Care Reviewed With: patient, spouse  Progress: no change  Outcome Summary/Follow up Plan: Attempted to see pt for repeat clinical dysphagia evaluation. Pt was not alert enough for any PO trials today. Did not arouse w/ sternal rub or repositioning. Pt's spouse was present. SLP provided extensive education regarding current risk of aspiration, previous BS swallow eval results, and discussed comfort diet vs. safest NPO w/ cont'd alternate  nutrition. Pt's spouse understood feeding options, but was not ready to make further decisions at this point. Pt not currently appropriate for further ST intervention until able to maintain alertness. Defer to MD for PEG vs. comfort diet. Pt is currently not safe to maintain nutrition by PO alone. Re-consult SLP if any changes occur in pt status, otherwise SLP will place pt on hold. RECS: NPO, meds alt route, Defer to MD for alternate nutrition vs. comfort decisions, Re-consult SLP if change in pt status occurs.              Time Calculation:         Time Calculation- SLP       02/02/18 1217          Time Calculation- SLP    SLP Start Time 1015  -RD      SLP Received On 02/02/18  -RD        User Key  (r) = Recorded By, (t) = Taken By, (c) = Cosigned By    Initials Name Provider Type    FRANK Arreola MS CCC-SLP Speech and Language Pathologist          Therapy Charges for Today     Code Description Service Date Service Provider Modifiers Qty    [id number]  ST EVAL ORAL PHARYNG SWALLOW 4 2/2/2018 Rosa Arreola MS CCC-SLP GN 1          SLP G-Codes  SLP NOMS Used?: Yes  Functional Limitations: Swallowing  Swallow Current Status (): 100 percent impaired, limited or restricted  Swallow Goal Status (): At least 20 percent but less than 40 percent impaired, limited or restricted    Rosa Arreola MS CCC-SLP  2/2/2018

## 2018-02-02 NOTE — PLAN OF CARE
Problem: Patient Care Overview (Adult)  Goal: Plan of Care Review  Outcome: Ongoing (interventions implemented as appropriate)   02/02/18 1215   Coping/Psychosocial Response Interventions   Plan Of Care Reviewed With patient;spouse   Patient Care Overview   Progress no change   Outcome Evaluation   Outcome Summary/Follow up Plan Attempted to see pt for repeat clinical dysphagia evaluation. Pt was not alert enough for any PO trials today. Did not arouse w/ sternal rub or repositioning. Pt's spouse was present. SLP provided extensive education regarding current risk of aspiration, previous BS swallow eval results, and discussed comfort diet vs. safest NPO w/ cont'd alternate nutrition. Pt's spouse understood feeding options, but was not ready to make further decisions at this point. Pt not currently appropriate for further ST intervention until able to maintain alertness. Defer to MD for PEG vs. comfort diet. Pt is currently not safe to maintain nutrition by PO alone. Re-consult SLP if any changes occur in pt status, otherwise SLP will place pt on hold. RECS: NPO, meds alt route, Defer to MD for alternate nutrition vs. comfort decisions, Re-consult SLP if change in pt status occurs.

## 2018-02-02 NOTE — PLAN OF CARE
Problem: Patient Care Overview (Adult)  Goal: Plan of Care Review  Outcome: Ongoing (interventions implemented as appropriate)   02/02/18 0421   Coping/Psychosocial Response Interventions   Plan Of Care Reviewed With patient   Patient Care Overview   Progress improving     Goal: Adult Individualization and Mutuality  Outcome: Ongoing (interventions implemented as appropriate)    Goal: Discharge Needs Assessment  Outcome: Ongoing (interventions implemented as appropriate)      Problem: Fall Risk (Adult)  Goal: Absence of Falls  Outcome: Ongoing (interventions implemented as appropriate)   02/02/18 0421   Fall Risk (Adult)   Absence of Falls making progress toward outcome       Problem: Sepsis (Adult)  Goal: Signs and Symptoms of Listed Potential Problems Will be Absent or Manageable (Sepsis)  Outcome: Ongoing (interventions implemented as appropriate)   02/02/18 0421   Sepsis   Problems Assessed (Sepsis) all   Problems Present (Sepsis) glycemic control, impaired;malnutrition (undernutrition)       Problem: Pressure Ulcer Risk (Miguelito Scale) (Adult,Obstetrics,Pediatric)  Goal: Skin Integrity  Outcome: Ongoing (interventions implemented as appropriate)   02/02/18 0421   Pressure Ulcer Risk (Miguelito Scale) (Adult,Obstetrics,Pediatric)   Skin Integrity making progress toward outcome       Problem: SAFETY - NON-VIOLENT RESTRAINT  Goal: Remains free of injury from restraints (Non-Violent Restraint)  Outcome: Ongoing (interventions implemented as appropriate)    Goal: Free from restraint(s) (Non-Violent Restraint)  Outcome: Ongoing (interventions implemented as appropriate)

## 2018-02-03 PROBLEM — G04.01: Status: ACTIVE | Noted: 2018-01-01

## 2018-02-03 NOTE — PROGRESS NOTES
[facility] Medicine Services  PROGRESS NOTE    Patient Name: Cassidy Teran  : 1940  MRN: [id number]    Date of Admission: 1/15/2018  Length of Stay: 10  Primary Care Physician: Luke Baum MD    Subjective   Subjective     CC:  Encephalopathy following influenza    HPI:  Obtunded, no waking up. Nonverbal. No changes clinically since 18. Still no purposeful movement  Review of Systems  uto    Otherwise ROS is negative except as mentioned in the HPI.    Objective   Objective     Vital Signs:   Temp:  [97.6 °F (36.4 °C)-98.5 °F (36.9 °C)] 97.9 °F (36.6 °C)  Heart Rate:  [68-84] 79  Resp:  [16-20] 18  BP: (123-143)/(71-88) 138/78        Physical Exam:  Nonverbal, eyes closed, does not respond to light sternal rub.   face symmetric, pupilis grossly equal, difficult neuro exam, less tone right arm/upper extremity  rrr currently  Lungs grossly clear  abd soft, nondistended  No cce  No truncal rash  Psych:uto    Results Reviewed:  I have personally reviewed current lab, radiology, and data and agree.      Results from last 7 days  Lab Units 18  0758 18  0718  0712  18  1741   WBC 10*3/mm3 6.62 8.95 7.43  < > 4.39   HEMOGLOBIN g/dL 10.4* 9.6* 9.4*  < > 10.6*   HEMATOCRIT % 32.6* 29.7* 29.1*  < > 32.9*   PLATELETS 10*3/mm3 146* 242 289  < > 451*   INR   --   --   --   --  1.18   < > = values in this interval not displayed.    Results from last 7 days  Lab Units 18  0758 18  0712 18  0726  18  0354 18  1741   SODIUM mmol/L 128* 127* 130*  < > 136 139   POTASSIUM mmol/L 3.8 4.3 4.5  4.5  < > 2.9* 2.5*   CHLORIDE mmol/L 99 96* 103  < > 109 110*   CO2 mmol/L 26.0 27.0 24.0  < > 15.0* 12.0*   BUN mg/dL 18 18 15  < > 17 22   CREATININE mg/dL 0.30* 0.30* 0.30*  < > 0.40* 0.40*   GLUCOSE mg/dL 121* 97 143*  < > 172* 143*   CALCIUM mg/dL 7.5* 8.4* 8.3*  < > 8.4* 8.4*   ALT (SGPT) U/L  --  99*  --   --  30 32   AST (SGOT) U/L  --   36*  --   --  23 24   < > = values in this interval not displayed.  Estimated Creatinine Clearance: 35.9 mL/min (by C-G formula based on Cr of 0.3).  No results found for: BNP  No results found for: PHART    Microbiology Results Abnormal     Procedure Component Value - Date/Time    UK HSV Types 1 / 2, DNA PCR - Cerebrospinal Fluid, Lumbar Puncture [[id number]] Collected:  01/31/18 1515    Lab Status:  Final result Specimen:  Cerebrospinal Fluid from Lumbar Puncture Updated:  02/01/18 1436     Reference Lab Report See Attached Report    Cryptococcal AG, CSF - Cerebrospinal Fluid, Lumbar Puncture [[id number]]  (Normal) Collected:  01/31/18 1515    Lab Status:  Final result Specimen:  Cerebrospinal Fluid from Lumbar Puncture Updated:  02/01/18 1027     Cryptococcal Antigen, CSF Negative    Blood Culture - Blood, [[id number]]  (Normal) Collected:  01/22/18 2233    Lab Status:  Final result Specimen:  Blood from Hand, Right Updated:  01/27/18 2301     Blood Culture No growth at 5 days    Blood Culture - Blood, [[id number]]  (Normal) Collected:  01/22/18 2233    Lab Status:  Final result Specimen:  Blood from Arm, Right Updated:  01/27/18 2301     Blood Culture No growth at 5 days    Urine Culture - Urine, Urine, Clean Catch [[id number]]  (Normal) Collected:  01/23/18 1013    Lab Status:  Final result Specimen:  Urine from Urine, Clean Catch Updated:  01/25/18 0855     Urine Culture No growth at 2 days    Blood Culture - Blood, [[id number]]  (Normal) Collected:  01/15/18 1927    Lab Status:  Final result Specimen:  Blood from Arm, Left Updated:  01/20/18 2046     Blood Culture No growth at 5 days    Blood Culture - Blood, [[id number]]  (Normal) Collected:  01/15/18 1927    Lab Status:  Final result Specimen:  Blood from Arm, Right Updated:  01/20/18 2046     Blood Culture No growth at 5 days    Influenza Antigen, Rapid - Swab, Nasopharynx [[id number]]  (Abnormal) Collected:  01/15/18 1854    Lab Status:  Final result Specimen:   Swab from Nasopharynx Updated:  01/15/18 1918     Influenza A Ag, EIA Positive (A)     Influenza B Ag, EIA Negative          Imaging Results (last 24 hours)     Procedure Component Value Units Date/Time    XR Abdomen KUB [[id number]] Collected:  02/02/18 1335     Updated:  02/02/18 1716    Narrative:       EXAMINATION: XR ABDOMEN, KUB-02/02/2018:      INDICATION: Verify tube placement; A41.9-Sepsis, unspecified organism;  J10.1-Influenza due to other identified influenza virus with other  respiratory manifestations; R41.0-Disorientation, unspecified;  E86.0-Dehydration; Z74.09-Other reduced mobility; Z74.09-Other reduced  mobility; I48.1-Persistent atrial fibrillation; I48.91-Unspecified  atrial fibrillation; R13.10-Dysphagia, unspecified.      COMPARISON: 01/27/2018.     FINDINGS: KUB reveals a bowel gas pattern that is nonspecific with  increased stool seen in the ascending colon. Degenerative changes seen  within the spine with curvature with convexity to the right. There is a  feeding tube identified with tip folded back upon itself in the distal  stomach.           Impression:       Feeding tube tip folded back upon itself in the distal  stomach. Bowel gas pattern is nonspecific.     D:  02/02/2018  E:  02/02/2018     This report was finalized on 2/2/2018 5:14 PM by Dr. Gianna Bland MD.       XR Chest 1 View [[id number]] Collected:  02/02/18 1308     Updated:  02/02/18 1717    Narrative:       EXAMINATION: XR CHEST 1 VW-      INDICATION: Coarse breath sounds; A41.9-Sepsis, unspecified organism;  J10.1-Influenza due to other identified influenza virus with other  respiratory manifestations; R41.0-Disorientation, unspecified;  E86.0-Dehydration; Z74.09-Other reduced mobility; I48.1-Persistent  atrial fibrillation; I48.91-Unspecified atrial fibrillation;  R13.10-Dysphagia, unspecified.      COMPARISON: 01/28/2018.     FINDINGS: Portable chest reveals cardiac and mediastinal silhouettes  within normal limits.  Feeding tube identified with tip below the level  of the diaphragm. PICC line catheter placed on the right with tip in the  SVC. Lung fields are grossly clear. Underlying chronic changes are  present.  No pleural effusion or pneumothorax. The bony structures are  unremarkable.           Impression:       Chronic changes seen within the lung fields with no evidence  of acute parenchymal disease.     D:  02/02/2018  E:  02/02/2018     This report was finalized on 2/2/2018 5:15 PM by Dr. Gianna Bland MD.       MRI Brain Without Contrast [[id number]] Collected:  02/03/18 1243     Updated:  02/03/18 1244    Narrative:       EXAMINATION: MRI BRAIN WO CONTRAST - 02/03/2018     INDICATION: A41.9-Sepsis, unspecified organism; J10.1-Influenza due to  other identified influenza virus with other respiratory manifestations;  R41.0-Disorientation, unspecified; E86.0-Dehydration; Z74.09-Other  reduced mobility; I48.1-Persistent atrial fibrillation;  I48.91-Unspecified atrial fibrillation; R13.10-Dysphagia, unspecified.     TECHNIQUE: Sagittal and axial T1 and axial T2 FLAIR and  diffusion-weighted images through the brain without contrast.     COMPARISON: 1/29/2018.     FINDINGS: Patient history indicates metabolic encephalopathy, prior  history of breast cancer, influenza, history of ADEM, on Solu-Medrol, no  improvement. Unremarkable CSF studies.     Diffusion-weighted images again show no definite diffusion restriction.  There is some symmetric artifact above the petrous ridges, presumably  chemical shift artifact, and slightly increased T2 signal in the central  white matter, without ADC map  correlation. Study is less motion  degraded today and appears to show subtle increased T2 signal in the  periphery of the pete, right greater than left cerebellar peduncles,  right greater than left cerebral peduncles, left thalamus, and internal  capsules as well as more generalized central white matter disease in the  right and  left centrum semiovale. Pattern and extent of these changes is  essentially unchanged from the 1/29/2018 study. There is no visible  mass, mass effect, no signal abnormality suggestive of hemorrhage, no  evidence of hydrocephalus or abnormal extra-axial collection.       Impression:       Stable central T2 changes, involving the brain stem thalami  in particular consistent with CSF inflammation. With history of viral  syndrome and this distribution of disease, ADEM, is certainly a  consideration. No apparent progression or new disease.      DICTATED:     02/03/2018  EDITED    :     02/03/2018            Results for orders placed during the hospital encounter of 11/30/16   Adult Transthoracic Echo Complete    Narrative · Left ventricular function is normal. Estimated EF = 55%.  · All left ventricular wall segments contract normally.  · Mild aortic valve stenosis is present.  · Mild tricuspid valve regurgitation is present.  · There is no evidence of pericardial effusion.  · Estimated right ventricular systolic pressure from tricuspid   regurgitation is normal (<35 mmHg).  · There are myxomatous changes of the mitral valve apparatus present.          I have reviewed the medications.    Assessment/Plan   Assessment / Plan     Hospital Problem List     * (Principal)ADEM (acute disseminated encephalomyelitis) (postinfectious)    Metabolic encephalopathy    Influenza A    Sepsis    Paroxysmal atrial fibrillation    Malignant neoplasm of central portion of right female breast (Chronic)    Overview Signed 8/17/2016  9:44 AM by Jenni Izaguirre              CHF (congestive heart failure)    Hyponatremia    Chronic anemia (Chronic)    Chronic combined systolic and diastolic heart failure (Chronic)    Traumatic hematoma of head    Renal insufficiency    Protein-calorie malnutrition    Respiratory alkalosis      -----------------------------------------  *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)  *s/p  Influenza A (completed rx)  *s/p sepsis/febrile illness (due to above)   -s/p empiric abx, cultures negative  *Afib w/ rvr (s/p cardioversion), now on sotalol per cards  *protein calorie malnutrition  *gen weakness/failure to thrive  *Hypokalemia  *Hyponatremia  *Hx breast cancer and mastectomies (remote hx, see above)  *Hyperglycemia (a1c 5.5)  --------------------------------------------------     Brief Hospital Course to date:  77-year-old female with a history of breast cancer treated in 2004 status post left mastectomy with another episode of breast cancer, stage IIa in the right breast in 2015 status post mastectomy, Adriamycin, Cytoxan, Herceptin/Taxol and maintained on Arimidex who has had multiple hospital admissions in the past few months.  She had a fall at home at the end of December status post right femoral neck fracture/pinning, was discharged to rehabilitation and was readmitted on 1/15/2018 with influenza.  Since that time she has had progressive altered mental status and is currently obtunded.      Pulmonary was consulted by Dr. Moreno secondary to the patient's respiratory alkalosis.  She has had a respiratory alkalosis and actually was given Diamox.  She currently has a metabolic acidosis with a respiratory alkalosis and is overall alkalotic.      The patient is currently obtunded and unable to relay any significant history.  She seems to try to mouth answers to some questions but does not follow commands.  She moves all extremities.  She has lost a significant amount of weight.  She apparently developed a fever a few days ago and was placed on empiric antibiotics.  Cultures from that event have been negative to date.  CT scan of the head from 1/15/2015 showed age-related changes with no acute process.  CT scan of the head from January 13 was read as a small superficial hematoma in the left frontal region.     She was transferred to the ICU on 1/27 secondary to Afib w/ RVR. She was cardioverted  by Cardiology. She received Versed for the cardioversion. Transferred out of icu 1/29 and picked up by hospitalists 1/30/18    ---------------------------------------------------------------------    Assessment & Plan:  -ADEM: no improvement w/ 5 day course high dose steroids. Neurology managing. Currently day 4/5 ivig. Repeat MR brain 2/3/18 overall stable but midbrain hyperintensity apparently mildly improved. Hopefully neurologic/mental status improves soon    -CSF analysis (from 1/31/18 LP) appears unremarkable   -hsv pcr negative   -paraneoplastic and ms profiles pending    -EEG (2/2/18) had rare right temporal sharps, started keppra 500mg bid empirically     -continue normal saline 75cc/hr    -continue tube feeds  -continue thiamine    -continue sotalol 40mg bid (s/p cardiology consult)    -Bmp, b12 in a.m.    *s/p prior discussion about guarded prognosis w/ . Seeing how does through weekend w/ ivig. If no improvement by early in week, discuss / plasma exchange versus palliative care with neurology    DVT Prophylaxis:  Sq heparin    CODE STATUS: Full Code    Disposition: I expect the patient to be discharged unknown timeframe at this point  Erich Ryder MD  02/03/18  3:41 PM

## 2018-02-03 NOTE — PLAN OF CARE
Problem: Sepsis (Adult)  Goal: Signs and Symptoms of Listed Potential Problems Will be Absent or Manageable (Sepsis)  Outcome: Ongoing (interventions implemented as appropriate)   02/03/18 0022   Sepsis   Problems Assessed (Sepsis) all   Problems Present (Sepsis) situational response

## 2018-02-03 NOTE — PLAN OF CARE
Problem: Patient Care Overview (Adult)  Goal: Plan of Care Review  Outcome: Ongoing (interventions implemented as appropriate)   02/03/18 1732   Coping/Psychosocial Response Interventions   Plan Of Care Reviewed With patient;spouse;daughter   Patient Care Overview   Progress improving   Outcome Evaluation   Outcome Summary/Follow up Plan Obtunded. Does not open eyes. No verbal. Intermittent spontaneous movement left extremities. Right arm flaccid. Right leg withdraws to pain. Respirations unlabored 2L cannula. Oral suctioned intermittent. SR monitor. Incontinent bowel and bladder. PICC infusing. Keofeed infusing. Q2turn with no new skin issues. Family bedside partial shift.        Problem: Sepsis (Adult)  Goal: Signs and Symptoms of Listed Potential Problems Will be Absent or Manageable (Sepsis)  Outcome: Ongoing (interventions implemented as appropriate)      Problem: Pressure Ulcer Risk (Miguelito Scale) (Adult,Obstetrics,Pediatric)  Goal: Skin Integrity  Outcome: Ongoing (interventions implemented as appropriate)

## 2018-02-03 NOTE — PROGRESS NOTES
Daily Progress Note  Neurology     LOS: 10 days     Subjective     Chief Complaint: Cough, fever    Interval History:  Patient still minimally responsive but will move occasionally with tactile stimulation    ROS: Negative for fever    Objective     Vital signs in last 24 hours:  Temp:  [97.6 °F (36.4 °C)-98.5 °F (36.9 °C)] 97.9 °F (36.6 °C)  Heart Rate:  [68-84] 79  Resp:  [16-20] 18  BP: (123-143)/(71-88) 138/78      Physical Exam:   General: Lying in bed with eyes closed. In NAD.     Respiratory: Respirations unlabored   CV: RRR       Neurologic Exam:   Mental status: Obtunded but withdraws to nailbed pressure      CN: PERRL, face symmetric                    Results from last 7 days  Lab Units 02/03/18  0758   WBC 10*3/mm3 6.62   HEMOGLOBIN g/dL 10.4*   HEMATOCRIT % 32.6*   PLATELETS 10*3/mm3 146*           Results Review:  Labs reviewed  EEG report reviewed and discussed with interpreting neurologist  MRI brain personally reviewed. Overall appears stable but the midbrain hyperintensity appears somewhat improved      Assessment/Plan     Principal Problem:    Metabolic encephalopathy  Active Problems:    Malignant neoplasm of central portion of right female breast    CHF (congestive heart failure)    Hyponatremia    Chronic anemia    Influenza A    Chronic combined systolic and diastolic heart failure    Traumatic hematoma of head    Vaginitis    Sepsis    Renal insufficiency    Protein-calorie malnutrition    Respiratory alkalosis    Paroxysmal atrial fibrillation        1.  ADEM = No improvement with 5-day course of Solumedrol. On day 4/5 of IVIG today. CSF analysis appears unremarkable. HSV PCR negative. Paraneoplastic and MS profiles pending. EEG report had rare right temporal sharps, started Keppra 500mg BID empirically. No seizures seen. Her repeat MRI brain overall is stable but the midbrain hyperintensity appears mildly improved. Recommend continue supportive care and IVIG therapy            Sara GIRALDO  MD [place]  02/03/18  12:46 PM

## 2018-02-04 NOTE — PLAN OF CARE
Problem: Patient Care Overview (Adult)  Goal: Plan of Care Review  Outcome: Ongoing (interventions implemented as appropriate)   02/04/18 0414   Coping/Psychosocial Response Interventions   Plan Of Care Reviewed With (family not present. pt obtunded)   Patient Care Overview   Progress unable to show any progress toward functional goals   Outcome Evaluation   Outcome Summary/Follow up Plan pt remains nonverbal and obtunded. cont to turn every 2 hours. fibersource hn tubefeeding cont. at 45cc/hr without any difficulties. prn oral suctioning. no evidience of pain or discomfort     Goal: Adult Individualization and Mutuality  Outcome: Ongoing (interventions implemented as appropriate)    Goal: Discharge Needs Assessment  Outcome: Ongoing (interventions implemented as appropriate)      Problem: Fall Risk (Adult)  Goal: Absence of Falls  Outcome: Ongoing (interventions implemented as appropriate)      Problem: Sepsis (Adult)  Goal: Signs and Symptoms of Listed Potential Problems Will be Absent or Manageable (Sepsis)  Outcome: Ongoing (interventions implemented as appropriate)      Problem: Pressure Ulcer Risk (Miguelito Scale) (Adult,Obstetrics,Pediatric)  Goal: Skin Integrity  Outcome: Ongoing (interventions implemented as appropriate)

## 2018-02-04 NOTE — PROGRESS NOTES
[facility] Medicine Services  PROGRESS NOTE    Patient Name: Cassidy Teran  : 1940  MRN: [id number]    Date of Admission: 1/15/2018  Length of Stay: 11  Primary Care Physician: Luke Baum MD    Subjective   Subjective     CC:  Encephalopathy following influenza    HPI:  Obtunded, no waking up. Nonverbal. No changes clinically since 18. Still no purposeful movement  Review of Systems  uto    Otherwise ROS is negative except as mentioned in the HPI.    Objective   Objective     Vital Signs:   Temp:  [98 °F (36.7 °C)-98.8 °F (37.1 °C)] 98 °F (36.7 °C)  Heart Rate:  [70-94] 74  Resp:  [14-20] 18  BP: (115-158)/(65-86) 130/72        Physical Exam:  Nonverbal, eyes closed, does not respond to light sternal rub.   face symmetric, pupilis grossly equal, difficult neuro exam, less tone right arm/upper extremity  rrr currently  Lungs grossly clear  abd soft, nondistended  No cce  No truncal rash  Psych:uto    Results Reviewed:  I have personally reviewed current lab, radiology, and data and agree.      Results from last 7 days  Lab Units 18  0758 18  0726 18  0712   WBC 10*3/mm3 6.62 8.95 7.43   HEMOGLOBIN g/dL 10.4* 9.6* 9.4*   HEMATOCRIT % 32.6* 29.7* 29.1*   PLATELETS 10*3/mm3 146* 242 289       Results from last 7 days  Lab Units 18  0550 18  0758 18  0712   SODIUM mmol/L 129* 128* 127*   POTASSIUM mmol/L 3.7 3.8 4.3   CHLORIDE mmol/L 97* 99 96*   CO2 mmol/L 29.0 26.0 27.0   BUN mg/dL 18   CREATININE mg/dL 0.20* 0.30* 0.30*   GLUCOSE mg/dL 117* 121* 97   CALCIUM mg/dL 7.6* 7.5* 8.4*   ALT (SGPT) U/L  --   --  99*   AST (SGOT) U/L  --   --  36*     Estimated Creatinine Clearance: 33.3 mL/min (by C-G formula based on Cr of 0.2).  No results found for: BNP  No results found for: PHART    Microbiology Results Abnormal     Procedure Component Value - Date/Time    UK HSV Types 1 / 2, DNA PCR - Cerebrospinal Fluid, Lumbar Puncture  [[id number]] Collected:  01/31/18 1515    Lab Status:  Final result Specimen:  Cerebrospinal Fluid from Lumbar Puncture Updated:  02/01/18 1436     Reference Lab Report See Attached Report    Cryptococcal AG, CSF - Cerebrospinal Fluid, Lumbar Puncture [[id number]]  (Normal) Collected:  01/31/18 1515    Lab Status:  Final result Specimen:  Cerebrospinal Fluid from Lumbar Puncture Updated:  02/01/18 1027     Cryptococcal Antigen, CSF Negative    Blood Culture - Blood, [[id number]]  (Normal) Collected:  01/22/18 2233    Lab Status:  Final result Specimen:  Blood from Hand, Right Updated:  01/27/18 2301     Blood Culture No growth at 5 days    Blood Culture - Blood, [[id number]]  (Normal) Collected:  01/22/18 2233    Lab Status:  Final result Specimen:  Blood from Arm, Right Updated:  01/27/18 2301     Blood Culture No growth at 5 days    Urine Culture - Urine, Urine, Clean Catch [[id number]]  (Normal) Collected:  01/23/18 1013    Lab Status:  Final result Specimen:  Urine from Urine, Clean Catch Updated:  01/25/18 0855     Urine Culture No growth at 2 days    Blood Culture - Blood, [[id number]]  (Normal) Collected:  01/15/18 1927    Lab Status:  Final result Specimen:  Blood from Arm, Left Updated:  01/20/18 2046     Blood Culture No growth at 5 days    Blood Culture - Blood, [[id number]]  (Normal) Collected:  01/15/18 1927    Lab Status:  Final result Specimen:  Blood from Arm, Right Updated:  01/20/18 2046     Blood Culture No growth at 5 days    Influenza Antigen, Rapid - Swab, Nasopharynx [[id number]]  (Abnormal) Collected:  01/15/18 1854    Lab Status:  Final result Specimen:  Swab from Nasopharynx Updated:  01/15/18 1918     Influenza A Ag, EIA Positive (A)     Influenza B Ag, EIA Negative          Imaging Results (last 24 hours)     Procedure Component Value Units Date/Time    MRI Brain Without Contrast [[id number]] Collected:  02/03/18 1243     Updated:  02/03/18 2330    Narrative:       EXAMINATION: MRI BRAIN WO  CONTRAST - 02/03/2018     INDICATION: A41.9-Sepsis, unspecified organism; J10.1-Influenza due to  other identified influenza virus with other respiratory manifestations;  R41.0-Disorientation, unspecified; E86.0-Dehydration; Z74.09-Other  reduced mobility; I48.1-Persistent atrial fibrillation;  I48.91-Unspecified atrial fibrillation; R13.10-Dysphagia, unspecified.     TECHNIQUE: Sagittal and axial T1 and axial T2 FLAIR and  diffusion-weighted images through the brain without contrast.     COMPARISON: 1/29/2018.     FINDINGS: Patient history indicates metabolic encephalopathy, prior  history of breast cancer, influenza, history of ADEM, on Solu-Medrol, no  improvement. Unremarkable CSF studies.     Diffusion-weighted images again show no definite diffusion restriction.  There is some symmetric artifact above the petrous ridges, presumably  chemical shift artifact, and slightly increased T2 signal in the central  white matter, without ADC map  correlation. Study is less motion  degraded today and appears to show subtle increased T2 signal in the  periphery of the pete, right greater than left cerebellar peduncles,  right greater than left cerebral peduncles, left thalamus, and internal  capsules as well as more generalized central white matter disease in the  right and left centrum semiovale. Pattern and extent of these changes is  essentially unchanged from the 1/29/2018 study. There is no visible  mass, mass effect, no signal abnormality suggestive of hemorrhage, no  evidence of hydrocephalus or abnormal extra-axial collection.       Impression:       Stable central T2 changes, involving the brain stem and  thalami in particular consistent with CNS inflammation. With history of  viral syndrome and this distribution of disease, ADEM, is certainly a  consideration. No apparent progression or new disease.      DICTATED:     02/03/2018  EDITED    :     02/03/2018      This report was finalized on 2/3/2018 11:28 PM by  DR. Slade Ambriz MD.           Results for orders placed during the hospital encounter of 11/30/16   Adult Transthoracic Echo Complete    Narrative · Left ventricular function is normal. Estimated EF = 55%.  · All left ventricular wall segments contract normally.  · Mild aortic valve stenosis is present.  · Mild tricuspid valve regurgitation is present.  · There is no evidence of pericardial effusion.  · Estimated right ventricular systolic pressure from tricuspid   regurgitation is normal (<35 mmHg).  · There are myxomatous changes of the mitral valve apparatus present.          I have reviewed the medications.    Assessment/Plan   Assessment / Plan     Hospital Problem List     * (Principal)ADEM (acute disseminated encephalomyelitis) (postinfectious)    Metabolic encephalopathy    Influenza A    Sepsis    Paroxysmal atrial fibrillation    Malignant neoplasm of central portion of right female breast (Chronic)    Overview Signed 8/17/2016  9:44 AM by Jenni Izaguirre              CHF (congestive heart failure)    Hyponatremia    Chronic anemia (Chronic)    Chronic combined systolic and diastolic heart failure (Chronic)    Traumatic hematoma of head    Renal insufficiency    Protein-calorie malnutrition    Respiratory alkalosis      -----------------------------------------  *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)  *s/p Influenza A (completed rx)  *s/p sepsis/febrile illness (due to above)   -s/p empiric abx, cultures negative  *Afib w/ rvr (s/p cardioversion), now on sotalol per cards  *protein calorie malnutrition  *gen weakness/failure to thrive  *Hypokalemia  *Hyponatremia  *Hx breast cancer and mastectomies (remote hx, see above)  *Hyperglycemia (a1c 5.5)  --------------------------------------------------     Brief Hospital Course to date:  77-year-old female with a history of breast cancer treated in 2004 status post left mastectomy with another episode of breast cancer, stage IIa in the  right breast in 2015 status post mastectomy, Adriamycin, Cytoxan, Herceptin/Taxol and maintained on Arimidex who has had multiple hospital admissions in the past few months.  She had a fall at home at the end of December status post right femoral neck fracture/pinning, was discharged to rehabilitation and was readmitted on 1/15/2018 with influenza.  Since that time she has had progressive altered mental status and is currently obtunded.      Pulmonary was consulted by Dr. Moreno secondary to the patient's respiratory alkalosis.  She has had a respiratory alkalosis and actually was given Diamox.  She currently has a metabolic acidosis with a respiratory alkalosis and is overall alkalotic.      The patient is currently obtunded and unable to relay any significant history.  She seems to try to mouth answers to some questions but does not follow commands.  She moves all extremities.  She has lost a significant amount of weight.  She apparently developed a fever a few days ago and was placed on empiric antibiotics.  Cultures from that event have been negative to date.  CT scan of the head from 1/15/2015 showed age-related changes with no acute process.  CT scan of the head from January 13 was read as a small superficial hematoma in the left frontal region.     She was transferred to the ICU on 1/27 secondary to Afib w/ RVR. She was cardioverted by Cardiology. She received Versed for the cardioversion. Transferred out of icu 1/29 and picked up by hospitalists 1/30/18    ---------------------------------------------------------------------    Assessment & Plan:  -Currently waiting for any response to therapy for acute disseminated encephalomyelitis, postinfectious ? from influenza. Completed 1st round high dose steroids. Then completing 5 days ivig on 2/4/18. Starting 2nd round high dose steroids 2/4/18 per neuro      -CSF analysis (from 1/31/18 LP) appears unremarkable   -hsv pcr negative   -paraneoplastic and ms  profiles still pending    -EEG (2/2/18) had rare right temporal sharps,on keppra per neuro empirically    -continue normal saline 75cc/hr  -continue tube feeds, supportive care, prn electrolyte replacements  -continue thiamine    -s/p afib w/ rvr, on sotalol 40mg bid (s/p cards consult)    -bmp in a.m.      *s/p prior discussion about guarded prognosis w/ . Seeing how does through weekend w/ ivig. If no improvement by early in week, discuss / plasma exchange versus palliative care with neurology    DVT Prophylaxis:  Sq heparin    CODE STATUS: Full Code    Disposition: I expect the patient to be discharged unknown timeframe at this point  Erich Ryder MD  02/04/18  3:53 PM

## 2018-02-04 NOTE — PLAN OF CARE
Problem: Patient Care Overview (Adult)  Goal: Plan of Care Review  Outcome: Ongoing (interventions implemented as appropriate)   02/04/18 8784   Coping/Psychosocial Response Interventions   Plan Of Care Reviewed With patient   Patient Care Overview   Progress no change   Outcome Evaluation   Outcome Summary/Follow up Plan Obtunded. Does not open eyes. No verbal.  More movement of left extremities than yesterday. Right arm remains flaccid. Right leg withdraws to pain. Respirations unlabored room air. Oral suctioned intermittent. SR monitor. Incontinent bladder. PICC dressing changed and fluids infusing. Keofeed infusing. Q2turn with no new skin issues. Family bedside partial shift.        Problem: Sepsis (Adult)  Goal: Signs and Symptoms of Listed Potential Problems Will be Absent or Manageable (Sepsis)  Outcome: Ongoing (interventions implemented as appropriate)      Problem: Pressure Ulcer Risk (Miguelito Scale) (Adult,Obstetrics,Pediatric)  Goal: Skin Integrity  Outcome: Ongoing (interventions implemented as appropriate)

## 2018-02-04 NOTE — PROGRESS NOTES
Daily Progress Note  Neurology     LOS: 11 days     Subjective     Chief Complaint: Cough, fever    Interval History:  Patient still minimally responsive but will move occasionally with tactile stimulation.  reports per daughter, patient smiled last night to voice    ROS: Negative for fever    Objective     Vital signs in last 24 hours:  Temp:  [98 °F (36.7 °C)-98.8 °F (37.1 °C)] 98 °F (36.7 °C)  Heart Rate:  [70-94] 74  Resp:  [14-20] 18  BP: (115-158)/(65-86) 130/72      Physical Exam:   General: Lying in bed with eyes closed. In NAD.     Respiratory: Respirations unlabored   CV: RRR       Neurologic Exam:   Mental status: Obtunded but moves extremities occasionally      CN: PERRL, face symmetric                    Results from last 7 days  Lab Units 02/03/18  0758   WBC 10*3/mm3 6.62   HEMOGLOBIN g/dL 10.4*   HEMATOCRIT % 32.6*   PLATELETS 10*3/mm3 146*           Results Review:  Labs reviewed      Assessment/Plan     Principal Problem:    ADEM (acute disseminated encephalomyelitis) (postinfectious)  Active Problems:    Malignant neoplasm of central portion of right female breast    CHF (congestive heart failure)    Hyponatremia    Chronic anemia    Influenza A    Metabolic encephalopathy    Chronic combined systolic and diastolic heart failure    Traumatic hematoma of head    Sepsis    Renal insufficiency    Protein-calorie malnutrition    Respiratory alkalosis    Paroxysmal atrial fibrillation        1.  ADEM = On day 5/5 of IVIG today. Paraneoplastic and MS profiles pending. EEG report had rare right temporal sharps, Keppra started empirically. No seizures seen. Will decrease her dose to 250mg BID given her small body habitus. Her repeat MRI brain overall is stable but the midbrain hyperintensity appears mildly improved on personal viewing. Continue supportive care. Will start round two of Solumedrol 500mg Q12 hours           Sara Rucker MD  02/04/18  12:37 PM

## 2018-02-05 NOTE — PLAN OF CARE
Problem: Patient Care Overview (Adult)  Goal: Plan of Care Review  Outcome: Ongoing (interventions implemented as appropriate)   02/05/18 3752   Coping/Psychosocial Response Interventions   Plan Of Care Reviewed With patient   Patient Care Overview   Progress improving   Outcome Evaluation   Outcome Summary/Follow up Plan Lethargic but improved since yesterday.  Although speech garbled, able to state name and a select few words.  Does not open eyes.  More movement of left extremities than yesterday. Right arm remains flaccid. Right leg withdraws to pain.  Pulling at keofeed and wires with left hand.  Restraint mitt placed. Respirations unlabored room air. Oral suctioned intermittent. SR monitor. Incontinent bladder and bowel. PICC in place.  Keofeed infusing. Q2turn with no new skin issues. Family bedside partial shift.        Problem: Fall Risk (Adult)  Goal: Absence of Falls  Outcome: Ongoing (interventions implemented as appropriate)      Problem: Sepsis (Adult)  Goal: Signs and Symptoms of Listed Potential Problems Will be Absent or Manageable (Sepsis)  Outcome: Ongoing (interventions implemented as appropriate)

## 2018-02-05 NOTE — PROGRESS NOTES
Continued Stay Note  [facility]     Patient Name: Cassidy Teran  MRN: [id number]  Today's Date: 2/5/2018    Admit Date: 1/15/2018          Discharge Plan       02/05/18 1445    Case Management/Social Work Plan    Plan Dependent upon Medical Condition    Additional Comments Per chart, patient's condition still guarded.  She has completed IVIG and has started second round of IV steroids with some improvement.  Patient still not skillable at this point.  Per chart, patient still in restraints and has keofeed.  MD stating that they will continue supportive care for now and repeat MRI later in the week.  CM will continue to follow and make appropriate referrals closer to discharge.  Zaina Subramanian RN [phone]              Discharge Codes     None        Expected Discharge Date and Time     Expected Discharge Date Expected Discharge Time    Feb 12, 2018             Zaina Subramanian RN

## 2018-02-05 NOTE — PROGRESS NOTES
Adult Nutrition  Assessment/PES    Patient Name:  Cassidy Teran  YOB: 1940  MRN: [id number]  Admit Date:  1/15/2018    Assessment Date:  2/5/2018    Comments:      RD notes low phos. Continue to monitor and replace per protocol.  Note hyponatremia and IVF at 75 ml/hr.          Reason for Assessment       02/05/18 0956    Reason for Assessment    Reason For Assessment/Visit follow up protocol;TF/PN    Time Spent (min) 30    Diagnosis --   per notes this admission    Metabolic/ICU Hyponatremia              Nutrition/Diet History       02/05/18 0957    Nutrition/Diet History    Reported/Observed By RN    Other Per RN patient tolerating TF at goal rate.              Labs/Tests/Procedures/Meds       02/05/18 0957    Labs/Tests/Procedures/Meds    Labs/Tests Review Reviewed;Na+;Phos    Medication Review --   IVF: NS at 75 ml/hr                Nutrition Prescription Ordered       02/05/18 0957    Nutrition Prescription PO    Current PO Diet NPO    Nutrition Prescription EN    Enteral Route NG    Product Fibersource HN    Continuous TF Goal Rate (mL/hr) 45 mL/hr    Continuous TF Current Rate (mL/hr) 45 mL/hr    Continuous TF Goal Volume (mL) 990 mL    Water flush (mL)  10 mL    Water Flush Frequency Per hour            Evaluation of Received Nutrient/Fluid Intake       02/05/18 0958    EN Evaluation    Number of Days EN Intake Evaluated 3 days    EN Average Volume Delivered (mL/day) 957 mL/day    % Goal Volume  97 %            Problem/Interventions:        Problem 1       02/05/18 0959    Nutrition Diagnoses Problem 1    Problem 1 Needs Alternate Route   unless pt/fam decide for comfort diet.    Etiology (related to) --   dysphagia    Signs/Symptoms (evidenced by) SLP/Swallow eval    Swallow eval status Done   2/2                    Intervention Goal       02/05/18 0959    Intervention Goal    General Nutrition support treatment   GOC/POC pending    TF/PN Maintain TF/PN            Nutrition Intervention        02/05/18 0959    Nutrition Intervention    RD/Tech Action Care plan reviewd;Follow Tx progress            Nutrition Prescription       02/05/18 0959    Nutrition Prescription EN    Enteral Prescription Continue same protocol            Education/Evaluation       02/05/18 0959    Monitor/Evaluation    Monitor Per protocol;I&O;Pertinent labs;TF delivery/tolerance   GOC/POC regarding comfort diet versus PEG        Electronically signed by:  Ana Paige RD  02/05/18 10:00 AM

## 2018-02-05 NOTE — PLAN OF CARE
Problem: Patient Care Overview (Adult)  Goal: Plan of Care Review  Outcome: Ongoing (interventions implemented as appropriate)   02/05/18 0408   Coping/Psychosocial Response Interventions   Plan Of Care Reviewed With patient   Patient Care Overview   Progress no change     Goal: Adult Individualization and Mutuality  Outcome: Ongoing (interventions implemented as appropriate)    Goal: Discharge Needs Assessment  Outcome: Ongoing (interventions implemented as appropriate)      Problem: Fall Risk (Adult)  Goal: Absence of Falls  Outcome: Ongoing (interventions implemented as appropriate)   02/05/18 0408   Fall Risk (Adult)   Absence of Falls making progress toward outcome       Problem: Sepsis (Adult)  Goal: Signs and Symptoms of Listed Potential Problems Will be Absent or Manageable (Sepsis)  Outcome: Ongoing (interventions implemented as appropriate)   02/05/18 0408   Sepsis   Problems Assessed (Sepsis) all   Problems Present (Sepsis) situational response;glycemic control, impaired;malnutrition (undernutrition)       Problem: Pressure Ulcer Risk (Miguelito Scale) (Adult,Obstetrics,Pediatric)  Goal: Skin Integrity  Outcome: Ongoing (interventions implemented as appropriate)   02/05/18 0408   Pressure Ulcer Risk (Miguelito Scale) (Adult,Obstetrics,Pediatric)   Skin Integrity making progress toward outcome       Problem: SAFETY - NON-VIOLENT RESTRAINT  Goal: Remains free of injury from restraints (Non-Violent Restraint)  Outcome: Outcome(s) achieved Date Met: 02/05/18    Goal: Free from restraint(s) (Non-Violent Restraint)  Outcome: Outcome(s) achieved Date Met: 02/05/18

## 2018-02-05 NOTE — PROGRESS NOTES
[facility] Medicine Services  PROGRESS NOTE    Patient Name: Cassidy Teran  : 1940  MRN: [id number]    Date of Admission: 1/15/2018  Length of Stay: 12  Primary Care Physician: Luke Baum MD    Subjective   Subjective     CC:  Encephalopathy following influenza    SUBJECTIVE:  Resting in bed drowsy but somewhat agitated also.  Patient does not respond nor interact.  Review of Systems  uto        Objective   Objective     Vital Signs:   Temp:  [97 °F (36.1 °C)-98.8 °F (37.1 °C)] 98 °F (36.7 °C)  Heart Rate:  [67-81] 76  Resp:  [18-22] 18  BP: (122-147)/(66-81) 147/81        Physical Exam:  Nonverbal, eyes closed, does not respond to light sternal rub.   face symmetric, pupilis grossly equal, difficult neuro exam, less tone right arm/upper extremity  rrr currently  Lungs grossly clear  abd soft, nondistended  No cce  No truncal rash  Psych:uto    Results Reviewed:  I have personally reviewed current lab, radiology, and data and agree.      Results from last 7 days  Lab Units 18  0758 18  0726 18  0712   WBC 10*3/mm3 6.62 8.95 7.43   HEMOGLOBIN g/dL 10.4* 9.6* 9.4*   HEMATOCRIT % 32.6* 29.7* 29.1*   PLATELETS 10*3/mm3 146* 242 289       Results from last 7 days  Lab Units 18  0737 18  1642 18  0550 18  0758 18  0712   SODIUM mmol/L  --  130* 129* 128* 127*   POTASSIUM mmol/L 3.3* 3.9 3.7 3.8 4.3   CHLORIDE mmol/L  --  98* 97* 99 96*   CO2 mmol/L  --  27.0 29.0 26.0 27.0   BUN mg/dL  --     CREATININE mg/dL  --  0.30* 0.20* 0.30* 0.30*   GLUCOSE mg/dL  --  143* 117* 121* 97   CALCIUM mg/dL  --  7.9* 7.6* 7.5* 8.4*   ALT (SGPT) U/L  --   --   --   --  99*   AST (SGOT) U/L  --   --   --   --  36*     Estimated Creatinine Clearance: 33.6 mL/min (by C-G formula based on Cr of 0.3).  No results found for: BNP  No results found for: PHART    Microbiology Results Abnormal     Procedure Component Value - Date/Time     HSV  Types 1 / 2, DNA PCR - Cerebrospinal Fluid, Lumbar Puncture [[id number]] Collected:  01/31/18 1515    Lab Status:  Final result Specimen:  Cerebrospinal Fluid from Lumbar Puncture Updated:  02/01/18 1436     Reference Lab Report See Attached Report    Cryptococcal AG, CSF - Cerebrospinal Fluid, Lumbar Puncture [[id number]]  (Normal) Collected:  01/31/18 1515    Lab Status:  Final result Specimen:  Cerebrospinal Fluid from Lumbar Puncture Updated:  02/01/18 1027     Cryptococcal Antigen, CSF Negative    Blood Culture - Blood, [[id number]]  (Normal) Collected:  01/22/18 2233    Lab Status:  Final result Specimen:  Blood from Hand, Right Updated:  01/27/18 2301     Blood Culture No growth at 5 days    Blood Culture - Blood, [[id number]]  (Normal) Collected:  01/22/18 2233    Lab Status:  Final result Specimen:  Blood from Arm, Right Updated:  01/27/18 2301     Blood Culture No growth at 5 days    Urine Culture - Urine, Urine, Clean Catch [[id number]]  (Normal) Collected:  01/23/18 1013    Lab Status:  Final result Specimen:  Urine from Urine, Clean Catch Updated:  01/25/18 0855     Urine Culture No growth at 2 days    Blood Culture - Blood, [[id number]]  (Normal) Collected:  01/15/18 1927    Lab Status:  Final result Specimen:  Blood from Arm, Left Updated:  01/20/18 2046     Blood Culture No growth at 5 days    Blood Culture - Blood, [[id number]]  (Normal) Collected:  01/15/18 1927    Lab Status:  Final result Specimen:  Blood from Arm, Right Updated:  01/20/18 2046     Blood Culture No growth at 5 days    Influenza Antigen, Rapid - Swab, Nasopharynx [[id number]]  (Abnormal) Collected:  01/15/18 1854    Lab Status:  Final result Specimen:  Swab from Nasopharynx Updated:  01/15/18 1918     Influenza A Ag, EIA Positive (A)     Influenza B Ag, EIA Negative          Imaging Results (last 24 hours)     ** No results found for the last 24 hours. **        Results for orders placed during the hospital encounter of 11/30/16   Adult  Transthoracic Echo Complete    Narrative · Left ventricular function is normal. Estimated EF = 55%.  · All left ventricular wall segments contract normally.  · Mild aortic valve stenosis is present.  · Mild tricuspid valve regurgitation is present.  · There is no evidence of pericardial effusion.  · Estimated right ventricular systolic pressure from tricuspid   regurgitation is normal (<35 mmHg).  · There are myxomatous changes of the mitral valve apparatus present.          I have reviewed the medications.    Assessment/Plan   Assessment / Plan     Hospital Problem List     * (Principal)ADEM (acute disseminated encephalomyelitis) (postinfectious)    Malignant neoplasm of central portion of right female breast (Chronic)    Overview Signed 8/17/2016  9:44 AM by Jenni Izaguirre              CHF (congestive heart failure)    Hyponatremia    Chronic anemia (Chronic)    Influenza A    Metabolic encephalopathy    Chronic combined systolic and diastolic heart failure (Chronic)    Traumatic hematoma of head    Sepsis    Renal insufficiency    Protein-calorie malnutrition    Respiratory alkalosis    Paroxysmal atrial fibrillation      -----------------------------------------  *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)  *s/p Influenza A (completed rx)  *s/p sepsis/febrile illness (due to above)   -s/p empiric abx, cultures negative  *Afib w/ rvr (s/p cardioversion), now on sotalol per cards  *protein calorie malnutrition  *gen weakness/failure to thrive  *Hypokalemia  *Hyponatremia  *Hx breast cancer and mastectomies (remote hx, see above)  *Hyperglycemia (a1c 5.5)  --------------------------------------------------     Brief Hospital Course to date:  77-year-old female with a history of breast cancer treated in 2004 status post left mastectomy with another episode of breast cancer, stage IIa in the right breast in 2015 status post mastectomy, Adriamycin, Cytoxan, Herceptin/Taxol and maintained on  Arimidex who has had multiple hospital admissions in the past few months.  She had a fall at home at the end of December status post right femoral neck fracture/pinning, was discharged to rehabilitation and was readmitted on 1/15/2018 with influenza.  Since that time she has had progressive altered mental status and is currently obtunded.      Pulmonary was consulted by Dr. Moreno secondary to the patient's respiratory alkalosis.  She has had a respiratory alkalosis and actually was given Diamox.  She currently has a metabolic acidosis with a respiratory alkalosis and is overall alkalotic.      The patient is currently obtunded and unable to relay any significant history.  She seems to try to mouth answers to some questions but does not follow commands.  She moves all extremities.  She has lost a significant amount of weight.  She apparently developed a fever a few days ago and was placed on empiric antibiotics.  Cultures from that event have been negative to date.  CT scan of the head from 1/15/2015 showed age-related changes with no acute process.  CT scan of the head from January 13 was read as a small superficial hematoma in the left frontal region.     She was transferred to the ICU on 1/27 secondary to Afib w/ RVR. She was cardioverted by Cardiology. She received Versed for the cardioversion. Transferred out of icu 1/29 and picked up by hospitalists 1/30/18    ---------------------------------------------------------------------    Assessment & Plan:  * Severe encephalopathy after influenza.  Very possibly demyelinating process secondary to viral infection.  Patient remains obtunded.  She has received high-dose steroids and also IVIG.  Neurology is following.    PLAN:  - Continue current care.  - Labs in a.m.    DVT Prophylaxis:  Sq heparin    CODE STATUS: Full Code    Disposition: I expect the patient to be discharged unknown timeframe at this point  Brian Moreno MD  02/05/18  6:18 PM

## 2018-02-05 NOTE — PROGRESS NOTES
Daily Progress Note  Neurology     LOS: 12 days     Subjective     Chief Complaint: Cough, fever    Interval History:  Patient starting to follow some commands but still appears lethargic    ROS: Negative for fever    Objective     Vital signs in last 24 hours:  Temp:  [97 °F (36.1 °C)-98.8 °F (37.1 °C)] 97.5 °F (36.4 °C)  Heart Rate:  [67-85] 76  Resp:  [18-22] 18  BP: (122-143)/(66-81) 143/66      Physical Exam:   General: Sitting in bedside chair with eyes closed. In NAD.     Respiratory: Respirations unlabored   CV: RRR       Neurologic Exam:   Mental status: Lethargic but says her name when asked and sticks out tongue to command      CN: PERRL, face symmetric                    Results from last 7 days  Lab Units 02/03/18  0758   WBC 10*3/mm3 6.62   HEMOGLOBIN g/dL 10.4*   HEMATOCRIT % 32.6*   PLATELETS 10*3/mm3 146*           Results Review:  Labs reviewed      Assessment/Plan     Principal Problem:    ADEM (acute disseminated encephalomyelitis) (postinfectious)  Active Problems:    Malignant neoplasm of central portion of right female breast    CHF (congestive heart failure)    Hyponatremia    Chronic anemia    Influenza A    Metabolic encephalopathy    Chronic combined systolic and diastolic heart failure    Traumatic hematoma of head    Sepsis    Renal insufficiency    Protein-calorie malnutrition    Respiratory alkalosis    Paroxysmal atrial fibrillation        1.  ADEM = Appears a little improved today, following some commands. Completed 5-day IVIG course. Paraneoplastic and MS profiles pending. EEG report had rare right temporal sharps. No seizures seen. On Keppra 250mg BID. Continue supportive care. On round 2 of Solumedrol, day 2/4. Will plan to repeat her MRI later this week.        Sara Rucker MD  02/05/18  1:03 PM

## 2018-02-06 NOTE — PROGRESS NOTES
Daily Progress Note  Neurology     LOS: 13 days     Subjective     Chief Complaint: Cough, fever    Interval History:  Still lethargic but responded a bit more to voice    ROS: Negative for fever    Objective     Vital signs in last 24 hours:  Temp:  [97.5 °F (36.4 °C)-98.5 °F (36.9 °C)] 97.8 °F (36.6 °C)  Heart Rate:  [59-82] 60  Resp:  [16-20] 16  BP: (126-147)/(68-81) 137/81      Physical Exam:   General: Lying in bed with eyes closed. In NAD.     Respiratory: Respirations unlabored   CV: RRR       Neurologic Exam:   Mental status: Lethargic       CN: PERRL, face symmetric                    Results from last 7 days  Lab Units 02/03/18  0758   WBC 10*3/mm3 6.62   HEMOGLOBIN g/dL 10.4*   HEMATOCRIT % 32.6*   PLATELETS 10*3/mm3 146*           Results Review:  Labs reviewed      Assessment/Plan     Principal Problem:    ADEM (acute disseminated encephalomyelitis) (postinfectious)  Active Problems:    Malignant neoplasm of central portion of right female breast    CHF (congestive heart failure)    Hyponatremia    Chronic anemia    Influenza A    Metabolic encephalopathy    Chronic combined systolic and diastolic heart failure    Traumatic hematoma of head    Sepsis    Renal insufficiency    Protein-calorie malnutrition    Respiratory alkalosis    Paroxysmal atrial fibrillation        1.  ADEM = Appears to be gradually improving. Completed 5-day IVIG course. Paraneoplastic profile negative. EEG report had rare right temporal sharps. No seizures seen. On Keppra 250mg BID. Continue supportive care. On round 2 of Solumedrol, day 3/4. Will plan to repeat her MRI later this week.        Sara Rucker MD  02/06/18  2:41 PM

## 2018-02-06 NOTE — PROGRESS NOTES
[facility] Medicine Services  PROGRESS NOTE    Patient Name: Cassidy Teran  : 1940  MRN: [id number]    Date of Admission: 1/15/2018  Length of Stay: 13  Primary Care Physician: Luke Baum MD    Subjective   Subjective     CC:  Encephalopathy following influenza    SUBJECTIVE:  Resting in bed Obtunded.  Patient does not respond nor interact.  Review of Systems  uto        Objective   Objective     Vital Signs:   Temp:  [97.5 °F (36.4 °C)-98.5 °F (36.9 °C)] 97.8 °F (36.6 °C)  Heart Rate:  [59-82] 60  Resp:  [16-20] 16  BP: (126-137)/(68-81) 137/81        Physical Exam:  Nonverbal, eyes closed, does not respond to light sternal rub.   face symmetric, pupilis grossly equal, difficult neuro exam, less tone right arm/upper extremity  rrr currently  Lungs grossly clear  abd soft, nondistended  No cce  No truncal rash  Psych:uto    Results Reviewed:  I have personally reviewed current lab, radiology, and data and agree.      Results from last 7 days  Lab Units 18  0758 18  0726 18  0712   WBC 10*3/mm3 6.62 8.95 7.43   HEMOGLOBIN g/dL 10.4* 9.6* 9.4*   HEMATOCRIT % 32.6* 29.7* 29.1*   PLATELETS 10*3/mm3 146* 242 289       Results from last 7 days  Lab Units 18  0712 18  0737 18  1642 18  0550  18  0712   SODIUM mmol/L 133  --  130* 129*  < > 127*   POTASSIUM mmol/L 3.0*  3.0* 3.3* 3.9 3.7  < > 4.3   CHLORIDE mmol/L 102  --  98* 97*  < > 96*   CO2 mmol/L 28.0  --  27.0 29.0  < > 27.0   BUN mg/dL 15  --  12 12  < > 18   CREATININE mg/dL 0.20*  --  0.30* 0.20*  < > 0.30*   GLUCOSE mg/dL 146*  --  143* 117*  < > 97   CALCIUM mg/dL 7.8*  --  7.9* 7.6*  < > 8.4*   ALT (SGPT) U/L 92*  --   --   --   --  99*   AST (SGOT) U/L 49*  --   --   --   --  36*   < > = values in this interval not displayed.  Estimated Creatinine Clearance: 33.6 mL/min (by C-G formula based on Cr of 0.2).  No results found for: BNP  No results found for:  PHART    Microbiology Results Abnormal     Procedure Component Value - Date/Time     HSV Types 1 / 2, DNA PCR - Cerebrospinal Fluid, Lumbar Puncture [[id number]] Collected:  01/31/18 1515    Lab Status:  Final result Specimen:  Cerebrospinal Fluid from Lumbar Puncture Updated:  02/01/18 1436     Reference Lab Report See Attached Report    Cryptococcal AG, CSF - Cerebrospinal Fluid, Lumbar Puncture [[id number]]  (Normal) Collected:  01/31/18 1515    Lab Status:  Final result Specimen:  Cerebrospinal Fluid from Lumbar Puncture Updated:  02/01/18 1027     Cryptococcal Antigen, CSF Negative    Blood Culture - Blood, [[id number]]  (Normal) Collected:  01/22/18 2233    Lab Status:  Final result Specimen:  Blood from Hand, Right Updated:  01/27/18 2301     Blood Culture No growth at 5 days    Blood Culture - Blood, [[id number]]  (Normal) Collected:  01/22/18 2233    Lab Status:  Final result Specimen:  Blood from Arm, Right Updated:  01/27/18 2301     Blood Culture No growth at 5 days    Urine Culture - Urine, Urine, Clean Catch [[id number]]  (Normal) Collected:  01/23/18 1013    Lab Status:  Final result Specimen:  Urine from Urine, Clean Catch Updated:  01/25/18 0855     Urine Culture No growth at 2 days    Blood Culture - Blood, [[id number]]  (Normal) Collected:  01/15/18 1927    Lab Status:  Final result Specimen:  Blood from Arm, Left Updated:  01/20/18 2046     Blood Culture No growth at 5 days    Blood Culture - Blood, [[id number]]  (Normal) Collected:  01/15/18 1927    Lab Status:  Final result Specimen:  Blood from Arm, Right Updated:  01/20/18 2046     Blood Culture No growth at 5 days    Influenza Antigen, Rapid - Swab, Nasopharynx [[id number]]  (Abnormal) Collected:  01/15/18 1854    Lab Status:  Final result Specimen:  Swab from Nasopharynx Updated:  01/15/18 1918     Influenza A Ag, EIA Positive (A)     Influenza B Ag, EIA Negative          Imaging Results (last 24 hours)     ** No results found for the last 24  hours. **        Results for orders placed during the hospital encounter of 11/30/16   Adult Transthoracic Echo Complete    Narrative · Left ventricular function is normal. Estimated EF = 55%.  · All left ventricular wall segments contract normally.  · Mild aortic valve stenosis is present.  · Mild tricuspid valve regurgitation is present.  · There is no evidence of pericardial effusion.  · Estimated right ventricular systolic pressure from tricuspid   regurgitation is normal (<35 mmHg).  · There are myxomatous changes of the mitral valve apparatus present.          I have reviewed the medications.    Assessment/Plan   Assessment / Plan     Hospital Problem List     * (Principal)ADEM (acute disseminated encephalomyelitis) (postinfectious)    Malignant neoplasm of central portion of right female breast (Chronic)    Overview Signed 8/17/2016  9:44 AM by Jenni Izaguirre              CHF (congestive heart failure)    Hyponatremia    Chronic anemia (Chronic)    Influenza A    Metabolic encephalopathy    Chronic combined systolic and diastolic heart failure (Chronic)    Traumatic hematoma of head    Sepsis    Renal insufficiency    Protein-calorie malnutrition    Respiratory alkalosis    Paroxysmal atrial fibrillation      -----------------------------------------  *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)  *s/p Influenza A (completed rx)  *s/p sepsis/febrile illness (due to above)   -s/p empiric abx, cultures negative  *Afib w/ rvr (s/p cardioversion), now on sotalol per cards  *protein calorie malnutrition  *gen weakness/failure to thrive  *Hypokalemia  *Hyponatremia  *Hx breast cancer and mastectomies (remote hx, see above)  *Hyperglycemia (a1c 5.5)  --------------------------------------------------     Brief Hospital Course to date:  77-year-old female with a history of breast cancer treated in 2004 status post left mastectomy with another episode of breast cancer, stage IIa in the right breast in  2015 status post mastectomy, Adriamycin, Cytoxan, Herceptin/Taxol and maintained on Arimidex who has had multiple hospital admissions in the past few months.  She had a fall at home at the end of December status post right femoral neck fracture/pinning, was discharged to rehabilitation and was readmitted on 1/15/2018 with influenza.  Since that time she has had progressive altered mental status and is currently obtunded.      Pulmonary was consulted by Dr. Moreno secondary to the patient's respiratory alkalosis.  She has had a respiratory alkalosis and actually was given Diamox.  She currently has a metabolic acidosis with a respiratory alkalosis and is overall alkalotic.      The patient is currently obtunded and unable to relay any significant history.  She seems to try to mouth answers to some questions but does not follow commands.  She moves all extremities.  She has lost a significant amount of weight.  She apparently developed a fever a few days ago and was placed on empiric antibiotics.  Cultures from that event have been negative to date.  CT scan of the head from 1/15/2015 showed age-related changes with no acute process.  CT scan of the head from January 13 was read as a small superficial hematoma in the left frontal region.     She was transferred to the ICU on 1/27 secondary to Afib w/ RVR. She was cardioverted by Cardiology. She received Versed for the cardioversion. Transferred out of icu 1/29 and picked up by hospitalists 1/30/18    ---------------------------------------------------------------------    Assessment & Plan:  * Severe encephalopathy after influenza.  Very possibly demyelinating process secondary to viral infection.  Patient remains obtunded.  She has received high-dose steroids and also IVIG.  Neurology is following. Poor prognosis.    PLAN:  - Continue current care.  - Labs in a.m.     DVT Prophylaxis:  Sq heparin    CODE STATUS: Full Code    Disposition: I expect the patient to be  discharged unknown timeframe at this point  Brian Moreno MD  02/06/18  4:25 PM

## 2018-02-06 NOTE — PLAN OF CARE
Problem: Patient Care Overview (Adult)  Goal: Plan of Care Review  Outcome: Ongoing (interventions implemented as appropriate)   02/06/18 0523   Coping/Psychosocial Response Interventions   Plan Of Care Reviewed With patient   Patient Care Overview   Progress improving     Goal: Adult Individualization and Mutuality  Outcome: Ongoing (interventions implemented as appropriate)    Goal: Discharge Needs Assessment  Outcome: Ongoing (interventions implemented as appropriate)      Problem: Fall Risk (Adult)  Goal: Absence of Falls  Outcome: Ongoing (interventions implemented as appropriate)   02/06/18 0523   Fall Risk (Adult)   Absence of Falls making progress toward outcome       Problem: Sepsis (Adult)  Goal: Signs and Symptoms of Listed Potential Problems Will be Absent or Manageable (Sepsis)  Outcome: Ongoing (interventions implemented as appropriate)   02/06/18 0523   Sepsis   Problems Assessed (Sepsis) all   Problems Present (Sepsis) malnutrition (undernutrition);glycemic control, impaired       Problem: Pressure Ulcer Risk (Miguelito Scale) (Adult,Obstetrics,Pediatric)  Goal: Skin Integrity  Outcome: Ongoing (interventions implemented as appropriate)   02/06/18 0523   Pressure Ulcer Risk (Miguelito Scale) (Adult,Obstetrics,Pediatric)   Skin Integrity making progress toward outcome       Problem: SAFETY - NON-VIOLENT RESTRAINT  Goal: Remains free of injury from restraints (Non-Violent Restraint)  Outcome: Ongoing (interventions implemented as appropriate)    Goal: Free from restraint(s) (Non-Violent Restraint)  Outcome: Ongoing (interventions implemented as appropriate)

## 2018-02-07 NOTE — PROGRESS NOTES
Continued Stay Note   Steph     Patient Name: Cassidy Teran  MRN: [id number]  Today's Date: 2/7/2018    Admit Date: 1/15/2018          Discharge Plan       02/07/18 1508    Case Management/Social Work Plan    Plan Dependent upon Medical Condition    Patient/Family In Agreement With Plan yes    Additional Comments Per chart, patient's medical condition is still guarded.  Will complete second round of IV steroids tomorrow.  Patient still has keofeed and restraints.  MD states that they will continue supportive care for now and repeat MRI later in the week.  CM will continue to follow and make appropriate referrals closer to discharge.  Zaina Subramanian RN [phone]              Discharge Codes     None        Expected Discharge Date and Time     Expected Discharge Date Expected Discharge Time    Feb 12, 2018             Zaina Subramanian RN

## 2018-02-07 NOTE — PROGRESS NOTES
Daily Progress Note  Neurology     LOS: 14 days     Subjective     Chief Complaint: Cough, fever    Interval History:  Remains fairly obtunded. Stirs when stimulated and nods on occasion per     ROS: Negative for fever    Objective     Vital signs in last 24 hours:  Temp:  [97.4 °F (36.3 °C)-98.3 °F (36.8 °C)] 97.7 °F (36.5 °C)  Heart Rate:  [60-77] 70  Resp:  [16-19] 16  BP: (130-151)/(73-91) 130/80      Physical Exam:   General: Lying in bed with eyes closed. In NAD.     Respiratory: Respirations unlabored   CV: RRR       Neurologic Exam:   Mental status: Lethargic       CN: PERRL, face symmetric                    Results from last 7 days  Lab Units 02/03/18  0758   WBC 10*3/mm3 6.62   HEMOGLOBIN g/dL 10.4*   HEMATOCRIT % 32.6*   PLATELETS 10*3/mm3 146*           Results Review:  Labs reviewed      Assessment/Plan     Principal Problem:    ADEM (acute disseminated encephalomyelitis) (postinfectious)  Active Problems:    Malignant neoplasm of central portion of right female breast    CHF (congestive heart failure)    Hyponatremia    Chronic anemia    Influenza A    Metabolic encephalopathy    Chronic combined systolic and diastolic heart failure    Traumatic hematoma of head    Sepsis    Renal insufficiency    Protein-calorie malnutrition    Respiratory alkalosis    Paroxysmal atrial fibrillation        1.  ADEM = On Keppra 250mg BID. Her previous EEG had rare sharps, but no electrographic seizures seen. On round 2 of Solumedrol. Will extend for a total 5-day course (day 5/5 tomorrow). Repeat MRI tomorrow. Possible 2nd round of IVIG later this week. Will repeat her routine EEG tomorrow.        Sara Rucker MD  02/07/18  3:31 PM

## 2018-02-07 NOTE — PROGRESS NOTES
[facility]  PROGRESS NOTE    Patient Name: Cassidy Teran  : 1940  MRN: [id number]    Date of Admission: 1/15/2018  Length of Stay: 14  Primary Care Physician: Luke Baum MD    Subjective   Subjective     CC:  Encephalopathy following influenza    SUBJECTIVE:  No significant change since yesterday.  Resting in bed Obtunded.  Patient does not respond nor interact.  Review of Systems  uto        Objective   Objective     Vital Signs:   Temp:  [97.4 °F (36.3 °C)-98.3 °F (36.8 °C)] 97.7 °F (36.5 °C)  Heart Rate:  [60-77] 70  Resp:  [16-19] 16  BP: (130-151)/(73-91) 130/80        Physical Exam:  Nonverbal, eyes closed, does not respond to light sternal rub.   face symmetric, pupilis grossly equal, difficult neuro exam, less tone right arm/upper extremity  rrr currently  Lungs grossly clear  abd soft, nondistended  No cce  No truncal rash  Psych:uto    Results Reviewed:  I have personally reviewed current lab, radiology, and data and agree.      Results from last 7 days  Lab Units 18  0758 18  0726   WBC 10*3/mm3 6.62 8.95   HEMOGLOBIN g/dL 10.4* 9.6*   HEMATOCRIT % 32.6* 29.7*   PLATELETS 10*3/mm3 146* 242       Results from last 7 days  Lab Units 18  0833 18  1934 18  0712  18  1642  18  0712   SODIUM mmol/L 132  --  133  --  130*  < > 127*   POTASSIUM mmol/L 3.7 3.5 3.0*  3.0*  < > 3.9  < > 4.3   CHLORIDE mmol/L 102  --  102  --  98*  < > 96*   CO2 mmol/L 25.0  --  28.0  --  27.0  < > 27.0   BUN mg/dL 12  --  15  --  12  < > 18   CREATININE mg/dL 0.30*  --  0.20*  --  0.30*  < > 0.30*   GLUCOSE mg/dL 193*  --  146*  --  143*  < > 97   CALCIUM mg/dL 7.8*  --  7.8*  --  7.9*  < > 8.4*   ALT (SGPT) U/L 105*  --  92*  --   --   --  99*   AST (SGOT) U/L 51*  --  49*  --   --   --  36*   < > = values in this interval not displayed.  Estimated Creatinine Clearance: 33.6 mL/min (by C-G formula based on Cr of 0.3).  No  results found for: BNP  No results found for: PHART    Microbiology Results Abnormal     Procedure Component Value - Date/Time    UK HSV Types 1 / 2, DNA PCR - Cerebrospinal Fluid, Lumbar Puncture [[id number]] Collected:  01/31/18 1515    Lab Status:  Final result Specimen:  Cerebrospinal Fluid from Lumbar Puncture Updated:  02/01/18 1436     Reference Lab Report See Attached Report    Cryptococcal AG, CSF - Cerebrospinal Fluid, Lumbar Puncture [[id number]]  (Normal) Collected:  01/31/18 1515    Lab Status:  Final result Specimen:  Cerebrospinal Fluid from Lumbar Puncture Updated:  02/01/18 1027     Cryptococcal Antigen, CSF Negative    Blood Culture - Blood, [[id number]]  (Normal) Collected:  01/22/18 2233    Lab Status:  Final result Specimen:  Blood from Hand, Right Updated:  01/27/18 2301     Blood Culture No growth at 5 days    Blood Culture - Blood, [[id number]]  (Normal) Collected:  01/22/18 2233    Lab Status:  Final result Specimen:  Blood from Arm, Right Updated:  01/27/18 2301     Blood Culture No growth at 5 days    Urine Culture - Urine, Urine, Clean Catch [[id number]]  (Normal) Collected:  01/23/18 1013    Lab Status:  Final result Specimen:  Urine from Urine, Clean Catch Updated:  01/25/18 0855     Urine Culture No growth at 2 days    Blood Culture - Blood, [[id number]]  (Normal) Collected:  01/15/18 1927    Lab Status:  Final result Specimen:  Blood from Arm, Left Updated:  01/20/18 2046     Blood Culture No growth at 5 days    Blood Culture - Blood, [[id number]]  (Normal) Collected:  01/15/18 1927    Lab Status:  Final result Specimen:  Blood from Arm, Right Updated:  01/20/18 2046     Blood Culture No growth at 5 days    Influenza Antigen, Rapid - Swab, Nasopharynx [[id number]]  (Abnormal) Collected:  01/15/18 1854    Lab Status:  Final result Specimen:  Swab from Nasopharynx Updated:  01/15/18 1918     Influenza A Ag, EIA Positive (A)     Influenza B Ag, EIA Negative          Imaging Results (last 24  hours)     ** No results found for the last 24 hours. **        Results for orders placed during the hospital encounter of 11/30/16   Adult Transthoracic Echo Complete    Narrative · Left ventricular function is normal. Estimated EF = 55%.  · All left ventricular wall segments contract normally.  · Mild aortic valve stenosis is present.  · Mild tricuspid valve regurgitation is present.  · There is no evidence of pericardial effusion.  · Estimated right ventricular systolic pressure from tricuspid   regurgitation is normal (<35 mmHg).  · There are myxomatous changes of the mitral valve apparatus present.          I have reviewed the medications.    Assessment/Plan   Assessment / Plan     Hospital Problem List     * (Principal)ADEM (acute disseminated encephalomyelitis) (postinfectious)    Malignant neoplasm of central portion of right female breast (Chronic)    Overview Signed 8/17/2016  9:44 AM by Jenni Izaguirre              CHF (congestive heart failure)    Hyponatremia    Chronic anemia (Chronic)    Influenza A    Metabolic encephalopathy    Chronic combined systolic and diastolic heart failure (Chronic)    Traumatic hematoma of head    Sepsis    Renal insufficiency    Protein-calorie malnutrition    Respiratory alkalosis    Paroxysmal atrial fibrillation      -----------------------------------------  *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)  *s/p Influenza A (completed rx)  *s/p sepsis/febrile illness (due to above)   -s/p empiric abx, cultures negative  *Afib w/ rvr (s/p cardioversion), now on sotalol per cards  *protein calorie malnutrition  *gen weakness/failure to thrive  *Hypokalemia  *Hyponatremia  *Hx breast cancer and mastectomies (remote hx, see above)  *Hyperglycemia (a1c 5.5)  --------------------------------------------------     Brief Hospital Course to date:  77-year-old female with a history of breast cancer treated in 2004 status post left mastectomy with another episode of  breast cancer, stage IIa in the right breast in 2015 status post mastectomy, Adriamycin, Cytoxan, Herceptin/Taxol and maintained on Arimidex who has had multiple hospital admissions in the past few months.  She had a fall at home at the end of December status post right femoral neck fracture/pinning, was discharged to rehabilitation and was readmitted on 1/15/2018 with influenza.  Since that time she has had progressive altered mental status and is currently obtunded.      Pulmonary was consulted by Dr. Moreno secondary to the patient's respiratory alkalosis.  She has had a respiratory alkalosis and actually was given Diamox.  She currently has a metabolic acidosis with a respiratory alkalosis and is overall alkalotic.      The patient is currently obtunded and unable to relay any significant history.  She seems to try to mouth answers to some questions but does not follow commands.  She moves all extremities.  She has lost a significant amount of weight.  She apparently developed a fever a few days ago and was placed on empiric antibiotics.  Cultures from that event have been negative to date.  CT scan of the head from 1/15/2015 showed age-related changes with no acute process.  CT scan of the head from January 13 was read as a small superficial hematoma in the left frontal region.     She was transferred to the ICU on 1/27 secondary to Afib w/ RVR. She was cardioverted by Cardiology. She received Versed for the cardioversion. Transferred out of icu 1/29 and picked up by hospitalists 1/30/18    ---------------------------------------------------------------------    Assessment & Plan:  * Severe encephalopathy after influenza.  Very possibly demyelinating process secondary to viral infection.  Patient remains obtunded.  She has received high-dose steroids and also IVIG.  Neurology is following. Poor prognosis.    NOTE: Had a long conversation with the patient's  at the bedside and answered his  questions.    PLAN:  - Continue current care.  - Labs in a.m.     DVT Prophylaxis:  Sq heparin    CODE STATUS: Full Code    Disposition: I expect the patient to be discharged unknown timeframe at this point  Brian Moreno MD  02/07/18  2:40 PM

## 2018-02-08 NOTE — PROGRESS NOTES
Daily Progress Note  Neurology     LOS: 15 days     Subjective     Chief Complaint: Cough, fever    Interval History:  Stirs when stimulated. She shook her head no per . Still obtunded    ROS: Negative for fever    Objective     Vital signs in last 24 hours:  Temp:  [97 °F (36.1 °C)-97.9 °F (36.6 °C)] 97.9 °F (36.6 °C)  Heart Rate:  [59-71] 71  Resp:  [16-20] 20  BP: (125-145)/(74-91) 125/79      Physical Exam:   General: Lying in bed with eyes closed. In NAD.     Respiratory: Respirations unlabored   CV: RRR       Neurologic Exam:   Mental status: Obtunded. Stirs to tactile and vocal stimuli       CN: PERRL, face symmetric                    Results from last 7 days  Lab Units 02/03/18  0758   WBC 10*3/mm3 6.62   HEMOGLOBIN g/dL 10.4*   HEMATOCRIT % 32.6*   PLATELETS 10*3/mm3 146*           Results Review:  Labs reviewed  EEG report reviewed and discussed with interpreting neurologist  Repeat MRI brain report reviewed      Assessment/Plan     Principal Problem:    ADEM (acute disseminated encephalomyelitis) (postinfectious)  Active Problems:    Malignant neoplasm of central portion of right female breast    CHF (congestive heart failure)    Hyponatremia    Chronic anemia    Influenza A    Metabolic encephalopathy    Chronic combined systolic and diastolic heart failure    Traumatic hematoma of head    Sepsis    Renal insufficiency    Protein-calorie malnutrition    Respiratory alkalosis    Paroxysmal atrial fibrillation        1.  ADEM = On Keppra 250mg BID. Her previous EEG had rare sharps, but no electrographic seizures seen. Repeat EEG was unremarkable. She completed round 2 of 5-day course Solumedrol. Repeat MRI is stable. Will start round 2 of IVIG for another 5-day course. Continue supportive care. Prognosis remains guarded at best        Sara Rucker MD  02/08/18  2:16 PM

## 2018-02-08 NOTE — PLAN OF CARE
Problem: Patient Care Overview (Adult)  Goal: Plan of Care Review  Outcome: Ongoing (interventions implemented as appropriate)   02/08/18 6753   Outcome Evaluation   Outcome Summary/Follow up Plan Restless at times, tolerating TF, purewick catheter restarted, VSS.

## 2018-02-08 NOTE — PLAN OF CARE
Problem: Patient Care Overview (Adult)  Goal: Plan of Care Review  Outcome: Ongoing (interventions implemented as appropriate)   02/07/18 1950   Coping/Psychosocial Response Interventions   Plan Of Care Reviewed With patient;spouse   Patient Care Overview   Progress no change   Outcome Evaluation   Outcome Summary/Follow up Plan VSS on RA, Pt obtunded with one word verbalization several times during shift. Right arm flaccid, right leg w/d to pain. Left arm with purposeful movement, left leg restless movement. Keofeed at 45/hr, no residual, severiano well. Low air loss bed with turn/positioning q2h. PICC right UA, no issues   02/07/18 1950   Coping/Psychosocial Response Interventions   Plan Of Care Reviewed With patient;spouse   Patient Care Overview   Progress no change   Outcome Evaluation   Outcome Summary/Follow up Plan VSS on RA, Pt obtunded with one word verbalization once during shift. Right arm flaccid, right leg w/d to pain. Left arm with purposeful movement, left leg restless movement. Keofeed at 45/hr, no residual, severiano well. Low air loss bed with turn/positioning q2h.    .        Problem: Fall Risk (Adult)  Goal: Absence of Falls  Outcome: Ongoing (interventions implemented as appropriate)   02/07/18 1950   Fall Risk (Adult)   Absence of Falls making progress toward outcome       Problem: Sepsis (Adult)  Goal: Signs and Symptoms of Listed Potential Problems Will be Absent or Manageable (Sepsis)  Outcome: Ongoing (interventions implemented as appropriate)   02/07/18 1950   Sepsis   Problems Assessed (Sepsis) all   Problems Present (Sepsis) glycemic control, impaired;malnutrition (undernutrition)       Problem: Pressure Ulcer Risk (Miguelito Scale) (Adult,Obstetrics,Pediatric)  Goal: Skin Integrity  Outcome: Ongoing (interventions implemented as appropriate)   02/07/18 1950   Pressure Ulcer Risk (Miguelito Scale) (Adult,Obstetrics,Pediatric)   Skin Integrity making progress toward outcome

## 2018-02-08 NOTE — PROGRESS NOTES
[facility] Medicine Services  PROGRESS NOTE    Patient Name: Cassidy Teran  : 1940  MRN: [id number]    Date of Admission: 1/15/2018  Length of Stay: 15  Primary Care Physician: Luke Baum MD    Subjective   Subjective     CC:  Encephalopathy following influenza    SUBJECTIVE:  No significant change since yesterday.  Resting in bed Obtunded.  Patient does not respond nor interact.  Review of Systems  uto        Objective   Objective     Vital Signs:   Temp:  [97 °F (36.1 °C)-97.9 °F (36.6 °C)] 97.9 °F (36.6 °C)  Heart Rate:  [59-71] 71  Resp:  [16-20] 20  BP: (125-145)/(74-91) 125/79        Physical Exam:  Nonverbal, eyes closed, does not respond to light sternal rub.   face symmetric, pupilis grossly equal, difficult neuro exam, less tone right arm/upper extremity  rrr currently  Lungs grossly clear  abd soft, nondistended  No cce  No truncal rash  Psych:uto    Results Reviewed:  I have personally reviewed current lab, radiology, and data and agree.      Results from last 7 days  Lab Units 18  0758   WBC 10*3/mm3 6.62   HEMOGLOBIN g/dL 10.4*   HEMATOCRIT % 32.6*   PLATELETS 10*3/mm3 146*       Results from last 7 days  Lab Units 18  0833 18  1934 18  0712  18  1642  18  0712   SODIUM mmol/L 132  --  133  --  130*  < > 127*   POTASSIUM mmol/L 3.7 3.5 3.0*  3.0*  < > 3.9  < > 4.3   CHLORIDE mmol/L 102  --  102  --  98*  < > 96*   CO2 mmol/L 25.0  --  28.0  --  27.0  < > 27.0   BUN mg/dL 12  --  15  --  12  < > 18   CREATININE mg/dL 0.30*  --  0.20*  --  0.30*  < > 0.30*   GLUCOSE mg/dL 193*  --  146*  --  143*  < > 97   CALCIUM mg/dL 7.8*  --  7.8*  --  7.9*  < > 8.4*   ALT (SGPT) U/L 105*  --  92*  --   --   --  99*   AST (SGOT) U/L 51*  --  49*  --   --   --  36*   < > = values in this interval not displayed.  Estimated Creatinine Clearance: 40.3 mL/min (by C-G formula based on Cr of 0.3).  No results found for: BNP  No results found  for: PHART    Microbiology Results Abnormal     Procedure Component Value - Date/Time    UK HSV Types 1 / 2, DNA PCR - Cerebrospinal Fluid, Lumbar Puncture [[id number]] Collected:  01/31/18 1515    Lab Status:  Final result Specimen:  Cerebrospinal Fluid from Lumbar Puncture Updated:  02/01/18 1436     Reference Lab Report See Attached Report    Cryptococcal AG, CSF - Cerebrospinal Fluid, Lumbar Puncture [[id number]]  (Normal) Collected:  01/31/18 1515    Lab Status:  Final result Specimen:  Cerebrospinal Fluid from Lumbar Puncture Updated:  02/01/18 1027     Cryptococcal Antigen, CSF Negative    Blood Culture - Blood, [[id number]]  (Normal) Collected:  01/22/18 2233    Lab Status:  Final result Specimen:  Blood from Hand, Right Updated:  01/27/18 2301     Blood Culture No growth at 5 days    Blood Culture - Blood, [[id number]]  (Normal) Collected:  01/22/18 2233    Lab Status:  Final result Specimen:  Blood from Arm, Right Updated:  01/27/18 2301     Blood Culture No growth at 5 days    Urine Culture - Urine, Urine, Clean Catch [[id number]]  (Normal) Collected:  01/23/18 1013    Lab Status:  Final result Specimen:  Urine from Urine, Clean Catch Updated:  01/25/18 0855     Urine Culture No growth at 2 days    Blood Culture - Blood, [[id number]]  (Normal) Collected:  01/15/18 1927    Lab Status:  Final result Specimen:  Blood from Arm, Left Updated:  01/20/18 2046     Blood Culture No growth at 5 days    Blood Culture - Blood, [[id number]]  (Normal) Collected:  01/15/18 1927    Lab Status:  Final result Specimen:  Blood from Arm, Right Updated:  01/20/18 2046     Blood Culture No growth at 5 days    Influenza Antigen, Rapid - Swab, Nasopharynx [[id number]]  (Abnormal) Collected:  01/15/18 1854    Lab Status:  Final result Specimen:  Swab from Nasopharynx Updated:  01/15/18 1918     Influenza A Ag, EIA Positive (A)     Influenza B Ag, EIA Negative          Imaging Results (last 24 hours)     Procedure Component Value Units  Date/Time    MRI Brain Without Contrast [[id number]] Collected:  02/08/18 1014     Updated:  02/08/18 1302    Narrative:       EXAMINATION: MRI BRAIN WO CONTRAST- 02/08/2018     INDICATION: encephalopathy, possible ADEM; A41.9-Sepsis, unspecified  organism; J10.1-Influenza due to other identified influenza virus with  other respiratory manifestations; R41.0-Disorientation, unspecified;  E86.0-Dehydration; Z74.09-Other reduced mobility; I48.1-Persistent  atrial fibrillation; I48.91-Unspecified atrial fibrillation;  R13.10-Dysphagia, unspecified         TECHNIQUE: MR datasets of the brain were performed without intravenous  contrast.     COMPARISON: Compared to previous datasets of 5 days ago, 02/03/2018.     FINDINGS:   1. Cervicomedullary junction and foramen magnum are clear. Midline  structures and pituitary gland are unremarkable.  2. There is marked mastoid fluid bilaterally. The conal contents are  unremarkable. The flow-voids are normal.  3. There is central and peripheral cortical atrophy.  4. The dominant finding is abnormal T2 and FLAIR pathologic signal  throughout the pete, the cerebral peduncles, the left thalamic nucleus,  and throughout the periventricular white matter, internal capsular  areas, forceps major forceps minor and corona radiata. This is marked  abnormal white matter confluent disease. This may represent an acute  encephalomyelitis. It does not have the typical appearance of PRES  syndrome, however, certainly would fit with regard to distribution and  signal abnormality with acute disseminated encephalomyelitis or ADEM.     When carefully compared to previous studies of 5 days ago, there has  been no change or improvement in this marked abnormal white matter  pattern on the FLAIR datasets.     5. There is no evidence of acute restricted diffusion. There is no  evidence of acute ischemic focal insult in addition to the marked global  refractory white matter disease.     Lastly, T1 datasets  are negative for hemorrhage and there is no  extracerebral collection or midline shift.       Impression:       1. Widespread markedly abnormal white matter pattern involving the pete,  cerebral peduncles, left thalamic nuclei and globally throughout the  periventricular white matter, forceps major, forceps minor, peritrigonal  areas and the corona radiata extensively. There has been no change or  improvement over the last 5 days when compared to previous MR datasets.  2. Findings are consistent with an acute inflammatory white matter  process or acute disseminated encephalomyelitis, ADEM. Also, exposure  and hypoxic insult would also need to be included in the differential  diagnosis.  3. There is no evidence of significant interval change, improvement or  progression when compared to studies of 5 days ago.     D:  02/08/2018  E:  02/08/2018         This report was finalized on 2/8/2018 1:00 PM by Dr. Chadwick Ansari MD.           Results for orders placed during the hospital encounter of 11/30/16   Adult Transthoracic Echo Complete    Narrative · Left ventricular function is normal. Estimated EF = 55%.  · All left ventricular wall segments contract normally.  · Mild aortic valve stenosis is present.  · Mild tricuspid valve regurgitation is present.  · There is no evidence of pericardial effusion.  · Estimated right ventricular systolic pressure from tricuspid   regurgitation is normal (<35 mmHg).  · There are myxomatous changes of the mitral valve apparatus present.          I have reviewed the medications.    Assessment/Plan   Assessment / Plan     Hospital Problem List     * (Principal)ADEM (acute disseminated encephalomyelitis) (postinfectious)    Malignant neoplasm of central portion of right female breast (Chronic)    Overview Signed 8/17/2016  9:44 AM by Jenni Izaguirre              CHF (congestive heart failure)    Hyponatremia    Chronic anemia (Chronic)    Influenza A    Metabolic encephalopathy    Chronic  combined systolic and diastolic heart failure (Chronic)    Traumatic hematoma of head    Sepsis    Renal insufficiency    Protein-calorie malnutrition    Respiratory alkalosis    Paroxysmal atrial fibrillation      -----------------------------------------  *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)  *s/p Influenza A (completed rx)  *s/p sepsis/febrile illness (due to above)   -s/p empiric abx, cultures negative  *Afib w/ rvr (s/p cardioversion), now on sotalol per cards  *protein calorie malnutrition  *gen weakness/failure to thrive  *Hypokalemia  *Hyponatremia  *Hx breast cancer and mastectomies (remote hx, see above)  *Hyperglycemia (a1c 5.5)  --------------------------------------------------     Brief Hospital Course to date:  77-year-old female with a history of breast cancer treated in 2004 status post left mastectomy with another episode of breast cancer, stage IIa in the right breast in 2015 status post mastectomy, Adriamycin, Cytoxan, Herceptin/Taxol and maintained on Arimidex who has had multiple hospital admissions in the past few months.  She had a fall at home at the end of December status post right femoral neck fracture/pinning, was discharged to rehabilitation and was readmitted on 1/15/2018 with influenza.  Since that time she has had progressive altered mental status and is currently obtunded.      Pulmonary was consulted by Dr. Moreno secondary to the patient's respiratory alkalosis.  She has had a respiratory alkalosis and actually was given Diamox.  She currently has a metabolic acidosis with a respiratory alkalosis and is overall alkalotic.      The patient is currently obtunded and unable to relay any significant history.  She seems to try to mouth answers to some questions but does not follow commands.  She moves all extremities.  She has lost a significant amount of weight.  She apparently developed a fever a few days ago and was placed on empiric antibiotics.  Cultures  from that event have been negative to date.  CT scan of the head from 1/15/2015 showed age-related changes with no acute process.  CT scan of the head from January 13 was read as a small superficial hematoma in the left frontal region.     She was transferred to the ICU on 1/27 secondary to Afib w/ RVR. She was cardioverted by Cardiology. She received Versed for the cardioversion. Transferred out of icu 1/29 and picked up by hospitalists 1/30/18    ---------------------------------------------------------------------    Assessment & Plan:  * Severe encephalopathy after influenza.  Very possibly demyelinating process secondary to viral infection.  Patient remains obtunded.  She has received high-dose steroids and also IVIG. Repeat MRI shows no significant improvement. Poor prognosis.    NOTE: talked to the patient's  at the bedside and answered his questions.    PLAN:  - Continue current care.  - Labs in a.m.     DVT Prophylaxis:  Sq heparin    CODE STATUS: Full Code    Disposition: I expect the patient to be discharged unknown timeframe at this point  Brian Moreno MD  02/08/18  2:54 PM

## 2018-02-09 NOTE — PROGRESS NOTES
Adult Nutrition  Assessment/PES    Patient Name:  Cassidy Teran  YOB: 1940  MRN: [id number]  Admit Date:  1/15/2018    Assessment Date:  2/9/2018    Comments:            Reason for Assessment       02/09/18 1303    Reason for Assessment    Reason For Assessment/Visit TF/PN;follow up protocol    Time Spent (min) --   25 min                Anthropometrics       02/09/18 1305    Anthropometrics    Weight --   98lb on 2/8            Labs/Tests/Procedures/Meds       02/09/18 1302    Labs/Tests/Procedures/Meds    Labs/Tests Review Reviewed;Phos   noted lab value nl yesterday but is low again today; rec cont to replace as approp per protocol                Nutrition Prescription Ordered       02/09/18 1306    Nutrition Prescription PO    Current PO Diet NPO    Nutrition Prescription EN    Product Fibersource HN    Continuous TF Goal Rate (mL/hr) --   45ml/h x 22h/d    Water flush (mL)  10 mL    Water Flush Frequency Per hour            Evaluation of Received Nutrient/Fluid Intake       02/09/18 1306    EN Evaluation    Number of Days EN Intake Evaluated 2 days    EN Average Volume Delivered (mL/day) 966 mL/day    % Goal Volume  98 %            Problem/Interventions:        Problem 1       02/09/18 1318    Nutrition Diagnoses Problem 1    Problem 1 Needs Alternate Route       Etiology (related to) --   dysphagia    Signs/Symptoms (evidenced by) SLP/Swallow eval    Swallow eval status                       Intervention Goal       02/09/18 1318    Intervention Goal    General Nutrition support treatment              Nutrition Prescription       02/09/18 1318    Nutrition Prescription EN    Enteral Prescription Continue same protocol            Education/Evaluation       02/09/18 1318    Monitor/Evaluation    Monitor Per protocol        Electronically signed by:  Nisha Perez, MS,RD,LD  02/09/18 1:18 PM

## 2018-02-09 NOTE — PROGRESS NOTES
Daily Progress Note  Neurology     LOS: 16 days     Subjective     Chief Complaint: Cough, fever    Interval History:  Stirs when stimulated. Still obtunded    ROS: Negative for fever    Objective     Vital signs in last 24 hours:  Temp:  [97.6 °F (36.4 °C)-97.9 °F (36.6 °C)] 97.6 °F (36.4 °C)  Heart Rate:  [60-76] 70  Resp:  [14-20] 20  BP: (126-147)/(65-80) 139/80      Physical Exam:   General: Lying in bed with eyes closed. In NAD.     Respiratory: Respirations unlabored   CV: RRR       Neurologic Exam:   Mental status: Obtunded. Stirs to tactile and vocal stimuli       CN: PERRL, face symmetric                    Results from last 7 days  Lab Units 02/03/18  0758   WBC 10*3/mm3 6.62   HEMOGLOBIN g/dL 10.4*   HEMATOCRIT % 32.6*   PLATELETS 10*3/mm3 146*           Results Review:  Labs reviewed    Assessment/Plan     Principal Problem:    ADEM (acute disseminated encephalomyelitis) (postinfectious)  Active Problems:    Malignant neoplasm of central portion of right female breast    CHF (congestive heart failure)    Hyponatremia    Chronic anemia    Influenza A    Metabolic encephalopathy    Chronic combined systolic and diastolic heart failure    Traumatic hematoma of head    Sepsis    Renal insufficiency    Protein-calorie malnutrition    Respiratory alkalosis    Paroxysmal atrial fibrillation        1.  ADEM = Still no further meaningful improvement. On Keppra 250mg BID. Her previous EEG had rare sharps, but no electrographic seizures seen. Repeat EEG was unremarkable. She completed round 2 of Solumedrol. Repeat MRI is stable. On day 2/5 of second round IVIG. Continue supportive care. Prognosis remains guarded at best        Sara Rucker MD  02/09/18  2:44 PM

## 2018-02-09 NOTE — PROGRESS NOTES
[facility] Medicine Services  PROGRESS NOTE    Patient Name: Cassidy Teran  : 1940  MRN: [id number]    Date of Admission: 1/15/2018  Length of Stay: 16  Primary Care Physician: Luke Baum MD    Subjective   Subjective     CC:  Encephalopathy following influenza    SUBJECTIVE:  No significant change since yesterday.  Resting in bed Obtunded.  Patient does not respond nor interacts.  Review of Systems  uto        Objective   Objective     Vital Signs:   Temp:  [97.6 °F (36.4 °C)-97.9 °F (36.6 °C)] 97.6 °F (36.4 °C)  Heart Rate:  [60-76] 70  Resp:  [14-20] 20  BP: (126-147)/(65-80) 139/80        Physical Exam:  Nonverbal, eyes closed, does not respond to light sternal rub.   face symmetric, pupilis grossly equal, difficult neuro exam, less tone right arm/upper extremity  rrr currently  Lungs grossly clear  abd soft, nondistended  No cce  No truncal rash  Psych:uto    Results Reviewed:  I have personally reviewed current lab, radiology, and data and agree.      Results from last 7 days  Lab Units 18  0758   WBC 10*3/mm3 6.62   HEMOGLOBIN g/dL 10.4*   HEMATOCRIT % 32.6*   PLATELETS 10*3/mm3 146*       Results from last 7 days  Lab Units 18  0833 18  1934 18  0712  18  1642   SODIUM mmol/L 132  --  133  --  130*   POTASSIUM mmol/L 3.7 3.5 3.0*  3.0*  < > 3.9   CHLORIDE mmol/L 102  --  102  --  98*   CO2 mmol/L 25.0  --  28.0  --  27.0   BUN mg/dL 12  --  15  --  12   CREATININE mg/dL 0.30*  --  0.20*  --  0.30*   GLUCOSE mg/dL 193*  --  146*  --  143*   CALCIUM mg/dL 7.8*  --  7.8*  --  7.9*   ALT (SGPT) U/L 105*  --  92*  --   --    AST (SGOT) U/L 51*  --  49*  --   --    < > = values in this interval not displayed.  Estimated Creatinine Clearance: 40.3 mL/min (by C-G formula based on Cr of 0.3).  No results found for: BNP  No results found for: PHART    Microbiology Results Abnormal     Procedure Component Value - Date/Time     HSV Types 1 /  2, DNA PCR - Cerebrospinal Fluid, Lumbar Puncture [[id number]] Collected:  01/31/18 1515    Lab Status:  Final result Specimen:  Cerebrospinal Fluid from Lumbar Puncture Updated:  02/01/18 1436     Reference Lab Report See Attached Report    Cryptococcal AG, CSF - Cerebrospinal Fluid, Lumbar Puncture [[id number]]  (Normal) Collected:  01/31/18 1515    Lab Status:  Final result Specimen:  Cerebrospinal Fluid from Lumbar Puncture Updated:  02/01/18 1027     Cryptococcal Antigen, CSF Negative    Blood Culture - Blood, [[id number]]  (Normal) Collected:  01/22/18 2233    Lab Status:  Final result Specimen:  Blood from Hand, Right Updated:  01/27/18 2301     Blood Culture No growth at 5 days    Blood Culture - Blood, [[id number]]  (Normal) Collected:  01/22/18 2233    Lab Status:  Final result Specimen:  Blood from Arm, Right Updated:  01/27/18 2301     Blood Culture No growth at 5 days    Urine Culture - Urine, Urine, Clean Catch [[id number]]  (Normal) Collected:  01/23/18 1013    Lab Status:  Final result Specimen:  Urine from Urine, Clean Catch Updated:  01/25/18 0855     Urine Culture No growth at 2 days    Blood Culture - Blood, [[id number]]  (Normal) Collected:  01/15/18 1927    Lab Status:  Final result Specimen:  Blood from Arm, Left Updated:  01/20/18 2046     Blood Culture No growth at 5 days    Blood Culture - Blood, [[id number]]  (Normal) Collected:  01/15/18 1927    Lab Status:  Final result Specimen:  Blood from Arm, Right Updated:  01/20/18 2046     Blood Culture No growth at 5 days    Influenza Antigen, Rapid - Swab, Nasopharynx [[id number]]  (Abnormal) Collected:  01/15/18 1854    Lab Status:  Final result Specimen:  Swab from Nasopharynx Updated:  01/15/18 1918     Influenza A Ag, EIA Positive (A)     Influenza B Ag, EIA Negative          Imaging Results (last 24 hours)     Procedure Component Value Units Date/Time    XR Chest 1 View [[id number]] Collected:  02/09/18 1444     Updated:  02/09/18 1444    Narrative:        EXAMINATION: XR CHEST 1 VW- 02/09/2018     INDICATION: congestion; A41.9-Sepsis, unspecified organism;  J10.1-Influenza due to other identified influenza virus with other  respiratory manifestations; R41.0-Disorientation, unspecified;  E86.0-Dehydration; Z74.09-Other reduced mobility; Z74.09-Other reduced  mobility; I48.1-Persistent atrial fibrillation; I48.91-Unspecified  atrial fibrillation; R13.10-Dysphagia, unspecified      COMPARISON: 02/02/2018     FINDINGS: Feeding tube is seen below the left hemidiaphragm. Right upper  extremity PICC line is seen with its tip in the mid SVC. The heart and  pulmonary vasculature appear normal in size. There is mild coarsening of  the  basilar interstitial markings, but increased from the previous  study. In particular, there may be very early airspace disease in the  right base. No densely consolidated lung effusion or pneumothorax is  seen.        Impression:       Mildly increased interstitial disease of the lower lungs,  right greater than left. Lungs remain grossly clear elsewhere.     D:  02/09/2018  E:  02/09/2018              Results for orders placed during the hospital encounter of 11/30/16   Adult Transthoracic Echo Complete    Narrative · Left ventricular function is normal. Estimated EF = 55%.  · All left ventricular wall segments contract normally.  · Mild aortic valve stenosis is present.  · Mild tricuspid valve regurgitation is present.  · There is no evidence of pericardial effusion.  · Estimated right ventricular systolic pressure from tricuspid   regurgitation is normal (<35 mmHg).  · There are myxomatous changes of the mitral valve apparatus present.          I have reviewed the medications.    Assessment/Plan   Assessment / Plan     Hospital Problem List     * (Principal)ADEM (acute disseminated encephalomyelitis) (postinfectious)    Malignant neoplasm of central portion of right female breast (Chronic)    Overview Signed 8/17/2016  9:44 AM by  Jenni Izaguirre              CHF (congestive heart failure)    Hyponatremia    Chronic anemia (Chronic)    Influenza A    Metabolic encephalopathy    Chronic combined systolic and diastolic heart failure (Chronic)    Traumatic hematoma of head    Sepsis    Renal insufficiency    Protein-calorie malnutrition    Respiratory alkalosis    Paroxysmal atrial fibrillation      -----------------------------------------  *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)  *s/p Influenza A (completed rx)  *s/p sepsis/febrile illness (due to above)   -s/p empiric abx, cultures negative  *Afib w/ rvr (s/p cardioversion), now on sotalol per cards  *protein calorie malnutrition  *gen weakness/failure to thrive  *Hypokalemia  *Hyponatremia  *Hx breast cancer and mastectomies (remote hx, see above)  *Hyperglycemia (a1c 5.5)  --------------------------------------------------     Brief Hospital Course to date:  77-year-old female with a history of breast cancer treated in 2004 status post left mastectomy with another episode of breast cancer, stage IIa in the right breast in 2015 status post mastectomy, Adriamycin, Cytoxan, Herceptin/Taxol and maintained on Arimidex who has had multiple hospital admissions in the past few months.  She had a fall at home at the end of December status post right femoral neck fracture/pinning, was discharged to rehabilitation and was readmitted on 1/15/2018 with influenza.  Since that time she has had progressive altered mental status and is currently obtunded.      Pulmonary was consulted by Dr. Moreno secondary to the patient's respiratory alkalosis.  She has had a respiratory alkalosis and actually was given Diamox.  She currently has a metabolic acidosis with a respiratory alkalosis and is overall alkalotic.      The patient is currently obtunded and unable to relay any significant history.  She seems to try to mouth answers to some questions but does not follow commands.  She moves all  extremities.  She has lost a significant amount of weight.  She apparently developed a fever a few days ago and was placed on empiric antibiotics.  Cultures from that event have been negative to date.  CT scan of the head from 1/15/2015 showed age-related changes with no acute process.  CT scan of the head from January 13 was read as a small superficial hematoma in the left frontal region.     She was transferred to the ICU on 1/27 secondary to Afib w/ RVR. She was cardioverted by Cardiology. She received Versed for the cardioversion. Transferred out of icu 1/29 and picked up by hospitalists 1/30/18    ---------------------------------------------------------------------    Assessment & Plan:  * Severe encephalopathy after influenza.  Very possibly demyelinating process secondary to viral infection.  Patient remains obtunded.  She has received high-dose steroids and also IVIG. Repeat MRI shows no significant improvement. Poor prognosis.    NOTE: again talked to the patient's  at the bedside and answered his questions.    PLAN:  - Continue current care.  - replace electrolytes  - Labs in a.m.     DVT Prophylaxis:  Sq heparin    CODE STATUS: Full Code    Disposition: I expect the patient to be discharged unknown timeframe at this point  Brian Moreno MD  02/09/18  3:21 PM

## 2018-02-09 NOTE — PROGRESS NOTES
Continued Stay Note   Steph     Patient Name: Cassidy Teran  MRN: [id number]  Today's Date: 2/9/2018    Admit Date: 1/15/2018          Discharge Plan       02/09/18 1428    Case Management/Social Work Plan    Plan Dependent upon Medical Condition    Patient/Family In Agreement With Plan yes    Additional Comments Per chart, patient's condition remains guarded at best and supportive care is being continued.  Patient has completed second round of IV steroids with not a lot of change in condition.  Neuro to trial second round og IVIG.  Patient still with keofeed.  CM will continue to follow and make appropriate referrals closer to discharge.  Zaina Subramanian RN [phone]              Discharge Codes     None        Expected Discharge Date and Time     Expected Discharge Date Expected Discharge Time    Feb 16, 2018             Zaina Subramanian RN

## 2018-02-09 NOTE — PLAN OF CARE
Problem: Patient Care Overview (Adult)  Goal: Plan of Care Review  Outcome: Ongoing (interventions implemented as appropriate)   02/09/18 0247   Outcome Evaluation   Outcome Summary/Follow up Plan pt stable this shift. Tolerating TF well. No s/s of pain/discomfort.      Goal: Adult Individualization and Mutuality  Outcome: Ongoing (interventions implemented as appropriate)    Goal: Discharge Needs Assessment  Outcome: Ongoing (interventions implemented as appropriate)      Problem: Fall Risk (Adult)  Goal: Absence of Falls  Outcome: Ongoing (interventions implemented as appropriate)      Problem: Sepsis (Adult)  Goal: Signs and Symptoms of Listed Potential Problems Will be Absent or Manageable (Sepsis)  Outcome: Ongoing (interventions implemented as appropriate)      Problem: Pressure Ulcer Risk (Miguelito Scale) (Adult,Obstetrics,Pediatric)  Goal: Skin Integrity  Outcome: Ongoing (interventions implemented as appropriate)      Problem: SAFETY - NON-VIOLENT RESTRAINT  Goal: Remains free of injury from restraints (Non-Violent Restraint)  Outcome: Ongoing (interventions implemented as appropriate)    Goal: Free from restraint(s) (Non-Violent Restraint)  Outcome: Ongoing (interventions implemented as appropriate)

## 2018-02-09 NOTE — NURSING NOTE
Waiting for family to decide if they want to allow nursing to put down a keeofeed tube for nutrition and meds. Awaiting family decision before proceeding.

## 2018-02-09 NOTE — PLAN OF CARE
Problem: Patient Care Overview (Adult)  Goal: Plan of Care Review  Outcome: Ongoing (interventions implemented as appropriate)      Problem: Fall Risk (Adult)  Goal: Absence of Falls  Outcome: Ongoing (interventions implemented as appropriate)      Problem: Sepsis (Adult)  Goal: Signs and Symptoms of Listed Potential Problems Will be Absent or Manageable (Sepsis)  Outcome: Ongoing (interventions implemented as appropriate)      Problem: Pressure Ulcer Risk (Miguelito Scale) (Adult,Obstetrics,Pediatric)  Goal: Skin Integrity  Outcome: Ongoing (interventions implemented as appropriate)

## 2018-02-10 NOTE — NURSING NOTE
Patient not in mitt restraint when I arrived this morning.  No problems with her pulling at tubes, lines, or clothing today.  Discontinued restraint order and informed patient's .

## 2018-02-10 NOTE — PROGRESS NOTES
[facility]  PROGRESS NOTE    Patient Name: Cassidy Teran  : 1940  MRN: [id number]    Date of Admission: 1/15/2018  Length of Stay: 17  Primary Care Physician: Luke Baum MD    Subjective   Subjective     CC:  Encephalopathy following influenza    SUBJECTIVE:  No significant change since yesterday.  Resting in bed Obtunded.  Patient does not respond nor interacts.  Review of Systems  uto        Objective   Objective     Vital Signs:   Temp:  [97.7 °F (36.5 °C)-99.6 °F (37.6 °C)] 98 °F (36.7 °C)  Heart Rate:  [76-88] 76  Resp:  [16-18] 16  BP: (117-167)/(62-95) 117/62        Physical Exam:  Nonverbal, eyes closed, does not respond to light sternal rub.   face symmetric, pupilis grossly equal, difficult neuro exam, less tone right arm/upper extremity  rrr currently  Lungs grossly clear  abd soft, nondistended  No cce  No truncal rash  Psych:uto    Results Reviewed:  I have personally reviewed current lab, radiology, and data and agree.      Results from last 7 days  Lab Units 02/10/18  0739 18  1612   WBC 10*3/mm3 6.99 11.06*   HEMOGLOBIN g/dL 9.0* 11.7   HEMATOCRIT % 27.3* 35.5   PLATELETS 10*3/mm3 92* 109*       Results from last 7 days  Lab Units 02/10/18  0739 18  1612 18  0833   SODIUM mmol/L 129* 130* 132   POTASSIUM mmol/L 3.3* 3.3* 3.7   CHLORIDE mmol/L 99 98* 102   CO2 mmol/L 28.0 24.0 25.0   BUN mg/dL 13 14 12   CREATININE mg/dL 0.20* 0.20* 0.30*   GLUCOSE mg/dL 129* 125* 193*   CALCIUM mg/dL 6.9* 7.8* 7.8*   ALT (SGPT) U/L 93* 121* 105*   AST (SGOT) U/L 41* 61* 51*     Estimated Creatinine Clearance: 40.3 mL/min (by C-G formula based on Cr of 0.2).  No results found for: BNP  No results found for: PHART    Microbiology Results Abnormal     Procedure Component Value - Date/Time     HSV Types 1 / 2, DNA PCR - Cerebrospinal Fluid, Lumbar Puncture [[id number]] Collected:  18 5638    Lab Status:  Final result Specimen:   Cerebrospinal Fluid from Lumbar Puncture Updated:  02/01/18 1436     Reference Lab Report See Attached Report    Cryptococcal AG, CSF - Cerebrospinal Fluid, Lumbar Puncture [[id number]]  (Normal) Collected:  01/31/18 1515    Lab Status:  Final result Specimen:  Cerebrospinal Fluid from Lumbar Puncture Updated:  02/01/18 1027     Cryptococcal Antigen, CSF Negative    Blood Culture - Blood, [[id number]]  (Normal) Collected:  01/22/18 2233    Lab Status:  Final result Specimen:  Blood from Hand, Right Updated:  01/27/18 2301     Blood Culture No growth at 5 days    Blood Culture - Blood, [[id number]]  (Normal) Collected:  01/22/18 2233    Lab Status:  Final result Specimen:  Blood from Arm, Right Updated:  01/27/18 2301     Blood Culture No growth at 5 days    Urine Culture - Urine, Urine, Clean Catch [[id number]]  (Normal) Collected:  01/23/18 1013    Lab Status:  Final result Specimen:  Urine from Urine, Clean Catch Updated:  01/25/18 0855     Urine Culture No growth at 2 days    Blood Culture - Blood, [[id number]]  (Normal) Collected:  01/15/18 1927    Lab Status:  Final result Specimen:  Blood from Arm, Left Updated:  01/20/18 2046     Blood Culture No growth at 5 days    Blood Culture - Blood, [[id number]]  (Normal) Collected:  01/15/18 1927    Lab Status:  Final result Specimen:  Blood from Arm, Right Updated:  01/20/18 2046     Blood Culture No growth at 5 days    Influenza Antigen, Rapid - Swab, Nasopharynx [[id number]]  (Abnormal) Collected:  01/15/18 1854    Lab Status:  Final result Specimen:  Swab from Nasopharynx Updated:  01/15/18 1918     Influenza A Ag, EIA Positive (A)     Influenza B Ag, EIA Negative          Imaging Results (last 24 hours)     Procedure Component Value Units Date/Time    XR Chest 1 View [[id number]] Collected:  02/09/18 1652     Updated:  02/09/18 1804    Narrative:       EXAMINATION: XR CHEST 1 VW- 02/09/2018     INDICATION: A41.9-Sepsis, unspecified organism; J10.1-Influenza due to  other  identified influenza virus with other respiratory manifestations;  R41.0-Disorientation, unspecified; E86.0-Dehydration; Z74.09-Other  reduced mobility; Z74.09-Other reduced mobility; I48.1-Persistent atrial  fibrillation; I48.91-Unspecified atrial fibrillation; R13.10-Dysphagia,  unspecified      COMPARISON: 02/09/2018     FINDINGS: There is a normal cardiac silhouette. There is no pulmonary  inflammatory process, mass or effusion. A PICC is well positioned.           Impression:       There are chronic pulmonary changes. There is no acute  process.     D:  02/09/2018  E:  02/09/2018     This report was finalized on 2/9/2018 6:01 PM by Dr. Ze Espitia MD.       XR Chest 1 View [[id number]] Collected:  02/09/18 1444     Updated:  02/09/18 2259    Narrative:       EXAMINATION: XR CHEST 1 VW- 02/09/2018     INDICATION: congestion; A41.9-Sepsis, unspecified organism;  J10.1-Influenza due to other identified influenza virus with other  respiratory manifestations; R41.0-Disorientation, unspecified;  E86.0-Dehydration; Z74.09-Other reduced mobility; Z74.09-Other reduced  mobility; I48.1-Persistent atrial fibrillation; I48.91-Unspecified  atrial fibrillation; R13.10-Dysphagia, unspecified      COMPARISON: 02/02/2018     FINDINGS: Feeding tube is seen below the left hemidiaphragm. Right upper  extremity PICC line is seen with its tip in the mid SVC. The heart and  pulmonary vasculature appear normal in size. There is mild coarsening of  the  basilar interstitial markings, but increased from the previous  study. In particular, there may be very early airspace disease in the  right base. No densely consolidated lung effusion or pneumothorax is  seen.        Impression:       Mildly increased interstitial disease of the lower lungs,  right greater than left. Lungs remain grossly clear elsewhere.     D:  02/09/2018  E:  02/09/2018     This report was finalized on 2/9/2018 10:56 PM by DR. Slade Ambriz MD.           Results for  orders placed during the hospital encounter of 11/30/16   Adult Transthoracic Echo Complete    Narrative · Left ventricular function is normal. Estimated EF = 55%.  · All left ventricular wall segments contract normally.  · Mild aortic valve stenosis is present.  · Mild tricuspid valve regurgitation is present.  · There is no evidence of pericardial effusion.  · Estimated right ventricular systolic pressure from tricuspid   regurgitation is normal (<35 mmHg).  · There are myxomatous changes of the mitral valve apparatus present.          I have reviewed the medications.    Assessment/Plan   Assessment / Plan     Hospital Problem List     * (Principal)ADEM (acute disseminated encephalomyelitis) (postinfectious)    Malignant neoplasm of central portion of right female breast (Chronic)    Overview Signed 8/17/2016  9:44 AM by Jenni Izaguirre              CHF (congestive heart failure)    Hyponatremia    Chronic anemia (Chronic)    Influenza A    Metabolic encephalopathy    Chronic combined systolic and diastolic heart failure (Chronic)    Traumatic hematoma of head    Sepsis    Renal insufficiency    Protein-calorie malnutrition    Respiratory alkalosis    Paroxysmal atrial fibrillation      -----------------------------------------  *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)  *s/p Influenza A (completed rx)  *s/p sepsis/febrile illness (due to above)   -s/p empiric abx, cultures negative  *Afib w/ rvr (s/p cardioversion), now on sotalol per cards  *protein calorie malnutrition  *gen weakness/failure to thrive  *Hypokalemia  *Hyponatremia  *Hx breast cancer and mastectomies (remote hx, see above)  *Hyperglycemia (a1c 5.5)  --------------------------------------------------     Brief Hospital Course to date:  77-year-old female with a history of breast cancer treated in 2004 status post left mastectomy with another episode of breast cancer, stage IIa in the right breast in 2015 status post mastectomy,  Adriamycin, Cytoxan, Herceptin/Taxol and maintained on Arimidex who has had multiple hospital admissions in the past few months.  She had a fall at home at the end of December status post right femoral neck fracture/pinning, was discharged to rehabilitation and was readmitted on 1/15/2018 with influenza.  Since that time she has had progressive altered mental status and is currently obtunded.      Pulmonary was consulted by Dr. Moreno secondary to the patient's respiratory alkalosis.  She has had a respiratory alkalosis and actually was given Diamox.  She currently has a metabolic acidosis with a respiratory alkalosis and is overall alkalotic.      The patient is currently obtunded and unable to relay any significant history.  She seems to try to mouth answers to some questions but does not follow commands.  She moves all extremities.  She has lost a significant amount of weight.  She apparently developed a fever a few days ago and was placed on empiric antibiotics.  Cultures from that event have been negative to date.  CT scan of the head from 1/15/2015 showed age-related changes with no acute process.  CT scan of the head from January 13 was read as a small superficial hematoma in the left frontal region.     She was transferred to the ICU on 1/27 secondary to Afib w/ RVR. She was cardioverted by Cardiology. She received Versed for the cardioversion. Transferred out of icu 1/29 and picked up by hospitalists 1/30/18    ---------------------------------------------------------------------    Assessment & Plan:  * Severe encephalopathy after influenza.  Very possibly demyelinating process secondary to viral infection.  Patient remains obtunded.  She has received high-dose steroids and also IVIG. Repeat MRI shows no significant improvement. Poor prognosis.    NOTE: Had a conversation with the patient's  at the bedside and answered his questions.  Gradually he is accepting the fact that everything that could  be done is being done and unfortunately we don't see the desirable results.    PLAN:  - Continue current care.  - replace electrolytes  - Labs in a.m.     DVT Prophylaxis:  Sq heparin    CODE STATUS: Full Code    Disposition: I expect the patient to be discharged unknown timeframe at this point  Brian Moreno MD  02/10/18  4:15 PM

## 2018-02-10 NOTE — PROGRESS NOTES
"Neurology       Patient Care Team:  Luke Baum MD as PCP - General  Neftali Denise MD as Consulting Physician (General Surgery)    Chief complaint encephalopathy        Subjective .     History:  Pt obtunded, unable to give hx. Chart reviewed, d/w Dr Rucker.    ROS: not obtainable    Objective     Vital Signs   Blood pressure 126/70, pulse 76, temperature 98.4 °F (36.9 °C), temperature source Axillary, resp. rate 16, height 154.9 cm (61\"), weight 44.8 kg (98 lb 11.2 oz), SpO2 96 %, not currently breastfeeding.    Physical Exam:              Neuro: elderly ww in nad, asleep, but does not respond to verbal or tactile stim. Does not open eyes. When eyes passively opened, and head turn for dolls reflex, then looks to left, but does not regard  Follows no verbal commands  No limb mvmt elicited  Tone OK  Toes bilaterally upgoing on plantar stim    Results Review:              Brain MRI personally reviewed -- dense increase signal on T2 and FLAIR in pete, cerebellar peduncles, midbrain, and some in thalami, no signal change on DWI/ADC.  Labs reviewed -- sodium 129, cr 0.2, pt 92    Assessment/Plan     ADEM - postinfectious brainstem signal abnormality -- remains obtunded, no response to solumedrol, IVIG. Worsening hyponatremia, lack of response to tx and degree of abnormality on scan all very concerning for extremely poor prognosis.      Kayla Pleitez MD  02/10/18  4:49 PM    "

## 2018-02-11 NOTE — PLAN OF CARE
Problem: Pressure Ulcer Risk (Miguelito Scale) (Adult,Obstetrics,Pediatric)  Goal: Skin Integrity  Outcome: Ongoing (interventions implemented as appropriate)   02/11/18 1536   Pressure Ulcer Risk (Miguelito Scale) (Adult,Obstetrics,Pediatric)   Skin Integrity making progress toward outcome

## 2018-02-11 NOTE — PLAN OF CARE
Problem: Patient Care Overview (Adult)  Goal: Plan of Care Review  Outcome: Ongoing (interventions implemented as appropriate)  Patient on 4L NC at start of shift. Patient 02 saturation dropped to mid 80's, audibly wheezing, respiratory called for PRN nebulizer, deep suctioning preformed, placed on nonrebreather for approximately 10minutes. After deep suctioning patients 02 saturation improved. Called nurse practioner to update and obtained scheduled nebulizing treatments. Patient has PRN nebulizing treatments as well. Patient back to 2L NC. Have deep suctioned several times throughout shift. Tube feeding stopped due to aspiration concerns. Nurse practitioner ordered chest xray, awaiting results.  Goal: Discharge Needs Assessment  Outcome: Ongoing (interventions implemented as appropriate)      Problem: Fall Risk (Adult)  Goal: Absence of Falls  Outcome: Ongoing (interventions implemented as appropriate)      Problem: Sepsis (Adult)  Goal: Signs and Symptoms of Listed Potential Problems Will be Absent or Manageable (Sepsis)  Outcome: Ongoing (interventions implemented as appropriate)      Problem: Pressure Ulcer Risk (Miguelito Scale) (Adult,Obstetrics,Pediatric)  Goal: Skin Integrity  Outcome: Ongoing (interventions implemented as appropriate)

## 2018-02-11 NOTE — PROGRESS NOTES
[facility] Medicine Services  PROGRESS NOTE    Patient Name: Cassidy Teran  : 1940  MRN: [id number]    Date of Admission: 1/15/2018  Length of Stay: 18  Primary Care Physician: Luke Baum MD    Subjective   Subjective     CC:  Encephalopathy following influenza    SUBJECTIVE:  No significant change since yesterday.  Resting in bed Obtunded.  Patient does not respond nor interacts.  Review of Systems  uto        Objective   Objective     Vital Signs:   Temp:  [97.9 °F (36.6 °C)-99.5 °F (37.5 °C)] 99.5 °F (37.5 °C)  Heart Rate:  [71-93] 76  Resp:  [16-20] 16  BP: (126-151)/(65-81) 135/65        Physical Exam:  Nonverbal, eyes closed, does not respond to light sternal rub.   face symmetric, pupilis grossly equal, difficult neuro exam, less tone right arm/upper extremity  rrr currently  Lungs grossly clear  abd soft, nondistended  No cce  No truncal rash  Psych:uto    Results Reviewed:  I have personally reviewed current lab, radiology, and data and agree.      Results from last 7 days  Lab Units 02/10/18  0739 18  1612   WBC 10*3/mm3 6.99 11.06*   HEMOGLOBIN g/dL 9.0* 11.7   HEMATOCRIT % 27.3* 35.5   PLATELETS 10*3/mm3 92* 109*       Results from last 7 days  Lab Units 02/10/18  1934 02/10/18  0739 18  1612 18  0833   SODIUM mmol/L  --  129* 130* 132   POTASSIUM mmol/L 4.7 3.3* 3.3* 3.7   CHLORIDE mmol/L  --  99 98* 102   CO2 mmol/L  --  28.0 24.0 25.0   BUN mg/dL  --  13 14 12   CREATININE mg/dL  --  0.20* 0.20* 0.30*   GLUCOSE mg/dL  --  129* 125* 193*   CALCIUM mg/dL  --  6.9* 7.8* 7.8*   ALT (SGPT) U/L  --  93* 121* 105*   AST (SGOT) U/L  --  41* 61* 51*     Estimated Creatinine Clearance: 40.3 mL/min (by C-G formula based on Cr of 0.2).  No results found for: BNP  No results found for: PHART    Microbiology Results Abnormal     Procedure Component Value - Date/Time     HSV Types 1 / 2, DNA PCR - Cerebrospinal Fluid, Lumbar Puncture [[id number]]  Collected:  01/31/18 1515    Lab Status:  Final result Specimen:  Cerebrospinal Fluid from Lumbar Puncture Updated:  02/01/18 1436     Reference Lab Report See Attached Report    Cryptococcal AG, CSF - Cerebrospinal Fluid, Lumbar Puncture [[id number]]  (Normal) Collected:  01/31/18 1515    Lab Status:  Final result Specimen:  Cerebrospinal Fluid from Lumbar Puncture Updated:  02/01/18 1027     Cryptococcal Antigen, CSF Negative    Blood Culture - Blood, [[id number]]  (Normal) Collected:  01/22/18 2233    Lab Status:  Final result Specimen:  Blood from Hand, Right Updated:  01/27/18 2301     Blood Culture No growth at 5 days    Blood Culture - Blood, [[id number]]  (Normal) Collected:  01/22/18 2233    Lab Status:  Final result Specimen:  Blood from Arm, Right Updated:  01/27/18 2301     Blood Culture No growth at 5 days    Urine Culture - Urine, Urine, Clean Catch [[id number]]  (Normal) Collected:  01/23/18 1013    Lab Status:  Final result Specimen:  Urine from Urine, Clean Catch Updated:  01/25/18 0855     Urine Culture No growth at 2 days    Blood Culture - Blood, [[id number]]  (Normal) Collected:  01/15/18 1927    Lab Status:  Final result Specimen:  Blood from Arm, Left Updated:  01/20/18 2046     Blood Culture No growth at 5 days    Blood Culture - Blood, [[id number]]  (Normal) Collected:  01/15/18 1927    Lab Status:  Final result Specimen:  Blood from Arm, Right Updated:  01/20/18 2046     Blood Culture No growth at 5 days    Influenza Antigen, Rapid - Swab, Nasopharynx [[id number]]  (Abnormal) Collected:  01/15/18 1854    Lab Status:  Final result Specimen:  Swab from Nasopharynx Updated:  01/15/18 1918     Influenza A Ag, EIA Positive (A)     Influenza B Ag, EIA Negative          Imaging Results (last 24 hours)     Procedure Component Value Units Date/Time    XR Chest 1 View [[id number]] Collected:  02/11/18 0301     Updated:  02/11/18 0505    Narrative:       EXAM:    XR Chest, 1 View    CLINICAL HISTORY:    77  years old, female; Signs and symptoms; Other: Possible aspiration    TECHNIQUE:    Frontal view of the chest.    COMPARISON:    CR - XR CHEST 1 VW 2018-02-09 15:45    FINDINGS:    Confluent and focal air space opacities bilaterally with worsening compared   to prior. Blunting of the LEFT costophrenic angle suggestive of small pleural   effusion/thickening.  Heart size is normal, cental pulmonary vascular   congestion.  Calcification and unfolding of the aortic arch.        Tip of NASOGASTRIC/ENTERIC tube is not visualized and extends beyond the   gastroesophageal junction into the LEFT upper abdomen.  Right-sided PICC line   with its tip in the SVC.       Impression:         Significant worsening consolidation/pneumonia in the lungs bilaterally.      Possibility of associated CHF with pulmonary edema cannot be excluded.    Recommend followup to complete resolution.       THIS DOCUMENT HAS BEEN ELECTRONICALLY SIGNED BY SARA BAZZI MD        Results for orders placed during the hospital encounter of 11/30/16   Adult Transthoracic Echo Complete    Narrative · Left ventricular function is normal. Estimated EF = 55%.  · All left ventricular wall segments contract normally.  · Mild aortic valve stenosis is present.  · Mild tricuspid valve regurgitation is present.  · There is no evidence of pericardial effusion.  · Estimated right ventricular systolic pressure from tricuspid   regurgitation is normal (<35 mmHg).  · There are myxomatous changes of the mitral valve apparatus present.          I have reviewed the medications.    Assessment/Plan   Assessment / Plan     Hospital Problem List     * (Principal)ADEM (acute disseminated encephalomyelitis) (postinfectious)    Malignant neoplasm of central portion of right female breast (Chronic)    Overview Signed 8/17/2016  9:44 AM by Jenni Izaguirre              CHF (congestive heart failure)    Hyponatremia    Chronic anemia (Chronic)    Influenza A    Metabolic encephalopathy     Chronic combined systolic and diastolic heart failure (Chronic)    Traumatic hematoma of head    Sepsis    Renal insufficiency    Protein-calorie malnutrition    Respiratory alkalosis    Paroxysmal atrial fibrillation      -----------------------------------------  *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)  *s/p Influenza A (completed rx)  *s/p sepsis/febrile illness (due to above)   -s/p empiric abx, cultures negative  *Afib w/ rvr (s/p cardioversion), now on sotalol per cards  *protein calorie malnutrition  *gen weakness/failure to thrive  *Hypokalemia  *Hyponatremia  *Hx breast cancer and mastectomies (remote hx, see above)  *Hyperglycemia (a1c 5.5)  --------------------------------------------------     Brief Hospital Course to date:  77-year-old female with a history of breast cancer treated in 2004 status post left mastectomy with another episode of breast cancer, stage IIa in the right breast in 2015 status post mastectomy, Adriamycin, Cytoxan, Herceptin/Taxol and maintained on Arimidex who has had multiple hospital admissions in the past few months.  She had a fall at home at the end of December status post right femoral neck fracture/pinning, was discharged to rehabilitation and was readmitted on 1/15/2018 with influenza.  Since that time she has had progressive altered mental status and is currently obtunded.      Pulmonary was consulted by Dr. Moreno secondary to the patient's respiratory alkalosis.  She has had a respiratory alkalosis and actually was given Diamox.  She currently has a metabolic acidosis with a respiratory alkalosis and is overall alkalotic.      The patient is currently obtunded and unable to relay any significant history.  She seems to try to mouth answers to some questions but does not follow commands.  She moves all extremities.  She has lost a significant amount of weight.  She apparently developed a fever a few days ago and was placed on empiric  antibiotics.  Cultures from that event have been negative to date.  CT scan of the head from 1/15/2015 showed age-related changes with no acute process.  CT scan of the head from January 13 was read as a small superficial hematoma in the left frontal region.     She was transferred to the ICU on 1/27 secondary to Afib w/ RVR. She was cardioverted by Cardiology. She received Versed for the cardioversion. Transferred out of icu 1/29 and picked up by hospitalists 1/30/18    ---------------------------------------------------------------------    Assessment & Plan:  * Severe encephalopathy after influenza.  Very possibly demyelinating process secondary to viral infection.  Patient remains obtunded.  She has received high-dose steroids and also IVIG. Repeat MRI shows no significant improvement. Poor prognosis.    NOTE: Had a conversation with the patient's  at the bedside and answered all of his questions.     PLAN:  - Continue current care.  - replace electrolytes  - Labs in a.m.     DVT Prophylaxis:  Sq heparin    CODE STATUS: Full Code    Disposition: I expect the patient to be discharged unknown timeframe at this point  Brian Moreno MD  02/11/18  4:00 PM

## 2018-02-11 NOTE — PROGRESS NOTES
"Neurology       Patient Care Team:  Luke Baum MD as PCP - General  Neftali Denise MD as Consulting Physician (General Surgery)    Chief complaint unresponsive; ADEM      Subjective .     History:   hoping she will \"pop out\" of this. He states that on Wednesday (2/7) he asked if she could hear him, and she said \"yes, I can.\" He also reports she is moving her legs at times. Nurse of past two days has not observed this, but has seen slight shoulder mvmt when apparently uncomfortable, and being turned.  On day 4 of second course of IVIg    ROS: not obtainable    Objective     Vital Signs   Blood pressure 135/65, pulse 72, temperature 99.5 °F (37.5 °C), temperature source Axillary, resp. rate 16, height 154.9 cm (61\"), weight 44.8 kg (98 lb 11.2 oz), SpO2 96 %, not currently breastfeeding.    Physical Exam:              Neuro: elderly ww lying with eyes closed, unresponsive to voice. No definite response to painful stim  Pupils 2.5mm with passive eye opening, minimal crossing of midline with dolls; neck flaccid  Limbs flaccid    Results Review:              Mg 1.8, phos 2.2    Assessment/Plan     ADEM with marked upper brainstem and bithalamic involvement, no response to steroids, IVIg. D/w  at length, who remains hopeful. Discussed poor prognosis, potential for her being locked in if she recovered that far. Discussed etiology, nature of illness, likely terminal.    I discussed the patients findings and my recommendations with family and nursing staff    Kayla Pleitez MD  02/11/18  11:52 AM    "

## 2018-02-12 PROBLEM — N28.9 RENAL INSUFFICIENCY: Status: RESOLVED | Noted: 2018-01-01 | Resolved: 2018-01-01

## 2018-02-12 PROBLEM — E87.3 RESPIRATORY ALKALOSIS: Status: RESOLVED | Noted: 2018-01-01 | Resolved: 2018-01-01

## 2018-02-12 PROBLEM — A41.9 SEPSIS (HCC): Status: RESOLVED | Noted: 2018-01-01 | Resolved: 2018-01-01

## 2018-02-12 PROBLEM — I50.42 CHRONIC COMBINED SYSTOLIC AND DIASTOLIC HEART FAILURE (HCC): Chronic | Status: RESOLVED | Noted: 2018-01-01 | Resolved: 2018-01-01

## 2018-02-12 PROBLEM — G93.41 METABOLIC ENCEPHALOPATHY: Status: RESOLVED | Noted: 2018-01-01 | Resolved: 2018-01-01

## 2018-02-12 NOTE — PROGRESS NOTES
Continued Stay Note  [facility]     Patient Name: Cassidy Teran  MRN: [id number]  Today's Date: 2/12/2018    Admit Date: 1/15/2018          Discharge Plan       02/12/18 1455    Case Management/Social Work Plan    Plan Dependent upon Medical Condition    Additional Comments Per chart, patient's condition unchanged.  Supportive care continues.  RRT called today and patient was placed on BiPap.  Palliative consulted and code status discussed.  Keofeed clotted off earlier in the day and was removed.  Plans to discuss goals of care.  CM will continue to follow.   Zaina Subramanian RN [phone]              Discharge Codes     None        Expected Discharge Date and Time     Expected Discharge Date Expected Discharge Time    Feb 16, 2018             Zaina Subramanian RN

## 2018-02-12 NOTE — PROGRESS NOTES
Daily Progress Note  Neurology     LOS: 19 days     Subjective     Chief Complaint: Cough, fever    Interval History:  Remains obtunded. Developed respiratory arrest and Afib with RVR this AM    ROS: Negative for fever    Objective     Vital signs in last 24 hours:  Temp:  [97.7 °F (36.5 °C)-99.5 °F (37.5 °C)] 99.5 °F (37.5 °C)  Heart Rate:  [] 80  Resp:  [14-22] 20  BP: ()/(62-99) 145/85      Physical Exam:   General: Lying in bed with eyes closed. In NAD.     Respiratory: respirations somewhat labored, on BiPAP   CV: RRR       Neurologic Exam:   Mental status: Remains obtunded      CN: PERRL                    Results from last 7 days  Lab Units 02/10/18  0739   WBC 10*3/mm3 6.99   HEMOGLOBIN g/dL 9.0*   HEMATOCRIT % 27.3*   PLATELETS 10*3/mm3 92*           Results Review:  Labs reviewed    Assessment/Plan     Principal Problem:    ADEM (acute disseminated encephalomyelitis) (postinfectious)  Active Problems:    Malignant neoplasm of central portion of right female breast    CHF (congestive heart failure)    Hyponatremia    Chronic anemia    Influenza A    Metabolic encephalopathy    Chronic combined systolic and diastolic heart failure    Traumatic hematoma of head    Sepsis    Renal insufficiency    Protein-calorie malnutrition    Respiratory alkalosis    Paroxysmal atrial fibrillation        1.  ADEM = Still no further meaningful improvement. She completed two rounds of Solumedrol. Final dose of second round IVIG today. Agree with palliative consult. I discussed with the patient's  at bedside that given the lack of significant clinical improvement thus far, this may be her new baseline. There is also a possibility she may have further cardiac or respiratory compromise given the extent of brainstem inflammation on her MRI. Overall prognosis appears poor.       Addendum: Hospitalist discussed patient's current condition with her . He agreed to comfort-focused approach. I agree with this  also given poor prognosis for recovery.    Sara Rucker MD  02/12/18  3:03 PM

## 2018-02-12 NOTE — PLAN OF CARE
Problem: Patient Care Overview (Adult)  Goal: Plan of Care Review  Outcome: Ongoing (interventions implemented as appropriate)   02/12/18 1500   Coping/Psychosocial Response Interventions   Plan Of Care Reviewed With spouse;caregiver   Patient Care Overview   Progress declining   Outcome Evaluation   Outcome Summary/Follow up Plan New Palliative Team consult from Dr. Vizcaino for encephalitis, poor recovery. Dr. Gipson met with , discussed goals and code status,  wants to talk to Dr. Rucker and his daughter. Goal is to continue aggressive care and full code.  will let us know if he wants palliative to continue to follow.  has been called for visit per  request.

## 2018-02-12 NOTE — SIGNIFICANT NOTE
13:00   Palliative Team Member Meeting  Attendance:  Dr. Kj Escobedo, APRN, ACHPN  Leila Khan, RN, CHPN  Maria Victoria Munguia, RN, CHPN  MALENA BellW

## 2018-02-12 NOTE — CONSULTS
Luke Baum MD  Consulting physician: Carrillo    Chief Complaint   Patient presents with   • Fatigue         HPI: Patient is a 77-year-old female brought in the hospital with fatigue.  The patient unfortunately developed ADEM. states the patient has been minimally to unresponsive for last week and half to 2 weeks, stating that she said about 3 words 4 days or so ago.  Apparently earlier today the patient's NG tube which was being used for tube feeds clogged off and was removed.  The patient also earlier today suffered a code and has been placed on the BiPAP.        Past Medical History:   Diagnosis Date   • Allergic rhinitis    • Altered mental status 1/15/2018   • Anxiety    • Cataract    • Chronic combined systolic and diastolic heart failure 1/15/2018   • History of repair of right hip fracture 1/15/2018   • Malignant neoplasm of breast, right     BREAST CANCER X2   • Renal insufficiency 1/15/2018   • Sepsis 1/15/2018   • Traumatic hematoma of head 1/15/2018   • Vaginitis 1/15/2018     Past Surgical History:   Procedure Laterality Date   • BREAST SURGERY      Left   • EYE SURGERY      cataract   • HIP CANNULATED SCREW PLACEMENT Right 12/30/2017    Procedure: HIP CANNULATED SCREW PLACEMENT;  Surgeon: Corbin Swanson MD;  Location: [facility];  Service:    • PORTACATH PLACEMENT     • RECONSTRUCTION BREAST IMMEDIATE / DELAYED W/ TISSUE EXPANDER Bilateral 05/2016    [place]     Current Facility-Administered Medications   Medication Dose Route Frequency Provider Last Rate Last Dose   • acetaminophen (TYLENOL) tablet 650 mg  650 mg Oral Q4H PRN Elise White, APRN   650 mg at 01/23/18 1914   • acetylcysteine (MUCOMYST) 20 % solution 3 mL  3 mL Nebulization Q4H PRN Radha Kumar APRN   3 mL at 02/11/18 0706   • albuterol (PROVENTIL) nebulizer solution 0.083% 2.5 mg/3mL  1.25 mg Nebulization Q4H PRN Lizbeth Vizcaino MD       • amiodarone (NEXTERONE) 360 mg/200 mL (1.8  mg/mL) infusion  0.5 mg/min Intravenous Continuous Lizbeth Vizcaino MD 33.3 mL/hr at 02/12/18 1139 1 mg/min at 02/12/18 1139   • dextrose (D50W) solution 25 g  25 g Intravenous Q15 Min PRN Socorro Campos, KRISS       • dextrose (GLUTOSE) oral gel 15 g  15 g Oral Q15 Min PRN KRISS Quintana       • dextrose 5 % and sodium chloride 0.45 % infusion  75 mL/hr Intravenous Continuous Lizbeth Vizcaino MD 75 mL/hr at 02/12/18 0944 75 mL/hr at 02/12/18 0944   • famotidine (PEPCID) tablet 20 mg  20 mg Oral BID Reno Gonzalez MD   20 mg at 02/11/18 2043   • glucagon (human recombinant) (GLUCAGEN DIAGNOSTIC) injection 1 mg  1 mg Subcutaneous Q15 Min PRN Socorro Campos, APRN       • heparin (porcine) 5000 UNIT/ML injection 5,000 Units  5,000 Units Subcutaneous Q12H Erich Ryder MD   5,000 Units at 02/12/18 0855   • immune globulin (human) (GAMMAGARD) infusion 20 g  20 g Intravenous Q24H Chadwick Mcintosh MD 94.5 mL/hr at 02/11/18 2145 20 g at 02/11/18 2145   • insulin lispro (humaLOG) injection 0-7 Units  0-7 Units Subcutaneous Q6H Socorro Campos APRN   2 Units at 02/09/18 0637   • ipratropium-albuterol (DUO-NEB) nebulizer solution 3 mL  3 mL Nebulization 4x Daily PRN Stephany Campos MD   3 mL at 02/01/18 0040   • levETIRAcetam (KEPPRA) 250 mg in sodium chloride 0.9 % 100 mL IVPB  250 mg Intravenous Q12H Sara Rucker MD   250 mg at 02/12/18 0811   • Magnesium Sulfate 2 gram Bolus, followed by 8 gram infusion (total Mg dose 10 grams)- Mg less than or equal to 1mg/dL  2 g Intravenous PRN Brian Moreno MD        Or   • Magnesium Sulfate 6 gram Infusion (2 gm x 3) -Mg 1.1 -1.5 mg/dL  2 g Intravenous PRN Brian Moreno MD        Or   • magnesium sulfate 4 gram infusion- Mg 1.6-1.9 mg/dL  4 g Intravenous PRN Brian Moreno MD       • metoprolol tartrate (LOPRESSOR) injection 5 mg  5 mg Intravenous Q6H PRN Lizbeth Vizcaino MD       • multivitamin and minerals liquid 15 mL  15 mL Oral Daily  Ester Laurent, DO   15 mL at 02/11/18 0833   • ondansetron (ZOFRAN) injection 4 mg  4 mg Intravenous Q6H PRN KRISS Rosales       • polyethylene glycol 3350 powder (packet)  17 g Oral Daily KRISS Rosales   17 g at 02/09/18 0814   • potassium chloride (KLOR-CON) packet 40 mEq  40 mEq Oral PRN KRISS Rosales   40 mEq at 02/10/18 1520   • potassium chloride 20 mEq in 50 mL IVPB  20 mEq Intravenous Q1H PRN Erich Ryder MD 50 mL/hr at 02/01/18 0346 20 mEq at 02/01/18 0346   • potassium phosphate 45 mmol in sodium chloride 0.9 % 500 mL infusion  45 mmol Intravenous PRN Brian Moreno MD        Or   • potassium phosphate 30 mmol in sodium chloride 0.9 % 250 mL infusion  30 mmol Intravenous PRN Brian Moreno MD        Or   • potassium phosphate 15 mmol in sodium chloride 0.9 % 100 mL infusion  15 mmol Intravenous PRN Brian Moreno MD        Or   • sodium phosphates 45 mmol in sodium chloride 0.9 % 500 mL IVPB  45 mmol Intravenous PRN Brian Moreno MD        Or   • sodium phosphates 30 mmol in sodium chloride 0.9 % 250 mL IVPB  30 mmol Intravenous PRN Brian Moreno MD        Or   • sodium phosphates 15 mmol in sodium chloride 0.9 % 250 mL IVPB  15 mmol Intravenous PRN Brian Moreno MD       • sodium chloride 0.9 % flush 10 mL  10 mL Intravenous PRN Betty Hill MD       • sodium chloride 0.9 % flush 10 mL  10 mL Intravenous PRN Reno oGnzalez MD       • sotalol (BETAPACE) tablet 40 mg  40 mg Oral Q12H Henna Leger MD   40 mg at 02/11/18 2043   • thiamine (VITAMIN B-1) tablet 100 mg  100 mg Nasogastric Daily Erich Ryder MD   100 mg at 02/11/18 0832       amiodarone 0.5 mg/min Last Rate: 1 mg/min (02/12/18 1139)   dextrose 5 % and sodium chloride 0.45 % 75 mL/hr Last Rate: 75 mL/hr (02/12/18 0944)     •  acetaminophen **OR** [DISCONTINUED] acetaminophen  •  acetylcysteine  •  albuterol  •  dextrose  •  dextrose  •  glucagon (human  "recombinant)  •  ipratropium-albuterol  •  magnesium sulfate **OR** magnesium sulfate **OR** magnesium sulfate  •  metoprolol tartrate  •  [DISCONTINUED] ondansetron **OR** ondansetron  •  [DISCONTINUED] potassium chloride **OR** potassium chloride **OR** [DISCONTINUED] potassium chloride  •  potassium chloride  •  potassium phosphate infusion greater than 15 mMoles **OR** potassium phosphate infusion greater than 15 mMoles **OR** potassium phosphate **OR** sodium phosphate IVPB **OR** sodium phosphate IVPB **OR** sodium phosphate IVPB  •  sodium chloride  •  sodium chloride  Allergies   Allergen Reactions   • Horse-Derived Products      Serum     Family History   Problem Relation Age of Onset   • Cancer Father      Social History     Social History   • Marital status:      Spouse name: N/A   • Number of children: N/A   • Years of education: N/A     Occupational History   • Not on file.     Social History Main Topics   • Smoking status: Former Smoker     Packs/day: 0.50   • Smokeless tobacco: Never Used      Comment: Quit 30 years .   • Alcohol use 0.6 oz/week     1 Glasses of wine per week      Comment: rare one glass wine socially/special occasion   • Drug use: No   • Sexual activity: Defer     Other Topics Concern   • Not on file     Social History Narrative    Mrs. Teran is a 77 year old white  female. They have one daughter and a son in law. They live in [place] but she has been at rehab.      Review of Systems - Unresponsive so unable to obtain      /85  Pulse 80  Temp 99.5 °F (37.5 °C) (Axillary)   Resp 20  Ht 154.9 cm (61\")  Wt 44.8 kg (98 lb 11.2 oz)  SpO2 100%  BMI 18.65 kg/m2    Intake/Output Summary (Last 24 hours) at 02/12/18 1402  Last data filed at 02/12/18 0944   Gross per 24 hour   Intake             1479 ml   Output                0 ml   Net             1479 ml     Physical Exam:      General Appearance:    Unresponsive, BiPAP in place    Head:    Normocephalic, without " obvious abnormality, atraumatic   Eyes:            Lids and lashes normal, conjunctivae and sclerae normal, no   icterus, no pallor, corneas clear, PERRLA   Ears:    Ears appear intact with no abnormalities noted   Throat:   No oral lesions, no thrush, oral mucosa moist   Neck:   No adenopathy, supple, trachea midline, no thyromegaly, no     carotid bruit, no JVD   Back:     No kyphosis present, no scoliosis present, no skin lesions,       erythema or scars, no tenderness to percussion or                   palpation,   range of motion normal   Lungs:     Diminished breath sounds throughout     Heart:    Regular rhythm and normal rate, normal S1 and S2, no            murmur, no gallop, no rub, no click   Breast Exam:    Deferred   Abdomen:     Normal bowel sounds, no masses, no organomegaly, soft        non-tender, non-distended, no guarding, no rebound                 tenderness   Genitalia:    Deferred   Extremities:   Edema noted both lower extremities equal bilaterally    Pulses:   Pulses palpable and equal bilaterally   Skin:   No bleeding, bruising or rash   Lymph nodes:   No palpable adenopathy             Results from last 7 days  Lab Units 02/10/18  0739   WBC 10*3/mm3 6.99   HEMOGLOBIN g/dL 9.0*   HEMATOCRIT % 27.3*   PLATELETS 10*3/mm3 92*       Results from last 7 days  Lab Units 02/12/18  0718   SODIUM mmol/L 123*   POTASSIUM mmol/L 3.5   CHLORIDE mmol/L 89*   CO2 mmol/L 30.0   BUN mg/dL 14   CREATININE mg/dL 0.20*   CALCIUM mg/dL 7.4*   BILIRUBIN mg/dL 0.9   ALK PHOS U/L 94   ALT (SGPT) U/L 110*   AST (SGOT) U/L 51*   GLUCOSE mg/dL 103*       Results from last 7 days  Lab Units 02/12/18  0718   SODIUM mmol/L 123*   POTASSIUM mmol/L 3.5   CHLORIDE mmol/L 89*   CO2 mmol/L 30.0   BUN mg/dL 14   CREATININE mg/dL 0.20*   GLUCOSE mg/dL 103*   CALCIUM mg/dL 7.4*     Imaging Results (last 72 hours)     Procedure Component Value Units Date/Time    XR Chest 1 View [[id number]] Collected:  02/09/18 2283      Updated:  02/09/18 1804    Narrative:       EXAMINATION: XR CHEST 1 VW- 02/09/2018     INDICATION: A41.9-Sepsis, unspecified organism; J10.1-Influenza due to  other identified influenza virus with other respiratory manifestations;  R41.0-Disorientation, unspecified; E86.0-Dehydration; Z74.09-Other  reduced mobility; Z74.09-Other reduced mobility; I48.1-Persistent atrial  fibrillation; I48.91-Unspecified atrial fibrillation; R13.10-Dysphagia,  unspecified      COMPARISON: 02/09/2018     FINDINGS: There is a normal cardiac silhouette. There is no pulmonary  inflammatory process, mass or effusion. A PICC is well positioned.           Impression:       There are chronic pulmonary changes. There is no acute  process.     D:  02/09/2018  E:  02/09/2018     This report was finalized on 2/9/2018 6:01 PM by Dr. Ze Espitia MD.       XR Chest 1 View [[id number]] Collected:  02/09/18 1444     Updated:  02/09/18 1709    Narrative:       EXAMINATION: XR CHEST 1 VW- 02/09/2018     INDICATION: congestion; A41.9-Sepsis, unspecified organism;  J10.1-Influenza due to other identified influenza virus with other  respiratory manifestations; R41.0-Disorientation, unspecified;  E86.0-Dehydration; Z74.09-Other reduced mobility; Z74.09-Other reduced  mobility; I48.1-Persistent atrial fibrillation; I48.91-Unspecified  atrial fibrillation; R13.10-Dysphagia, unspecified      COMPARISON: 02/02/2018     FINDINGS: Feeding tube is seen below the left hemidiaphragm. Right upper  extremity PICC line is seen with its tip in the mid SVC. The heart and  pulmonary vasculature appear normal in size. There is mild coarsening of  the  basilar interstitial markings, but increased from the previous  study. In particular, there may be very early airspace disease in the  right base. No densely consolidated lung effusion or pneumothorax is  seen.        Impression:       Mildly increased interstitial disease of the lower lungs,  right greater than left. Lungs  remain grossly clear elsewhere.     D:  02/09/2018  E:  02/09/2018     This report was finalized on 2/9/2018 10:56 PM by DR. Slade Ambriz MD.       XR Chest 1 View [[id number]] Collected:  02/11/18 0301     Updated:  02/11/18 0505    Narrative:       EXAM:    XR Chest, 1 View    CLINICAL HISTORY:    77 years old, female; Signs and symptoms; Other: Possible aspiration    TECHNIQUE:    Frontal view of the chest.    COMPARISON:    CR - XR CHEST 1 VW 2018-02-09 15:45    FINDINGS:    Confluent and focal air space opacities bilaterally with worsening compared   to prior. Blunting of the LEFT costophrenic angle suggestive of small pleural   effusion/thickening.  Heart size is normal, cental pulmonary vascular   congestion.  Calcification and unfolding of the aortic arch.        Tip of NASOGASTRIC/ENTERIC tube is not visualized and extends beyond the   gastroesophageal junction into the LEFT upper abdomen.  Right-sided PICC line   with its tip in the SVC.       Impression:         Significant worsening consolidation/pneumonia in the lungs bilaterally.      Possibility of associated CHF with pulmonary edema cannot be excluded.    Recommend followup to complete resolution.       THIS DOCUMENT HAS BEEN ELECTRONICALLY SIGNED BY SARA BAZZI MD        Impression: Acute disseminated encephalomyelitis  Change in mental status  Hypoxic respiratory failure  Breast cancer    Plan: Talked with patient's .  We discussed code status.  I highly recommended making the patietn a DNR, however he is having a hard time making decisions.  States that he needs to talk to more family members.  I also encouraged him to be thinking about what their wishes are as far as tube feeds are concerend.  We will have more discussions tomorrow.        Kj Gipson,   02/12/18  2:02 PM

## 2018-02-12 NOTE — SIGNIFICANT NOTE
Pt in AFib RVR 1050, Carrillo paged and rapid response called. Dr Vizcaino at bedside at 1100, see orders.

## 2018-02-12 NOTE — PROGRESS NOTES
Adult Nutrition  Assessment/PES    Patient Name:  Cassidy Teran  YOB: 1940  MRN: [id number]  Admit Date:  1/15/2018    Assessment Date:  2/12/2018    Comments: RD follow-up. Pt has been on tube feeding, however NG tube clogged this AM and pulled out. Palliative following for POC/GOC. Will d/c tube feeding order in Epic at this time and continue to follow. RD available to re-order tube feeding as medically appropriate.           Reason for Assessment       02/12/18 1415    Reason for Assessment    Reason For Assessment/Visit follow up protocol;TF/PN    Time Spent (min) 45    Diagnosis per notes this adm    Cardiac s/p rapid response this AM (2/12)    Other diagnosis per neuro note (2/11)- ADEM with marked upper brainstem and bithalamic involvement, no response to steroids, IVIg. D/w  at length, who remains hopeful. Discussed poor prognosis, potential for her being locked in if she recovered that far. Discussed etiology, nature of illness, likely terminal. Per palliative note (2/12)- Talked with patient's .  We discussed code status.  I highly recommended making the patietn a DNR, however he is having a hard time making decisions.  States that he needs to talk to more family members.  I also encouraged him to be thinking about what their wishes are as far as tube feeds are concerend.  We will have more discussions tomorrow.              Nutrition/Diet History       02/12/18 1421    Nutrition/Diet History    Reported/Observed By RN    Other RN reports NG tubed clogged this AM and was pulled out. POC/GOC pending at this time.               Labs/Tests/Procedures/Meds       02/12/18 1428    Labs/Tests/Procedures/Meds    Labs/Tests Review Reviewed;Na+    Medication Review Reviewed, pertinent   D5 1/2NS @ 75 ml/hr     Results from last 7 days  Lab Units 02/12/18  0718   SODIUM mmol/L 123*   POTASSIUM mmol/L 3.5   CHLORIDE mmol/L 89*   CO2 mmol/L 30.0   BUN mg/dL 14   CREATININE mg/dL 0.20*    CALCIUM mg/dL 7.4*   BILIRUBIN mg/dL 0.9   ALK PHOS U/L 94   ALT (SGPT) U/L 110*   AST (SGOT) U/L 51*   GLUCOSE mg/dL 103*                Nutrition Prescription Ordered       02/12/18 1430    Nutrition Prescription PO    Current PO Diet NPO    Nutrition Prescription EN    Enteral Route --   NG tube d/c'd this AM as NG tube was clogged    Product [employer]ers[employer] HN    TF Delivery Method Continuous    Continuous TF Goal Rate (mL/hr) 45 mL/hr    Continuous TF Current Rate (mL/hr) 0 mL/hr    Continuous TF Goal Volume (mL) 990 mL    Water flush (mL)  10 mL    Water Flush Frequency Per hour            Evaluation of Received Nutrient/Fluid Intake       02/12/18 1431    Evaluation of Received Nutrient/Fluid Intake    Number of Days Evaluated 3 days    Calorie Intake Evaluation    Enteral Calories (kcal) 818    Total Calories (kcal) 818    % Kcal Needs 68    Protein Intake Evaluation    Enteral Protein (gm) 36    Total Protein (gm) 36    % Protein Needs 72    Fluid Intake Evaluation    Enteral  Fluid (mL) 552    Free Water Flush Fluid (mL) 311    Total Fluid Intake (mL) 863    Fiber Intake Evaluation    Fiber 10 g    EN Evaluation    Number of Days EN Intake Evaluated 3 days    EN Average Volume Delivered (mL/day) 682 mL/day    % Goal Volume  69 %            Problem/Interventions:          Problem 2       02/12/18 1433    Nutrition Diagnoses Problem 2    Problem 2 Inadequate Intake/Infusion    Inadequate Intake Type Enteral    Etiology (related to) --   clinical condition, TF d/c'd this AM     Signs/Symptoms (evidenced by) EN Intake Delivery    Percent (%) of EN goal 69 %                  Intervention Goal       02/12/18 1434    Intervention Goal    General Nutrition support treatment   palliative following, POC/GOC pending            Nutrition Intervention       02/12/18 1434    Nutrition Intervention    RD/Tech Action Care plan reviewd;Follow Tx progress            Nutrition Prescription       02/12/18 1434    Nutrition  Prescription EN    Enteral Prescription --   will d/c TF order in [facility] as NG tube pulled out this AM; POC/GOC pending    Other Orders    Other If consistent with POC/GOC then rec restarting previous TF order.             Education/Evaluation       02/12/18 4875    Monitor/Evaluation    Monitor Per protocol;Pertinent labs;Symptoms   POC/GOC        Electronically signed by:  Amairani Greene MS RD/LD CNSC  02/12/18 2:38 PM

## 2018-02-12 NOTE — PROGRESS NOTES
[facility] Medicine Services  PROGRESS NOTE    Patient Name: Cassidy Teran  : 1940  MRN: [id number]    Date of Admission: 1/15/2018  Length of Stay: 19  Primary Care Physician: Luke Baum MD    Subjective   Subjective     CC:  Encephalopathy following influenza-    SUBJECTIVE:  Patient went in to rapid,unstable afib today during a nebulizer treatment this morning- adenosine, broke the rhythm of afib for just a second then she returned to a HR of 160s with a BP in the 80s,   She was given amiodarone load of 150 mg twice with a good response and return to SR while on a amio drip.  She also developed respiratory compromise, with decreased respiratory effort and increased oxygen requirement- patient was NT suctioned and then placed on BIpap for respiratory support.  Her feeding tube also became clogged today and was removed.  Review of Systems  Unable to obtain      Objective   Objective     Vital Signs:   Temp:  [97.7 °F (36.5 °C)-99.5 °F (37.5 °C)] 99.5 °F (37.5 °C)  Heart Rate:  [] 80  Resp:  [14-22] 20  BP: ()/(62-99) 145/85        Physical Exam:  Patient is asleep and unresponsive  Neck is without mass or JVD  Heart is IRReg wo murmur  Lungs are shallow   Abd is soft without HSM, she has a soft tissue area just above her umbilicus  Moves extremities rarely  Skin is without rash  Neurologic exam is nonfocal but obtunded.  Mood is unable to be evaluated- she last spoke to her  5 days ago.    Results Reviewed:  I have personally reviewed current lab, radiology, and data and agree.      Results from last 7 days  Lab Units 02/10/18  0739 18  1612   WBC 10*3/mm3 6.99 11.06*   HEMOGLOBIN g/dL 9.0* 11.7   HEMATOCRIT % 27.3* 35.5   PLATELETS 10*3/mm3 92* 109*       Results from last 7 days  Lab Units 1818 02/10/18  1934 02/10/18  0739 18  1612   SODIUM mmol/L 123*  --  129* 130*   POTASSIUM mmol/L 3.5 4.7 3.3* 3.3*   CHLORIDE mmol/L 89*   --  99 98*   CO2 mmol/L 30.0  --  28.0 24.0   BUN mg/dL 14  --  13 14   CREATININE mg/dL 0.20*  --  0.20* 0.20*   GLUCOSE mg/dL 103*  --  129* 125*   CALCIUM mg/dL 7.4*  --  6.9* 7.8*   ALT (SGPT) U/L 110*  --  93* 121*   AST (SGOT) U/L 51*  --  41* 61*     Estimated Creatinine Clearance: 40.3 mL/min (by C-G formula based on Cr of 0.2).  No results found for: BNP  No results found for: PHART    Microbiology Results Abnormal     Procedure Component Value - Date/Time    UK HSV Types 1 / 2, DNA PCR - Cerebrospinal Fluid, Lumbar Puncture [[id number]] Collected:  01/31/18 1515    Lab Status:  Final result Specimen:  Cerebrospinal Fluid from Lumbar Puncture Updated:  02/01/18 1436     Reference Lab Report See Attached Report    Cryptococcal AG, CSF - Cerebrospinal Fluid, Lumbar Puncture [[id number]]  (Normal) Collected:  01/31/18 1515    Lab Status:  Final result Specimen:  Cerebrospinal Fluid from Lumbar Puncture Updated:  02/01/18 1027     Cryptococcal Antigen, CSF Negative    Blood Culture - Blood, [[id number]]  (Normal) Collected:  01/22/18 2233    Lab Status:  Final result Specimen:  Blood from Hand, Right Updated:  01/27/18 2301     Blood Culture No growth at 5 days    Blood Culture - Blood, [[id number]]  (Normal) Collected:  01/22/18 2233    Lab Status:  Final result Specimen:  Blood from Arm, Right Updated:  01/27/18 2301     Blood Culture No growth at 5 days    Urine Culture - Urine, Urine, Clean Catch [[id number]]  (Normal) Collected:  01/23/18 1013    Lab Status:  Final result Specimen:  Urine from Urine, Clean Catch Updated:  01/25/18 0855     Urine Culture No growth at 2 days    Blood Culture - Blood, [[id number]]  (Normal) Collected:  01/15/18 1927    Lab Status:  Final result Specimen:  Blood from Arm, Left Updated:  01/20/18 2046     Blood Culture No growth at 5 days    Blood Culture - Blood, [[id number]]  (Normal) Collected:  01/15/18 1927    Lab Status:  Final result Specimen:  Blood from Arm, Right Updated:   01/20/18 2046     Blood Culture No growth at 5 days    Influenza Antigen, Rapid - Swab, Nasopharynx [[id number]]  (Abnormal) Collected:  01/15/18 1854    Lab Status:  Final result Specimen:  Swab from Nasopharynx Updated:  01/15/18 1918     Influenza A Ag, EIA Positive (A)     Influenza B Ag, EIA Negative          Imaging Results (last 24 hours)     ** No results found for the last 24 hours. **        Results for orders placed during the hospital encounter of 11/30/16   Adult Transthoracic Echo Complete    Narrative · Left ventricular function is normal. Estimated EF = 55%.  · All left ventricular wall segments contract normally.  · Mild aortic valve stenosis is present.  · Mild tricuspid valve regurgitation is present.  · There is no evidence of pericardial effusion.  · Estimated right ventricular systolic pressure from tricuspid   regurgitation is normal (<35 mmHg).  · There are myxomatous changes of the mitral valve apparatus present.          I have reviewed the medications.    Assessment/Plan   Assessment / Plan     Hospital Problem List     * (Principal)ADEM (acute disseminated encephalomyelitis) (postinfectious)    Malignant neoplasm of central portion of right female breast (Chronic)    Overview Signed 8/17/2016  9:44 AM by Jenni Izaguirre              CHF (congestive heart failure)    Hyponatremia    Chronic anemia (Chronic)    Influenza A    Metabolic encephalopathy    Chronic combined systolic and diastolic heart failure (Chronic)    Traumatic hematoma of head    Sepsis    Renal insufficiency    Protein-calorie malnutrition    Respiratory alkalosis    Paroxysmal atrial fibrillation      -----------------------------------------  *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)  *s/p Influenza A (completed rx)  *s/p sepsis/febrile illness (due to above)   -s/p empiric abx, cultures negative  *Afib w/ rvr (s/p cardioversion), now on sotalol per cards  *protein calorie malnutrition  *gen  weakness/failure to thrive  *Hypokalemia  *Hyponatremia  *Hx breast cancer and mastectomies (remote hx, see above)  *Hyperglycemia (a1c 5.5)  --------------------------------------------------     Brief Hospital Course to date:  77-year-old female with a history of breast cancer treated in 2004 status post left mastectomy with another episode of breast cancer, stage IIa in the right breast in 2015 status post mastectomy, Adriamycin, Cytoxan, Herceptin/Taxol and maintained on Arimidex who has had multiple hospital admissions in the past few months.  She had a fall at home at the end of December status post right femoral neck fracture/pinning, was discharged to rehabilitation and was readmitted on 1/15/2018 with influenza.  Since that time she has had progressive altered mental status and is currently obtunded.     She was transferred to the ICU on 1/27 secondary to Afib w/ RVR. She was cardioverted by Cardiology. She received Versed for the cardioversion. Transferred out of icu 1/29 and picked up by hospitalists 1/30/18    Today- unstable Afib- now improved with IV medications but subsequent respiratory failure.  I spent hours with the patient and family today- Story is complicated by the husbands prior near death experience and having to withdrawal care from his brother. And their daughter is a  and it is valentines week.  I appreciate palliative seeing the patient-  Will hold the course tonight wean Bipap as tolerated.  Cont amio drip.  I explained that despite our best efforts that patient may only worsen. He expressed understanding of thi situation, but seems to not want to take repsonsibility for making the decision to withdrawal any care.  75 minutes of critical care provided today at the bedside  DVT Prophylaxis:  Sq heparin    CODE STATUS: Full Code for now- though the  understands she would NOT come off a VENT.    Disposition: I expect the patient to be discharged unknown timeframe at this  point  Lizbeth Vizcaino MD  02/12/18  5:12 PM

## 2018-02-12 NOTE — PLAN OF CARE
Problem: Patient Care Overview (Adult)  Goal: Plan of Care Review  Outcome: Ongoing (interventions implemented as appropriate)    Goal: Adult Individualization and Mutuality  Outcome: Ongoing (interventions implemented as appropriate)    Goal: Discharge Needs Assessment  Outcome: Ongoing (interventions implemented as appropriate)      Problem: Fall Risk (Adult)  Goal: Absence of Falls  Outcome: Ongoing (interventions implemented as appropriate)      Problem: Sepsis (Adult)  Goal: Signs and Symptoms of Listed Potential Problems Will be Absent or Manageable (Sepsis)  Outcome: Ongoing (interventions implemented as appropriate)

## 2018-02-13 NOTE — PLAN OF CARE
Problem: Patient Care Overview (Adult)  Goal: Plan of Care Review  Outcome: Ongoing (interventions implemented as appropriate)   02/13/18 0980   Coping/Psychosocial Response Interventions   Plan Of Care Reviewed With patient;caregiver   Patient Care Overview   Progress declining   Outcome Evaluation   Outcome Summary/Follow up Plan Patient is only BiPaP and is having period of apnea, she is restless, moving her arms when you touch her. Palliative Team will continue to follow and support and work on goals and plan of care

## 2018-02-13 NOTE — PLAN OF CARE
Problem: Patient Care Overview (Adult)  Goal: Plan of Care Review   02/13/18 0248   Patient Care Overview   Progress No change   Outcome Evaluation   Outcome Summary/Follow up Plan Patient bipap dependent and in rapid afib at shift change. IV metoprolol ordered for rapid heart rate. Amio drip continues. Shortly after 1st metoprolol IV dose patient converted to NSR/SB 50-60 but BP improved to 120-130s SBP after conversion. Amio drip running. Spoke with GOVIND MONTERROSO, MD Vizcaino, and MD Dillon early on in shift to notify of apneic episodes on bipap and bradycardia after conversion from afib (still having occasional unsustained bursts of afib that quickly revert to NSR/SB). Plan per MDs to keep amio infusing and continue Bipap over night. Patient moving extremeties spontaneously on occasion. IVIG infusion completed on PM shift. Family not present so far on PM shift. IV metoprolol PRN dose for sustained HR > 140 decreased to 2.5mg from 5mg per MD Dillon. Will continue to monitor.        Problem: Fall Risk (Adult)  Goal: Absence of Falls  Outcome: Ongoing (interventions implemented as appropriate)   02/13/18 0248   Fall Risk (Adult)   Absence of Falls making progress toward outcome       Problem: Sepsis (Adult)  Goal: Signs and Symptoms of Listed Potential Problems Will be Absent or Manageable (Sepsis)  Outcome: Ongoing (interventions implemented as appropriate)   02/13/18 0248   Sepsis   Problems Assessed (Sepsis) all   Problems Present (Sepsis) hypoxia/hypoxemia       Problem: Pressure Ulcer Risk (Miguelito Scale) (Adult,Obstetrics,Pediatric)  Goal: Skin Integrity  Outcome: Ongoing (interventions implemented as appropriate)   02/13/18 0248   Pressure Ulcer Risk (Miguelito Scale) (Adult,Obstetrics,Pediatric)   Skin Integrity making progress toward outcome

## 2018-02-13 NOTE — PROGRESS NOTES
Adult Nutrition  Assessment/PES    Patient Name:  Cassidy Teran  YOB: 1940  MRN: [id number]  Admit Date:  1/15/2018    Assessment Date:  2/13/2018    Comments:            Reason for Assessment       02/13/18 1420    Reason for Assessment    Time Spent (min) 15      02/13/18 1410    Reason for Assessment    Reason For Assessment/Visit follow up protocol   RD spoke with nsg, who states that pt now with conditional  code status.  Pt is not alert or responsive, and nsg states no need for TF orders or po diet at this time.                          Nutrition Prescription Ordered       02/13/18 1420    Nutrition Prescription PO    Current PO Diet NPO              Problem/Interventions:                        Education/Evaluation       02/13/18 1420    Monitor/Evaluation    Monitor Per protocol; will cont to monitor POC per palliative care/MD notes.         Electronically signed by:  Nisha Perez MS,RD,LD  02/13/18 2:23 PM

## 2018-02-13 NOTE — PROGRESS NOTES
[facility] Medicine Services  PROGRESS NOTE    Patient Name: Cassidy Teran  : 1940  MRN: [id number]    Date of Admission: 1/15/2018  Length of Stay: 20  Primary Care Physician: Luke Baum MD    Subjective   Subjective     CC:  Encephalopathy following influenza-    SUBJECTIVE:  Patient continued to have bursts of rapid afib/bradycardia overnight as well as periods of apnea- with the bipap on.  HEr oxygen has been wenaed.  She has been more restless today  Review of Systems  Unable to obtain      Objective   Objective     Vital Signs:   Temp:  [96.4 °F (35.8 °C)-98.7 °F (37.1 °C)] 98.7 °F (37.1 °C)  Heart Rate:  [] 83  Resp:  [14-22] 21  BP: (102-148)/(63-97) 140/92      fio2 21-30%  Physical Exam:  Patient is asleep and unresponsive, squirms around the bed some  Neck is without mass or JVD  Heart is IRReg wo murmur  Lungs are shallow some spontaneous respirations with bipap on  Abd is soft without HSM, she has a soft tissue area just above her umbilicus  Moves extremities rarely  Skin is without rash  Neurologic exam is nonfocal but obtunded.  Mood is unable to be evaluated- she last spoke to her  5 days ago.    Results Reviewed:  I have personally reviewed current lab, radiology, and data and agree.      Results from last 7 days  Lab Units 18  1726 02/10/18  0739 18  1612   WBC 10*3/mm3 5.54 6.99 11.06*   HEMOGLOBIN g/dL 8.8* 9.0* 11.7   HEMATOCRIT % 26.7* 27.3* 35.5   PLATELETS 10*3/mm3 81* 92* 109*       Results from last 7 days  Lab Units 18  1325 18  0718 02/10/18  1934 02/10/18  0739   SODIUM mmol/L 128* 123*  --  129*   POTASSIUM mmol/L 2.8* 3.5 4.7 3.3*   CHLORIDE mmol/L 94* 89*  --  99   CO2 mmol/L 27.0 30.0  --  28.0   BUN mg/dL 13 14  --  13   CREATININE mg/dL 0.30* 0.20*  --  0.20*   GLUCOSE mg/dL 119* 103*  --  129*   CALCIUM mg/dL 6.8* 7.4*  --  6.9*   ALT (SGPT) U/L 86* 110*  --  93*   AST (SGOT) U/L 46* 51*  --  41*        pH, Arterial   Date Value Ref Range Status   02/12/2018 7.471 (H) 7.350 - 7.450 pH units Final       Microbiology Results Abnormal     Procedure Component Value - Date/Time    UK HSV Types 1 / 2, DNA PCR - Cerebrospinal Fluid, Lumbar Puncture [[id number]] Collected:  01/31/18 1515    Lab Status:  Final result Specimen:  Cerebrospinal Fluid from Lumbar Puncture Updated:  02/01/18 1436     Reference Lab Report See Attached Report    Cryptococcal AG, CSF - Cerebrospinal Fluid, Lumbar Puncture [[id number]]  (Normal) Collected:  01/31/18 1515    Lab Status:  Final result Specimen:  Cerebrospinal Fluid from Lumbar Puncture Updated:  02/01/18 1027     Cryptococcal Antigen, CSF Negative    Blood Culture - Blood, [[id number]]  (Normal) Collected:  01/22/18 2233    Lab Status:  Final result Specimen:  Blood from Hand, Right Updated:  01/27/18 2301     Blood Culture No growth at 5 days    Blood Culture - Blood, [[id number]]  (Normal) Collected:  01/22/18 2233    Lab Status:  Final result Specimen:  Blood from Arm, Right Updated:  01/27/18 2301     Blood Culture No growth at 5 days    Urine Culture - Urine, Urine, Clean Catch [[id number]]  (Normal) Collected:  01/23/18 1013    Lab Status:  Final result Specimen:  Urine from Urine, Clean Catch Updated:  01/25/18 0855     Urine Culture No growth at 2 days    Blood Culture - Blood, [[id number]]  (Normal) Collected:  01/15/18 1927    Lab Status:  Final result Specimen:  Blood from Arm, Left Updated:  01/20/18 2046     Blood Culture No growth at 5 days    Blood Culture - Blood, [[id number]]  (Normal) Collected:  01/15/18 1927    Lab Status:  Final result Specimen:  Blood from Arm, Right Updated:  01/20/18 2046     Blood Culture No growth at 5 days    Influenza Antigen, Rapid - Swab, Nasopharynx [[id number]]  (Abnormal) Collected:  01/15/18 1854    Lab Status:  Final result Specimen:  Swab from Nasopharynx Updated:  01/15/18 1918     Influenza A Ag, EIA Positive (A)     Influenza B Ag,  EIA Negative          Imaging Results (last 24 hours)     Procedure Component Value Units Date/Time    XR Chest 1 View [[id number]] Collected:  02/12/18 1720     Updated:  02/12/18 1836    Narrative:       EXAM:    XR Chest, 1 View    CLINICAL HISTORY:    77 years old, female; Sepsis, unspecified organism; Dehydration; Persistent   atrial fibrillation; Unspecified atrial fibrillation; Influenza due to other   identified influenza virus with other respiratory manifestations; Dysphagia,   unspecified; Disorientation, unspecified; Other reduced mobility; Other reduced   mobility; Signs and symptoms; Other: Resp. Failure, hypoxic; Additional info:   Hypoxic resp failure    TECHNIQUE:    Frontal view of the chest.    COMPARISON:    CR - XR CHEST 1 VW 2018-02-11 03:22    FINDINGS:    Lungs:  The consolidation right lower lobe shows increased density compared   to the prior examination.  Early improvement in the left lower lobe airspace   disease.    Pleural space:  There are likely small pleural effusions.  No pneumothorax.    Heart:  Unremarkable.  No cardiomegaly.    Mediastinum:  Unremarkable.    Bones/joints:  Unremarkable.    Tubes, lines and devices:  A peripherally inserted central venous catheter   lies with its tip in the superior vena cava.      Impression:       1.  The consolidation right lower lobe shows increased density compared to the   prior examination, likely secondary to pneumonic infiltrate.  2.  Early improvement in the left lower lobe airspace disease.    THIS DOCUMENT HAS BEEN ELECTRONICALLY SIGNED BY RADHA RENEE MD        Results for orders placed during the hospital encounter of 11/30/16   Adult Transthoracic Echo Complete    Narrative · Left ventricular function is normal. Estimated EF = 55%.  · All left ventricular wall segments contract normally.  · Mild aortic valve stenosis is present.  · Mild tricuspid valve regurgitation is present.  · There is no evidence of pericardial effusion.  ·  Estimated right ventricular systolic pressure from tricuspid   regurgitation is normal (<35 mmHg).  · There are myxomatous changes of the mitral valve apparatus present.          I have reviewed the medications.    Assessment/Plan   Assessment / Plan     Hospital Problem List     * (Principal)ADEM (acute disseminated encephalomyelitis) (postinfectious)    Paroxysmal atrial fibrillation    Hyponatremia    Malignant neoplasm of central portion of right female breast (Chronic)    Overview Signed 8/17/2016  9:44 AM by Jenni Izaguirre              History of breast cancer (Chronic)    Chronic anemia (Chronic)    Influenza A    Traumatic hematoma of head    Protein-calorie malnutrition      -----------------------------------------  *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)  *s/p Influenza A (completed rx)  *s/p sepsis/febrile illness (due to above)   -s/p empiric abx, cultures negative  *Afib w/ rvr (s/p cardioversion), now on sotalol per cards  *protein calorie malnutrition  *gen weakness/failure to thrive  *Hypokalemia  *Hyponatremia  *Hx breast cancer and mastectomies (remote hx, see above)  *Hyperglycemia (a1c 5.5)  --------------------------------------------------     Brief Hospital Course to date:  77-year-old female with a history of breast cancer treated in 2004 status post left mastectomy with another episode of breast cancer, stage IIa in the right breast in 2015 status post mastectomy, Adriamycin, Cytoxan, Herceptin/Taxol and maintained on Arimidex who has had multiple hospital admissions in the past few months.  She had a fall at home at the end of December status post right femoral neck fracture/pinning, was discharged to rehabilitation and was readmitted on 1/15/2018 with influenza.  Since that time she has had progressive altered mental status and is currently obtunded.     She was transferred to the ICU on 1/27 secondary to Afib w/ RVR. She was cardioverted by Cardiology. She received  Versed for the cardioversion. Transferred out of icu 1/29 and picked up by hospitalists 1/30/18    Today- still unstable Afib- with PROLONGED QT from so much amiodarone yesterday and electrolyte imbalances-   Have stopped amio and are replacing her electrolytes.  Patient remains on Bipap.  I spent 30_ minutes witcelesteband- I reviewed what we talked about yesterday, that her going on a vent or getting CPR would only torture her and not prolong her life.  He said, I just want her to be comfortable.  I have made her comfort measures only- I will make her conditional code- so that we can provide appropriate support- without being heroic -he is buying time until his daughter is through valentines day (she is a )    I explained that despite our best efforts that patient continues to have life threatening complications from the disease and our treatment.  DVT Prophylaxis:  Sq heparin    CODE STATUS: Conditional Code     Disposition: I expect the patient to be discharged unknown timeframe at this point  Lizbeth Vizcaino MD  02/13/18  3:44 PM

## 2018-02-13 NOTE — PROGRESS NOTES
"Palliative Care Progress Note    Date of Admission: 1/15/2018    Subjective:  Patient is unresponsive and remains on the BiPAP.  No current facility-administered medications on file prior to encounter.      Current Outpatient Prescriptions on File Prior to Encounter   Medication Sig Dispense Refill   • acetaminophen (TYLENOL) 325 MG tablet Take 2 tablets by mouth Every 4 (Four) Hours As Needed for Mild Pain .     • Calcium Carbonate-Vitamin D (CALCIUM + D PO) Take 1 tablet by mouth daily.     • enoxaparin (LOVENOX) 30 MG/0.3ML solution syringe Inject 0.3 mL under the skin Daily. 8.4 syringe    • Fexofenadine HCl (ALLERGY 24-HR PO) Take 1 tablet by mouth as needed.     • HYDROcodone-acetaminophen (NORCO) 5-325 MG per tablet Take 1 tablet by mouth Every 6 (Six) Hours As Needed for Moderate Pain  or Severe Pain . 8 tablet 0   • Multiple Vitamins-Minerals (CENTRUM ADULTS PO) Take 1 tablet by mouth daily.     • Multiple Vitamins-Minerals (PRESERVISION AREDS PO) Take 1 tablet by mouth daily.     • polyethylene glycol (MIRALAX) pack packet Take 17 g by mouth Daily.     • sennosides-docusate sodium (SENOKOT-S) 8.6-50 MG tablet Take 2 tablets by mouth 2 (Two) Times a Day As Needed for Constipation.         dextrose 5 % and sodium chloride 0.9 % 50 mL/hr Last Rate: 50 mL/hr (02/13/18 1345)     •  acetaminophen **OR** [DISCONTINUED] acetaminophen  •  acetylcysteine  •  albuterol  •  dextrose  •  dextrose  •  glucagon (human recombinant)  •  ipratropium-albuterol  •  metoprolol tartrate  •  metoprolol tartrate  •  Morphine  •  [DISCONTINUED] ondansetron **OR** ondansetron  •  palliative care oral rinse  •  [DISCONTINUED] potassium chloride **OR** potassium chloride **OR** [DISCONTINUED] potassium chloride  •  potassium chloride  •  sodium chloride  •  sodium chloride    Objective: /92 (BP Location: Left arm, Patient Position: Lying)  Pulse 83  Temp 98.7 °F (37.1 °C) (Axillary)   Resp 21  Ht 154.9 cm (61\")  Wt 44.8 kg " (98 lb 11.2 oz)  SpO2 98%  BMI 18.65 kg/m2     Intake/Output Summary (Last 24 hours) at 02/13/18 1531  Last data filed at 02/13/18 0300   Gross per 24 hour   Intake             2045 ml   Output              300 ml   Net             1745 ml     Physical Exam:      General Appearance:    Unresposnive, Bipap in place   Head:    Normocephalic, without obvious abnormality, atraumatic   Eyes:            Lids and lashes normal, conjunctivae and sclerae normal, no   icterus, no pallor, corneas clear, PERRLA   Ears:    Ears appear intact with no abnormalities noted   Throat:   No oral lesions, no thrush, oral mucosa moist   Neck:   No adenopathy, supple, trachea midline, no thyromegaly, no     carotid bruit, no JVD   Back:     No kyphosis present, no scoliosis present, no skin lesions,       erythema or scars, no tenderness to percussion or                   palpation,   range of motion normal   Lungs:     Clear to auscultation,respirations regular, even and                   unlabored    Heart:    Regular rhythm and normal rate, normal S1 and S2, no            murmur, no gallop, no rub, no click   Breast Exam:    Deferred   Abdomen:     Normal bowel sounds, no masses, no organomegaly, soft        non-tender, non-distended, no guarding, no rebound                 tenderness   Genitalia:    Deferred   Extremities:   Moves all extremities well, no edema, no cyanosis, no              redness   Pulses:   Pulses palpable and equal bilaterally   Skin:   No bleeding, bruising or rash   Lymph nodes:   No palpable adenopathy   Neurologic:   Cranial nerves 2 - 12 grossly intact, sensation intact, DTR        present and equal bilaterally       Results from last 7 days  Lab Units 02/12/18  1726   WBC 10*3/mm3 5.54   HEMOGLOBIN g/dL 8.8*   HEMATOCRIT % 26.7*   PLATELETS 10*3/mm3 81*       Results from last 7 days  Lab Units 02/13/18  1325   SODIUM mmol/L 128*   POTASSIUM mmol/L 2.8*   CHLORIDE mmol/L 94*   CO2 mmol/L 27.0   BUN mg/dL  13   CREATININE mg/dL 0.30*   CALCIUM mg/dL 6.8*   BILIRUBIN mg/dL 1.0   ALK PHOS U/L 72   ALT (SGPT) U/L 86*   AST (SGOT) U/L 46*   GLUCOSE mg/dL 119*       Impression: Acute disseminated encephalomyelitis  Change in mental status  Hypoxic respiratory failure  Breast cancer  Plan: I did have another long discussion with the patient's .  Patient's  is having a hard time making any type of decision and pushing the decision off.  I did tell him that coding the patient would cause more harm than helping.  He seems to understand this but again is not wanting to make a decision.        Kj Gipson,   02/13/18  3:31 PM

## 2018-02-13 NOTE — SIGNIFICANT NOTE
Nursing called to update hospitalist team on pt status. Pt  has left for the evening. Pt vitals are stable however she is on bipap. And still on amiodarone gtt. converted to sinus temporary after iv metoprolol but HR decreased into the 50s. BP stable, systolic in the 130s. Nursing reports pt desats significantly when they have the change her. To prevent this and skin breakdown, we will proceed with david for now. Pt is Full Code.  discussing code status with family according to nursing. We will continue bipap, amiodarone gtt and Prn IV metoprolol for rate control. And decrease metoprolol dose to 2.5 mg IV.       10:50pm  2/12/18  Virginia Dillon, DO

## 2018-02-14 NOTE — SIGNIFICANT NOTE
13:00   Palliative Team Member Meeting  Attendance:  Dr. Kj Davidson, APRN, ACHPN  Leila Khan, RN, CHPN  Maria Victoria Munguia, RN, CHPN  MALENA BellW

## 2018-02-14 NOTE — DISCHARGE SUMMARY
[facility] Medicine Services  DEATH SUMMARY    Patient Name: Cassidy Teran  : 1940  MRN: [id number]    Date of Admission: 1/15/2018  Date of Death:  18  Time of Death:  10:40 am    Primary Care Physician: Luke Baum MD  Consults     Date and Time Order Name Status Description    2018 1128 Inpatient Consult to Palliative Care MD Completed     2018 1424 Inpatient Consult to Neurology Completed     2018 1155 Inpatient Consult to Pulmonology Completed     2017 1905 Inpatient Consult to Hematology and Oncology Completed                    Summary of Hospital Events     Presenting Problem:   Influenza A [J10.1]  Sepsis, due to unspecified organism [A41.9]  Sepsis, due to unspecified organism [A41.9]    Active Hospital Problems (** Indicates Principal Problem)    Diagnosis Date Noted   • **ADEM (acute disseminated encephalomyelitis) (postinfectious) [G04.01] 2018     Priority: High   • Paroxysmal atrial fibrillation [I48.0] 2018     Priority: High   • Hyponatremia [E87.1] 2017     Priority: Medium   • Protein-calorie malnutrition [E46] 2018   • Influenza A [J10.1] 01/15/2018   • Traumatic hematoma of head [S00.93XA] 01/15/2018   • Chronic anemia [D64.9] 2017   • History of breast cancer [Z85.3] 2017   • Malignant neoplasm of central portion of right female breast [C50.111] 2016      Resolved Hospital Problems    Diagnosis Date Noted Date Resolved   • Respiratory alkalosis [E87.3] 2018   • Metabolic encephalopathy [G93.41] 01/15/2018 2018   • 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  She had a fall at home at the end of December status post right femoral neck fracture/pinning, was discharged to rehabilitation and was readmitted on 1/15/2018 with influenza.  Since that time she has had progressive altered mental status and is currently obtunded.       The patient is continued to be obtunded and unable to relay any significant history.      She was transferred to the ICU on  secondary to Afib w/ RVR. She was cardioverted by Cardiology. She received Versed for the cardioversion. Transferred out of icu  and picked up by hospitalists 18.    Patient battled worsening confusion, progressing to coma  Her MRi showed evidence of encephalitis- consistent with acute dissemminated encephalomyelitis. Despite treatment with IV steroids and IVIG patients neurologic status did not improve.  She continued to have autonomic instability with rapid a fib and bradycardia- as well as progressive episodes of apnea. Her clinical condition only worsened throughout her hospital stay.    She  on the morning of 18 at 10:40 am.      Official Cause of Death and any directly related diagnoses:    Influenza related encephalomyelitis     Electronically signed by Lizbeth Vizcaino MD, 18, 4:02 PM.

## 2018-02-14 NOTE — SIGNIFICANT NOTE
Exam confirms with auscultation zero audible heart tones and zero audible respirations. Ms.Sybil ORVILLE Teran was pronounced dead at 1040 on 2/14/2018 by Dr Lizbeth Vizcaino.      Valencia London RN  Clinical House Supervisor  2/14/2018 11:31 AM

## 2018-02-14 NOTE — PLAN OF CARE
Problem: Patient Care Overview (Adult)  Goal: Plan of Care Review  Outcome: Ongoing (interventions implemented as appropriate)   02/14/18 9700   Outcome Evaluation   Outcome Summary/Follow up Plan Apnea on BiPAP, comfort care provided, restless at times, medicated for presumed pain/agitation.
